# Patient Record
Sex: MALE | Race: WHITE | HISPANIC OR LATINO | URBAN - METROPOLITAN AREA
[De-identification: names, ages, dates, MRNs, and addresses within clinical notes are randomized per-mention and may not be internally consistent; named-entity substitution may affect disease eponyms.]

---

## 2017-08-05 ENCOUNTER — INPATIENT (INPATIENT)
Facility: HOSPITAL | Age: 82
LOS: 2 days | Discharge: HOME | DRG: 247 | End: 2017-08-08
Attending: STUDENT IN AN ORGANIZED HEALTH CARE EDUCATION/TRAINING PROGRAM | Admitting: STUDENT IN AN ORGANIZED HEALTH CARE EDUCATION/TRAINING PROGRAM
Payer: MEDICARE

## 2017-08-05 VITALS
SYSTOLIC BLOOD PRESSURE: 137 MMHG | DIASTOLIC BLOOD PRESSURE: 68 MMHG | OXYGEN SATURATION: 100 % | RESPIRATION RATE: 24 BRPM | HEART RATE: 56 BPM

## 2017-08-05 DIAGNOSIS — I21.3 ST ELEVATION (STEMI) MYOCARDIAL INFARCTION OF UNSPECIFIED SITE: ICD-10-CM

## 2017-08-05 DIAGNOSIS — R07.9 CHEST PAIN, UNSPECIFIED: ICD-10-CM

## 2017-08-05 DIAGNOSIS — E11.9 TYPE 2 DIABETES MELLITUS WITHOUT COMPLICATIONS: ICD-10-CM

## 2017-08-05 DIAGNOSIS — I10 ESSENTIAL (PRIMARY) HYPERTENSION: ICD-10-CM

## 2017-08-05 DIAGNOSIS — Z95.1 PRESENCE OF AORTOCORONARY BYPASS GRAFT: Chronic | ICD-10-CM

## 2017-08-05 DIAGNOSIS — E78.5 HYPERLIPIDEMIA, UNSPECIFIED: ICD-10-CM

## 2017-08-05 DIAGNOSIS — E87.8 OTHER DISORDERS OF ELECTROLYTE AND FLUID BALANCE, NOT ELSEWHERE CLASSIFIED: ICD-10-CM

## 2017-08-05 LAB
ALBUMIN SERPL ELPH-MCNC: 4.1 G/DL — SIGNIFICANT CHANGE UP (ref 3.3–5)
ALBUMIN SERPL ELPH-MCNC: 4.3 G/DL — SIGNIFICANT CHANGE UP (ref 3.3–5)
ALP SERPL-CCNC: 41 U/L — SIGNIFICANT CHANGE UP (ref 40–120)
ALP SERPL-CCNC: 42 U/L — SIGNIFICANT CHANGE UP (ref 40–120)
ALT FLD-CCNC: 17 U/L RC — SIGNIFICANT CHANGE UP (ref 10–45)
ALT FLD-CCNC: 18 U/L RC — SIGNIFICANT CHANGE UP (ref 10–45)
ANION GAP SERPL CALC-SCNC: 15 MMOL/L — SIGNIFICANT CHANGE UP (ref 5–17)
ANION GAP SERPL CALC-SCNC: 18 MMOL/L — HIGH (ref 5–17)
APTT BLD: 26.4 SEC — LOW (ref 27.5–37.4)
AST SERPL-CCNC: 22 U/L — SIGNIFICANT CHANGE UP (ref 10–40)
AST SERPL-CCNC: 24 U/L — SIGNIFICANT CHANGE UP (ref 10–40)
BASOPHILS # BLD AUTO: 0 K/UL — SIGNIFICANT CHANGE UP (ref 0–0.2)
BASOPHILS # BLD AUTO: 0 K/UL — SIGNIFICANT CHANGE UP (ref 0–0.2)
BASOPHILS NFR BLD AUTO: 0.1 % — SIGNIFICANT CHANGE UP (ref 0–2)
BASOPHILS NFR BLD AUTO: 0.2 % — SIGNIFICANT CHANGE UP (ref 0–2)
BILIRUB SERPL-MCNC: 0.5 MG/DL — SIGNIFICANT CHANGE UP (ref 0.2–1.2)
BILIRUB SERPL-MCNC: 0.6 MG/DL — SIGNIFICANT CHANGE UP (ref 0.2–1.2)
BLD GP AB SCN SERPL QL: NEGATIVE — SIGNIFICANT CHANGE UP
BUN SERPL-MCNC: 15 MG/DL — SIGNIFICANT CHANGE UP (ref 7–23)
BUN SERPL-MCNC: 17 MG/DL — SIGNIFICANT CHANGE UP (ref 7–23)
CALCIUM SERPL-MCNC: 9.6 MG/DL — SIGNIFICANT CHANGE UP (ref 8.4–10.5)
CALCIUM SERPL-MCNC: 9.7 MG/DL — SIGNIFICANT CHANGE UP (ref 8.4–10.5)
CHLORIDE SERPL-SCNC: 102 MMOL/L — SIGNIFICANT CHANGE UP (ref 96–108)
CHLORIDE SERPL-SCNC: 104 MMOL/L — SIGNIFICANT CHANGE UP (ref 96–108)
CHOLEST SERPL-MCNC: 118 MG/DL — SIGNIFICANT CHANGE UP (ref 10–199)
CK MB BLD-MCNC: 5.6 % — HIGH (ref 0–3.5)
CK MB CFR SERPL CALC: 4.6 NG/ML — SIGNIFICANT CHANGE UP (ref 0–6.7)
CK SERPL-CCNC: 126 U/L — SIGNIFICANT CHANGE UP (ref 30–200)
CK SERPL-CCNC: 82 U/L — SIGNIFICANT CHANGE UP (ref 30–200)
CO2 SERPL-SCNC: 18 MMOL/L — LOW (ref 22–31)
CO2 SERPL-SCNC: 20 MMOL/L — LOW (ref 22–31)
CREAT SERPL-MCNC: 1.06 MG/DL — SIGNIFICANT CHANGE UP (ref 0.5–1.3)
CREAT SERPL-MCNC: 1.2 MG/DL — SIGNIFICANT CHANGE UP (ref 0.5–1.3)
EOSINOPHIL # BLD AUTO: 0.1 K/UL — SIGNIFICANT CHANGE UP (ref 0–0.5)
EOSINOPHIL # BLD AUTO: 0.1 K/UL — SIGNIFICANT CHANGE UP (ref 0–0.5)
EOSINOPHIL NFR BLD AUTO: 0.6 % — SIGNIFICANT CHANGE UP (ref 0–6)
EOSINOPHIL NFR BLD AUTO: 1 % — SIGNIFICANT CHANGE UP (ref 0–6)
GAS PNL BLDV: SIGNIFICANT CHANGE UP
GLUCOSE SERPL-MCNC: 136 MG/DL — HIGH (ref 70–99)
GLUCOSE SERPL-MCNC: 307 MG/DL — HIGH (ref 70–99)
HBA1C BLD-MCNC: 7.2 % — HIGH (ref 4–5.6)
HCT VFR BLD CALC: 38.9 % — LOW (ref 39–50)
HCT VFR BLD CALC: 38.9 % — LOW (ref 39–50)
HDLC SERPL-MCNC: 39 MG/DL — LOW (ref 40–125)
HGB BLD-MCNC: 13.2 G/DL — SIGNIFICANT CHANGE UP (ref 13–17)
HGB BLD-MCNC: 13.6 G/DL — SIGNIFICANT CHANGE UP (ref 13–17)
INR BLD: 0.96 RATIO — SIGNIFICANT CHANGE UP (ref 0.88–1.16)
LIDOCAIN IGE QN: 46 U/L — SIGNIFICANT CHANGE UP (ref 7–60)
LIPID PNL WITH DIRECT LDL SERPL: 47 MG/DL — SIGNIFICANT CHANGE UP
LYMPHOCYTES # BLD AUTO: 2.4 K/UL — SIGNIFICANT CHANGE UP (ref 1–3.3)
LYMPHOCYTES # BLD AUTO: 20 % — SIGNIFICANT CHANGE UP (ref 13–44)
LYMPHOCYTES # BLD AUTO: 29.3 % — SIGNIFICANT CHANGE UP (ref 13–44)
LYMPHOCYTES # BLD AUTO: 3.2 K/UL — SIGNIFICANT CHANGE UP (ref 1–3.3)
MAGNESIUM SERPL-MCNC: 1.4 MG/DL — LOW (ref 1.6–2.6)
MCHC RBC-ENTMCNC: 32 PG — SIGNIFICANT CHANGE UP (ref 27–34)
MCHC RBC-ENTMCNC: 33.2 PG — SIGNIFICANT CHANGE UP (ref 27–34)
MCHC RBC-ENTMCNC: 33.8 GM/DL — SIGNIFICANT CHANGE UP (ref 32–36)
MCHC RBC-ENTMCNC: 35 GM/DL — SIGNIFICANT CHANGE UP (ref 32–36)
MCV RBC AUTO: 94.4 FL — SIGNIFICANT CHANGE UP (ref 80–100)
MCV RBC AUTO: 95 FL — SIGNIFICANT CHANGE UP (ref 80–100)
MONOCYTES # BLD AUTO: 1 K/UL — HIGH (ref 0–0.9)
MONOCYTES # BLD AUTO: 1 K/UL — HIGH (ref 0–0.9)
MONOCYTES NFR BLD AUTO: 8.2 % — SIGNIFICANT CHANGE UP (ref 2–14)
MONOCYTES NFR BLD AUTO: 9.6 % — SIGNIFICANT CHANGE UP (ref 2–14)
NEUTROPHILS # BLD AUTO: 6.5 K/UL — SIGNIFICANT CHANGE UP (ref 1.8–7.4)
NEUTROPHILS # BLD AUTO: 8.7 K/UL — HIGH (ref 1.8–7.4)
NEUTROPHILS NFR BLD AUTO: 60 % — SIGNIFICANT CHANGE UP (ref 43–77)
NEUTROPHILS NFR BLD AUTO: 70.9 % — SIGNIFICANT CHANGE UP (ref 43–77)
NT-PROBNP SERPL-SCNC: 222 PG/ML — SIGNIFICANT CHANGE UP (ref 0–300)
NT-PROBNP SERPL-SCNC: 540 PG/ML — HIGH (ref 0–300)
PHOSPHATE SERPL-MCNC: 1.9 MG/DL — LOW (ref 2.5–4.5)
PLATELET # BLD AUTO: 247 K/UL — SIGNIFICANT CHANGE UP (ref 150–400)
PLATELET # BLD AUTO: 257 K/UL — SIGNIFICANT CHANGE UP (ref 150–400)
POTASSIUM SERPL-MCNC: 4.1 MMOL/L — SIGNIFICANT CHANGE UP (ref 3.5–5.3)
POTASSIUM SERPL-MCNC: 4.7 MMOL/L — SIGNIFICANT CHANGE UP (ref 3.5–5.3)
POTASSIUM SERPL-SCNC: 4.1 MMOL/L — SIGNIFICANT CHANGE UP (ref 3.5–5.3)
POTASSIUM SERPL-SCNC: 4.7 MMOL/L — SIGNIFICANT CHANGE UP (ref 3.5–5.3)
PROT SERPL-MCNC: 6.6 G/DL — SIGNIFICANT CHANGE UP (ref 6–8.3)
PROT SERPL-MCNC: 6.8 G/DL — SIGNIFICANT CHANGE UP (ref 6–8.3)
PROTHROM AB SERPL-ACNC: 10.4 SEC — SIGNIFICANT CHANGE UP (ref 9.8–12.7)
RBC # BLD: 4.09 M/UL — LOW (ref 4.2–5.8)
RBC # BLD: 4.12 M/UL — LOW (ref 4.2–5.8)
RBC # FLD: 12 % — SIGNIFICANT CHANGE UP (ref 10.3–14.5)
RBC # FLD: 12 % — SIGNIFICANT CHANGE UP (ref 10.3–14.5)
RH IG SCN BLD-IMP: POSITIVE — SIGNIFICANT CHANGE UP
SODIUM SERPL-SCNC: 137 MMOL/L — SIGNIFICANT CHANGE UP (ref 135–145)
SODIUM SERPL-SCNC: 140 MMOL/L — SIGNIFICANT CHANGE UP (ref 135–145)
TOTAL CHOLESTEROL/HDL RATIO MEASUREMENT: 3 RATIO — LOW (ref 3.4–9.6)
TRIGL SERPL-MCNC: 159 MG/DL — HIGH (ref 10–149)
TROPONIN T SERPL-MCNC: 0.1 NG/ML — HIGH (ref 0–0.06)
TROPONIN T SERPL-MCNC: <0.01 NG/ML — SIGNIFICANT CHANGE UP (ref 0–0.06)
WBC # BLD: 10.8 K/UL — HIGH (ref 3.8–10.5)
WBC # BLD: 12.2 K/UL — HIGH (ref 3.8–10.5)
WBC # FLD AUTO: 10.8 K/UL — HIGH (ref 3.8–10.5)
WBC # FLD AUTO: 12.2 K/UL — HIGH (ref 3.8–10.5)

## 2017-08-05 PROCEDURE — 93010 ELECTROCARDIOGRAM REPORT: CPT | Mod: GC

## 2017-08-05 PROCEDURE — 71010: CPT | Mod: 26

## 2017-08-05 PROCEDURE — 99223 1ST HOSP IP/OBS HIGH 75: CPT | Mod: GC

## 2017-08-05 PROCEDURE — 93010 ELECTROCARDIOGRAM REPORT: CPT

## 2017-08-05 PROCEDURE — 99291 CRITICAL CARE FIRST HOUR: CPT | Mod: 25,GC

## 2017-08-05 RX ORDER — HEPARIN SODIUM 5000 [USP'U]/ML
INJECTION INTRAVENOUS; SUBCUTANEOUS
Qty: 25000 | Refills: 0 | Status: DISCONTINUED | OUTPATIENT
Start: 2017-08-05 | End: 2017-08-07

## 2017-08-05 RX ORDER — TICAGRELOR 90 MG/1
90 TABLET ORAL
Qty: 0 | Refills: 0 | Status: DISCONTINUED | OUTPATIENT
Start: 2017-08-05 | End: 2017-08-08

## 2017-08-05 RX ORDER — HEPARIN SODIUM 5000 [USP'U]/ML
5000 INJECTION INTRAVENOUS; SUBCUTANEOUS ONCE
Qty: 0 | Refills: 0 | Status: COMPLETED | OUTPATIENT
Start: 2017-08-05 | End: 2017-08-05

## 2017-08-05 RX ORDER — FENTANYL CITRATE 50 UG/ML
50 INJECTION INTRAVENOUS ONCE
Qty: 0 | Refills: 0 | Status: DISCONTINUED | OUTPATIENT
Start: 2017-08-05 | End: 2017-08-05

## 2017-08-05 RX ORDER — ASPIRIN/CALCIUM CARB/MAGNESIUM 324 MG
324 TABLET ORAL ONCE
Qty: 0 | Refills: 0 | Status: COMPLETED | OUTPATIENT
Start: 2017-08-05 | End: 2017-08-05

## 2017-08-05 RX ORDER — PANTOPRAZOLE SODIUM 20 MG/1
40 TABLET, DELAYED RELEASE ORAL
Qty: 0 | Refills: 0 | Status: DISCONTINUED | OUTPATIENT
Start: 2017-08-05 | End: 2017-08-08

## 2017-08-05 RX ORDER — SIMETHICONE 80 MG/1
80 TABLET, CHEWABLE ORAL ONCE
Qty: 0 | Refills: 0 | Status: COMPLETED | OUTPATIENT
Start: 2017-08-05 | End: 2017-08-05

## 2017-08-05 RX ORDER — MAGNESIUM SULFATE 500 MG/ML
2 VIAL (ML) INJECTION ONCE
Qty: 0 | Refills: 0 | Status: COMPLETED | OUTPATIENT
Start: 2017-08-05 | End: 2017-08-05

## 2017-08-05 RX ORDER — ATORVASTATIN CALCIUM 80 MG/1
80 TABLET, FILM COATED ORAL AT BEDTIME
Qty: 0 | Refills: 0 | Status: DISCONTINUED | OUTPATIENT
Start: 2017-08-05 | End: 2017-08-08

## 2017-08-05 RX ORDER — ASPIRIN/CALCIUM CARB/MAGNESIUM 324 MG
81 TABLET ORAL DAILY
Qty: 0 | Refills: 0 | Status: DISCONTINUED | OUTPATIENT
Start: 2017-08-05 | End: 2017-08-08

## 2017-08-05 RX ORDER — HEPARIN SODIUM 5000 [USP'U]/ML
4000 INJECTION INTRAVENOUS; SUBCUTANEOUS ONCE
Qty: 0 | Refills: 0 | Status: DISCONTINUED | OUTPATIENT
Start: 2017-08-05 | End: 2017-08-05

## 2017-08-05 RX ORDER — TICAGRELOR 90 MG/1
180 TABLET ORAL ONCE
Qty: 0 | Refills: 0 | Status: COMPLETED | OUTPATIENT
Start: 2017-08-05 | End: 2017-08-05

## 2017-08-05 RX ORDER — HEPARIN SODIUM 5000 [USP'U]/ML
4000 INJECTION INTRAVENOUS; SUBCUTANEOUS EVERY 6 HOURS
Qty: 0 | Refills: 0 | Status: DISCONTINUED | OUTPATIENT
Start: 2017-08-05 | End: 2017-08-07

## 2017-08-05 RX ORDER — SODIUM CHLORIDE 9 MG/ML
3 INJECTION INTRAMUSCULAR; INTRAVENOUS; SUBCUTANEOUS ONCE
Qty: 0 | Refills: 0 | Status: COMPLETED | OUTPATIENT
Start: 2017-08-05 | End: 2017-08-05

## 2017-08-05 RX ADMIN — HEPARIN SODIUM 800 UNIT(S)/HR: 5000 INJECTION INTRAVENOUS; SUBCUTANEOUS at 17:31

## 2017-08-05 RX ADMIN — TICAGRELOR 90 MILLIGRAM(S): 90 TABLET ORAL at 20:39

## 2017-08-05 RX ADMIN — SIMETHICONE 80 MILLIGRAM(S): 80 TABLET, CHEWABLE ORAL at 18:21

## 2017-08-05 RX ADMIN — Medication 324 MILLIGRAM(S): at 12:22

## 2017-08-05 RX ADMIN — TICAGRELOR 180 MILLIGRAM(S): 90 TABLET ORAL at 13:02

## 2017-08-05 RX ADMIN — FENTANYL CITRATE 50 MICROGRAM(S): 50 INJECTION INTRAVENOUS at 12:18

## 2017-08-05 RX ADMIN — SODIUM CHLORIDE 3 MILLILITER(S): 9 INJECTION INTRAMUSCULAR; INTRAVENOUS; SUBCUTANEOUS at 12:10

## 2017-08-05 RX ADMIN — Medication 50 GRAM(S): at 20:39

## 2017-08-05 RX ADMIN — HEPARIN SODIUM 5000 UNIT(S): 5000 INJECTION INTRAVENOUS; SUBCUTANEOUS at 13:01

## 2017-08-05 RX ADMIN — Medication 63.75 MILLIMOLE(S): at 22:05

## 2017-08-05 RX ADMIN — ATORVASTATIN CALCIUM 80 MILLIGRAM(S): 80 TABLET, FILM COATED ORAL at 13:01

## 2017-08-05 NOTE — H&P ADULT - NSHPREVIEWOFSYSTEMS_GEN_ALL_CORE
REVIEW OF SYSTEMS:    CONSTITUTIONAL: No weakness, fevers or chills  EYES/ENT: No visual changes;  No vertigo or throat pain   NECK: No pain or stiffness  RESPIRATORY: No cough, wheezing, hemoptysis; No shortness of breath  CARDIOVASCULAR: No chest pain or palpitations  GASTROINTESTINAL: No abdominal or epigastric pain. No nausea, vomiting, or hematemesis; No diarrhea or constipation. No melena or hematochezia.  GENITOURINARY: No dysuria, frequency or hematuria  NEUROLOGICAL: No numbness or weakness  SKIN: No itching, burning, rashes, or lesions   All other review of systems is negative unless indicated above. REVIEW OF SYSTEMS:    CONSTITUTIONAL: No weakness, fevers or chills  EYES/ENT: No visual changes;  No vertigo or throat pain   NECK: No pain or stiffness  RESPIRATORY: No cough, wheezing, hemoptysis; No shortness of breath  CARDIOVASCULAR: No chest pain or palpitations  GASTROINTESTINAL: No abdominal or epigastric pain. No nausea, vomiting, or hematemesis; No diarrhea or constipation. No melena or hematochezia. + burping and bloating.   GENITOURINARY: No dysuria, frequency or hematuria  NEUROLOGICAL: No numbness or weakness  SKIN: No itching, burning, rashes, or lesions   All other review of systems is negative unless indicated above.

## 2017-08-05 NOTE — ED PROVIDER NOTE - PHYSICAL EXAMINATION
AAOX3, Moderate distress 2/2 pain. NCAT, PERRL, +pallor, MMM, Neck Supple, Hr bradycardic, regular, no murmur appreciated. CTABL, Midline sternal scar. ABD S/NT/ND No CVAT, EXT warm, well perfused, No Edema, Distal Pulses Intact, No Rash,  MAEx4, Sensation to soft touch grossly intact, normal strength/tone.

## 2017-08-05 NOTE — ED PROVIDER NOTE - CRITICAL CARE PROVIDED
consultation with other physicians/consult w/ pt's family directly relating to pts condition/documentation/direct patient care (not related to procedure)/additional history taking/interpretation of diagnostic studies

## 2017-08-05 NOTE — H&P ADULT - ASSESSMENT
83 y/o M w/ pmhx of CABG x2 (1988), PCI w/ 2-3stents, HTN, T2DM. Presented to the ED w/ chest pain admitted for STEMI

## 2017-08-05 NOTE — ED ADULT NURSE NOTE - OBJECTIVE STATEMENT
85 y/o M pt w/ pmhx of DM2, CAD s/p CABG, stentx3 on Plavix, present to ED for 10/10 substernal chest pain that radiating to both arms a/w sob that began 2 hours. Pt was using a  on the floor when pain began. Pt became diaphoretic, SOB along with chest pain. No nausea, vomiting or dizziness. Pt took 2 0.4mg of Nitro at home, with mild relief. Pt states he has been otherwise well but felt tired yesterday. Had some SOB with exertion yesterday. No fever/chills, no change in vision, no throat pain, no jvd, +sob, no leg swelling or calf pain, no recent decrease in exercise tolerance, no recent travel,  no abdominal pain, no nausea/vomiting,  no dysuria, no joint pain, no rashes, no focal numbness or weakness, no melena, here ED pt arrival clutch chest, SOB, EKG done and given to MD, placed on CM NSR, labs drawn and sent, pt given fentanyl for pain, 324mg of aspirin, ED MD and cardiologist at bedside

## 2017-08-05 NOTE — ED PROVIDER NOTE - MEDICAL DECISION MAKING DETAILS
Chest pain consistent with ACS and CAD risk factors 1) serial EKGs/CXR/ASA/O2/cardiac monitor/LABS/nitro vs analgesia prn CP 2) reassess 3) Admit to telemetry, pending cards consult. Chest pain consistent with ACS and CAD risk factors 1) serial EKGs/CXR/ASA/O2/cardiac monitor/LABS/nitro vs analgesia prn CP 2) reassess 3) Admit to telemetry, pending cards consult.    ALEM Morton MD: 85 y/o male with PMH DM2, CAD s/p CABG and 3 stents, on plavix, who p/w 10/10 SSCP radiating to both arms a/w sob that began 2 hours pta. Per pt and wife, pt was exerting himself by using a  to clean the home when CP began. No nausea, vomiting, diaphoresis. Pt states he has been otherwise well but felt tired yesterday. Did not take ASA prior to arrival. No known h/o GIB or life-threatening bleeding, kidney injury or CVA. Pt arrived in triage clutching his chest, appearing very uncomfortable. STAT EKG concerning for 1mm STEs in V1 and V2. Cardiology consulted for a STEMI and arrived immediately. Per their recommendations, pt given 325mg ASA, 180mg brillinta, 5000U heparin and 80mg atorvastatin. Cardiology fellow discussed pt. with Cardiology attending, and they plan to admit pt to CCU, obtain troponin and if + and/or pt. continues to have ongoing CP, they will take pt to cardiac cath lab for PCI.

## 2017-08-05 NOTE — CONSULT NOTE ADULT - SUBJECTIVE AND OBJECTIVE BOX
Patient seen and evaluated @ [facility] ED 26  Chief Complaint: chest pain    HPI:  84 year old man w/ hx CABG 2V 1988, subsequent PCI last 2014    PMH:     PSH:     Medications:   heparin  Injectable 5000 Unit(s) IV Push once  ticagrelor 180 milliGRAM(s) Oral once  atorvastatin 80 milliGRAM(s) Oral at bedtime    Allergies:  No Known Allergies    FAMILY HISTORY:    Social History:  Smoking:  Alcohol:  Drugs:    Review of Systems:  Constitutional: [ ] Fever [ ] Chills [ ] Fatigue [ ] Weight change   HEENT: [ ] Blurred vision [ ] Eye Pain [ ] Headache [ ] Runny nose [ ] Sore Throat   Respiratory: [ ] Cough [ ] Wheezing [ ] Shortness of breath  Cardiovascular: [ ] Chest Pain [ ] Palpitations [ ] POPE [ ] PND [ ] Orthopnea  Gastrointestinal: [ ] Abdominal Pain [ ] Diarrhea [ ] Constipation [ ] Hemorrhoids [ ] Nausea [ ] Vomiting  Genitourinary: [ ] Nocturia [ ] Dysuria [ ] Incontinence  Extremities: [ ] Swelling [ ] Joint Pain  Neurologic: [ ] Focal deficit [ ] Paresthesias [ ] Syncope  Lymphatic: [ ] Swelling [ ] Lymphadenopathy   Skin: [ ] Rash [ ] Ecchymoses [ ] Wounds [ ] Lesions  Psychiatry: [ ] Depression [ ] Suicidal/Homicidal Ideation [ ] Anxiety [ ] Sleep Disturbances  [ ] 10 point review of systems is otherwise negative except as mentioned above            [ ]Unable to obtain    Physical Exam:  T(C): 36.4 (08-05-17 @ 12:30), Max: 36.4 (08-05-17 @ 12:30)  HR: 56 (08-05-17 @ 12:13) (56 - 56)  BP: 152/72 (08-05-17 @ 12:30) (137/68 - 152/72)  RR: 18 (08-05-17 @ 12:30) (18 - 24)  SpO2: 99% (08-05-17 @ 12:30) (99% - 100%)  Wt(kg): --    Daily     Daily     Appearance: [ ] Normal [ ] NAD  Eyes: [ ] PERRL [ ] EOMI  HENT: [ ] Normal oral muscosa [ ]NC/AT  Cardiovascular: [ ] S1 [ ] S2 [ ] RRR [ ] No m/r/g [ ]No edema [ ] no JVP  Procedural Access Site: [ ] No hematoma [ ] Non-tender to palpation [ ] 2+ pulse [ ] No bruit [ ] No Ecchymosis  Respiratory: [ ] Clear to auscultation bilaterally  Gastrointestinal: [ ] Soft [ ] Non-tender [ ] Non-distended [ ] BS+  Musculoskeletal: [ ] No clubbing [ ] No joint deformity   Neurologic: [ ] Non-focal  Lymphatic: [ ] No lymphadenopathy  Psychiatry: [ ] AAOx3 [ ] Mood & affect appropriate  Skin: [ ] No rashes [ ] No ecchymoses [ ] No cyanosis    Cardiovascular Diagnostic Testing:  ECG:    Echo:    Stress Testing:    Cath:    Interpretation of Telemetry:    Imaging:    Labs:                        13.2   10.8  )-----------( 257      ( 05 Aug 2017 12:23 )             38.9           PT/INR - ( 05 Aug 2017 12:23 )   PT: 10.4 sec;   INR: 0.96 ratio         PTT - ( 05 Aug 2017 12:23 )  PTT:26.4 sec Patient seen and evaluated @ [facility] ED 26  Chief Complaint: chest pain    HPI:  84 year old man w/ hx CABG 2V 1988, subsequent PCI last 2014, DM presents w/ chest pain.    He explains that two hours ago started cleaning with rented steam machine. Laments "I should not have pushed myself so hard." He developed substernal chest pain, pressure-like "9.99" out of 10 rad down both arms associated with SOB and diaphoresis per wife. Pain lasted until patient received fentanyl in ER. Denies orthopnea, fatigue, n/v, LH/dizziness, nor LOC. Up to this point had been feeling well, which surprised him because he is often fatigued.    Cards [employer] [facility]      Medications:   heparin  Injectable 5000 Unit(s) IV Push once  ticagrelor 180 milliGRAM(s) Oral once  atorvastatin 80 milliGRAM(s) Oral at bedtime    home:  plavix 75  losartan 50  atenolol 25  atorvastatin 40  trajenta 5  metformin 500  glimepiride 4  vit C      Allergies:  No Known Allergies    FAMILY HISTORY:    Social History:  Smoking: former smoker   Alcohol: denies  Drugs: denies    Review of Systems: see hpi  Constitutional: [ ] Fever [ ]  Chills [ ] Fatigue [ ] Weight change   HEENT: [ ] Blurred vision [ ] Eye Pain [ ] Headache [ ] Runny nose [ ] Sore Throat   Respiratory: [ ] Cough [ ] Wheezing [ ] Shortness of breath  Cardiovascular: [ ] Chest Pain [ ] Palpitations [ ] POPE [ ] PND [ ] Orthopnea  Gastrointestinal: [ ] Abdominal Pain [ ] Diarrhea [ ] Constipation [ ] Hemorrhoids [ ] Nausea [ ] Vomiting  Genitourinary: [ ] Nocturia [ ] Dysuria [ ] Incontinence  Extremities: [ ] Swelling [ ] Joint Pain  Neurologic: [ ] Focal deficit [ ] Paresthesias [ ] Syncope  Lymphatic: [ ] Swelling [ ] Lymphadenopathy   Skin: [ ] Rash [ ] Ecchymoses [ ] Wounds [ ] Lesions  Psychiatry: [ ] Depression [ ] Suicidal/Homicidal Ideation [ ] Anxiety [ ] Sleep Disturbances  [ ] 10 point review of systems is otherwise negative except as mentioned above            [ ]Unable to obtain    Physical Exam:  T(C): 36.4 (08-05-17 @ 12:30), Max: 36.4 (08-05-17 @ 12:30)  HR: 56 (08-05-17 @ 12:13) (56 - 56)  BP: 152/72 (08-05-17 @ 12:30) (137/68 - 152/72)  RR: 18 (08-05-17 @ 12:30) (18 - 24)  SpO2: 99% (08-05-17 @ 12:30) (99% - 100%)  Wt(kg): --    Daily     Daily     Appearance: [x ] Normal [x ] NAD  Eyes: [ x] PERRL [x ] EOMI  HENT: [x ] Normal oral muscosa [x ]NC/AT  Cardiovascular: [x ] S1 [x ] S2 [x ] RRR [ x] No m/r/g [x ]No edema [x ] no JVP  Respiratory: [x ] Clear to auscultation bilaterally  Gastrointestinal: [x ] Soft [x ] Non-tender [x ] Non-distended [x ] BS+  Musculoskeletal: [x ] No clubbing [x ] No joint deformity   Neurologic: [x ] Non-focal  Lymphatic: [x ] No lymphadenopathy  Psychiatry: [x ] AAOx3 [x ] Mood & affect appropriate  Skin: [ x] No rashes [x ] No ecchymoses [x ] No cyanosis    Cardiovascular Diagnostic Testing:  ECG: sinus 50     Echo:    Stress Testing:    Cath:    Interpretation of Telemetry:    Imaging:    Labs:                        13.2   10.8  )-----------( 257      ( 05 Aug 2017 12:23 )             38.9           PT/INR - ( 05 Aug 2017 12:23 )   PT: 10.4 sec;   INR: 0.96 ratio         PTT - ( 05 Aug 2017 12:23 )  PTT:26.4 sec Patient seen and evaluated @ [facility] ED 26  Chief Complaint: chest pain    HPI:  84 year old man w/ hx CABG 2V 1988, subsequent PCI last 2014, DM presents w/ chest pain.    He explains that two hours ago started cleaning with rented steam machine. Laments "I should not have pushed myself so hard." He developed substernal chest pain, pressure-like "9.99" out of 10 rad down both arms associated with SOB and diaphoresis per wife. Pain lasted until patient received fentanyl in ER. Denies orthopnea, fatigue, n/v, LH/dizziness, nor LOC. Up to this point had been feeling well, which surprised him because he is often fatigued.    Cards [employer] [facility]      Medications:   heparin  Injectable 5000 Unit(s) IV Push once  ticagrelor 180 milliGRAM(s) Oral once  atorvastatin 80 milliGRAM(s) Oral at bedtime    home:  plavix 75  losartan 50  atenolol 25  atorvastatin 40  trajenta 5  metformin 500  glimepiride 4  vit C      Allergies:  No Known Allergies    FAMILY HISTORY:    Social History:  Smoking: former smoker   Alcohol: denies  Drugs: denies    Review of Systems: see hpi  Constitutional: [ ] Fever [ ]  Chills [ ] Fatigue [ ] Weight change   HEENT: [ ] Blurred vision [ ] Eye Pain [ ] Headache [ ] Runny nose [ ] Sore Throat   Respiratory: [ ] Cough [ ] Wheezing [ ] Shortness of breath  Cardiovascular: [ ] Chest Pain [ ] Palpitations [ ] POPE [ ] PND [ ] Orthopnea  Gastrointestinal: [ ] Abdominal Pain [ ] Diarrhea [ ] Constipation [ ] Hemorrhoids [ ] Nausea [ ] Vomiting  Genitourinary: [ ] Nocturia [ ] Dysuria [ ] Incontinence  Extremities: [ ] Swelling [ ] Joint Pain  Neurologic: [ ] Focal deficit [ ] Paresthesias [ ] Syncope  Lymphatic: [ ] Swelling [ ] Lymphadenopathy   Skin: [ ] Rash [ ] Ecchymoses [ ] Wounds [ ] Lesions  Psychiatry: [ ] Depression [ ] Suicidal/Homicidal Ideation [ ] Anxiety [ ] Sleep Disturbances  [ ] 10 point review of systems is otherwise negative except as mentioned above            [ ]Unable to obtain    Physical Exam:  T(C): 36.4 (08-05-17 @ 12:30), Max: 36.4 (08-05-17 @ 12:30)  HR: 56 (08-05-17 @ 12:13) (56 - 56)  BP: 152/72 (08-05-17 @ 12:30) (137/68 - 152/72)  RR: 18 (08-05-17 @ 12:30) (18 - 24)  SpO2: 99% (08-05-17 @ 12:30) (99% - 100%)  Wt(kg): --    Daily     Daily     Appearance: [x ] Normal [x ] NAD  Eyes: [ x] PERRL [x ] EOMI  HENT: [x ] Normal oral muscosa [x ]NC/AT  Cardiovascular: [x ] S1 [x ] S2 [x ] RRR [ x] No m/r/g [x ]No edema [x ] no JVP  Respiratory: [x ] Clear to auscultation bilaterally  Gastrointestinal: [x ] Soft [x ] Non-tender [x ] Non-distended [x ] BS+  Musculoskeletal: [x ] No clubbing [x ] No joint deformity   Neurologic: [x ] Non-focal  Lymphatic: [x ] No lymphadenopathy  Psychiatry: [x ] AAOx3 [x ] Mood & affect appropriate  Skin: [ x] No rashes [x ] No ecchymoses [x ] No cyanosis    Cardiovascular Diagnostic Testing:  ECG: sinus 50, incomplete RBBB, septal 1mm NATHANIEL    Labs:                        13.2   10.8  )-----------( 257      ( 05 Aug 2017 12:23 )             38.9           PT/INR - ( 05 Aug 2017 12:23 )   PT: 10.4 sec;   INR: 0.96 ratio         PTT - ( 05 Aug 2017 12:23 )  PTT:26.4 sec

## 2017-08-05 NOTE — H&P ADULT - PROBLEM SELECTOR PLAN 1
s/p ticagrelor load give 90mg tonight then 90mg BID  s/p ASA load c/w 81mg OD   c/w heparin ggt   atorvastatin 80mg OD  -trend CE's q8 until down trending.   continuous telemetry monitoring   -vital checks per CCU routine.    -eventual cardiac cath

## 2017-08-05 NOTE — CONSULT NOTE ADULT - PROBLEM SELECTOR RECOMMENDATION 9
-pain resolved after fentanyl  -hemodynamically stable  -trend CE/EKG  -ticagrelor 180, asa 325, hep 5000 followed by hep gtt  -TTE  -discussed w interventionist will consider ICA   -records from [facility]  -admit and monitor CCU    [id number]

## 2017-08-05 NOTE — H&P ADULT - NSHPPHYSICALEXAM_GEN_ALL_CORE
Gen: awake, alert  HENT: neck soft / supple  Lymph: no LAD noted in neck  Eye: PERRL, sclerae anicteric  CV: bradycardic, RRR, +systolic murmur   Pulm: CTAB, no w/r/r auscultated  Abd: +BS, soft, NT, ND  Skin: warm, dry  Ext: no LE edema  Neuro: answering questions appropriately, following commands appropriately, recent and remote memory intact  Psych: normal mood / affect

## 2017-08-05 NOTE — H&P ADULT - HISTORY OF PRESENT ILLNESS
83 y/o M w/ pmhx of CABG 85 y/o M w/ pmhx of CABG x2 (1988), PCI w/ 2-3? stents, HTN, T2DM (noninsulin dependent) 85 y/o M w/ pmhx of CABG x2 (1988), PCI w/ 2-3? stents, HTN, T2DM (noninsulin dependent). Presented to ED w/ chest pain. Pain started when the patient was vaccuming the sofa. 8-10/10 in intensity sharp radiating to both arms accompanied w/ diaphoresis. EMS was called and he was taken to the ED.     In the ED EKG was performed and showed 83 y/o M w/ pmhx of CABG x2 (1988), PCI w/ 2-3? stents last PCI in 2014, HTN, T2DM (noninsulin dependent). Presented to ED w/ chest pain. Pain started when the patient was vaccuming the sofa. 8-10/10 in intensity sharp radiating to both arms accompanied w/ diaphoresis. EMS was called and he was taken to the ED.     In the ED EKG was performed and showed 1mm elevation in v2; <1mm elevation in v1 in the setting of right bundle branch block. GIven 325mg ASA, 180mg brillinta, 5000U heparin and 80mg Atorvastatin. CE's in ED had elevated CPK mass assay % increase but other wise negative.    Patient txfered to CCU for monitoring.

## 2017-08-05 NOTE — ED PROVIDER NOTE - OBJECTIVE STATEMENT
83yo M hx of DMII, CAD s/p CABG on plavix pw 10/10 SSCP radiating to both arms a/w sob that began 2 hours pta. Pt was 85yo M hx of DMII, CAD s/p CABG on plavix pw 10/10 SSCP radiating to both arms a/w sob that began 2 hours pta. Pt was using a  on the floor when pain began. Progressive onset. No nausea, vomiting, diaphoresis. Pt states he has been otherwise well but felt tired yesterday. Did not take ASA prior to arrival.  No fever/chills, no change in vision, no throat pain, no jvd, +sob, no leg swelling or calf pain, no orthopnea, no pnd, no recent decrease in exercise tolerance, no recent travel, no cancer hx, unknown prior hx of dvt/blood clot,  no abdominal pain, no nausea/vomiting,  no dysuria, no joint pain, no rashes, no focal numbness or weakness, no known mental health issues, no melena, no hematochezia, no a/c

## 2017-08-05 NOTE — CONSULT NOTE ADULT - ASSESSMENT
84 year old man w/ hx CABG 2V 1988, subsequent PCI last 2014, DM presents w/ chest pain concerning for STEMI

## 2017-08-05 NOTE — CHART NOTE - NSCHARTNOTEFT_GEN_A_CORE
====================  NEW EVENTS:  ====================  Loaded with ASA, Brilinta, Heparin. Admitted to CCU for monitoring.    ====================  SUMMARY:  ====================  HPI:  85 y/o M w/ pmhx of CABG x2 (1988), PCI w/ 2-3? stents last PCI in 2014, HTN, T2DM (noninsulin dependent). Presented to ED w/ chest pain. Pain started when the patient was vaccuming the sofa. 8-10/10 in intensity sharp radiating to both arms accompanied w/ diaphoresis. EMS was called and he was taken to the ED.     In the ED EKG was performed and showed 1mm elevation in v2; <1mm elevation in v1 in the setting of right bundle branch block. GIven 325mg ASA, 180mg brillinta, 5000U heparin and 80mg Atorvastatin. CE's in ED had elevated CPK mass assay % increase but other wise negative.    Patient txfered to CCU for monitoring. (05 Aug 2017 16:11)      ====================  VITALS:  ====================    ICU Vital Signs Last 24 Hrs  T(C): 36.8 (05 Aug 2017 19:35), Max: 37 (05 Aug 2017 15:35)  T(F): 98.3 (05 Aug 2017 19:35), Max: 98.6 (05 Aug 2017 15:35)  HR: 50 (05 Aug 2017 21:30) (50 - 68)  BP: 114/65 (05 Aug 2017 21:30) (114/65 - 157/66)  BP(mean): 78 (05 Aug 2017 21:30) (78 - 105)  ABP: --  ABP(mean): --  RR: 17 (05 Aug 2017 21:30) (17 - 24)  SpO2: 96% (05 Aug 2017 21:30) (96% - 100%)      I&O's Summary    05 Aug 2017 07:01  -  05 Aug 2017 22:21  --------------------------------------------------------  IN: 632.5 mL / OUT: 750 mL / NET: -117.5 mL        Adult Advanced Hemodynamics Last 24 Hrs  CVP(mm Hg): --  CVP(cm H2O): --  CO: --  CI: --  PA: --  PA(mean): --  PCWP: --  SVR: --  SVRI: --  PVR: --  PVRI: --        ====================  LABS:  ====================                          13.6   12.2  )-----------( 247      ( 05 Aug 2017 18:51 )             38.9     08-05    140  |  104  |  15  ----------------------------<  136<H>  4.1   |  18<L>  |  1.06    Ca    9.6      05 Aug 2017 18:51  Phos  1.9     08-05  Mg     1.4     08-05    TPro  6.6  /  Alb  4.1  /  TBili  0.5  /  DBili  x   /  AST  24  /  ALT  18  /  AlkPhos  42  08-05    PT/INR - ( 05 Aug 2017 12:23 )   PT: 10.4 sec;   INR: 0.96 ratio         PTT - ( 05 Aug 2017 12:23 )  PTT:26.4 sec  Creatine Kinase, Serum: 126 U/L (08-05-17 @ 18:51)  Troponin T, Serum: 0.10 ng/mL <H> (08-05-17 @ 18:51)  Creatine Kinase, Serum: 82 U/L (08-05-17 @ 12:23)  Troponin T, Serum: <0.01 ng/mL (08-05-17 @ 12:23)        ====================  PLAN:  ====================  -C/w ASA, Brilinta, Heparin, Lipitor  -Trend CE

## 2017-08-06 LAB
ALBUMIN SERPL ELPH-MCNC: 3.8 G/DL — SIGNIFICANT CHANGE UP (ref 3.3–5)
ALBUMIN SERPL ELPH-MCNC: 3.9 G/DL — SIGNIFICANT CHANGE UP (ref 3.3–5)
ALP SERPL-CCNC: 39 U/L — LOW (ref 40–120)
ALP SERPL-CCNC: 39 U/L — LOW (ref 40–120)
ALT FLD-CCNC: 17 U/L RC — SIGNIFICANT CHANGE UP (ref 10–45)
ALT FLD-CCNC: 18 U/L RC — SIGNIFICANT CHANGE UP (ref 10–45)
ANION GAP SERPL CALC-SCNC: 15 MMOL/L — SIGNIFICANT CHANGE UP (ref 5–17)
ANION GAP SERPL CALC-SCNC: 15 MMOL/L — SIGNIFICANT CHANGE UP (ref 5–17)
APPEARANCE UR: CLEAR — SIGNIFICANT CHANGE UP
APTT BLD: 63.5 SEC — HIGH (ref 27.5–37.4)
APTT BLD: 64.7 SEC — HIGH (ref 27.5–37.4)
AST SERPL-CCNC: 32 U/L — SIGNIFICANT CHANGE UP (ref 10–40)
AST SERPL-CCNC: 39 U/L — SIGNIFICANT CHANGE UP (ref 10–40)
BASOPHILS # BLD AUTO: 0 K/UL — SIGNIFICANT CHANGE UP (ref 0–0.2)
BASOPHILS # BLD AUTO: 0 K/UL — SIGNIFICANT CHANGE UP (ref 0–0.2)
BASOPHILS NFR BLD AUTO: 0.1 % — SIGNIFICANT CHANGE UP (ref 0–2)
BASOPHILS NFR BLD AUTO: 0.3 % — SIGNIFICANT CHANGE UP (ref 0–2)
BILIRUB SERPL-MCNC: 0.4 MG/DL — SIGNIFICANT CHANGE UP (ref 0.2–1.2)
BILIRUB SERPL-MCNC: 0.7 MG/DL — SIGNIFICANT CHANGE UP (ref 0.2–1.2)
BILIRUB UR-MCNC: NEGATIVE — SIGNIFICANT CHANGE UP
BUN SERPL-MCNC: 13 MG/DL — SIGNIFICANT CHANGE UP (ref 7–23)
BUN SERPL-MCNC: 13 MG/DL — SIGNIFICANT CHANGE UP (ref 7–23)
CALCIUM SERPL-MCNC: 9 MG/DL — SIGNIFICANT CHANGE UP (ref 8.4–10.5)
CALCIUM SERPL-MCNC: 9.4 MG/DL — SIGNIFICANT CHANGE UP (ref 8.4–10.5)
CHLORIDE SERPL-SCNC: 104 MMOL/L — SIGNIFICANT CHANGE UP (ref 96–108)
CHLORIDE SERPL-SCNC: 105 MMOL/L — SIGNIFICANT CHANGE UP (ref 96–108)
CK MB BLD-MCNC: 10.8 % — HIGH (ref 0–3.5)
CK MB BLD-MCNC: 11.7 % — HIGH (ref 0–3.5)
CK MB BLD-MCNC: 12.8 % — HIGH (ref 0–3.5)
CK MB CFR SERPL CALC: 18.1 NG/ML — HIGH (ref 0–6.7)
CK MB CFR SERPL CALC: 23.6 NG/ML — HIGH (ref 0–6.7)
CK MB CFR SERPL CALC: 25.2 NG/ML — HIGH (ref 0–6.7)
CK SERPL-CCNC: 167 U/L — SIGNIFICANT CHANGE UP (ref 30–200)
CK SERPL-CCNC: 184 U/L — SIGNIFICANT CHANGE UP (ref 30–200)
CK SERPL-CCNC: 215 U/L — HIGH (ref 30–200)
CO2 SERPL-SCNC: 22 MMOL/L — SIGNIFICANT CHANGE UP (ref 22–31)
CO2 SERPL-SCNC: 22 MMOL/L — SIGNIFICANT CHANGE UP (ref 22–31)
COLOR SPEC: COLORLESS — SIGNIFICANT CHANGE UP
CREAT SERPL-MCNC: 1 MG/DL — SIGNIFICANT CHANGE UP (ref 0.5–1.3)
CREAT SERPL-MCNC: 1.01 MG/DL — SIGNIFICANT CHANGE UP (ref 0.5–1.3)
DIFF PNL FLD: NEGATIVE — SIGNIFICANT CHANGE UP
EOSINOPHIL # BLD AUTO: 0.2 K/UL — SIGNIFICANT CHANGE UP (ref 0–0.5)
EOSINOPHIL # BLD AUTO: 0.2 K/UL — SIGNIFICANT CHANGE UP (ref 0–0.5)
EOSINOPHIL NFR BLD AUTO: 1.9 % — SIGNIFICANT CHANGE UP (ref 0–6)
EOSINOPHIL NFR BLD AUTO: 2 % — SIGNIFICANT CHANGE UP (ref 0–6)
GLUCOSE SERPL-MCNC: 115 MG/DL — HIGH (ref 70–99)
GLUCOSE SERPL-MCNC: 138 MG/DL — HIGH (ref 70–99)
GLUCOSE UR QL: NEGATIVE — SIGNIFICANT CHANGE UP
HCT VFR BLD CALC: 37.1 % — LOW (ref 39–50)
HCT VFR BLD CALC: 38.6 % — LOW (ref 39–50)
HGB BLD-MCNC: 13.1 G/DL — SIGNIFICANT CHANGE UP (ref 13–17)
HGB BLD-MCNC: 13.4 G/DL — SIGNIFICANT CHANGE UP (ref 13–17)
INR BLD: 0.93 RATIO — SIGNIFICANT CHANGE UP (ref 0.88–1.16)
INR BLD: 0.99 RATIO — SIGNIFICANT CHANGE UP (ref 0.88–1.16)
KETONES UR-MCNC: NEGATIVE — SIGNIFICANT CHANGE UP
LEUKOCYTE ESTERASE UR-ACNC: NEGATIVE — SIGNIFICANT CHANGE UP
LYMPHOCYTES # BLD AUTO: 19.6 % — SIGNIFICANT CHANGE UP (ref 13–44)
LYMPHOCYTES # BLD AUTO: 2.3 K/UL — SIGNIFICANT CHANGE UP (ref 1–3.3)
LYMPHOCYTES # BLD AUTO: 2.5 K/UL — SIGNIFICANT CHANGE UP (ref 1–3.3)
LYMPHOCYTES # BLD AUTO: 21.3 % — SIGNIFICANT CHANGE UP (ref 13–44)
MAGNESIUM SERPL-MCNC: 1.8 MG/DL — SIGNIFICANT CHANGE UP (ref 1.6–2.6)
MAGNESIUM SERPL-MCNC: 2.2 MG/DL — SIGNIFICANT CHANGE UP (ref 1.6–2.6)
MCHC RBC-ENTMCNC: 32.6 PG — SIGNIFICANT CHANGE UP (ref 27–34)
MCHC RBC-ENTMCNC: 33.1 PG — SIGNIFICANT CHANGE UP (ref 27–34)
MCHC RBC-ENTMCNC: 34.7 GM/DL — SIGNIFICANT CHANGE UP (ref 32–36)
MCHC RBC-ENTMCNC: 35.2 GM/DL — SIGNIFICANT CHANGE UP (ref 32–36)
MCV RBC AUTO: 94 FL — SIGNIFICANT CHANGE UP (ref 80–100)
MCV RBC AUTO: 94.1 FL — SIGNIFICANT CHANGE UP (ref 80–100)
MONOCYTES # BLD AUTO: 1.2 K/UL — HIGH (ref 0–0.9)
MONOCYTES # BLD AUTO: 1.3 K/UL — HIGH (ref 0–0.9)
MONOCYTES NFR BLD AUTO: 11.5 % — SIGNIFICANT CHANGE UP (ref 2–14)
MONOCYTES NFR BLD AUTO: 9.8 % — SIGNIFICANT CHANGE UP (ref 2–14)
NEUTROPHILS # BLD AUTO: 7.5 K/UL — HIGH (ref 1.8–7.4)
NEUTROPHILS # BLD AUTO: 8.1 K/UL — HIGH (ref 1.8–7.4)
NEUTROPHILS NFR BLD AUTO: 64.9 % — SIGNIFICANT CHANGE UP (ref 43–77)
NEUTROPHILS NFR BLD AUTO: 68.4 % — SIGNIFICANT CHANGE UP (ref 43–77)
NITRITE UR-MCNC: NEGATIVE — SIGNIFICANT CHANGE UP
PH UR: 6 — SIGNIFICANT CHANGE UP (ref 5–8)
PHOSPHATE SERPL-MCNC: 3 MG/DL — SIGNIFICANT CHANGE UP (ref 2.5–4.5)
PLATELET # BLD AUTO: 240 K/UL — SIGNIFICANT CHANGE UP (ref 150–400)
PLATELET # BLD AUTO: 242 K/UL — SIGNIFICANT CHANGE UP (ref 150–400)
POTASSIUM SERPL-MCNC: 3.8 MMOL/L — SIGNIFICANT CHANGE UP (ref 3.5–5.3)
POTASSIUM SERPL-MCNC: 3.9 MMOL/L — SIGNIFICANT CHANGE UP (ref 3.5–5.3)
POTASSIUM SERPL-SCNC: 3.8 MMOL/L — SIGNIFICANT CHANGE UP (ref 3.5–5.3)
POTASSIUM SERPL-SCNC: 3.9 MMOL/L — SIGNIFICANT CHANGE UP (ref 3.5–5.3)
PROT SERPL-MCNC: 6.2 G/DL — SIGNIFICANT CHANGE UP (ref 6–8.3)
PROT SERPL-MCNC: 6.2 G/DL — SIGNIFICANT CHANGE UP (ref 6–8.3)
PROT UR-MCNC: NEGATIVE — SIGNIFICANT CHANGE UP
PROTHROM AB SERPL-ACNC: 10 SEC — SIGNIFICANT CHANGE UP (ref 9.8–12.7)
PROTHROM AB SERPL-ACNC: 10.7 SEC — SIGNIFICANT CHANGE UP (ref 9.8–12.7)
RBC # BLD: 3.94 M/UL — LOW (ref 4.2–5.8)
RBC # BLD: 4.11 M/UL — LOW (ref 4.2–5.8)
RBC # FLD: 12 % — SIGNIFICANT CHANGE UP (ref 10.3–14.5)
RBC # FLD: 12.1 % — SIGNIFICANT CHANGE UP (ref 10.3–14.5)
SODIUM SERPL-SCNC: 141 MMOL/L — SIGNIFICANT CHANGE UP (ref 135–145)
SODIUM SERPL-SCNC: 142 MMOL/L — SIGNIFICANT CHANGE UP (ref 135–145)
SP GR SPEC: 1 — LOW (ref 1.01–1.02)
TROPONIN T SERPL-MCNC: 0.27 NG/ML — HIGH (ref 0–0.06)
TROPONIN T SERPL-MCNC: 0.49 NG/ML — HIGH (ref 0–0.06)
TROPONIN T SERPL-MCNC: 0.56 NG/ML — HIGH (ref 0–0.06)
UROBILINOGEN FLD QL: NEGATIVE — SIGNIFICANT CHANGE UP
WBC # BLD: 11.6 K/UL — HIGH (ref 3.8–10.5)
WBC # BLD: 11.8 K/UL — HIGH (ref 3.8–10.5)
WBC # FLD AUTO: 11.6 K/UL — HIGH (ref 3.8–10.5)
WBC # FLD AUTO: 11.8 K/UL — HIGH (ref 3.8–10.5)

## 2017-08-06 PROCEDURE — 99233 SBSQ HOSP IP/OBS HIGH 50: CPT | Mod: GC

## 2017-08-06 PROCEDURE — 93306 TTE W/DOPPLER COMPLETE: CPT | Mod: 26

## 2017-08-06 PROCEDURE — 93010 ELECTROCARDIOGRAM REPORT: CPT

## 2017-08-06 RX ORDER — SIMETHICONE 80 MG/1
80 TABLET, CHEWABLE ORAL ONCE
Qty: 0 | Refills: 0 | Status: COMPLETED | OUTPATIENT
Start: 2017-08-06 | End: 2017-08-06

## 2017-08-06 RX ORDER — LISINOPRIL 2.5 MG/1
5 TABLET ORAL DAILY
Qty: 0 | Refills: 0 | Status: DISCONTINUED | OUTPATIENT
Start: 2017-08-06 | End: 2017-08-08

## 2017-08-06 RX ORDER — ACETAMINOPHEN 500 MG
650 TABLET ORAL AT BEDTIME
Qty: 0 | Refills: 0 | Status: DISCONTINUED | OUTPATIENT
Start: 2017-08-06 | End: 2017-08-06

## 2017-08-06 RX ORDER — SIMETHICONE 80 MG/1
80 TABLET, CHEWABLE ORAL THREE TIMES A DAY
Qty: 0 | Refills: 0 | Status: DISCONTINUED | OUTPATIENT
Start: 2017-08-06 | End: 2017-08-08

## 2017-08-06 RX ORDER — CEFAZOLIN SODIUM 1 G
2000 VIAL (EA) INJECTION ONCE
Qty: 0 | Refills: 0 | Status: DISCONTINUED | OUTPATIENT
Start: 2017-08-06 | End: 2017-08-06

## 2017-08-06 RX ORDER — POTASSIUM CHLORIDE 20 MEQ
20 PACKET (EA) ORAL ONCE
Qty: 0 | Refills: 0 | Status: COMPLETED | OUTPATIENT
Start: 2017-08-06 | End: 2017-08-06

## 2017-08-06 RX ORDER — MAGNESIUM SULFATE 500 MG/ML
2 VIAL (ML) INJECTION ONCE
Qty: 0 | Refills: 0 | Status: COMPLETED | OUTPATIENT
Start: 2017-08-06 | End: 2017-08-06

## 2017-08-06 RX ORDER — INSULIN LISPRO 100/ML
VIAL (ML) SUBCUTANEOUS
Qty: 0 | Refills: 0 | Status: DISCONTINUED | OUTPATIENT
Start: 2017-08-06 | End: 2017-08-08

## 2017-08-06 RX ADMIN — HEPARIN SODIUM 800 UNIT(S)/HR: 5000 INJECTION INTRAVENOUS; SUBCUTANEOUS at 06:28

## 2017-08-06 RX ADMIN — Medication 650 MILLIGRAM(S): at 00:49

## 2017-08-06 RX ADMIN — PANTOPRAZOLE SODIUM 40 MILLIGRAM(S): 20 TABLET, DELAYED RELEASE ORAL at 05:25

## 2017-08-06 RX ADMIN — Medication 20 MILLIEQUIVALENT(S): at 05:25

## 2017-08-06 RX ADMIN — HEPARIN SODIUM 800 UNIT(S)/HR: 5000 INJECTION INTRAVENOUS; SUBCUTANEOUS at 00:50

## 2017-08-06 RX ADMIN — Medication 81 MILLIGRAM(S): at 17:00

## 2017-08-06 RX ADMIN — LISINOPRIL 5 MILLIGRAM(S): 2.5 TABLET ORAL at 21:02

## 2017-08-06 RX ADMIN — ATORVASTATIN CALCIUM 80 MILLIGRAM(S): 80 TABLET, FILM COATED ORAL at 21:02

## 2017-08-06 RX ADMIN — TICAGRELOR 90 MILLIGRAM(S): 90 TABLET ORAL at 17:00

## 2017-08-06 RX ADMIN — SIMETHICONE 80 MILLIGRAM(S): 80 TABLET, CHEWABLE ORAL at 05:25

## 2017-08-06 RX ADMIN — Medication 50 GRAM(S): at 06:45

## 2017-08-06 RX ADMIN — SIMETHICONE 80 MILLIGRAM(S): 80 TABLET, CHEWABLE ORAL at 21:03

## 2017-08-06 RX ADMIN — Medication 650 MILLIGRAM(S): at 00:52

## 2017-08-06 RX ADMIN — TICAGRELOR 90 MILLIGRAM(S): 90 TABLET ORAL at 05:25

## 2017-08-06 NOTE — PROGRESS NOTE ADULT - SUBJECTIVE AND OBJECTIVE BOX
PATIENT:  CRISTOFER SMITH  [id number]    CHIEF COMPLAINT:  Patient is a 84y old  Male who presents with a chief complaint of chest pain (05 Aug 2017 16:11)      INTERVAL HISTORY:    SUBJECTIVE:    REVIEW OF SYSTEMS:         General: No fevers, No chills, No malaise        HEENT: No headaches, No changes in vision, No dysphagia        CVS: No chest pain, No palpitations        Pulm: No SOB, No wheezing        GI: No abdominal pain, No nausea, No vomiting, No changes in bowel         : No dysuria         Ext: No edema, No claudications,    MEDICATIONS  (STANDING):  atorvastatin 80 milliGRAM(s) Oral at bedtime  pantoprazole    Tablet 40 milliGRAM(s) Oral before breakfast  heparin  Infusion.  Unit(s)/Hr (8 mL/Hr) IV Continuous <Continuous>  ticagrelor 90 milliGRAM(s) Oral two times a day  aspirin  chewable 81 milliGRAM(s) Oral daily    MEDICATIONS  (PRN):  heparin  Injectable 4000 Unit(s) IV Push every 6 hours PRN For aPTT less than 40  artificial  tears Solution 1 Drop(s) Both EYES four times a day PRN Dry Eyes      OBJECTIVE:  ICU Vital Signs Last 24 Hrs  T(C): 36.7 (06 Aug 2017 07:30), Max: 37 (05 Aug 2017 15:35)  T(F): 98 (06 Aug 2017 07:30), Max: 98.6 (05 Aug 2017 15:35)  HR: 46 (06 Aug 2017 07:30) (46 - 68)  BP: 119/66 (06 Aug 2017 07:30) (108/50 - 157/66)  BP(mean): 83 (06 Aug 2017 07:30) (73 - 105)  ABP: --  ABP(mean): --  RR: 14 (06 Aug 2017 07:30) (12 - 24)  SpO2: 98% (06 Aug 2017 07:30) (95% - 100%)      Adult Advanced Hemodynamics Last 24 Hrs  CVP(mm Hg): --  CVP(cm H2O): --  CO: --  CI: --  PA: --  PA(mean): --  PCWP: --  SVR: --  SVRI: --  PVR: --  PVRI: --  CAPILLARY BLOOD GLUCOSE  161 (05 Aug 2017 17:15)        CAPILLARY BLOOD GLUCOSE  161 (05 Aug 2017 17:15)          I&O's Summary    05 Aug 2017 07:01  -  06 Aug 2017 07:00  --------------------------------------------------------  IN: 1124 mL / OUT: 1550 mL / NET: -426 mL      Daily Height in cm: 162.56 (05 Aug 2017 15:35)    Daily Weight in k.5 (06 Aug 2017 05:30)    PHYSICAL EXAM:       General: NAD, lyng in bed       HEENT: EOMI, Nl oropharynx, neck supple, no JVD        CVS: RRR, nl S1, S2, no murmurs, gallops, or regurg       Pulm: Lungs CTA b/l, no crackles, no wheezing, no rhonchi        GI: Nl BS, soft, nontender, nondistended, no masses       : No CVA tenderness       Ext: + Peripheral pulses, no edema, no cyanosis, no clubbing       Neuro: AOx3, no focal deficits       TELEMETRY:     EKG:     IMAGING:    LABS:                          13.4   11.8  )-----------( 242      ( 06 Aug 2017 05:57 )             38.6     08-    141  |  104  |  13  ----------------------------<  138<H>  3.8   |  22  |  1.01    Ca    9.0      06 Aug 2017 05:57  Phos  3.0     -  Mg     1.8     -    TPro  6.2  /  Alb  3.8  /  TBili  0.7  /  DBili  x   /  AST  39  /  ALT  17  /  AlkPhos  39<L>  08-    LIVER FUNCTIONS - ( 06 Aug 2017 05:57 )  Alb: 3.8 g/dL / Pro: 6.2 g/dL / ALK PHOS: 39 U/L / ALT: 17 U/L RC / AST: 39 U/L / GGT: x           PT/INR - ( 06 Aug 2017 05:57 )   PT: 10.7 sec;   INR: 0.99 ratio         PTT - ( 06 Aug 2017 05:57 )  PTT:63.5 sec  CKMB Units: 25.2 ng/mL ( @ 05:57)  Creatine Kinase, Serum: 215 U/L ( @ 05:57)  Troponin T, Serum: 0.56 ng/mL ( 05:57)  CKMB Units: 23.6 ng/mL ( @ 23:55)  Creatine Kinase, Serum: 184 U/L ( @ 23:55)  Troponin T, Serum: 0.27 ng/mL ( @ 23:55)  Creatine Kinase, Serum: 126 U/L ( @ 18:51)  Troponin T, Serum: 0.10 ng/mL ( @ 18:51)  Creatine Kinase, Serum: 82 U/L ( @ 12:23)  CKMB Units: 4.6 ng/mL ( @ 12:23)  Troponin T, Serum: <0.01 ng/mL ( @ 12:23)    Urinalysis Basic - ( 06 Aug 2017 00:56 )    Color: x / Appearance: Clear / S.005 / pH: x  Gluc: x / Ketone: Negative  / Bili: Negative / Urobili: Negative   Blood: x / Protein: Negative / Nitrite: Negative   Leuk Esterase: Negative / RBC: x / WBC x   Sq Epi: x / Non Sq Epi: x / Bacteria: x

## 2017-08-06 NOTE — PROGRESS NOTE ADULT - ATTENDING COMMENTS
Patient is seen and examined with fellow, NP and the CCU house-staff. I agree with the history, physical and the assessment and plan.  NSTEMI - presently asymptomatic  -continue dual antiplatelet therapy  -continue heparin gtt  -continue statin  -trend cardiac enzymes  - plan for cardiac catheterization  - TTE

## 2017-08-06 NOTE — CHART NOTE - NSCHARTNOTEFT_GEN_A_CORE
====================  CCU MIDNIGHT ROUNDS  ====================    CRISTOFER SMITH  [id number]    ====================  SUMMARY:  ====================    85 y/o M w/ pmhx of CABG x2 (1988), PCI w/ 2-3stents, HTN, T2DM. Presented to the ED w/ chest pain admitted for NSTEMI    ====================  NEW EVENTS:  ====================    CE downtrending,     ====================  VITALS (Last 12 hrs):  ====================    T(C): 36.7 (08-06-17 @ 20:00), Max: 36.7 (08-06-17 @ 20:00)  HR: 56 (08-06-17 @ 22:00) (52 - 70)  BP: 140/68 (08-06-17 @ 22:00) (108/57 - 186/78)  BP(mean): 91 (08-06-17 @ 22:00) (73 - 124)  RR: 23 (08-06-17 @ 22:00) (12 - 35)  SpO2: 98% (08-06-17 @ 22:00) (94% - 100%)    TELEMETRY: sinus bud    I&O's Summary    05 Aug 2017 07:01  -  06 Aug 2017 07:00  --------------------------------------------------------  IN: 1124 mL / OUT: 1550 mL / NET: -426 mL    06 Aug 2017 07:01  -  06 Aug 2017 22:52  --------------------------------------------------------  IN: 660 mL / OUT: 1350 mL / NET: -690 mL    ====================  PLAN:  ====================  1. NSTEMI - c/w hep gtt x 48 hours, DAPT, lipitor, ACE, holding BB s/t bradycardia. plan for [facility]     Lynda Christina CCU NP   #[id number]

## 2017-08-07 DIAGNOSIS — I21.4 NON-ST ELEVATION (NSTEMI) MYOCARDIAL INFARCTION: ICD-10-CM

## 2017-08-07 LAB
ALBUMIN SERPL ELPH-MCNC: 3.9 G/DL — SIGNIFICANT CHANGE UP (ref 3.3–5)
ALP SERPL-CCNC: 46 U/L — SIGNIFICANT CHANGE UP (ref 40–120)
ALT FLD-CCNC: 19 U/L RC — SIGNIFICANT CHANGE UP (ref 10–45)
ANION GAP SERPL CALC-SCNC: 11 MMOL/L — SIGNIFICANT CHANGE UP (ref 5–17)
APTT BLD: 76.6 SEC — HIGH (ref 27.5–37.4)
AST SERPL-CCNC: 31 U/L — SIGNIFICANT CHANGE UP (ref 10–40)
BASOPHILS # BLD AUTO: 0 K/UL — SIGNIFICANT CHANGE UP (ref 0–0.2)
BASOPHILS NFR BLD AUTO: 0.2 % — SIGNIFICANT CHANGE UP (ref 0–2)
BILIRUB SERPL-MCNC: 0.8 MG/DL — SIGNIFICANT CHANGE UP (ref 0.2–1.2)
BUN SERPL-MCNC: 12 MG/DL — SIGNIFICANT CHANGE UP (ref 7–23)
CALCIUM SERPL-MCNC: 8.8 MG/DL — SIGNIFICANT CHANGE UP (ref 8.4–10.5)
CHLORIDE SERPL-SCNC: 106 MMOL/L — SIGNIFICANT CHANGE UP (ref 96–108)
CK MB BLD-MCNC: 8.3 % — HIGH (ref 0–3.5)
CK MB CFR SERPL CALC: 7.3 NG/ML — HIGH (ref 0–6.7)
CK SERPL-CCNC: 88 U/L — SIGNIFICANT CHANGE UP (ref 30–200)
CO2 SERPL-SCNC: 25 MMOL/L — SIGNIFICANT CHANGE UP (ref 22–31)
CREAT SERPL-MCNC: 1.06 MG/DL — SIGNIFICANT CHANGE UP (ref 0.5–1.3)
EOSINOPHIL # BLD AUTO: 0.3 K/UL — SIGNIFICANT CHANGE UP (ref 0–0.5)
EOSINOPHIL NFR BLD AUTO: 2.4 % — SIGNIFICANT CHANGE UP (ref 0–6)
GLUCOSE SERPL-MCNC: 157 MG/DL — HIGH (ref 70–99)
HCT VFR BLD CALC: 40.9 % — SIGNIFICANT CHANGE UP (ref 39–50)
HGB BLD-MCNC: 14.3 G/DL — SIGNIFICANT CHANGE UP (ref 13–17)
INR BLD: 1.08 RATIO — SIGNIFICANT CHANGE UP (ref 0.88–1.16)
LYMPHOCYTES # BLD AUTO: 25.9 % — SIGNIFICANT CHANGE UP (ref 13–44)
LYMPHOCYTES # BLD AUTO: 3 K/UL — SIGNIFICANT CHANGE UP (ref 1–3.3)
MAGNESIUM SERPL-MCNC: 1.7 MG/DL — SIGNIFICANT CHANGE UP (ref 1.6–2.6)
MCHC RBC-ENTMCNC: 33.1 PG — SIGNIFICANT CHANGE UP (ref 27–34)
MCHC RBC-ENTMCNC: 35 GM/DL — SIGNIFICANT CHANGE UP (ref 32–36)
MCV RBC AUTO: 94.5 FL — SIGNIFICANT CHANGE UP (ref 80–100)
MONOCYTES # BLD AUTO: 1.3 K/UL — HIGH (ref 0–0.9)
MONOCYTES NFR BLD AUTO: 11.4 % — SIGNIFICANT CHANGE UP (ref 2–14)
NEUTROPHILS # BLD AUTO: 6.8 K/UL — SIGNIFICANT CHANGE UP (ref 1.8–7.4)
NEUTROPHILS NFR BLD AUTO: 60 % — SIGNIFICANT CHANGE UP (ref 43–77)
PHOSPHATE SERPL-MCNC: 2.8 MG/DL — SIGNIFICANT CHANGE UP (ref 2.5–4.5)
PLATELET # BLD AUTO: 248 K/UL — SIGNIFICANT CHANGE UP (ref 150–400)
POTASSIUM SERPL-MCNC: 4.3 MMOL/L — SIGNIFICANT CHANGE UP (ref 3.5–5.3)
POTASSIUM SERPL-SCNC: 4.3 MMOL/L — SIGNIFICANT CHANGE UP (ref 3.5–5.3)
PROT SERPL-MCNC: 6.5 G/DL — SIGNIFICANT CHANGE UP (ref 6–8.3)
PROTHROM AB SERPL-ACNC: 11.8 SEC — SIGNIFICANT CHANGE UP (ref 9.8–12.7)
RBC # BLD: 4.32 M/UL — SIGNIFICANT CHANGE UP (ref 4.2–5.8)
RBC # FLD: 12.2 % — SIGNIFICANT CHANGE UP (ref 10.3–14.5)
SODIUM SERPL-SCNC: 142 MMOL/L — SIGNIFICANT CHANGE UP (ref 135–145)
TROPONIN T SERPL-MCNC: 0.41 NG/ML — HIGH (ref 0–0.06)
WBC # BLD: 11.4 K/UL — HIGH (ref 3.8–10.5)
WBC # FLD AUTO: 11.4 K/UL — HIGH (ref 3.8–10.5)

## 2017-08-07 PROCEDURE — 93010 ELECTROCARDIOGRAM REPORT: CPT

## 2017-08-07 PROCEDURE — 99233 SBSQ HOSP IP/OBS HIGH 50: CPT | Mod: GC

## 2017-08-07 PROCEDURE — 99152 MOD SED SAME PHYS/QHP 5/>YRS: CPT

## 2017-08-07 PROCEDURE — 93459 L HRT ART/GRFT ANGIO: CPT | Mod: 26,59,GC

## 2017-08-07 PROCEDURE — 93571 IV DOP VEL&/PRESS C FLO 1ST: CPT | Mod: 26,RC,GC

## 2017-08-07 PROCEDURE — 92941 PRQ TRLML REVSC TOT OCCL AMI: CPT | Mod: RC,GC

## 2017-08-07 RX ORDER — FENTANYL CITRATE 50 UG/ML
12.5 INJECTION INTRAVENOUS ONCE
Qty: 0 | Refills: 0 | Status: DISCONTINUED | OUTPATIENT
Start: 2017-08-07 | End: 2017-08-07

## 2017-08-07 RX ORDER — NITROGLYCERIN 6.5 MG
0.3 CAPSULE, EXTENDED RELEASE ORAL ONCE
Qty: 0 | Refills: 0 | Status: DISCONTINUED | OUTPATIENT
Start: 2017-08-07 | End: 2017-08-07

## 2017-08-07 RX ORDER — METOPROLOL TARTRATE 50 MG
25 TABLET ORAL
Qty: 0 | Refills: 0 | Status: DISCONTINUED | OUTPATIENT
Start: 2017-08-07 | End: 2017-08-08

## 2017-08-07 RX ORDER — CEFTRIAXONE 500 MG/1
INJECTION, POWDER, FOR SOLUTION INTRAMUSCULAR; INTRAVENOUS
Qty: 0 | Refills: 0 | Status: DISCONTINUED | OUTPATIENT
Start: 2017-08-07 | End: 2017-08-07

## 2017-08-07 RX ORDER — CEFTRIAXONE 500 MG/1
2 INJECTION, POWDER, FOR SOLUTION INTRAMUSCULAR; INTRAVENOUS ONCE
Qty: 0 | Refills: 0 | Status: DISCONTINUED | OUTPATIENT
Start: 2017-08-07 | End: 2017-08-07

## 2017-08-07 RX ORDER — NITROGLYCERIN 6.5 MG
0.4 CAPSULE, EXTENDED RELEASE ORAL ONCE
Qty: 0 | Refills: 0 | Status: COMPLETED | OUTPATIENT
Start: 2017-08-07 | End: 2017-08-07

## 2017-08-07 RX ORDER — INSULIN LISPRO 100/ML
VIAL (ML) SUBCUTANEOUS AT BEDTIME
Qty: 0 | Refills: 0 | Status: DISCONTINUED | OUTPATIENT
Start: 2017-08-07 | End: 2017-08-08

## 2017-08-07 RX ADMIN — FENTANYL CITRATE 12.5 MICROGRAM(S): 50 INJECTION INTRAVENOUS at 22:00

## 2017-08-07 RX ADMIN — PANTOPRAZOLE SODIUM 40 MILLIGRAM(S): 20 TABLET, DELAYED RELEASE ORAL at 05:49

## 2017-08-07 RX ADMIN — Medication 81 MILLIGRAM(S): at 11:23

## 2017-08-07 RX ADMIN — Medication: at 09:25

## 2017-08-07 RX ADMIN — TICAGRELOR 90 MILLIGRAM(S): 90 TABLET ORAL at 05:49

## 2017-08-07 RX ADMIN — LISINOPRIL 5 MILLIGRAM(S): 2.5 TABLET ORAL at 11:23

## 2017-08-07 RX ADMIN — TICAGRELOR 90 MILLIGRAM(S): 90 TABLET ORAL at 20:17

## 2017-08-07 RX ADMIN — ATORVASTATIN CALCIUM 80 MILLIGRAM(S): 80 TABLET, FILM COATED ORAL at 20:16

## 2017-08-07 RX ADMIN — HEPARIN SODIUM 750 UNIT(S)/HR: 5000 INJECTION INTRAVENOUS; SUBCUTANEOUS at 06:39

## 2017-08-07 RX ADMIN — Medication 0.4 MILLIGRAM(S): at 11:21

## 2017-08-07 RX ADMIN — Medication 6: at 12:33

## 2017-08-07 NOTE — CHART NOTE - NSCHARTNOTEFT_GEN_A_CORE
Removal of Femoral Sheath    Pulses in the (right/left) lower extremity are (palpable/audible by doppler/absent). The patient was placed in the supine position. The insertion site was identified and the sutures were removed per protocol.  The __5__ [place] femoral sheath was then removed. Direct pressure was applied for  ____15__ minutes.     Monitoring of the (right/left) groin and both lower extremities including neuro-vascular checks and vital signs every 15 minutes x 4, then every 30 minutes x 2, then every 1 hour was ordered.    Complications: None    Comments: Premedicate with Fentanyl 12.5 mg IVP x 1    Ghazala Duran  CCU NP

## 2017-08-07 NOTE — PROGRESS NOTE ADULT - PROBLEM SELECTOR PLAN 1
c/w ticagrelor 90mg BID  c/w ASA load c/w 81mg OD   c/w heparin ggt   atorvastatin 80mg OD  -trend CE's q8 until down trending.   continuous telemetry monitoring   -vital checks per CCU routine.  -Pending cardiac cath

## 2017-08-07 NOTE — PROGRESS NOTE ADULT - ASSESSMENT
85 y/o M w/ pmhx of CABG x2 (1988), PCI w/ 2-3stents, HTN, T2DM. Presented to the ED w/ chest pain admitted for NSTEMI.

## 2017-08-07 NOTE — PROGRESS NOTE ADULT - SUBJECTIVE AND OBJECTIVE BOX
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 stents last PCI in 2014, HTN, T2DM (noninsulin dependent). Presented to ED w/ chest pain, found to have NSTEMI    ====================  NEW EVENTS:  ====================  Now s/p ASA to the SVG to dRCA.  RFA sheath discontinued, premedicated with Fentanyl, no complications post removal.        MEDICATIONS  (STANDING):  atorvastatin 80 milliGRAM(s) Oral at bedtime  pantoprazole    Tablet 40 milliGRAM(s) Oral before breakfast  ticagrelor 90 milliGRAM(s) Oral two times a day  aspirin  chewable 81 milliGRAM(s) Oral daily  insulin lispro (HumaLOG) corrective regimen sliding scale   SubCutaneous three times a day before meals  lisinopril 5 milliGRAM(s) Oral daily  metoprolol 25 milliGRAM(s) Oral two times a day  insulin lispro (HumaLOG) corrective regimen sliding scale   SubCutaneous at bedtime  fentaNYL    Injectable 12.5 MICROGram(s) IV Push once    MEDICATIONS  (PRN):  artificial  tears Solution 1 Drop(s) Both EYES four times a day PRN Dry Eyes  simethicone 80 milliGRAM(s) Chew three times a day PRN Upset Stomach    ====================  VITALS (Last 12 hrs):  ====================    T(C): 36.8 (08-07-17 @ 19:00), Max: 36.8 (08-07-17 @ 19:00)  T(F): 98.2 (08-07-17 @ 19:00), Max: 98.2 (08-07-17 @ 19:00)  HR: 58 (08-07-17 @ 21:49) (50 - 64)  BP: 119/72 (08-07-17 @ 21:49) (92/58 - 131/60)  BP(mean): 93 (08-07-17 @ 21:49) (64 - 98)  ABP: --  ABP(mean): --  RR: 22 (08-07-17 @ 21:49) (13 - 26)  SpO2: 96% (08-07-17 @ 21:49) (96% - 100%)  Wt(kg): --  CVP(mm Hg): --  CVP(cm H2O): --  CO: --  CI: --  PA: --  PA(mean): --  PCWP: --  SVR: --  PVR: --    I&O's Summary    06 Aug 2017 07:01  -  07 Aug 2017 07:00  --------------------------------------------------------  IN: 911.5 mL / OUT: 1350 mL / NET: -438.5 mL    07 Aug 2017 07:01  -  07 Aug 2017 22:37  --------------------------------------------------------  IN: 832.5 mL / OUT: 350 mL / NET: 482.5 mL    ====================  NEW LABS:  ====================    08-07    142  |  106  |  12  ----------------------------<  157<H>  4.3   |  25  |  1.06    Ca    8.8      07 Aug 2017 05:49  Phos  2.8     08-07  Mg     1.7     08-07    TPro  6.5  /  Alb  3.9  /  TBili  0.8  /  DBili  x   /  AST  31  /  ALT  19  /  AlkPhos  46  08-07    PT/INR - ( 07 Aug 2017 05:49 )   PT: 11.8 sec;   INR: 1.08 ratio      PTT - ( 07 Aug 2017 05:49 )  PTT:76.6 sec  Creatine Kinase, Serum: 88 U/L (08-07-17 @ 05:49)  Troponin T, Serum: 0.41 ng/mL <H> (08-07-17 @ 05:49)    CKMB Units: 7.3 ng/mL (08-07 @ 05:49)    ====================  PLAN:  ====================  - Continue DAPT (ASA & Brilinta)  - Continue BB, ACEI, and statin  - Monitor HR, may need to decrease BB  - Discharge planning     Ghazala Duran CCU NP  Beeper #[id number]  Spectra # [phone]

## 2017-08-07 NOTE — PROGRESS NOTE ADULT - ATTENDING COMMENTS
,sign  NSTEMI awaiting PCI   -continue dual antiplatelet therapy  -continue heparin gtt  -continue statin  -trend cardiac enzymes  - plan for cardiac catheterization  - started on Beta-blocker and an ace-inhibitor

## 2017-08-07 NOTE — CHART NOTE - NSCHARTNOTEFT_GEN_A_CORE
2- Hours Post Removal of Femoral Sheath Assessment of Access Site    Vital signs are stable, neuro-vascular status of the lower extremities is intact, stable and there is no evidence of hematoma on the (right/left) lower extremities.     Complications:   [ x] None  [ ] Other    Comments: Premedicated with Fentanyl 12.5 mg IVP x 1    [place]  CCU NP

## 2017-08-07 NOTE — PROGRESS NOTE ADULT - SUBJECTIVE AND OBJECTIVE BOX
HPI:  83 y/o M w/ pmhx of CABG x2 (), PCI w/ 2-3? stents last PCI in , HTN, T2DM (noninsulin dependent). Presented to ED w/ chest pain. Pain started when the patient was vaccuming the sofa. 8-10/10 in intensity sharp radiating to both arms accompanied w/ diaphoresis. EMS was called and he was taken to the ED.     In the ED EKG was performed and showed 1mm elevation in v2; <1mm elevation in v1 in the setting of right bundle branch block. GIven 325mg ASA, 180mg brillinta, 5000U heparin and 80mg Atorvastatin. CE's in ED had elevated CPK mass assay % increase but other wise negative.    Patient txfered to CCU for monitoring. (05 Aug 2017 16:11)      SUBJECTIVE:  Patient is a 84y old  Male who presents with a chief complaint of chest pain (05 Aug 2017 16:11)          OBJECTIVE:  Review Of Systems:  Constitutional: [ ] Fever [ ] Chills [ ] Fatigue [ ] Weight change   HEENT: [ ] Blurred vision [ ] Eye Pain [ ] Headache [ ] Runny nose [ ] Sore Throat   Respiratory: [ ] Cough [ ] Wheezing [ ] Shortness of breath  Cardiovascular: [ ] Chest Pain [ ] Palpitations [ ] POPE [ ] PND [ ] Orthopnea  Gastrointestinal: [ ] Abdominal Pain [ ] Diarrhea [ ] Constipation [ ] Hemorrhoids [ ] Nausea [ ] Vomiting  Genitourinary: [ ] Nocturia [ ] Dysuria [ ] Incontinence  Extremities: [ ] Swelling [ ] Joint Pain  Neurologic: [ ] Focal deficit [ ] Paresthesias [ ] Syncope  Lymphatic: [ ] Swelling [ ] Lymphadenopathy   Skin: [ ] Rash [ ] Ecchymoses [ ] Wounds [ ] Lesions  Psychiatry: [ ] Depression [ ] Suicidal/Homicidal Ideation [ ] Anxiety [ ] Sleep Disturbances  [ ] 10 point review of systems is otherwise negative except as mentioned above            [ ]Unable to obtain    Allergy:  Allergies    No Known Allergies    Intolerances        Medications:  MEDICATIONS  (STANDING):  atorvastatin 80 milliGRAM(s) Oral at bedtime  pantoprazole    Tablet 40 milliGRAM(s) Oral before breakfast  heparin  Infusion.  Unit(s)/Hr (8 mL/Hr) IV Continuous <Continuous>  ticagrelor 90 milliGRAM(s) Oral two times a day  aspirin  chewable 81 milliGRAM(s) Oral daily  insulin lispro (HumaLOG) corrective regimen sliding scale   SubCutaneous three times a day before meals  lisinopril 5 milliGRAM(s) Oral daily    MEDICATIONS  (PRN):  heparin  Injectable 4000 Unit(s) IV Push every 6 hours PRN For aPTT less than 40  artificial  tears Solution 1 Drop(s) Both EYES four times a day PRN Dry Eyes  simethicone 80 milliGRAM(s) Chew three times a day PRN Upset Stomach      PMH/PSH/FH/SH: [ ] Unchanged    Vitals:  T(C): 36.8 (17 @ 08:00), Max: 36.8 (17 @ 05:00)  HR: 74 (17 @ 09:00) (48 - 74)  BP: 151/82 (17 @ 09:00) (97/59 - 186/78)  BP(mean): 105 (17 @ 09:00) (71 - 124)  RR: 29 (17 @ 09:00) (11 - 35)  SpO2: 97% (17 @ 09:00) (94% - 100%)  Wt(kg): --  Daily     Daily Weight in k.4 (07 Aug 2017 05:00)  I&O's Summary    06 Aug 2017 07:  -  07 Aug 2017 07:00  --------------------------------------------------------  IN: 911.5 mL / OUT: 1350 mL / NET: -438.5 mL    07 Aug 2017 07:  -  07 Aug 2017 09:38  --------------------------------------------------------  IN: 7.5 mL / OUT: 0 mL / NET: 7.5 mL        Labs:                        14.3   11.4  )-----------( 248      ( 07 Aug 2017 05:49 )             40.9         142  |  106  |  12  ----------------------------<  157<H>  4.3   |  25  |  1.06    Ca    8.8      07 Aug 2017 05:49  Phos  2.8       Mg     1.7         TPro  6.5  /  Alb  3.9  /  TBili  0.8  /  DBili  x   /  AST  31  /  ALT  19  /  AlkPhos  46      PT/INR - ( 07 Aug 2017 05:49 )   PT: 11.8 sec;   INR: 1.08 ratio         PTT - ( 07 Aug 2017 05:49 )  PTT:76.6 sec  CARDIAC MARKERS ( 07 Aug 2017 05:49 )  x     / 0.41 ng/mL / 88 U/L / x     / 7.3 ng/mL  CARDIAC MARKERS ( 06 Aug 2017 13:56 )  x     / 0.49 ng/mL / 167 U/L / x     / 18.1 ng/mL  CARDIAC MARKERS ( 06 Aug 2017 05:57 )  x     / 0.56 ng/mL / 215 U/L / x     / 25.2 ng/mL  CARDIAC MARKERS ( 05 Aug 2017 23:55 )  x     / 0.27 ng/mL / 184 U/L / x     / 23.6 ng/mL  CARDIAC MARKERS ( 05 Aug 2017 18:51 )  x     / 0.10 ng/mL / 126 U/L / x     / x      CARDIAC MARKERS ( 05 Aug 2017 12:23 )  x     / <0.01 ng/mL / 82 U/L / x     / 4.6 ng/mL      Magnesium, Serum: 1.7 mg/dL ( @ 05:49)  Phosphorus Level, Serum: 2.8 mg/dL ( @ 05:49)              ECG:    Echo:    Stress Testing:     Cath:    Imaging:    Interpretation of Telemetry:      Physical Exam:  Appearance: [ ] Normal  [ ] abnormal [ ] NAD   Eyes: [ ] PERRL [ ] EOMI  HENT: [ ] Normal [ ] Abnormal oral muscosa [ ]NC/AT  Cardiovascular: [ ] S1 [ ] S2 [ ] RRR [ ] m/r/g [ ]edema [ ] JVP  Procedural Access Site: [ ]  hematoma [ ] tender to palpation [ ] 2+ pulse [ ] bruit [ ] Ecchymosis  Respiratory: [ ] Clear to auscultation bilaterally  Gastrointestinal: [ ] Soft [ ] tenderness[ ] distension [ ] BS  Musculoskeletal: [ ] clubbing [ ] joint deformity   Neurologic: [ ] Non-focal  Lymphatic: [ ] lymphadenopathy  Psychiatry: [ ] AAOx3  [ ] confused [ ] disoriented [ ] Mood & affect appropriate  Skin: [ ]  rashes [ ] ecchymoses [ ] cyanosis Patient txfered to CCU for monitoring. (05 Aug 2017 16:11)      SUBJECTIVE:  Patient is a 84y old  Male who presents with a chief complaint of chest pain (05 Aug 2017 16:11)          OBJECTIVE:  Review Of Systems:  Constitutional: [ ] Fever [ ] Chills [ ] Fatigue [ ] Weight change   HEENT: [ ] Blurred vision [ ] Eye Pain [ ] Headache [ ] Runny nose [ ] Sore Throat   Respiratory: [ ] Cough [ ] Wheezing [ ] Shortness of breath  Cardiovascular: [ ] Chest Pain [ ] Palpitations [ ] POPE [ ] PND [ ] Orthopnea  Gastrointestinal: [ ] Abdominal Pain [ ] Diarrhea [ ] Constipation [ ] Hemorrhoids [ ] Nausea [ ] Vomiting  Genitourinary: [ ] Nocturia [ ] Dysuria [ ] Incontinence  Extremities: [ ] Swelling [ ] Joint Pain  Neurologic: [ ] Focal deficit [ ] Paresthesias [ ] Syncope  Lymphatic: [ ] Swelling [ ] Lymphadenopathy   Skin: [ ] Rash [ ] Ecchymoses [ ] Wounds [ ] Lesions  Psychiatry: [ ] Depression [ ] Suicidal/Homicidal Ideation [ ] Anxiety [ ] Sleep Disturbances  [ ] 10 point review of systems is otherwise negative except as mentioned above            [ ]Unable to obtain    Allergy:  Allergies    No Known Allergies    Intolerances        Medications:  MEDICATIONS  (STANDING):  atorvastatin 80 milliGRAM(s) Oral at bedtime  pantoprazole    Tablet 40 milliGRAM(s) Oral before breakfast  heparin  Infusion.  Unit(s)/Hr (8 mL/Hr) IV Continuous <Continuous>  ticagrelor 90 milliGRAM(s) Oral two times a day  aspirin  chewable 81 milliGRAM(s) Oral daily  insulin lispro (HumaLOG) corrective regimen sliding scale   SubCutaneous three times a day before meals  lisinopril 5 milliGRAM(s) Oral daily    MEDICATIONS  (PRN):  heparin  Injectable 4000 Unit(s) IV Push every 6 hours PRN For aPTT less than 40  artificial  tears Solution 1 Drop(s) Both EYES four times a day PRN Dry Eyes  simethicone 80 milliGRAM(s) Chew three times a day PRN Upset Stomach      PMH/PSH/FH/SH: [ ] Unchanged    Vitals:  T(C): 36.8 (17 @ 08:00), Max: 36.8 (17 @ 05:00)  HR: 74 (17 @ 09:00) (48 - 74)  BP: 151/82 (17 @ 09:00) (97/59 - 186/78)  BP(mean): 105 (17 @ 09:00) (71 - 124)  RR: 29 (17 @ 09:00) (11 - 35)  SpO2: 97% (17 @ 09:00) (94% - 100%)  Wt(kg): --  Daily     Daily Weight in k.4 (07 Aug 2017 05:00)  I&O's Summary    06 Aug 2017 07:01  -  07 Aug 2017 07:00  --------------------------------------------------------  IN: 911.5 mL / OUT: 1350 mL / NET: -438.5 mL    07 Aug 2017 07:01  -  07 Aug 2017 09:38  --------------------------------------------------------  IN: 7.5 mL / OUT: 0 mL / NET: 7.5 mL        Labs:                        14.3   11.4  )-----------( 248      ( 07 Aug 2017 05:49 )             40.9     08-07    142  |  106  |  12  ----------------------------<  157<H>  4.3   |  25  |  1.06    Ca    8.8      07 Aug 2017 05:49  Phos  2.8     08-07  Mg     1.7     08-07    TPro  6.5  /  Alb  3.9  /  TBili  0.8  /  DBili  x   /  AST  31  /  ALT  19  /  AlkPhos  46  08-07    PT/INR - ( 07 Aug 2017 05:49 )   PT: 11.8 sec;   INR: 1.08 ratio         PTT - ( 07 Aug 2017 05:49 )  PTT:76.6 sec  CARDIAC MARKERS ( 07 Aug 2017 05:49 )  x     / 0.41 ng/mL / 88 U/L / x     / 7.3 ng/mL  CARDIAC MARKERS ( 06 Aug 2017 13:56 )  x     / 0.49 ng/mL / 167 U/L / x     / 18.1 ng/mL  CARDIAC MARKERS ( 06 Aug 2017 05:57 )  x     / 0.56 ng/mL / 215 U/L / x     / 25.2 ng/mL  CARDIAC MARKERS ( 05 Aug 2017 23:55 )  x     / 0.27 ng/mL / 184 U/L / x     / 23.6 ng/mL  CARDIAC MARKERS ( 05 Aug 2017 18:51 )  x     / 0.10 ng/mL / 126 U/L / x     / x      CARDIAC MARKERS ( 05 Aug 2017 12:23 )  x     / <0.01 ng/mL / 82 U/L / x     / 4.6 ng/mL      Magnesium, Serum: 1.7 mg/dL ( @ 05:49)  Phosphorus Level, Serum: 2.8 mg/dL ( @ 05:49)              ECG:    Echo:    Stress Testing:     Cath:    Imaging:    Interpretation of Telemetry:      Physical Exam:  Appearance: [ ] Normal  [ ] abnormal [ ] NAD   Eyes: [ ] PERRL [ ] EOMI  HENT: [ ] Normal [ ] Abnormal oral muscosa [ ]NC/AT  Cardiovascular: [ ] S1 [ ] S2 [ ] RRR [ ] m/r/g [ ]edema [ ] JVP  Procedural Access Site: [ ]  hematoma [ ] tender to palpation [ ] 2+ pulse [ ] bruit [ ] Ecchymosis  Respiratory: [ ] Clear to auscultation bilaterally  Gastrointestinal: [ ] Soft [ ] tenderness[ ] distension [ ] BS  Musculoskeletal: [ ] clubbing [ ] joint deformity   Neurologic: [ ] Non-focal  Lymphatic: [ ] lymphadenopathy  Psychiatry: [ ] AAOx3  [ ] confused [ ] disoriented [ ] Mood & affect appropriate  Skin: [ ]  rashes [ ] ecchymoses [ ] cyanosis

## 2017-08-08 ENCOUNTER — TRANSCRIPTION ENCOUNTER (OUTPATIENT)
Age: 82
End: 2017-08-08

## 2017-08-08 VITALS
RESPIRATION RATE: 23 BRPM | DIASTOLIC BLOOD PRESSURE: 63 MMHG | SYSTOLIC BLOOD PRESSURE: 121 MMHG | HEART RATE: 58 BPM | OXYGEN SATURATION: 99 %

## 2017-08-08 LAB
ALBUMIN SERPL ELPH-MCNC: 3.7 G/DL — SIGNIFICANT CHANGE UP (ref 3.3–5)
ALP SERPL-CCNC: 48 U/L — SIGNIFICANT CHANGE UP (ref 40–120)
ALT FLD-CCNC: 25 U/L RC — SIGNIFICANT CHANGE UP (ref 10–45)
ANION GAP SERPL CALC-SCNC: 13 MMOL/L — SIGNIFICANT CHANGE UP (ref 5–17)
AST SERPL-CCNC: 34 U/L — SIGNIFICANT CHANGE UP (ref 10–40)
BASOPHILS # BLD AUTO: 0 K/UL — SIGNIFICANT CHANGE UP (ref 0–0.2)
BASOPHILS NFR BLD AUTO: 0.3 % — SIGNIFICANT CHANGE UP (ref 0–2)
BILIRUB SERPL-MCNC: 0.7 MG/DL — SIGNIFICANT CHANGE UP (ref 0.2–1.2)
BUN SERPL-MCNC: 15 MG/DL — SIGNIFICANT CHANGE UP (ref 7–23)
CALCIUM SERPL-MCNC: 8.5 MG/DL — SIGNIFICANT CHANGE UP (ref 8.4–10.5)
CHLORIDE SERPL-SCNC: 107 MMOL/L — SIGNIFICANT CHANGE UP (ref 96–108)
CO2 SERPL-SCNC: 21 MMOL/L — LOW (ref 22–31)
CREAT SERPL-MCNC: 1.21 MG/DL — SIGNIFICANT CHANGE UP (ref 0.5–1.3)
EOSINOPHIL # BLD AUTO: 0.3 K/UL — SIGNIFICANT CHANGE UP (ref 0–0.5)
EOSINOPHIL NFR BLD AUTO: 2.7 % — SIGNIFICANT CHANGE UP (ref 0–6)
GLUCOSE SERPL-MCNC: 212 MG/DL — HIGH (ref 70–99)
HCT VFR BLD CALC: 39.1 % — SIGNIFICANT CHANGE UP (ref 39–50)
HGB BLD-MCNC: 13.9 G/DL — SIGNIFICANT CHANGE UP (ref 13–17)
LYMPHOCYTES # BLD AUTO: 1.7 K/UL — SIGNIFICANT CHANGE UP (ref 1–3.3)
LYMPHOCYTES # BLD AUTO: 16.8 % — SIGNIFICANT CHANGE UP (ref 13–44)
MAGNESIUM SERPL-MCNC: 1.7 MG/DL — SIGNIFICANT CHANGE UP (ref 1.6–2.6)
MCHC RBC-ENTMCNC: 33.9 PG — SIGNIFICANT CHANGE UP (ref 27–34)
MCHC RBC-ENTMCNC: 35.5 GM/DL — SIGNIFICANT CHANGE UP (ref 32–36)
MCV RBC AUTO: 95.5 FL — SIGNIFICANT CHANGE UP (ref 80–100)
MONOCYTES # BLD AUTO: 1.2 K/UL — HIGH (ref 0–0.9)
MONOCYTES NFR BLD AUTO: 11.6 % — SIGNIFICANT CHANGE UP (ref 2–14)
NEUTROPHILS # BLD AUTO: 7 K/UL — SIGNIFICANT CHANGE UP (ref 1.8–7.4)
NEUTROPHILS NFR BLD AUTO: 68.7 % — SIGNIFICANT CHANGE UP (ref 43–77)
PHOSPHATE SERPL-MCNC: 2.6 MG/DL — SIGNIFICANT CHANGE UP (ref 2.5–4.5)
PLATELET # BLD AUTO: 257 K/UL — SIGNIFICANT CHANGE UP (ref 150–400)
POTASSIUM SERPL-MCNC: 3.9 MMOL/L — SIGNIFICANT CHANGE UP (ref 3.5–5.3)
POTASSIUM SERPL-SCNC: 3.9 MMOL/L — SIGNIFICANT CHANGE UP (ref 3.5–5.3)
PROT SERPL-MCNC: 6.3 G/DL — SIGNIFICANT CHANGE UP (ref 6–8.3)
RBC # BLD: 4.1 M/UL — LOW (ref 4.2–5.8)
RBC # FLD: 12.3 % — SIGNIFICANT CHANGE UP (ref 10.3–14.5)
SODIUM SERPL-SCNC: 141 MMOL/L — SIGNIFICANT CHANGE UP (ref 135–145)
WBC # BLD: 10.2 K/UL — SIGNIFICANT CHANGE UP (ref 3.8–10.5)
WBC # FLD AUTO: 10.2 K/UL — SIGNIFICANT CHANGE UP (ref 3.8–10.5)

## 2017-08-08 PROCEDURE — 99233 SBSQ HOSP IP/OBS HIGH 50: CPT | Mod: GC

## 2017-08-08 PROCEDURE — 93010 ELECTROCARDIOGRAM REPORT: CPT

## 2017-08-08 RX ORDER — METOPROLOL TARTRATE 50 MG
1 TABLET ORAL
Qty: 30 | Refills: 0
Start: 2017-08-08 | End: 2017-09-07

## 2017-08-08 RX ORDER — LISINOPRIL 2.5 MG/1
1 TABLET ORAL
Qty: 30 | Refills: 0 | OUTPATIENT
Start: 2017-08-08 | End: 2017-09-07

## 2017-08-08 RX ORDER — MAGNESIUM SULFATE 500 MG/ML
2 VIAL (ML) INJECTION ONCE
Qty: 0 | Refills: 0 | Status: COMPLETED | OUTPATIENT
Start: 2017-08-08 | End: 2017-08-08

## 2017-08-08 RX ORDER — ASPIRIN/CALCIUM CARB/MAGNESIUM 324 MG
1 TABLET ORAL
Qty: 30 | Refills: 0
Start: 2017-08-08

## 2017-08-08 RX ORDER — HEPARIN SODIUM 5000 [USP'U]/ML
5000 INJECTION INTRAVENOUS; SUBCUTANEOUS EVERY 12 HOURS
Qty: 0 | Refills: 0 | Status: DISCONTINUED | OUTPATIENT
Start: 2017-08-08 | End: 2017-08-08

## 2017-08-08 RX ORDER — TICAGRELOR 90 MG/1
1 TABLET ORAL
Qty: 60 | Refills: 0 | OUTPATIENT
Start: 2017-08-08 | End: 2017-09-07

## 2017-08-08 RX ORDER — ATORVASTATIN CALCIUM 80 MG/1
1 TABLET, FILM COATED ORAL
Qty: 30 | Refills: 0
Start: 2017-08-08 | End: 2017-09-07

## 2017-08-08 RX ADMIN — LISINOPRIL 5 MILLIGRAM(S): 2.5 TABLET ORAL at 12:22

## 2017-08-08 RX ADMIN — Medication 50 GRAM(S): at 08:39

## 2017-08-08 RX ADMIN — Medication 4: at 08:37

## 2017-08-08 RX ADMIN — TICAGRELOR 90 MILLIGRAM(S): 90 TABLET ORAL at 06:37

## 2017-08-08 RX ADMIN — Medication 81 MILLIGRAM(S): at 12:22

## 2017-08-08 RX ADMIN — Medication 2: at 12:22

## 2017-08-08 RX ADMIN — PANTOPRAZOLE SODIUM 40 MILLIGRAM(S): 20 TABLET, DELAYED RELEASE ORAL at 06:37

## 2017-08-08 RX ADMIN — SIMETHICONE 80 MILLIGRAM(S): 80 TABLET, CHEWABLE ORAL at 06:37

## 2017-08-08 RX ADMIN — Medication 25 MILLIGRAM(S): at 06:37

## 2017-08-08 NOTE — DISCHARGE NOTE ADULT - MEDICATION SUMMARY - MEDICATIONS TO TAKE
I will START or STAY ON the medications listed below when I get home from the hospital:    aspirin 81 mg oral tablet, chewable  -- 1 tab(s) by mouth once a day  -- Indication: For NSTEMI (non-ST elevated myocardial infarction)    lisinopril 5 mg oral tablet  -- 1 tab(s) by mouth once a day  -- Indication: For HTN (hypertension)    glimepiride 4 mg oral tablet  -- 1 tab(s) by mouth once a day  -- Indication: For DM (diabetes mellitus)    metFORMIN 500 mg oral tablet  -- 1 tab(s) by mouth once a day  -- Indication: For DM (diabetes mellitus)    Tradjenta 5 mg oral tablet  -- 1 tab(s) by mouth once a day  -- Indication: For DM (diabetes mellitus)    atorvastatin 80 mg oral tablet  -- 1 tab(s) by mouth once a day (at bedtime)  -- Avoid grapefruit and grapefruit juice while taking this medication.  Do not take this drug if you are pregnant.  It is very important that you take or use this exactly as directed.  Do not skip doses or discontinue unless directed by your doctor.  Obtain medical advice before taking any non-prescription drugs as some may affect the action of this medication.  Take with food or milk.    -- Indication: For HLD (hyperlipidemia)    ticagrelor 90 mg oral tablet  -- 1 tab(s) by mouth 2 times a day  -- Indication: For NSTEMI (non-ST elevated myocardial infarction)    metoprolol succinate 25 mg oral tablet, extended release  -- 1 tab(s) by mouth once a day  -- It is very important that you take or use this exactly as directed.  Do not skip doses or discontinue unless directed by your doctor.  May cause drowsiness.  Alcohol may intensify this effect.  Use care when operating dangerous machinery.  Some non-prescription drugs may aggravate your condition.  Read all labels carefully.  If a warning appears, check with your doctor before taking.  Swallow whole.  Do not crush.  Take with food or milk.  This drug may impair the ability to drive or operate machinery.  Use care until you become familiar with its effects.    -- Indication: For NSTEMI (non-ST elevated myocardial infarction)    Vitamin C 1000 mg oral tablet  -- 1 tab(s) by mouth once a day  -- Indication: For prophylatic

## 2017-08-08 NOTE — DISCHARGE NOTE ADULT - PATIENT PORTAL LINK FT
“You can access the [employer] Patient Portal, offered by [facility], by registering with the following website: [email or web address]”

## 2017-08-08 NOTE — PROGRESS NOTE ADULT - SUBJECTIVE AND OBJECTIVE BOX
HPI:  85 y/o M w/ pmhx of CABG x2 (), PCI w/ 2-3? stents last PCI in , HTN, T2DM (noninsulin dependent). Presented to ED w/ chest pain. Pain started when the patient was vaccuming the sofa. 8-10/10 in intensity sharp radiating to both arms accompanied w/ diaphoresis. EMS was called and he was taken to the ED.     In the ED EKG was performed and showed 1mm elevation in v2; <1mm elevation in v1 in the setting of right bundle branch block. GIven 325mg ASA, 180mg brillinta, 5000U heparin and 80mg Atorvastatin. CE's in ED had elevated CPK mass assay % increase but other wise negative.    Patient txfered to CCU for monitoring. (05 Aug 2017 16:11)      SUBJECTIVE:  Patient is a 84y old  Male who presents with a chief complaint of chest pain (05 Aug 2017 16:11)          OBJECTIVE:  Review Of Systems:  Constitutional: [ ] Fever [ ] Chills [ ] Fatigue [ ] Weight change   HEENT: [ ] Blurred vision [ ] Eye Pain [ ] Headache [ ] Runny nose [ ] Sore Throat   Respiratory: [ ] Cough [ ] Wheezing [ ] Shortness of breath  Cardiovascular: [ ] Chest Pain [ ] Palpitations [ ] POPE [ ] PND [ ] Orthopnea  Gastrointestinal: [ ] Abdominal Pain [ ] Diarrhea [ ] Constipation [ ] Hemorrhoids [ ] Nausea [ ] Vomiting  Genitourinary: [ ] Nocturia [ ] Dysuria [ ] Incontinence  Extremities: [ ] Swelling [ ] Joint Pain  Neurologic: [ ] Focal deficit [ ] Paresthesias [ ] Syncope  Lymphatic: [ ] Swelling [ ] Lymphadenopathy   Skin: [ ] Rash [ ] Ecchymoses [ ] Wounds [ ] Lesions  Psychiatry: [ ] Depression [ ] Suicidal/Homicidal Ideation [ ] Anxiety [ ] Sleep Disturbances  [ ] 10 point review of systems is otherwise negative except as mentioned above            [ ]Unable to obtain    Allergy:  Allergies    No Known Allergies    Intolerances        Medications:  MEDICATIONS  (STANDING):  atorvastatin 80 milliGRAM(s) Oral at bedtime  pantoprazole    Tablet 40 milliGRAM(s) Oral before breakfast  ticagrelor 90 milliGRAM(s) Oral two times a day  aspirin  chewable 81 milliGRAM(s) Oral daily  insulin lispro (HumaLOG) corrective regimen sliding scale   SubCutaneous three times a day before meals  lisinopril 5 milliGRAM(s) Oral daily  metoprolol 25 milliGRAM(s) Oral two times a day  insulin lispro (HumaLOG) corrective regimen sliding scale   SubCutaneous at bedtime    MEDICATIONS  (PRN):  artificial  tears Solution 1 Drop(s) Both EYES four times a day PRN Dry Eyes  simethicone 80 milliGRAM(s) Chew three times a day PRN Upset Stomach      PMH/PSH/FH/SH: [ ] Unchanged    Vitals:  T(C): 36.9 (17 @ 08:00), Max: 36.9 (17 @ 08:00)  HR: 46 (17 @ 08:00) (46 - 74)  BP: 106/64 (17 @ 08:00) (84/51 - 165/78)  BP(mean): 80 (17 @ 08:00) (60 - 122)  RR: 12 (17 @ 08:00) (8 - 31)  SpO2: 96% (17 @ 08:00) (92% - 100%)  Wt(kg): --  Daily     Daily Weight in k.2 (08 Aug 2017 06:00)  I&O's Summary    07 Aug 2017 07:01  -  08 Aug 2017 07:00  --------------------------------------------------------  IN: 832.5 mL / OUT: 550 mL / NET: 282.5 mL        Labs:                        13.9   10.2  )-----------( 257      ( 08 Aug 2017 06:43 )             39.1     08-    141  |  107  |  15  ----------------------------<  212<H>  3.9   |  21<L>  |  1.21    Ca    8.5      08 Aug 2017 06:43  Phos  2.6     08-  Mg     1.7         TPro  6.3  /  Alb  3.7  /  TBili  0.7  /  DBili  x   /  AST  34  /  ALT  25  /  AlkPhos  48  08-08    PT/INR - ( 07 Aug 2017 05:49 )   PT: 11.8 sec;   INR: 1.08 ratio         PTT - ( 07 Aug 2017 05:49 )  PTT:76.6 sec  CARDIAC MARKERS ( 07 Aug 2017 05:49 )  x     / 0.41 ng/mL / 88 U/L / x     / 7.3 ng/mL  CARDIAC MARKERS ( 06 Aug 2017 13:56 )  x     / 0.49 ng/mL / 167 U/L / x     / 18.1 ng/mL      Magnesium, Serum: 1.7 mg/dL ( @ 06:43)  Phosphorus Level, Serum: 2.6 mg/dL ( @ 06:43)              ECG:    Echo:    Stress Testing:     Cath:    Imaging:    Interpretation of Telemetry:      Physical Exam:  Appearance: [ ] Normal  [ ] abnormal [ ] NAD   Eyes: [ ] PERRL [ ] EOMI  HENT: [ ] Normal [ ] Abnormal oral muscosa [ ]NC/AT  Cardiovascular: [ ] S1 [ ] S2 [ ] RRR [ ] m/r/g [ ]edema [ ] JVP  Procedural Access Site: [ ]  hematoma [ ] tender to palpation [ ] 2+ pulse [ ] bruit [ ] Ecchymosis  Respiratory: [ ] Clear to auscultation bilaterally  Gastrointestinal: [ ] Soft [ ] tenderness[ ] distension [ ] BS  Musculoskeletal: [ ] clubbing [ ] joint deformity   Neurologic: [ ] Non-focal  Lymphatic: [ ] lymphadenopathy  Psychiatry: [ ] AAOx3  [ ] confused [ ] disoriented [ ] Mood & affect appropriate  Skin: [ ]  rashes [ ] ecchymoses [ ] cyanosis SUBJECTIVE:  Patient is a 84y old  Male who presents with a chief complaint of chest pain (05 Aug 2017 16:11)          OBJECTIVE:  Review Of Systems:  Constitutional: [ ] Fever [ ] Chills [ ] Fatigue [ ] Weight change   HEENT: [ ] Blurred vision [ ] Eye Pain [ ] Headache [ ] Runny nose [ ] Sore Throat   Respiratory: [ ] Cough [ ] Wheezing [ ] Shortness of breath  Cardiovascular: [ ] Chest Pain [ ] Palpitations [ ] POPE [ ] PND [ ] Orthopnea  Gastrointestinal: denies nausea, vomiting abdomen pain, but does endorse a decrease in belching.   Genitourinary: [ ] Nocturia [ ] Dysuria [ ] Incontinence  Extremities: [ ] Swelling [ ] Joint Pain  Neurologic: [ ] Focal deficit [ ] Paresthesias [ ] Syncope  Lymphatic: [ ] Swelling [ ] Lymphadenopathy   Skin: [ ] Rash [ ] Ecchymoses [ ] Wounds [ ] Lesions  Psychiatry: [ ] Depression [ ] Suicidal/Homicidal Ideation [ ] Anxiety [ ] Sleep Disturbances  [ ] 10 point review of systems is otherwise negative except as mentioned above            [ ]Unable to obtain    Allergy:  Allergies    No Known Allergies    Intolerances        Medications:  MEDICATIONS  (STANDING):  atorvastatin 80 milliGRAM(s) Oral at bedtime  pantoprazole    Tablet 40 milliGRAM(s) Oral before breakfast  ticagrelor 90 milliGRAM(s) Oral two times a day  aspirin  chewable 81 milliGRAM(s) Oral daily  insulin lispro (HumaLOG) corrective regimen sliding scale   SubCutaneous three times a day before meals  lisinopril 5 milliGRAM(s) Oral daily  metoprolol 25 milliGRAM(s) Oral two times a day  insulin lispro (HumaLOG) corrective regimen sliding scale   SubCutaneous at bedtime    MEDICATIONS  (PRN):  artificial  tears Solution 1 Drop(s) Both EYES four times a day PRN Dry Eyes  simethicone 80 milliGRAM(s) Chew three times a day PRN Upset Stomach      PMH/PSH/FH/SH: [ ] Unchanged    Vitals:  T(C): 36.9 (17 @ 08:00), Max: 36.9 (17 @ 08:00)  HR: 46 (17 @ 08:00) (46 - 74)  BP: 106/64 (17 @ 08:00) (84/51 - 165/78)  BP(mean): 80 (17 @ 08:00) (60 - 122)  RR: 12 (17 @ 08:00) (8 - 31)  SpO2: 96% (17 @ 08:00) (92% - 100%)  Wt(kg): --  Daily     Daily Weight in k.2 (08 Aug 2017 06:00)  I&O's Summary    07 Aug 2017 07:01  -  08 Aug 2017 07:00  --------------------------------------------------------  IN: 832.5 mL / OUT: 550 mL / NET: 282.5 mL        Labs:                        13.9   10.2  )-----------( 257      ( 08 Aug 2017 06:43 )             39.1         141  |  107  |  15  ----------------------------<  212<H>  3.9   |  21<L>  |  1.21    Ca    8.5      08 Aug 2017 06:43  Phos  2.6       Mg     1.7         TPro  6.3  /  Alb  3.7  /  TBili  0.7  /  DBili  x   /  AST  34  /  ALT  25  /  AlkPhos  48  08-08    PT/INR - ( 07 Aug 2017 05:49 )   PT: 11.8 sec;   INR: 1.08 ratio         PTT - ( 07 Aug 2017 05:49 )  PTT:76.6 sec  CARDIAC MARKERS ( 07 Aug 2017 05:49 )  x     / 0.41 ng/mL / 88 U/L / x     / 7.3 ng/mL  CARDIAC MARKERS ( 06 Aug 2017 13:56 )  x     / 0.49 ng/mL / 167 U/L / x     / 18.1 ng/mL      Magnesium, Serum: 1.7 mg/dL ( @ 06:43)  Phosphorus Level, Serum: 2.6 mg/dL ( @ 06:43)              ECG:    Echo:    Stress Testing:     Cath:  CORONARY VESSELS: The coronary circulation is right dominant.  LM:   --  LM: There was a tubular 40 % stenosis.  LAD:   --  Ostial LAD: There was a 100 % stenosis.  CX:   --  Circumflex: The vessel was very small sized. Angiography showed  mild atherosclerosis with no flow limiting lesions.  --  Ostial circumflex: There was a 90 % stenosis.  RCA:   --  Distal RCA: There was a 100 % stenosis.  GRAFTS:   --  Graft to the mid LAD: The graft was a LIMA. It was normal.  Distal vessel angiography showed severe diffuse disease.  --  Graft to the RPDA: The graft was a saphenous vein graft from the aorta.  There was a discrete 50 % stenosis in the proximal third of the graft, at  the site of two layers of stent, within the stented segment. There was a  discrete 90 % stenosis in the distal third of the graft, at the site of a  prior stent.      Imaging:    Interpretation of Telemetry:      Physical Exam:  Appearance: [ ] Normal  [ ] abnormal [ ] NAD   Eyes: [ ] PERRL [ ] EOMI  HENT: [ ] Normal [ ] Abnormal oral muscosa [ ]NC/AT  Cardiovascular: [ ] S1 [ ] S2 [ ] RRR [ ] m/r/g [ ]edema [ ] JVP  Procedural Access Site: [ ]  hematoma [ ] tender to palpation [ ] 2+ pulse [ ] bruit [ ] Ecchymosis  Respiratory: [ ] Clear to auscultation bilaterally  Gastrointestinal: [ ] Soft [ ] tenderness[ ] distension [ ] BS  Musculoskeletal: [ ] clubbing [ ] joint deformity   Neurologic: [ ] Non-focal  Lymphatic: [ ] lymphadenopathy  Psychiatry: [ ] AAOx3  [ ] confused [ ] disoriented [ ] Mood & affect appropriate  Skin: [ ]  rashes [ ] ecchymoses [ ] cyanosis

## 2017-08-08 NOTE — DISCHARGE NOTE ADULT - CARE PROVIDERS DIRECT ADDRESSES
[email or web address]

## 2017-08-08 NOTE — DISCHARGE NOTE ADULT - CARE PLAN
Principal Discharge DX:	NSTEMI (non-ST elevated myocardial infarction)  Goal:	Management of acute event  Instructions for follow-up, activity and diet:	You were diagnosed with a myocardial infarction. Percutaneous coronary intervention was performed and a drug eluding stent was placed in an existing graft to the right proximal diastal artery  Secondary Diagnosis:	HTN (hypertension)  Secondary Diagnosis:	DM (diabetes mellitus) Principal Discharge DX:	NSTEMI (non-ST elevated myocardial infarction)  Goal:	Management of acute event  Instructions for follow-up, activity and diet:	You were diagnosed with a myocardial infarction. Percutaneous coronary intervention was performed and a drug eluding stent was placed in an existing graft to the right proximal distal artery. Please adhere to your medical regimen as listed. Thursday at 2:15 pm.  Secondary Diagnosis:	HTN (hypertension)  Instructions for follow-up, activity and diet:	Please continue with the medical regimen as listed. Follow up with your primary care physician within one week of discharge.  Secondary Diagnosis:	DM (diabetes mellitus)  Instructions for follow-up, activity and diet:	Please continue with your home regimen. Your hemoglobin A1c which is a marker of diabetes is high(7.2%) and your medications may need to be adjusted. Follow up with your primary care physician within one week of discharge. Principal Discharge DX:	NSTEMI (non-ST elevated myocardial infarction)  Goal:	Management of acute event  Instructions for follow-up, activity and diet:	You were diagnosed with a myocardial infarction. Percutaneous coronary intervention was performed and a drug eluding stent was placed in an existing graft to the right proximal distal artery. Please adhere to your medical regimen as listed. An appointment was set up with your cardiologist Dr. Luis Eduardo Schaffer for this Thursday(8/10/2017) at 2:15 pm.  Secondary Diagnosis:	HTN (hypertension)  Instructions for follow-up, activity and diet:	Please continue with the medical regimen as listed. Follow up with your primary care physician within one week of discharge.  Secondary Diagnosis:	DM (diabetes mellitus)  Instructions for follow-up, activity and diet:	Please continue with your home regimen. Your hemoglobin A1c which is a marker of diabetes is high(7.2%) and your medications may need to be adjusted. Follow up with your primary care physician within one week of discharge.

## 2017-08-08 NOTE — DISCHARGE NOTE ADULT - MEDICATION SUMMARY - MEDICATIONS TO STOP TAKING
I will STOP taking the medications listed below when I get home from the hospital:    Plavix 75 mg oral tablet  -- 1 tab(s) by mouth once a day    atenolol 25 mg oral tablet  -- 1 tab(s) by mouth once a day    losartan 50 mg oral tablet  -- 1 tab(s) by mouth once a day

## 2017-08-08 NOTE — DISCHARGE NOTE ADULT - MEDICATION SUMMARY - MEDICATIONS TO CHANGE
I will SWITCH the dose or number of times a day I take the medications listed below when I get home from the hospital:    atorvastatin 40 mg oral tablet  -- 1 tab(s) by mouth once a day

## 2017-08-08 NOTE — DISCHARGE NOTE ADULT - HOSPITAL COURSE
83 y/o male hx of CAD s/p CABG x2, PCI w/ ASA x 3, HTN, T2DM. Presented to the ED w/ chest pain. EKG did not show ST elevations but chest pain was relieved w/ aspirin and Brilinta load. Suspision for NSTEMI initiated patient to be placed on heparin drip and cardiac cath was obtained. Drug eluding stent was placed in the SVG (distal 1/3) from the aorta to RPDA. 83 y/o male hx of CAD s/p CABG x2, PCI w/ ASA x 3, HTN, T2DM. Presented to the ED w/ chest pain. EKG did not show ST elevations but chest pain was relieved w/ aspirin and Brilinta load. Suspision for NSTEMI initiated patient to be placed on heparin drip and cardiac cath was performed. Drug eluding stent was placed in the SVG (distal 1/3) from the aorta to RPDA. Patient tolerated the procedure well with no complications. 83 y/o male hx of CAD s/p CABG x2, PCI w/ ASA x 3, HTN, T2DM. Presented to the ED w/ chest pain. EKG did not show ST elevations but chest pain was relieved w/ aspirin and Brilinta load. Suspision for NSTEMI initiated patient to be placed on heparin drip and cardiac cath was performed. Drug eluding stent was placed in the SVG (distal 1/3) from the aorta to RPDA. Patient tolerated the procedure well with no complications. Transthoracic echo was also performed during his admission and showed an EF of 55%,Mildly dilated left atrium.  LA volume index = 36 cc/m2.,Normal left ventricular internal dimensions and wall thicknesses, Mild segmental left ventricular systolic dysfunction and Hypokinesis of the basal inferoseptum.

## 2017-08-08 NOTE — PROGRESS NOTE ADULT - PROBLEM SELECTOR PLAN 1
s/p cath w/ ASA x1 to SVG to RPDL  c/w ticagrelor 90mg BID  c/w 81mg OD   atorvastatin 80mg OD  continuous telemetry monitoring   -vital checks per CCU routine. s/p cath w/ ASA x1 to SVG to RPDA  c/w ticagrelor 90mg BID  c/w 81mg OD   atorvastatin 80mg OD  continuous telemetry monitoring   -vital checks per CCU routine.

## 2017-08-08 NOTE — PROGRESS NOTE ADULT - PROBLEM SELECTOR PLAN 4
Holding home antihypertensives in setting of low BP   monitor vitals per CCU routine

## 2017-08-08 NOTE — DISCHARGE NOTE ADULT - CARE PROVIDER_API CALL
Luis Eduardo Schaffer), Cardiovascular Disease; Internal Medicine  [address] [place] [address]  Phone: [phone]  Fax: [phone]

## 2017-08-08 NOTE — PROGRESS NOTE ADULT - PROBLEM SELECTOR PLAN 3
holding home medications   -start HISS   fingersticks per routine

## 2017-08-08 NOTE — PROGRESS NOTE ADULT - PROBLEM SELECTOR PLAN 5
-c/w monitoring of lytes and replete PRN
-c/w monitoring of lytes and replete PRN
low mg and phos will replete PRN

## 2017-08-08 NOTE — PROGRESS NOTE ADULT - ASSESSMENT
83 y/o M w/ pmhx of CABG x2 (1988), PCI w/ 2-3stents, HTN, T2DM. Presented to the ED w/ chest pain admitted for NSTEMI. s/p ASA to SVG to RPDL. 83 y/o M w/ pmhx of CABG x2 (1988), PCI w/ 2-3stents, HTN, T2DM. Presented to the ED w/ chest pain admitted for NSTEMI. s/p ASA to SVG to RPDA.

## 2017-08-08 NOTE — DISCHARGE NOTE ADULT - PLAN OF CARE
Management of acute event You were diagnosed with a myocardial infarction. Percutaneous coronary intervention was performed and a drug eluding stent was placed in an existing graft to the right proximal diastal artery Please continue with the medical regimen as listed. Follow up with your primary care physician within one week of discharge. Please continue with your home regimen. Your hemoglobin A1c which is a marker of diabetes is high(7.2%) and your medications may need to be adjusted. Follow up with your primary care physician within one week of discharge. You were diagnosed with a myocardial infarction. Percutaneous coronary intervention was performed and a drug eluding stent was placed in an existing graft to the right proximal distal artery. Please adhere to your medical regimen as listed. Thursday at 2:15 pm. You were diagnosed with a myocardial infarction. Percutaneous coronary intervention was performed and a drug eluding stent was placed in an existing graft to the right proximal distal artery. Please adhere to your medical regimen as listed. An appointment was set up with your cardiologist Dr. Luis Eduardo Schaffer for this Thursday(8/10/2017) at 2:15 pm.

## 2017-08-17 RX ORDER — ATENOLOL 25 MG/1
0 TABLET ORAL
Qty: 0 | Refills: 0 | COMMUNITY

## 2017-08-17 RX ORDER — LOSARTAN POTASSIUM 100 MG/1
1 TABLET, FILM COATED ORAL
Qty: 0 | Refills: 0 | COMMUNITY

## 2017-08-17 RX ORDER — METFORMIN HYDROCHLORIDE 850 MG/1
0 TABLET ORAL
Qty: 0 | Refills: 0 | COMMUNITY

## 2017-08-17 RX ORDER — METOPROLOL TARTRATE 50 MG
0 TABLET ORAL
Qty: 0 | Refills: 0 | COMMUNITY

## 2017-08-17 RX ORDER — ATORVASTATIN CALCIUM 80 MG/1
1 TABLET, FILM COATED ORAL
Qty: 0 | Refills: 0 | COMMUNITY

## 2017-08-17 RX ORDER — CLOPIDOGREL BISULFATE 75 MG/1
1 TABLET, FILM COATED ORAL
Qty: 0 | Refills: 0 | COMMUNITY

## 2017-08-17 RX ORDER — ATENOLOL 25 MG/1
1 TABLET ORAL
Qty: 0 | Refills: 0 | COMMUNITY

## 2017-08-17 RX ORDER — SIMVASTATIN 20 MG/1
0 TABLET, FILM COATED ORAL
Qty: 0 | Refills: 0 | COMMUNITY

## 2017-08-17 RX ORDER — GLIMEPIRIDE 1 MG
0 TABLET ORAL
Qty: 0 | Refills: 0 | COMMUNITY

## 2017-09-18 NOTE — PATIENT PROFILE ADULT. - PRESSURE ULCER(S)
General Adult HPI





- General


Chief complaint: Chest Pain


Stated complaint: Chest Pain/Migrane/Weakness


Time Seen by Provider: 17 23:46


Source: patient, RN notes reviewed, old records reviewed


Mode of arrival: ambulatory


Limitations: no limitations





- History of Present Illness


Initial comments: 





This is a 30-year-old female year for evaluation.  Patient is a for evaluation 

of abdominal pain chest pain generalized body pain weakness not feeling well.  

Patient has history of similar symptoms when more started on a headache to this 

use of Hospital at that time felt better was discharged home.  Patient's 

symptoms became worse today at work.  No fevers or cough or congestion note 

significant medical history.





- Related Data


 Home Medications











 Medication  Instructions  Recorded  Confirmed


 


No Known Home Medications [No  17





Known Home Medications]   











 Allergies











Allergy/AdvReac Type Severity Reaction Status Date / Time


 


sulfamethoxazole Allergy  Rash/Hives Verified 17 23:44





[From Bactrim]     


 


trimethoprim [From Bactrim] Allergy  Rash/Hives Verified 17 23:44














Review of Systems


ROS Statement: 


Those systems with pertinent positive or pertinent negative responses have been 

documented in the HPI.





ROS Other: All systems not noted in ROS Statement are negative.





Past Medical History


Past Medical History: No Reported History


Additional Past Medical History / Comment(s): Anemia


History of Any Multi-Drug Resistant Organisms: None Reported


Past Surgical History:  Section, Tubal Ligation


Additional Past Surgical History / Comment(s): ganglion cyst removal-left wrist


Past Anesthesia/Blood Transfusion Reactions: No Reported Reaction


Past Psychological History: No Psychological Hx Reported


Smoking Status: Current every day smoker


Past Alcohol Use History: Occasional


Past Drug Use History: None Reported





- Past Family History


  ** Mother


Family Medical History: Hypertension





  ** Father


Family Medical History: Cancer





General Exam


Limitations: no limitations


General appearance: alert, in no apparent distress


Head exam: Present: atraumatic, normocephalic, normal inspection


Eye exam: Present: normal appearance, PERRL, EOMI.  Absent: scleral icterus, 

conjunctival injection, periorbital swelling


ENT exam: Present: normal exam, mucous membranes moist


Neck exam: Present: normal inspection.  Absent: tenderness, meningismus, 

lymphadenopathy


Respiratory exam: Present: normal lung sounds bilaterally.  Absent: respiratory 

distress, wheezes, rales, rhonchi, stridor


Cardiovascular Exam: Present: regular rate, normal rhythm, normal heart sounds.

  Absent: systolic murmur, diastolic murmur, rubs, gallop, clicks


GI/Abdominal exam: Present: soft, normal bowel sounds.  Absent: distended, 

tenderness, guarding, rebound, rigid


Extremities exam: Present: normal inspection, full ROM, normal capillary 

refill.  Absent: tenderness, pedal edema, joint swelling, calf tenderness


Back exam: Present: normal inspection


Neurological exam: Present: alert, oriented X3, CN II-XII intact


Psychiatric exam: Present: normal affect, normal mood


Skin exam: Present: warm, dry, intact, normal color.  Absent: rash





Course


 Vital Signs











  17





  23:40 01:23 02:02


 


Temperature 97.9 F 97.1 F L 98.2 F


 


Pulse Rate 85 68 62


 


Respiratory 18 18 16





Rate   


 


Blood Pressure 119/65 107/58 114/57


 


O2 Sat by Pulse 100 99 100





Oximetry   














EKG Findings





- EKG Comments:


EKG Findings:: EKG shows normal sinus or mastoid I, , QRS 86, QTc 458





Medical Decision Making





- Medical Decision Making





38 female here for evaluation of chest pain.  Patient does have history of 

chest pain.  Similar symptoms before which resolved with pain control.  Patient 

at this time is without pain no acute distress and no cardiac risk factors.  

Patient will be discharged home





- Lab Data


Result diagrams: 


 17 00:33





 17 00:33


 Lab Results











  17 Range/Units





  00:00 00:00 00:33 


 


WBC    6.9  (3.8-10.6)  k/uL


 


RBC    3.81  (3.80-5.40)  m/uL


 


Hgb    7.2 L  (11.4-16.0)  gm/dL


 


Hct    27.3 L  (34.0-46.0)  %


 


MCV    71.6 L  (80.0-100.0)  fL


 


MCH    18.9 L  (25.0-35.0)  pg


 


MCHC    26.4 L  (31.0-37.0)  g/dL


 


RDW    18.0 H  (11.5-15.5)  %


 


Plt Count    493 H  (150-450)  k/uL


 


Neutrophils %    68  %


 


Lymphocytes %    24  %


 


Monocytes %    4  %


 


Eosinophils %    2  %


 


Basophils %    1  %


 


Neutrophils #    4.7  (1.3-7.7)  k/uL


 


Lymphocytes #    1.7  (1.0-4.8)  k/uL


 


Monocytes #    0.3  (0-1.0)  k/uL


 


Eosinophils #    0.1  (0-0.7)  k/uL


 


Basophils #    0.0  (0-0.2)  k/uL


 


Hypochromasia    Marked  


 


Anisocytosis    Slight  


 


Microcytosis    Marked  


 


PT     (9.0-12.0)  sec


 


INR     (<1.2)  


 


APTT     (22.0-30.0)  sec


 


D-Dimer     (<0.60)  mg/L FEU


 


Sodium     (137-145)  mmol/L


 


Potassium     (3.5-5.1)  mmol/L


 


Chloride     ()  mmol/L


 


Carbon Dioxide     (22-30)  mmol/L


 


Anion Gap     mmol/L


 


BUN     (7-17)  mg/dL


 


Creatinine     (0.52-1.04)  mg/dL


 


Est GFR (MDRD) Af Amer     (>60 ml/min/1.73 sqM)  


 


Est GFR (MDRD) Non-Af     (>60 ml/min/1.73 sqM)  


 


Glucose     (74-99)  mg/dL


 


Calcium     (8.4-10.2)  mg/dL


 


Magnesium     (1.6-2.3)  mg/dL


 


Total Bilirubin     (0.2-1.3)  mg/dL


 


AST     (14-36)  U/L


 


ALT     (9-52)  U/L


 


Alkaline Phosphatase     ()  U/L


 


Total Creatine Kinase     ()  U/L


 


CK-MB (CK-2)     (0.0-2.4)  ng/mL


 


CK-MB (CK-2) Rel Index     


 


Troponin I     (0.000-0.034)  ng/mL


 


Total Protein     (6.3-8.2)  g/dL


 


Albumin     (3.5-5.0)  g/dL


 


Lipase     ()  U/L


 


Urine Color  Light Yellow    


 


Urine Appearance  Clear    (Clear)  


 


Urine pH  7.0    (5.0-8.0)  


 


Ur Specific Gravity  1.011    (1.001-1.035)  


 


Urine Protein  Negative    (Negative)  


 


Urine Glucose (UA)  Negative    (Negative)  


 


Urine Ketones  Negative    (Negative)  


 


Urine Blood  Negative    (Negative)  


 


Urine Nitrite  Negative    (Negative)  


 


Urine Bilirubin  Negative    (Negative)  


 


Urine Urobilinogen  <2.0    (<2.0)  mg/dL


 


Ur Leukocyte Esterase  Negative    (Negative)  


 


Urine HCG, Qual   Not Detected   (Not Detectd)  














  17 Range/Units





  00:33 00:33 00:33 


 


WBC     (3.8-10.6)  k/uL


 


RBC     (3.80-5.40)  m/uL


 


Hgb     (11.4-16.0)  gm/dL


 


Hct     (34.0-46.0)  %


 


MCV     (80.0-100.0)  fL


 


MCH     (25.0-35.0)  pg


 


MCHC     (31.0-37.0)  g/dL


 


RDW     (11.5-15.5)  %


 


Plt Count     (150-450)  k/uL


 


Neutrophils %     %


 


Lymphocytes %     %


 


Monocytes %     %


 


Eosinophils %     %


 


Basophils %     %


 


Neutrophils #     (1.3-7.7)  k/uL


 


Lymphocytes #     (1.0-4.8)  k/uL


 


Monocytes #     (0-1.0)  k/uL


 


Eosinophils #     (0-0.7)  k/uL


 


Basophils #     (0-0.2)  k/uL


 


Hypochromasia     


 


Anisocytosis     


 


Microcytosis     


 


PT  12.6 H    (9.0-12.0)  sec


 


INR  1.3 H    (<1.2)  


 


APTT  24.7    (22.0-30.0)  sec


 


D-Dimer  <0.17    (<0.60)  mg/L FEU


 


Sodium   138   (137-145)  mmol/L


 


Potassium   3.9   (3.5-5.1)  mmol/L


 


Chloride   108 H   ()  mmol/L


 


Carbon Dioxide   20 L   (22-30)  mmol/L


 


Anion Gap   10   mmol/L


 


BUN   11   (7-17)  mg/dL


 


Creatinine   0.60   (0.52-1.04)  mg/dL


 


Est GFR (MDRD) Af Amer   >60   (>60 ml/min/1.73 sqM)  


 


Est GFR (MDRD) Non-Af   >60   (>60 ml/min/1.73 sqM)  


 


Glucose   73 L   (74-99)  mg/dL


 


Calcium   9.1   (8.4-10.2)  mg/dL


 


Magnesium   1.6   (1.6-2.3)  mg/dL


 


Total Bilirubin   0.3   (0.2-1.3)  mg/dL


 


AST   27   (14-36)  U/L


 


ALT   21   (9-52)  U/L


 


Alkaline Phosphatase   54   ()  U/L


 


Total Creatine Kinase    75  ()  U/L


 


CK-MB (CK-2)    0.3  (0.0-2.4)  ng/mL


 


CK-MB (CK-2) Rel Index    0.4  


 


Troponin I    <0.012  (0.000-0.034)  ng/mL


 


Total Protein   6.6   (6.3-8.2)  g/dL


 


Albumin   4.0   (3.5-5.0)  g/dL


 


Lipase   62   ()  U/L


 


Urine Color     


 


Urine Appearance     (Clear)  


 


Urine pH     (5.0-8.0)  


 


Ur Specific Gravity     (1.001-1.035)  


 


Urine Protein     (Negative)  


 


Urine Glucose (UA)     (Negative)  


 


Urine Ketones     (Negative)  


 


Urine Blood     (Negative)  


 


Urine Nitrite     (Negative)  


 


Urine Bilirubin     (Negative)  


 


Urine Urobilinogen     (<2.0)  mg/dL


 


Ur Leukocyte Esterase     (Negative)  


 


Urine HCG, Qual     (Not Detectd)  














- Radiology Data


Radiology results: report reviewed (Chest x-ray is negative for acute disease), 

image reviewed





Disposition


Clinical Impression: 


 Chest pain





Disposition: HOME SELF-CARE


Condition: Good


Instructions:  Chest Pain (ED)


Referrals: 


Pankaj Resendiz MD [Primary Care Provider] - 1-2 days no

## 2017-10-19 PROCEDURE — C1887: CPT

## 2017-10-19 PROCEDURE — C1769: CPT

## 2017-10-19 PROCEDURE — 86850 RBC ANTIBODY SCREEN: CPT

## 2017-10-19 PROCEDURE — 82330 ASSAY OF CALCIUM: CPT

## 2017-10-19 PROCEDURE — 80061 LIPID PANEL: CPT

## 2017-10-19 PROCEDURE — 96374 THER/PROPH/DIAG INJ IV PUSH: CPT

## 2017-10-19 PROCEDURE — 80053 COMPREHEN METABOLIC PANEL: CPT

## 2017-10-19 PROCEDURE — 85730 THROMBOPLASTIN TIME PARTIAL: CPT

## 2017-10-19 PROCEDURE — 86900 BLOOD TYPING SEROLOGIC ABO: CPT

## 2017-10-19 PROCEDURE — 82550 ASSAY OF CK (CPK): CPT

## 2017-10-19 PROCEDURE — C1874: CPT

## 2017-10-19 PROCEDURE — 82803 BLOOD GASES ANY COMBINATION: CPT

## 2017-10-19 PROCEDURE — 83605 ASSAY OF LACTIC ACID: CPT

## 2017-10-19 PROCEDURE — 84100 ASSAY OF PHOSPHORUS: CPT

## 2017-10-19 PROCEDURE — C9606: CPT | Mod: RC

## 2017-10-19 PROCEDURE — 85610 PROTHROMBIN TIME: CPT

## 2017-10-19 PROCEDURE — 83690 ASSAY OF LIPASE: CPT

## 2017-10-19 PROCEDURE — 71045 X-RAY EXAM CHEST 1 VIEW: CPT

## 2017-10-19 PROCEDURE — 83036 HEMOGLOBIN GLYCOSYLATED A1C: CPT

## 2017-10-19 PROCEDURE — 85014 HEMATOCRIT: CPT

## 2017-10-19 PROCEDURE — 85027 COMPLETE CBC AUTOMATED: CPT

## 2017-10-19 PROCEDURE — 99285 EMERGENCY DEPT VISIT HI MDM: CPT | Mod: 25

## 2017-10-19 PROCEDURE — 99152 MOD SED SAME PHYS/QHP 5/>YRS: CPT

## 2017-10-19 PROCEDURE — 93459 L HRT ART/GRFT ANGIO: CPT | Mod: 59

## 2017-10-19 PROCEDURE — 84132 ASSAY OF SERUM POTASSIUM: CPT

## 2017-10-19 PROCEDURE — 82553 CREATINE MB FRACTION: CPT

## 2017-10-19 PROCEDURE — 86901 BLOOD TYPING SEROLOGIC RH(D): CPT

## 2017-10-19 PROCEDURE — 99153 MOD SED SAME PHYS/QHP EA: CPT

## 2017-10-19 PROCEDURE — 82947 ASSAY GLUCOSE BLOOD QUANT: CPT

## 2017-10-19 PROCEDURE — C1894: CPT

## 2017-10-19 PROCEDURE — 84295 ASSAY OF SERUM SODIUM: CPT

## 2017-10-19 PROCEDURE — 83735 ASSAY OF MAGNESIUM: CPT

## 2017-10-19 PROCEDURE — 93005 ELECTROCARDIOGRAM TRACING: CPT

## 2017-10-19 PROCEDURE — 84484 ASSAY OF TROPONIN QUANT: CPT

## 2017-10-19 PROCEDURE — 93571 IV DOP VEL&/PRESS C FLO 1ST: CPT | Mod: RC

## 2017-10-19 PROCEDURE — 81003 URINALYSIS AUTO W/O SCOPE: CPT

## 2017-10-19 PROCEDURE — 93306 TTE W/DOPPLER COMPLETE: CPT

## 2017-10-19 PROCEDURE — 82435 ASSAY OF BLOOD CHLORIDE: CPT

## 2017-10-19 PROCEDURE — 83880 ASSAY OF NATRIURETIC PEPTIDE: CPT

## 2018-03-13 ENCOUNTER — EMERGENCY (EMERGENCY)
Facility: HOSPITAL | Age: 83
LOS: 1 days | Discharge: HOME | End: 2018-03-13
Attending: EMERGENCY MEDICINE | Admitting: EMERGENCY MEDICINE
Payer: MEDICARE

## 2018-03-13 VITALS
HEART RATE: 74 BPM | SYSTOLIC BLOOD PRESSURE: 206 MMHG | OXYGEN SATURATION: 98 % | DIASTOLIC BLOOD PRESSURE: 84 MMHG | RESPIRATION RATE: 20 BRPM | TEMPERATURE: 98 F

## 2018-03-13 DIAGNOSIS — Z95.1 PRESENCE OF AORTOCORONARY BYPASS GRAFT: Chronic | ICD-10-CM

## 2018-03-13 PROCEDURE — 93010 ELECTROCARDIOGRAM REPORT: CPT

## 2018-03-13 PROCEDURE — 99284 EMERGENCY DEPT VISIT MOD MDM: CPT | Mod: 25,GC

## 2018-03-13 NOTE — ED ADULT NURSE NOTE - OBJECTIVE STATEMENT
Pt 85yrs old arrived from triage [id number] Reporting abdominal pain PMH DM, CAD, HLD, Onset of pain in the morning ..... Pain rated 3 scale of 10 right now. Lcoated in RUQ Pain is non radiating. Denies NVD. No  distress. No CP dizziness weakness or SOB. BS sounds + All 4Q ABdomen soft, nontender, nondistended. Decrease BM.

## 2018-03-13 NOTE — ED ADULT NURSE NOTE - CHPI ED SYMPTOMS NEG
no vomiting/no chills/no burning urination/no hematuria/no dysuria/no blood in stool/no diarrhea/no nausea/no abdominal distension/no fever

## 2018-03-14 VITALS
RESPIRATION RATE: 16 BRPM | HEART RATE: 61 BPM | OXYGEN SATURATION: 96 % | DIASTOLIC BLOOD PRESSURE: 72 MMHG | TEMPERATURE: 98 F | SYSTOLIC BLOOD PRESSURE: 159 MMHG

## 2018-03-14 LAB
ALBUMIN SERPL ELPH-MCNC: 4.3 G/DL — SIGNIFICANT CHANGE UP (ref 3.3–5)
ALP SERPL-CCNC: 49 U/L — SIGNIFICANT CHANGE UP (ref 40–120)
ALT FLD-CCNC: 17 U/L RC — SIGNIFICANT CHANGE UP (ref 10–45)
ANION GAP SERPL CALC-SCNC: 14 MMOL/L — SIGNIFICANT CHANGE UP (ref 5–17)
APPEARANCE UR: CLEAR — SIGNIFICANT CHANGE UP
APTT BLD: 27.7 SEC — SIGNIFICANT CHANGE UP (ref 27.5–37.4)
AST SERPL-CCNC: 20 U/L — SIGNIFICANT CHANGE UP (ref 10–40)
BASOPHILS # BLD AUTO: 0 K/UL — SIGNIFICANT CHANGE UP (ref 0–0.2)
BASOPHILS NFR BLD AUTO: 0.1 % — SIGNIFICANT CHANGE UP (ref 0–2)
BILIRUB SERPL-MCNC: 0.7 MG/DL — SIGNIFICANT CHANGE UP (ref 0.2–1.2)
BILIRUB UR-MCNC: NEGATIVE — SIGNIFICANT CHANGE UP
BUN SERPL-MCNC: 15 MG/DL — SIGNIFICANT CHANGE UP (ref 7–23)
CALCIUM SERPL-MCNC: 10.2 MG/DL — SIGNIFICANT CHANGE UP (ref 8.4–10.5)
CHLORIDE SERPL-SCNC: 99 MMOL/L — SIGNIFICANT CHANGE UP (ref 96–108)
CO2 SERPL-SCNC: 25 MMOL/L — SIGNIFICANT CHANGE UP (ref 22–31)
COLOR SPEC: SIGNIFICANT CHANGE UP
CREAT SERPL-MCNC: 1.08 MG/DL — SIGNIFICANT CHANGE UP (ref 0.5–1.3)
DIFF PNL FLD: NEGATIVE — SIGNIFICANT CHANGE UP
EOSINOPHIL # BLD AUTO: 0.1 K/UL — SIGNIFICANT CHANGE UP (ref 0–0.5)
EOSINOPHIL NFR BLD AUTO: 0.9 % — SIGNIFICANT CHANGE UP (ref 0–6)
GAS PNL BLDV: SIGNIFICANT CHANGE UP
GLUCOSE SERPL-MCNC: 170 MG/DL — HIGH (ref 70–99)
GLUCOSE UR QL: NEGATIVE — SIGNIFICANT CHANGE UP
HCT VFR BLD CALC: 39.5 % — SIGNIFICANT CHANGE UP (ref 39–50)
HGB BLD-MCNC: 13.7 G/DL — SIGNIFICANT CHANGE UP (ref 13–17)
INR BLD: 1.03 RATIO — SIGNIFICANT CHANGE UP (ref 0.88–1.16)
KETONES UR-MCNC: NEGATIVE — SIGNIFICANT CHANGE UP
LEUKOCYTE ESTERASE UR-ACNC: NEGATIVE — SIGNIFICANT CHANGE UP
LIDOCAIN IGE QN: 107 U/L — HIGH (ref 7–60)
LYMPHOCYTES # BLD AUTO: 14.6 % — SIGNIFICANT CHANGE UP (ref 13–44)
LYMPHOCYTES # BLD AUTO: 2.1 K/UL — SIGNIFICANT CHANGE UP (ref 1–3.3)
MCHC RBC-ENTMCNC: 32.4 PG — SIGNIFICANT CHANGE UP (ref 27–34)
MCHC RBC-ENTMCNC: 34.6 GM/DL — SIGNIFICANT CHANGE UP (ref 32–36)
MCV RBC AUTO: 93.6 FL — SIGNIFICANT CHANGE UP (ref 80–100)
MONOCYTES # BLD AUTO: 1.6 K/UL — HIGH (ref 0–0.9)
MONOCYTES NFR BLD AUTO: 11.4 % — SIGNIFICANT CHANGE UP (ref 2–14)
NEUTROPHILS # BLD AUTO: 10.4 K/UL — HIGH (ref 1.8–7.4)
NEUTROPHILS NFR BLD AUTO: 73 % — SIGNIFICANT CHANGE UP (ref 43–77)
NITRITE UR-MCNC: NEGATIVE — SIGNIFICANT CHANGE UP
PH UR: 8 — SIGNIFICANT CHANGE UP (ref 5–8)
PLATELET # BLD AUTO: 268 K/UL — SIGNIFICANT CHANGE UP (ref 150–400)
POTASSIUM SERPL-MCNC: 4.1 MMOL/L — SIGNIFICANT CHANGE UP (ref 3.5–5.3)
POTASSIUM SERPL-SCNC: 4.1 MMOL/L — SIGNIFICANT CHANGE UP (ref 3.5–5.3)
PROT SERPL-MCNC: 7.4 G/DL — SIGNIFICANT CHANGE UP (ref 6–8.3)
PROT UR-MCNC: NEGATIVE — SIGNIFICANT CHANGE UP
PROTHROM AB SERPL-ACNC: 11.1 SEC — SIGNIFICANT CHANGE UP (ref 9.8–12.7)
RBC # BLD: 4.22 M/UL — SIGNIFICANT CHANGE UP (ref 4.2–5.8)
RBC # FLD: 12 % — SIGNIFICANT CHANGE UP (ref 10.3–14.5)
SODIUM SERPL-SCNC: 138 MMOL/L — SIGNIFICANT CHANGE UP (ref 135–145)
SP GR SPEC: 1.01 — LOW (ref 1.01–1.02)
UROBILINOGEN FLD QL: NEGATIVE — SIGNIFICANT CHANGE UP
WBC # BLD: 14.2 K/UL — HIGH (ref 3.8–10.5)
WBC # FLD AUTO: 14.2 K/UL — HIGH (ref 3.8–10.5)
WBC UR QL: SIGNIFICANT CHANGE UP /HPF (ref 0–5)

## 2018-03-14 PROCEDURE — 96374 THER/PROPH/DIAG INJ IV PUSH: CPT | Mod: XU

## 2018-03-14 PROCEDURE — 82330 ASSAY OF CALCIUM: CPT

## 2018-03-14 PROCEDURE — 93005 ELECTROCARDIOGRAM TRACING: CPT

## 2018-03-14 PROCEDURE — 82947 ASSAY GLUCOSE BLOOD QUANT: CPT

## 2018-03-14 PROCEDURE — 85610 PROTHROMBIN TIME: CPT

## 2018-03-14 PROCEDURE — 85730 THROMBOPLASTIN TIME PARTIAL: CPT

## 2018-03-14 PROCEDURE — 96375 TX/PRO/DX INJ NEW DRUG ADDON: CPT

## 2018-03-14 PROCEDURE — 85014 HEMATOCRIT: CPT

## 2018-03-14 PROCEDURE — 83690 ASSAY OF LIPASE: CPT

## 2018-03-14 PROCEDURE — 84132 ASSAY OF SERUM POTASSIUM: CPT

## 2018-03-14 PROCEDURE — 74177 CT ABD & PELVIS W/CONTRAST: CPT

## 2018-03-14 PROCEDURE — 82435 ASSAY OF BLOOD CHLORIDE: CPT

## 2018-03-14 PROCEDURE — 82803 BLOOD GASES ANY COMBINATION: CPT

## 2018-03-14 PROCEDURE — 84295 ASSAY OF SERUM SODIUM: CPT

## 2018-03-14 PROCEDURE — 80053 COMPREHEN METABOLIC PANEL: CPT

## 2018-03-14 PROCEDURE — 74177 CT ABD & PELVIS W/CONTRAST: CPT | Mod: 26

## 2018-03-14 PROCEDURE — 99284 EMERGENCY DEPT VISIT MOD MDM: CPT | Mod: 25

## 2018-03-14 PROCEDURE — 81001 URINALYSIS AUTO W/SCOPE: CPT

## 2018-03-14 PROCEDURE — 83605 ASSAY OF LACTIC ACID: CPT

## 2018-03-14 PROCEDURE — 85027 COMPLETE CBC AUTOMATED: CPT

## 2018-03-14 RX ORDER — SODIUM CHLORIDE 9 MG/ML
500 INJECTION INTRAMUSCULAR; INTRAVENOUS; SUBCUTANEOUS ONCE
Qty: 0 | Refills: 0 | Status: COMPLETED | OUTPATIENT
Start: 2018-03-14 | End: 2018-03-14

## 2018-03-14 RX ORDER — FAMOTIDINE 10 MG/ML
20 INJECTION INTRAVENOUS ONCE
Qty: 0 | Refills: 0 | Status: COMPLETED | OUTPATIENT
Start: 2018-03-14 | End: 2018-03-14

## 2018-03-14 RX ORDER — METRONIDAZOLE 500 MG
500 TABLET ORAL ONCE
Qty: 0 | Refills: 0 | Status: DISCONTINUED | OUTPATIENT
Start: 2018-03-14 | End: 2018-03-14

## 2018-03-14 RX ORDER — METRONIDAZOLE 500 MG
1 TABLET ORAL
Qty: 30 | Refills: 0 | OUTPATIENT
Start: 2018-03-14 | End: 2018-03-23

## 2018-03-14 RX ORDER — ACETAMINOPHEN 500 MG
1000 TABLET ORAL ONCE
Qty: 0 | Refills: 0 | Status: COMPLETED | OUTPATIENT
Start: 2018-03-14 | End: 2018-03-14

## 2018-03-14 RX ORDER — CIPROFLOXACIN LACTATE 400MG/40ML
400 VIAL (ML) INTRAVENOUS ONCE
Qty: 0 | Refills: 0 | Status: COMPLETED | OUTPATIENT
Start: 2018-03-14 | End: 2018-03-14

## 2018-03-14 RX ORDER — MOXIFLOXACIN HYDROCHLORIDE TABLETS, 400 MG 400 MG/1
1 TABLET, FILM COATED ORAL
Qty: 20 | Refills: 0 | OUTPATIENT
Start: 2018-03-14 | End: 2018-03-23

## 2018-03-14 RX ORDER — METRONIDAZOLE 500 MG
500 TABLET ORAL ONCE
Qty: 0 | Refills: 0 | Status: COMPLETED | OUTPATIENT
Start: 2018-03-14 | End: 2018-03-14

## 2018-03-14 RX ADMIN — Medication 200 MILLIGRAM(S): at 03:53

## 2018-03-14 RX ADMIN — Medication 500 MILLIGRAM(S): at 04:46

## 2018-03-14 RX ADMIN — Medication 1000 MILLIGRAM(S): at 04:47

## 2018-03-14 RX ADMIN — SODIUM CHLORIDE 1000 MILLILITER(S): 9 INJECTION INTRAMUSCULAR; INTRAVENOUS; SUBCUTANEOUS at 01:25

## 2018-03-14 RX ADMIN — Medication 400 MILLIGRAM(S): at 01:24

## 2018-03-14 RX ADMIN — FAMOTIDINE 20 MILLIGRAM(S): 10 INJECTION INTRAVENOUS at 01:24

## 2018-03-14 NOTE — ED PROVIDER NOTE - GASTROINTESTINAL, MLM
Abdomen soft, mild bloating. Tenderness in epigastrum w/ neg murphys. Marked tenderness in left lower quadrant w/ mild in right lower quadrant. No guarding. Frequent belching while palpating abdomen.

## 2018-03-14 NOTE — ED PROVIDER NOTE - MEDICAL DECISION MAKING DETAILS
Alexis Andrade (Resident): 86 y/o hx of hernia repair, HTN HLD DM p/w abd pain, small stools, belching, bloating w/ epigastric tenderness - concern for diverticulitis vs pancreatitis vs SBO - non toxic appearing w/ minimal pain at rest, declined pain medication - will check labs, CT A/P and dispo accordingly.

## 2018-03-14 NOTE — ED PROVIDER NOTE - PROGRESS NOTE DETAILS
mildly elevated lipase and uncomplicated diverticulitis - d/w patient and daughters results, and return precuations including worsening pain and uncontrollable vomiting - d/w patient liquid diet for 24 hours followed by bland food, increasing water intake and fiber diet - safe to d/c home after IV ABX and PO chal

## 2018-03-14 NOTE — ED PROVIDER NOTE - ATTENDING CONTRIBUTION TO CARE
attending Aliza: 85yM HTN, NIDDM, CAD s/p on aspirin and plavix p/w abdominal pain. Epigastric and lower abdomen with associated abdominal distention. Began after drinking coffee. Denies fever, vomiting, diarrhea, or nausea. Reports only small amount of stool today. Passing flatus. On exam, abdomen distended, soft, tender in epigastric area and LLQ without r/g/r. Concern for diverticulitis vs pancreatitis vs SBO. Will obtain labs, CT A/P, pain control and reassess.

## 2018-03-14 NOTE — ED PROVIDER NOTE - CARE PLAN
Principal Discharge DX:	Diverticulitis  Assessment and plan of treatment:	1) Please return to the ED should you have any new or worsening symptoms, worsening pain, develop uncontrollable vomiting, or any concerning symptoms  2) Please follow up with your primary care doctor in 2-3 days.   3) Please  Cipro from pharmacy - please take 1 tab twice a day for 10 days. Please  flagyl. 1 tab every 8 hours for 10 days. Do not drink alcohol while on this medication or for 3 days after completing medication.

## 2018-03-14 NOTE — ED PROVIDER NOTE - PLAN OF CARE
1) Please return to the ED should you have any new or worsening symptoms, worsening pain, develop uncontrollable vomiting, or any concerning symptoms  2) Please follow up with your primary care doctor in 2-3 days.   3) Please  Cipro from pharmacy - please take 1 tab twice a day for 10 days. Please  flagyl. 1 tab every 8 hours for 10 days. Do not drink alcohol while on this medication or for 3 days after completing medication.

## 2018-03-14 NOTE — ED PROVIDER NOTE - CONSTITUTIONAL, MLM
normal... Well appearing, well nourished, awake, alert, oriented to person, place, time/situation and mildly uncomfortable.

## 2018-03-14 NOTE — ED PROVIDER NOTE - OBJECTIVE STATEMENT
86 y/o male with PMH of HTN, NIDDM, CAD w/ CABG on aspirin and plavix p/w epigastric pain. Per patient, this morning he developed epigastric pain after drinking coffee, and pain has not gone away. Has had this pain before, that always resolves after burping or Mylanta. Pain is not getting worse. Located epigastric, non radiating. Did not take anything for the pain. No vomiting, or diarrhea, or nausea. Has sensation that he needs to have a BM - only small amount of stool today each of 5 times he tried, normal color and no blood. No dysuria. Never takes NSAIDs. Doesn't drink alcohol regularly. No hx of any ulcers. Hx of hernia repair 25 years ago. Still passing gas. No hx of pancreatitis. Has had renal colic before - this pain is not the same or as severe.

## 2018-03-23 ENCOUNTER — INPATIENT (INPATIENT)
Facility: HOSPITAL | Age: 83
LOS: 3 days | Discharge: HOME | DRG: 392 | End: 2018-03-27
Attending: INTERNAL MEDICINE | Admitting: INTERNAL MEDICINE
Payer: MEDICARE

## 2018-03-23 VITALS
RESPIRATION RATE: 18 BRPM | HEIGHT: 65 IN | SYSTOLIC BLOOD PRESSURE: 169 MMHG | HEART RATE: 64 BPM | WEIGHT: 151.9 LBS | OXYGEN SATURATION: 99 % | TEMPERATURE: 98 F | DIASTOLIC BLOOD PRESSURE: 76 MMHG

## 2018-03-23 DIAGNOSIS — R63.8 OTHER SYMPTOMS AND SIGNS CONCERNING FOOD AND FLUID INTAKE: ICD-10-CM

## 2018-03-23 DIAGNOSIS — E78.5 HYPERLIPIDEMIA, UNSPECIFIED: ICD-10-CM

## 2018-03-23 DIAGNOSIS — E87.1 HYPO-OSMOLALITY AND HYPONATREMIA: ICD-10-CM

## 2018-03-23 DIAGNOSIS — Z95.1 PRESENCE OF AORTOCORONARY BYPASS GRAFT: Chronic | ICD-10-CM

## 2018-03-23 DIAGNOSIS — I10 ESSENTIAL (PRIMARY) HYPERTENSION: ICD-10-CM

## 2018-03-23 DIAGNOSIS — E11.9 TYPE 2 DIABETES MELLITUS WITHOUT COMPLICATIONS: ICD-10-CM

## 2018-03-23 DIAGNOSIS — R10.13 EPIGASTRIC PAIN: ICD-10-CM

## 2018-03-23 DIAGNOSIS — Z29.9 ENCOUNTER FOR PROPHYLACTIC MEASURES, UNSPECIFIED: ICD-10-CM

## 2018-03-23 DIAGNOSIS — I25.10 ATHEROSCLEROTIC HEART DISEASE OF NATIVE CORONARY ARTERY WITHOUT ANGINA PECTORIS: ICD-10-CM

## 2018-03-23 LAB
ALBUMIN SERPL ELPH-MCNC: 4.2 G/DL — SIGNIFICANT CHANGE UP (ref 3.3–5)
ALP SERPL-CCNC: 43 U/L — SIGNIFICANT CHANGE UP (ref 40–120)
ALT FLD-CCNC: 21 U/L RC — SIGNIFICANT CHANGE UP (ref 10–45)
ANION GAP SERPL CALC-SCNC: 15 MMOL/L — SIGNIFICANT CHANGE UP (ref 5–17)
ANION GAP SERPL CALC-SCNC: 15 MMOL/L — SIGNIFICANT CHANGE UP (ref 5–17)
APPEARANCE UR: CLEAR — SIGNIFICANT CHANGE UP
APTT BLD: 29.6 SEC — SIGNIFICANT CHANGE UP (ref 27.5–37.4)
AST SERPL-CCNC: 29 U/L — SIGNIFICANT CHANGE UP (ref 10–40)
BASE EXCESS BLDV CALC-SCNC: -0.1 MMOL/L — SIGNIFICANT CHANGE UP (ref -2–2)
BASOPHILS # BLD AUTO: 0 K/UL — SIGNIFICANT CHANGE UP (ref 0–0.2)
BASOPHILS NFR BLD AUTO: 0.1 % — SIGNIFICANT CHANGE UP (ref 0–2)
BILIRUB SERPL-MCNC: 0.6 MG/DL — SIGNIFICANT CHANGE UP (ref 0.2–1.2)
BILIRUB UR-MCNC: NEGATIVE — SIGNIFICANT CHANGE UP
BUN SERPL-MCNC: 13 MG/DL — SIGNIFICANT CHANGE UP (ref 7–23)
BUN SERPL-MCNC: 15 MG/DL — SIGNIFICANT CHANGE UP (ref 7–23)
CA-I SERPL-SCNC: 1.2 MMOL/L — SIGNIFICANT CHANGE UP (ref 1.12–1.3)
CALCIUM SERPL-MCNC: 10.1 MG/DL — SIGNIFICANT CHANGE UP (ref 8.4–10.5)
CALCIUM SERPL-MCNC: 8.9 MG/DL — SIGNIFICANT CHANGE UP (ref 8.4–10.5)
CHLORIDE BLDV-SCNC: 93 MMOL/L — LOW (ref 96–108)
CHLORIDE SERPL-SCNC: 88 MMOL/L — LOW (ref 96–108)
CHLORIDE SERPL-SCNC: 90 MMOL/L — LOW (ref 96–108)
CHLORIDE UR-SCNC: 35 MMOL/L — SIGNIFICANT CHANGE UP
CO2 BLDV-SCNC: 26 MMOL/L — SIGNIFICANT CHANGE UP (ref 22–30)
CO2 SERPL-SCNC: 20 MMOL/L — LOW (ref 22–31)
CO2 SERPL-SCNC: 23 MMOL/L — SIGNIFICANT CHANGE UP (ref 22–31)
COLOR SPEC: SIGNIFICANT CHANGE UP
CREAT ?TM UR-MCNC: 24 MG/DL — SIGNIFICANT CHANGE UP
CREAT SERPL-MCNC: 1.16 MG/DL — SIGNIFICANT CHANGE UP (ref 0.5–1.3)
CREAT SERPL-MCNC: 1.17 MG/DL — SIGNIFICANT CHANGE UP (ref 0.5–1.3)
DIFF PNL FLD: NEGATIVE — SIGNIFICANT CHANGE UP
EOSINOPHIL # BLD AUTO: 0.2 K/UL — SIGNIFICANT CHANGE UP (ref 0–0.5)
EOSINOPHIL NFR BLD AUTO: 2.2 % — SIGNIFICANT CHANGE UP (ref 0–6)
GAS PNL BLDV: 125 MMOL/L — LOW (ref 136–145)
GAS PNL BLDV: SIGNIFICANT CHANGE UP
GLUCOSE BLDV-MCNC: 116 MG/DL — HIGH (ref 70–99)
GLUCOSE SERPL-MCNC: 101 MG/DL — HIGH (ref 70–99)
GLUCOSE SERPL-MCNC: 127 MG/DL — HIGH (ref 70–99)
GLUCOSE UR QL: NEGATIVE — SIGNIFICANT CHANGE UP
HCO3 BLDV-SCNC: 25 MMOL/L — SIGNIFICANT CHANGE UP (ref 21–29)
HCT VFR BLD CALC: 39.4 % — SIGNIFICANT CHANGE UP (ref 39–50)
HCT VFR BLDA CALC: 41 % — SIGNIFICANT CHANGE UP (ref 39–50)
HGB BLD CALC-MCNC: 13.5 G/DL — SIGNIFICANT CHANGE UP (ref 13–17)
HGB BLD-MCNC: 13.6 G/DL — SIGNIFICANT CHANGE UP (ref 13–17)
INR BLD: 1.03 RATIO — SIGNIFICANT CHANGE UP (ref 0.88–1.16)
KETONES UR-MCNC: NEGATIVE — SIGNIFICANT CHANGE UP
LACTATE BLDV-MCNC: 1.9 MMOL/L — SIGNIFICANT CHANGE UP (ref 0.7–2)
LEUKOCYTE ESTERASE UR-ACNC: NEGATIVE — SIGNIFICANT CHANGE UP
LIDOCAIN IGE QN: 52 U/L — SIGNIFICANT CHANGE UP (ref 7–60)
LYMPHOCYTES # BLD AUTO: 19.9 % — SIGNIFICANT CHANGE UP (ref 13–44)
LYMPHOCYTES # BLD AUTO: 2.1 K/UL — SIGNIFICANT CHANGE UP (ref 1–3.3)
MCHC RBC-ENTMCNC: 32 PG — SIGNIFICANT CHANGE UP (ref 27–34)
MCHC RBC-ENTMCNC: 34.6 GM/DL — SIGNIFICANT CHANGE UP (ref 32–36)
MCV RBC AUTO: 92.3 FL — SIGNIFICANT CHANGE UP (ref 80–100)
MONOCYTES # BLD AUTO: 1 K/UL — HIGH (ref 0–0.9)
MONOCYTES NFR BLD AUTO: 9.5 % — SIGNIFICANT CHANGE UP (ref 2–14)
NEUTROPHILS # BLD AUTO: 7.3 K/UL — SIGNIFICANT CHANGE UP (ref 1.8–7.4)
NEUTROPHILS NFR BLD AUTO: 68.2 % — SIGNIFICANT CHANGE UP (ref 43–77)
NITRITE UR-MCNC: NEGATIVE — SIGNIFICANT CHANGE UP
OSMOLALITY SERPL: 271 MOS/KG — LOW (ref 275–300)
OSMOLALITY UR: 196 MOS/KG — LOW (ref 300–900)
PCO2 BLDV: 43 MMHG — SIGNIFICANT CHANGE UP (ref 35–50)
PH BLDV: 7.38 — SIGNIFICANT CHANGE UP (ref 7.35–7.45)
PH UR: 6 — SIGNIFICANT CHANGE UP (ref 5–8)
PLATELET # BLD AUTO: 399 K/UL — SIGNIFICANT CHANGE UP (ref 150–400)
PO2 BLDV: 24 MMHG — LOW (ref 25–45)
POTASSIUM BLDV-SCNC: 4 MMOL/L — SIGNIFICANT CHANGE UP (ref 3.5–5)
POTASSIUM SERPL-MCNC: 4.1 MMOL/L — SIGNIFICANT CHANGE UP (ref 3.5–5.3)
POTASSIUM SERPL-MCNC: 4.3 MMOL/L — SIGNIFICANT CHANGE UP (ref 3.5–5.3)
POTASSIUM SERPL-SCNC: 4.1 MMOL/L — SIGNIFICANT CHANGE UP (ref 3.5–5.3)
POTASSIUM SERPL-SCNC: 4.3 MMOL/L — SIGNIFICANT CHANGE UP (ref 3.5–5.3)
POTASSIUM UR-SCNC: 8 MMOL/L — SIGNIFICANT CHANGE UP
PROT SERPL-MCNC: 7.7 G/DL — SIGNIFICANT CHANGE UP (ref 6–8.3)
PROT UR-MCNC: NEGATIVE — SIGNIFICANT CHANGE UP
PROTHROM AB SERPL-ACNC: 11.1 SEC — SIGNIFICANT CHANGE UP (ref 9.8–12.7)
RBC # BLD: 4.27 M/UL — SIGNIFICANT CHANGE UP (ref 4.2–5.8)
RBC # FLD: 11.9 % — SIGNIFICANT CHANGE UP (ref 10.3–14.5)
SAO2 % BLDV: 38 % — LOW (ref 67–88)
SODIUM SERPL-SCNC: 123 MMOL/L — LOW (ref 135–145)
SODIUM SERPL-SCNC: 128 MMOL/L — LOW (ref 135–145)
SODIUM UR-SCNC: 36 MMOL/L — SIGNIFICANT CHANGE UP
SP GR SPEC: 1.02 — SIGNIFICANT CHANGE UP (ref 1.01–1.02)
UROBILINOGEN FLD QL: NEGATIVE — SIGNIFICANT CHANGE UP
WBC # BLD: 10.7 K/UL — HIGH (ref 3.8–10.5)
WBC # FLD AUTO: 10.7 K/UL — HIGH (ref 3.8–10.5)

## 2018-03-23 PROCEDURE — 99285 EMERGENCY DEPT VISIT HI MDM: CPT | Mod: 25,GC

## 2018-03-23 PROCEDURE — 99223 1ST HOSP IP/OBS HIGH 75: CPT

## 2018-03-23 PROCEDURE — 71045 X-RAY EXAM CHEST 1 VIEW: CPT | Mod: 26

## 2018-03-23 PROCEDURE — 74177 CT ABD & PELVIS W/CONTRAST: CPT | Mod: 26

## 2018-03-23 PROCEDURE — 93010 ELECTROCARDIOGRAM REPORT: CPT

## 2018-03-23 RX ORDER — METOPROLOL TARTRATE 50 MG
25 TABLET ORAL DAILY
Qty: 0 | Refills: 0 | Status: DISCONTINUED | OUTPATIENT
Start: 2018-03-23 | End: 2018-03-27

## 2018-03-23 RX ORDER — METFORMIN HYDROCHLORIDE 850 MG/1
1 TABLET ORAL
Qty: 0 | Refills: 0 | COMMUNITY

## 2018-03-23 RX ORDER — ATORVASTATIN CALCIUM 80 MG/1
80 TABLET, FILM COATED ORAL AT BEDTIME
Qty: 0 | Refills: 0 | Status: DISCONTINUED | OUTPATIENT
Start: 2018-03-23 | End: 2018-03-27

## 2018-03-23 RX ORDER — ASPIRIN/CALCIUM CARB/MAGNESIUM 324 MG
81 TABLET ORAL DAILY
Qty: 0 | Refills: 0 | Status: DISCONTINUED | OUTPATIENT
Start: 2018-03-23 | End: 2018-03-27

## 2018-03-23 RX ORDER — SODIUM CHLORIDE 9 MG/ML
1000 INJECTION, SOLUTION INTRAVENOUS
Qty: 0 | Refills: 0 | Status: DISCONTINUED | OUTPATIENT
Start: 2018-03-23 | End: 2018-03-27

## 2018-03-23 RX ORDER — FAMOTIDINE 10 MG/ML
20 INJECTION INTRAVENOUS ONCE
Qty: 0 | Refills: 0 | Status: COMPLETED | OUTPATIENT
Start: 2018-03-23 | End: 2018-03-23

## 2018-03-23 RX ORDER — ONDANSETRON 8 MG/1
4 TABLET, FILM COATED ORAL ONCE
Qty: 0 | Refills: 0 | Status: COMPLETED | OUTPATIENT
Start: 2018-03-23 | End: 2018-03-23

## 2018-03-23 RX ORDER — CLOPIDOGREL BISULFATE 75 MG/1
75 TABLET, FILM COATED ORAL DAILY
Qty: 0 | Refills: 0 | Status: DISCONTINUED | OUTPATIENT
Start: 2018-03-23 | End: 2018-03-27

## 2018-03-23 RX ORDER — LISINOPRIL 2.5 MG/1
10 TABLET ORAL DAILY
Qty: 0 | Refills: 0 | Status: DISCONTINUED | OUTPATIENT
Start: 2018-03-23 | End: 2018-03-27

## 2018-03-23 RX ORDER — SODIUM CHLORIDE 9 MG/ML
1000 INJECTION INTRAMUSCULAR; INTRAVENOUS; SUBCUTANEOUS ONCE
Qty: 0 | Refills: 0 | Status: DISCONTINUED | OUTPATIENT
Start: 2018-03-23 | End: 2018-03-23

## 2018-03-23 RX ORDER — PANTOPRAZOLE SODIUM 20 MG/1
40 TABLET, DELAYED RELEASE ORAL DAILY
Qty: 0 | Refills: 0 | Status: DISCONTINUED | OUTPATIENT
Start: 2018-03-23 | End: 2018-03-26

## 2018-03-23 RX ORDER — DEXTROSE 50 % IN WATER 50 %
1 SYRINGE (ML) INTRAVENOUS ONCE
Qty: 0 | Refills: 0 | Status: DISCONTINUED | OUTPATIENT
Start: 2018-03-23 | End: 2018-03-27

## 2018-03-23 RX ORDER — DEXTROSE 50 % IN WATER 50 %
25 SYRINGE (ML) INTRAVENOUS ONCE
Qty: 0 | Refills: 0 | Status: DISCONTINUED | OUTPATIENT
Start: 2018-03-23 | End: 2018-03-27

## 2018-03-23 RX ORDER — GLUCAGON INJECTION, SOLUTION 0.5 MG/.1ML
1 INJECTION, SOLUTION SUBCUTANEOUS ONCE
Qty: 0 | Refills: 0 | Status: DISCONTINUED | OUTPATIENT
Start: 2018-03-23 | End: 2018-03-27

## 2018-03-23 RX ORDER — ACETAMINOPHEN 500 MG
1000 TABLET ORAL ONCE
Qty: 0 | Refills: 0 | Status: COMPLETED | OUTPATIENT
Start: 2018-03-23 | End: 2018-03-23

## 2018-03-23 RX ORDER — INSULIN LISPRO 100/ML
VIAL (ML) SUBCUTANEOUS
Qty: 0 | Refills: 0 | Status: DISCONTINUED | OUTPATIENT
Start: 2018-03-23 | End: 2018-03-27

## 2018-03-23 RX ORDER — INSULIN LISPRO 100/ML
VIAL (ML) SUBCUTANEOUS AT BEDTIME
Qty: 0 | Refills: 0 | Status: DISCONTINUED | OUTPATIENT
Start: 2018-03-23 | End: 2018-03-27

## 2018-03-23 RX ORDER — HEPARIN SODIUM 5000 [USP'U]/ML
5000 INJECTION INTRAVENOUS; SUBCUTANEOUS EVERY 12 HOURS
Qty: 0 | Refills: 0 | Status: DISCONTINUED | OUTPATIENT
Start: 2018-03-23 | End: 2018-03-27

## 2018-03-23 RX ORDER — SODIUM CHLORIDE 9 MG/ML
1000 INJECTION INTRAMUSCULAR; INTRAVENOUS; SUBCUTANEOUS ONCE
Qty: 0 | Refills: 0 | Status: COMPLETED | OUTPATIENT
Start: 2018-03-23 | End: 2018-03-23

## 2018-03-23 RX ORDER — DEXTROSE 50 % IN WATER 50 %
12.5 SYRINGE (ML) INTRAVENOUS ONCE
Qty: 0 | Refills: 0 | Status: DISCONTINUED | OUTPATIENT
Start: 2018-03-23 | End: 2018-03-27

## 2018-03-23 RX ADMIN — Medication 30 MILLILITER(S): at 18:21

## 2018-03-23 RX ADMIN — ONDANSETRON 4 MILLIGRAM(S): 8 TABLET, FILM COATED ORAL at 12:32

## 2018-03-23 RX ADMIN — Medication 1000 MILLIGRAM(S): at 15:51

## 2018-03-23 RX ADMIN — SODIUM CHLORIDE 1000 MILLILITER(S): 9 INJECTION INTRAMUSCULAR; INTRAVENOUS; SUBCUTANEOUS at 12:15

## 2018-03-23 RX ADMIN — FAMOTIDINE 20 MILLIGRAM(S): 10 INJECTION INTRAVENOUS at 18:21

## 2018-03-23 RX ADMIN — Medication 400 MILLIGRAM(S): at 14:12

## 2018-03-23 NOTE — ED PROVIDER NOTE - PROGRESS NOTE DETAILS
Patient reports having belching, no chest pain, but had belching with MI many years ago (had chest pain at that time). Will add troponin to labs, unlikely cardiac based on story.  - Jn Casillas MD Patient with worsening hyponatremia despite normal saline, will perform hyponatremia workup and admit to the hospital. Patient with worsening hyponatremia despite normal saline, will perform hyponatremia workup and admit to the hospital, spoke with CDU. Belching continues, but epigastric pain decreased now.  - Jn Casillas MD

## 2018-03-23 NOTE — H&P ADULT - NSHPLABSRESULTS_GEN_ALL_CORE
Labs personally reviewed.                        13.6   10.7  )-----------( 399      ( 23 Mar 2018 12:20 )             39.4     03-    123<L>  |  88<L>  |  13  ----------------------------<  101<H>  4.1   |  20<L>  |  1.17    Ca    8.9      23 Mar 2018 18:30    TPro  7.7  /  Alb  4.2  /  TBili  0.6  /  DBili  x   /  AST  29  /  ALT  21  /  AlkPhos  43  -    CARDIAC MARKERS ( 23 Mar 2018 18:30 )  x     / <0.01 ng/mL / x     / x     / x      CARDIAC MARKERS ( 23 Mar 2018 12:20 )  x     / <0.01 ng/mL / x     / x     / x          LIVER FUNCTIONS - ( 23 Mar 2018 12:20 )  Alb: 4.2 g/dL / Pro: 7.7 g/dL / ALK PHOS: 43 U/L / ALT: 21 U/L RC / AST: 29 U/L / GGT: x           PT/INR - ( 23 Mar 2018 12:20 )   PT: 11.1 sec;   INR: 1.03 ratio         PTT - ( 23 Mar 2018 12:20 )  PTT:29.6 sec  Urinalysis Basic - ( 23 Mar 2018 20:52 )    Color: PL Yellow / Appearance: Clear / S.018 / pH: x  Gluc: x / Ketone: Negative  / Bili: Negative / Urobili: Negative   Blood: x / Protein: Negative / Nitrite: Negative   Leuk Esterase: Negative / RBC: x / WBC x   Sq Epi: x / Non Sq Epi: x / Bacteria: x      Imaging personally reviewed.      Tracing personally reviewed.

## 2018-03-23 NOTE — ED ADULT NURSE NOTE - OBJECTIVE STATEMENT
85 year old male complaining of RLQ abdominal pain x 1 week. As per family the Pt was seen at [facility] this week and was diagnosed with uncomplicated diverticulitis and sent home on oral cipro and flagyl with no improvement in pain. Pt is A&O x 4, VSS, afebrile, ambulating independently. Pt denies fever, chills, NVD, SOB, or chest pain. Pt complains of belching. IV placed, labs drawn, bed in low position, safety measures in place. positive bowel sounds in all 4 quadrants. last BM today was, 'normal'.

## 2018-03-23 NOTE — ED PROVIDER NOTE - OBJECTIVE STATEMENT
85y Male PMH CAD s/p 3 stents (most recent last year in August), DM2, HLD, HTN complaining of epigastric abdominal pain. Patient recently discharged with diverticulitis last week on 3/14, given ciprofloxacin and flagyl. Patient has been having a lot of epigastric pain and belching. Last Bm this morning, twice, reports having dark stools, last week PCP checked stool, was negative. 85y Male PMH CAD s/p 3 stents (most recent last year in August), DM2, HLD, HTN PSH right inguinal hernia repair ~30 years ago complaining of epigastric abdominal pain with bloating. Patient recently discharged with diverticulitis last week on 3/14, given ciprofloxacin and flagyl. Patient has been having a lot of epigastric pain and belching due to bloating, lower abdominal pain improved. Last Bm this morning, twice and appeared normal, reports having dark stools, last week PCP checked stool, was negative. 85y Male PMH CAD s/p 3 stents (most recent last year in August), DM2, HLD, HTN PSH right inguinal hernia repair ~30 years ago complaining of epigastric abdominal pain with bloating. Patient recently discharged with diverticulitis last week on 3/14, given ciprofloxacin and flagyl. Patient has been having a lot of epigastric pain and belching due to bloating, lower abdominal pain improved. Still passing flatus, Last Bm this morning, twice and appeared normal, reports having dark stools, last week PCP checked stool, was negative. 85y Male PMH CAD s/p 3 stents (most recent last year in August), DM2, HLD, HTN PSH right inguinal hernia repair ~30 years ago complaining of epigastric abdominal pain with bloating. Patient recently discharged with diverticulitis last week on 3/14, given ciprofloxacin and flagyl. Patient has been having a lot of epigastric pain and belching due to bloating, lower abdominal pain improved. Still passing flatus, Last BM this morning, twice and appeared normal, reports having dark stools, last week PCP checked stool, was negative. 85y Male PMH CAD s/p 3 stents (most recent last year in August), DM2, HLD, HTN PSH right inguinal hernia repair ~30 years ago complaining of epigastric abdominal pain with bloating. Patient recently discharged with diverticulitis last week on 3/14, given ciprofloxacin and flagyl. Patient has been having a lot of epigastric pain and belching due to bloating, lower abdominal pain improved. Still passing flatus, Last BM this morning, twice and appeared normal, reports having dark stools, last week PCP checked stool, was negative.    PCP: Dr. Jairo Blanc 85y Male PMH CAD s/p 3 stents (most recent last year in August), DM2, HLD, HTN PSH right inguinal hernia repair ~30 years ago complaining of epigastric abdominal pain with bloating. Patient recently discharged with diverticulitis last week on 3/14, given ciprofloxacin and flagyl. Patient has been having a lot of epigastric pain and belching due to bloating, lower abdominal pain improved. Still passing flatus, Last BM this morning, twice and appeared normal, reports having dark stools, last week PCP checked stool, was negative.    PCP: Dr. Jairo Blanc ([facility])

## 2018-03-23 NOTE — H&P ADULT - PROBLEM SELECTOR PLAN 1
--suspect gastritis vs. PUD given similar to prior gastritis pain, associated belching, worse when supine  --patient high risk for ACS, but pain is non-exertional, no associated symptoms, troponin negative x 2, no EKG changes  --pantoprazole 40mg IV daily  --patient with black stools, but guaiac negative as out-patient, Hgb stable, doubt UGIB  --maalox PRN --suspect gastritis vs. PUD given similar to prior gastritis pain, associated belching, worse when supine  --patient high risk for ACS, but pain is non-exertional, no associated symptoms, troponin negative x 2, no EKG changes  --pantoprazole 40mg IV daily  --patient with black stools, but guaiac negative as out-patient, Hgb stable, doubt UGIB.  Will continue ASA, plavix, DVT PPx  --maalox PRN

## 2018-03-23 NOTE — ED PROVIDER NOTE - ATTENDING CONTRIBUTION TO CARE
attending Pollannabelle: 85yM h/o CAD s/p stents, DM2, HLD, prior R inguinal hernia repair p/w epigastric abdominal pain with bloating. Seen in ED earlier this month for uncomplicated diverticulitis currently on cipro and flagy. Initially with improved symptoms but now with increased pain. Passing flatus, Last BM this morning. On exam, well-appearing, abdomen soft, mild tenderness epigastric area and bilateral lower quadrants without r/g/r. Will obtain labs, CT A/P eval for complicated diverticulitis, pain control and reassess.

## 2018-03-23 NOTE — ED ADULT NURSE NOTE - ATTEMPT TO OOB
Last office visit:12/11/17.  Next office visit:none,to rtn in 1 wk/PRN.     refilled  Date rx was last filled:.  lisinopril (PRINIVIL,ZESTRIL) 40 MG tablet 30 tablet 1 10/26/2017     Sig - Route: Take 1 tablet by mouth daily. - Oral    Class: Eprescribe          No changes to dose/sig     no

## 2018-03-23 NOTE — ED ADULT NURSE NOTE - CHPI ED SYMPTOMS NEG
no abdominal distension/no burning urination/no chills/no fever/no blood in stool/no diarrhea/no vomiting/no dysuria/no nausea

## 2018-03-23 NOTE — ED ADULT NURSE REASSESSMENT NOTE - NS ED NURSE REASSESS COMMENT FT1
Pt admitted to medicine, awaiting bed assignment. No complaints of pain or discomfort at this time. Comfort and safety maintained.

## 2018-03-23 NOTE — ED PROVIDER NOTE - PHYSICAL EXAMINATION
PE: CONSTITUTIONAL: Nontoxic, in no apparent distress. ENMT: Airway patent, nasal mucosa clear, mouth with normal mucosa. HEAD: NCAT EYES: PERRL, EOMI bilaterally CARDIAC: RRR, no m/r/g, no pedal edema RESPIRATORY: CTA bilaterally, no adventitious sounds GI: Abdomen non-distended, mild-moderate epigastric TTP, mild BL lower abdominal TTP MSK: Spine appears normal, range of motion is not limited, no muscle/joint tenderness NEURO: CNII-XII grossly intact, 5/5 strength, full sensation all extremities, gait intact SKIN: Skin tone normal in color, warm and dry. No evidence of rash.

## 2018-03-23 NOTE — H&P ADULT - HISTORY OF PRESENT ILLNESS
This is an 85 year old gentleman with PMHx CAD s/p 3 stents (most recent last year in August), DM2, HLD, HTN PSH right inguinal hernia repair ~30 years ago complaining of epigastric abdominal pain with bloating. Patient recently discharged with diverticulitis last week on 3/14, given ciprofloxacin and flagyl. Patient has been having a lot of epigastric pain and belching due to bloating, lower abdominal pain improved. Still passing flatus, Last BM this morning, twice and appeared normal, reports having dark stools, last week PCP checked stool, was negative.    In ED, This is an 85 year old gentleman with PMHx CAD s/p 3 stents (most recent last year in August), DM2, HLD, HTN PSH right inguinal hernia repair ~30 years ago complaining of epigastric abdominal pain with bloating. Patient recently discharged with diverticulitis last week on 3/14, given ciprofloxacin and flagyl. Patient has been having a lot of epigastric pain and belching due to bloating, lower abdominal pain improved. Still passing flatus, Last BM this morning, twice and appeared normal, reports having dark stools, last week PCP checked stool, was negative.    In ED, Tmax 98.3, HR 62-64, /75, satting well on RA. Labs notable for low grade leukocytosis to 10.7 with normal diff, Hgb 13.6, Plt 399, Na initially 128, then worsened to 123 with 2L NS, Cr 1.16 (stable), trop negative x 2. This is an 85 year old gentleman with PMHx CAD s/p 3 stents (most recent last year in August), DM2, HLD, HTN PSH right inguinal hernia repair ~30 years ago complaining of epigastric abdominal pain with bloating. Patient recently discharged with diverticulitis last week on 3/14, given ciprofloxacin and flagyl. Patient has been having a lot of epigastric pain and belching due to bloating, lower abdominal pain improved. Still passing flatus, Last BM this morning, twice and appeared normal, reports having dark stools, last week PCP checked stool, was negative. yes    In ED, Tmax 98.3, HR 62-64, /75, satting well on RA. Labs notable for low grade leukocytosis to 10.7 with normal diff, Hgb 13.6, Plt 399, Na initially 128, then worsened to 123 with 2L NS, Cr 1.16 (stable), trop negative x 2. This is an 85 year old gentleman with PMHx CAD s/p 3 stents (most recent last year in August), DM2, HLD, HTN PSH right inguinal hernia repair ~30 years ago complaining of epigastric abdominal pain with bloating. Patient presented to ED 3/14 with epigastric pain. At the time found on exam to have LLQ pain, CT showed moderate sigmoid diverticulitis, he was discharged on cipro/flagyl x 1 week.  He notes that his epigastric pain never improved, and worsened overnight prior to admission. He notes that pain overnight was 9/10 sharp pain which is non-radiating, lasted hours overnight, non-exertional, non-pleuritic, non-radiating, no cough, SOB, diaphoresis.  Notes that pain is similar to prior gastritis pain, though more severe. Denies nausea, vomiting, diarrhea, constipation. He does note dark stools since starting antibiotics, had stool guaiac as out-patient which was negative. Notes complete resolution of pain with ED GI cocktail.      In ED, Tmax 98.3, HR 62-64, /75, satting well on RA. Labs notable for low grade leukocytosis to 10.7 with normal diff, Hgb 13.6, Plt 399, Na initially 128, then worsened to 123 with 2L NS, Cr 1.16 (stable), trop negative x 2. This is an 85 year old gentleman with PMHx CAD s/p 3 stents (most recent last year in August), DM2, HLD, HTN PSH right inguinal hernia repair ~30 years ago complaining of epigastric abdominal pain with bloating. Patient presented to ED 3/14 with epigastric pain. At the time found on exam to have LLQ pain, CT showed moderate sigmoid diverticulitis, he was discharged on cipro/flagyl x 1 week.  He notes that his epigastric pain never improved, and worsened overnight prior to admission. He notes that pain overnight was 9/10 sharp pain which is non-radiating, lasted hours overnight, non-exertional, non-pleuritic, non-radiating, no cough, SOB, diaphoresis.  Notes that pain is similar to prior gastritis pain, though more severe. Denies nausea, vomiting, diarrhea, constipation. He does note dark stools since starting antibiotics, had stool guaiac as out-patient which was negative, denies lightheadedness, dizziness, fatigue. Notes complete resolution of pain with ED GI cocktail. Notably, patient     In ED, Tmax 98.3, HR 62-64, /75, satting well on RA. Labs notable for low grade leukocytosis to 10.7 with normal diff, Hgb 13.6, Plt 399, Na initially 128, then worsened to 123 with 2L NS, Cr 1.16 (stable), trop negative x 2. This is an 85 year old gentleman with PMHx CAD s/p 3 stents (most recent last year in August), DM2, HLD, HTN PSH right inguinal hernia repair ~30 years ago complaining of epigastric abdominal pain with bloating. Patient presented to ED 3/14 with epigastric pain. At the time found on exam to have LLQ pain, CT showed moderate sigmoid diverticulitis, he was discharged on cipro/flagyl x 1 week.  He notes that his epigastric pain never improved, and worsened overnight prior to admission. He notes that pain overnight was 9/10 sharp pain which is non-radiating, lasted hours overnight, non-exertional, non-pleuritic, non-radiating, no cough, SOB, diaphoresis.  Notes that pain is similar to prior gastritis pain, though more severe. Denies nausea, vomiting, diarrhea, constipation. He does note dark stools since starting antibiotics, had stool guaiac as out-patient which was negative, denies lightheadedness, dizziness, fatigue. Notes complete resolution of pain with ED GI cocktail. Notably, patient has increased his PO intake of water since diverticulitis dx, notes at least [id number] of water daily, also notes decreased urine output.      In ED, Tmax 98.3, HR 62-64, /75, satting well on RA. Labs notable for low grade leukocytosis to 10.7 with normal diff, Hgb 13.6, Plt 399, Na initially 128, then worsened to 123 with 2L NS, Cr 1.16 (stable), trop negative x 2.

## 2018-03-23 NOTE — H&P ADULT - NSHPSOCIALHISTORY_GEN_ALL_CORE
tobacco smoker at 18 quit at age 19. occasional Etoh use, 1-2 drinks a week. no illicit drug use. , retired banker, independent in ADLs. Tobacco smoker at 18 quit at age 19. denies illicits, EtOH.

## 2018-03-23 NOTE — H&P ADULT - ASSESSMENT
This is an 85 year old gentleman with PMHx CAD s/p 3 stents (most recent last year in August), DM2, HLD, HTN PSH right inguinal hernia repair ~30 years ago complaining of epigastric abdominal pain with bloating, found to be hyponatremic to 128, worsened to 123 with 2L NS.  Suspect SIADH, urine lytes pending.

## 2018-03-23 NOTE — ED ADULT NURSE REASSESSMENT NOTE - NS ED NURSE REASSESS COMMENT FT1
Report received from Emily MORALES in [facility]. Pt resting comfortably at this time. Awaiting admission and bed assignment. Comfort and safety maintained.

## 2018-03-24 LAB
ANION GAP SERPL CALC-SCNC: 13 MMOL/L — SIGNIFICANT CHANGE UP (ref 5–17)
BASOPHILS # BLD AUTO: 0.06 K/UL — SIGNIFICANT CHANGE UP (ref 0–0.2)
BASOPHILS NFR BLD AUTO: 0.8 % — SIGNIFICANT CHANGE UP (ref 0–2)
BUN SERPL-MCNC: 15 MG/DL — SIGNIFICANT CHANGE UP (ref 7–23)
CALCIUM SERPL-MCNC: 9.3 MG/DL — SIGNIFICANT CHANGE UP (ref 8.4–10.5)
CHLORIDE SERPL-SCNC: 99 MMOL/L — SIGNIFICANT CHANGE UP (ref 96–108)
CO2 SERPL-SCNC: 23 MMOL/L — SIGNIFICANT CHANGE UP (ref 22–31)
CREAT SERPL-MCNC: 1.27 MG/DL — SIGNIFICANT CHANGE UP (ref 0.5–1.3)
EOSINOPHIL # BLD AUTO: 0.59 K/UL — HIGH (ref 0–0.5)
EOSINOPHIL NFR BLD AUTO: 7.7 % — HIGH (ref 0–6)
GLUCOSE SERPL-MCNC: 117 MG/DL — HIGH (ref 70–99)
HBA1C BLD-MCNC: 7 % — HIGH (ref 4–5.6)
HCT VFR BLD CALC: 36.7 % — LOW (ref 39–50)
HGB BLD-MCNC: 12.7 G/DL — LOW (ref 13–17)
IMM GRANULOCYTES NFR BLD AUTO: 0.9 % — SIGNIFICANT CHANGE UP (ref 0–1.5)
LYMPHOCYTES # BLD AUTO: 1.79 K/UL — SIGNIFICANT CHANGE UP (ref 1–3.3)
LYMPHOCYTES # BLD AUTO: 23.4 % — SIGNIFICANT CHANGE UP (ref 13–44)
MCHC RBC-ENTMCNC: 31.1 PG — SIGNIFICANT CHANGE UP (ref 27–34)
MCHC RBC-ENTMCNC: 34.6 GM/DL — SIGNIFICANT CHANGE UP (ref 32–36)
MCV RBC AUTO: 90 FL — SIGNIFICANT CHANGE UP (ref 80–100)
MONOCYTES # BLD AUTO: 1.27 K/UL — HIGH (ref 0–0.9)
MONOCYTES NFR BLD AUTO: 16.6 % — HIGH (ref 2–14)
NEUTROPHILS # BLD AUTO: 3.88 K/UL — SIGNIFICANT CHANGE UP (ref 1.8–7.4)
NEUTROPHILS NFR BLD AUTO: 50.6 % — SIGNIFICANT CHANGE UP (ref 43–77)
PLATELET # BLD AUTO: 364 K/UL — SIGNIFICANT CHANGE UP (ref 150–400)
POTASSIUM SERPL-MCNC: 4.4 MMOL/L — SIGNIFICANT CHANGE UP (ref 3.5–5.3)
POTASSIUM SERPL-SCNC: 4.4 MMOL/L — SIGNIFICANT CHANGE UP (ref 3.5–5.3)
RBC # BLD: 4.08 M/UL — LOW (ref 4.2–5.8)
RBC # FLD: 13.2 % — SIGNIFICANT CHANGE UP (ref 10.3–14.5)
SODIUM SERPL-SCNC: 135 MMOL/L — SIGNIFICANT CHANGE UP (ref 135–145)
WBC # BLD: 7.66 K/UL — SIGNIFICANT CHANGE UP (ref 3.8–10.5)
WBC # FLD AUTO: 7.66 K/UL — SIGNIFICANT CHANGE UP (ref 3.8–10.5)

## 2018-03-24 PROCEDURE — 99233 SBSQ HOSP IP/OBS HIGH 50: CPT

## 2018-03-24 RX ORDER — DIPHENHYDRAMINE HCL 50 MG
12.5 CAPSULE ORAL ONCE
Qty: 0 | Refills: 0 | Status: COMPLETED | OUTPATIENT
Start: 2018-03-24 | End: 2018-03-24

## 2018-03-24 RX ORDER — DIPHENHYDRAMINE HCL 50 MG
12.5 CAPSULE ORAL ONCE
Qty: 0 | Refills: 0 | Status: DISCONTINUED | OUTPATIENT
Start: 2018-03-24 | End: 2018-03-24

## 2018-03-24 RX ADMIN — Medication 81 MILLIGRAM(S): at 12:28

## 2018-03-24 RX ADMIN — HEPARIN SODIUM 5000 UNIT(S): 5000 INJECTION INTRAVENOUS; SUBCUTANEOUS at 05:02

## 2018-03-24 RX ADMIN — Medication 1: at 17:15

## 2018-03-24 RX ADMIN — HEPARIN SODIUM 5000 UNIT(S): 5000 INJECTION INTRAVENOUS; SUBCUTANEOUS at 17:16

## 2018-03-24 RX ADMIN — PANTOPRAZOLE SODIUM 40 MILLIGRAM(S): 20 TABLET, DELAYED RELEASE ORAL at 12:28

## 2018-03-24 RX ADMIN — Medication 2: at 12:28

## 2018-03-24 RX ADMIN — Medication 12.5 MILLIGRAM(S): at 23:39

## 2018-03-24 RX ADMIN — CLOPIDOGREL BISULFATE 75 MILLIGRAM(S): 75 TABLET, FILM COATED ORAL at 12:28

## 2018-03-24 RX ADMIN — LISINOPRIL 10 MILLIGRAM(S): 2.5 TABLET ORAL at 05:02

## 2018-03-24 RX ADMIN — ATORVASTATIN CALCIUM 80 MILLIGRAM(S): 80 TABLET, FILM COATED ORAL at 22:39

## 2018-03-24 RX ADMIN — Medication 25 MILLIGRAM(S): at 05:02

## 2018-03-25 LAB
ANION GAP SERPL CALC-SCNC: 12 MMOL/L — SIGNIFICANT CHANGE UP (ref 5–17)
BUN SERPL-MCNC: 16 MG/DL — SIGNIFICANT CHANGE UP (ref 7–23)
CALCIUM SERPL-MCNC: 9.4 MG/DL — SIGNIFICANT CHANGE UP (ref 8.4–10.5)
CHLORIDE SERPL-SCNC: 99 MMOL/L — SIGNIFICANT CHANGE UP (ref 96–108)
CO2 SERPL-SCNC: 24 MMOL/L — SIGNIFICANT CHANGE UP (ref 22–31)
CREAT SERPL-MCNC: 1.24 MG/DL — SIGNIFICANT CHANGE UP (ref 0.5–1.3)
GLUCOSE SERPL-MCNC: 172 MG/DL — HIGH (ref 70–99)
HCT VFR BLD CALC: 34.8 % — LOW (ref 39–50)
HGB BLD-MCNC: 12.4 G/DL — LOW (ref 13–17)
MCHC RBC-ENTMCNC: 31.1 PG — SIGNIFICANT CHANGE UP (ref 27–34)
MCHC RBC-ENTMCNC: 35.6 GM/DL — SIGNIFICANT CHANGE UP (ref 32–36)
MCV RBC AUTO: 87.2 FL — SIGNIFICANT CHANGE UP (ref 80–100)
NRBC # BLD: 0 /100 WBCS — SIGNIFICANT CHANGE UP (ref 0–0)
PLATELET # BLD AUTO: 364 K/UL — SIGNIFICANT CHANGE UP (ref 150–400)
POTASSIUM SERPL-MCNC: 4.6 MMOL/L — SIGNIFICANT CHANGE UP (ref 3.5–5.3)
POTASSIUM SERPL-SCNC: 4.6 MMOL/L — SIGNIFICANT CHANGE UP (ref 3.5–5.3)
RBC # BLD: 3.99 M/UL — LOW (ref 4.2–5.8)
RBC # FLD: 13.7 % — SIGNIFICANT CHANGE UP (ref 10.3–14.5)
SODIUM SERPL-SCNC: 135 MMOL/L — SIGNIFICANT CHANGE UP (ref 135–145)
WBC # BLD: 7.9 K/UL — SIGNIFICANT CHANGE UP (ref 3.8–10.5)
WBC # FLD AUTO: 7.9 K/UL — SIGNIFICANT CHANGE UP (ref 3.8–10.5)

## 2018-03-25 PROCEDURE — 99232 SBSQ HOSP IP/OBS MODERATE 35: CPT

## 2018-03-25 RX ORDER — SIMETHICONE 80 MG/1
80 TABLET, CHEWABLE ORAL EVERY 8 HOURS
Qty: 0 | Refills: 0 | Status: DISCONTINUED | OUTPATIENT
Start: 2018-03-25 | End: 2018-03-27

## 2018-03-25 RX ORDER — POLYETHYLENE GLYCOL 3350 17 G/17G
17 POWDER, FOR SOLUTION ORAL DAILY
Qty: 0 | Refills: 0 | Status: DISCONTINUED | OUTPATIENT
Start: 2018-03-25 | End: 2018-03-27

## 2018-03-25 RX ADMIN — Medication 1: at 08:47

## 2018-03-25 RX ADMIN — PANTOPRAZOLE SODIUM 40 MILLIGRAM(S): 20 TABLET, DELAYED RELEASE ORAL at 12:02

## 2018-03-25 RX ADMIN — Medication 81 MILLIGRAM(S): at 12:02

## 2018-03-25 RX ADMIN — CLOPIDOGREL BISULFATE 75 MILLIGRAM(S): 75 TABLET, FILM COATED ORAL at 12:02

## 2018-03-25 RX ADMIN — HEPARIN SODIUM 5000 UNIT(S): 5000 INJECTION INTRAVENOUS; SUBCUTANEOUS at 05:58

## 2018-03-25 RX ADMIN — Medication 3: at 12:44

## 2018-03-25 RX ADMIN — Medication 1: at 17:11

## 2018-03-25 RX ADMIN — LISINOPRIL 10 MILLIGRAM(S): 2.5 TABLET ORAL at 05:58

## 2018-03-25 RX ADMIN — Medication 25 MILLIGRAM(S): at 05:58

## 2018-03-25 RX ADMIN — ATORVASTATIN CALCIUM 80 MILLIGRAM(S): 80 TABLET, FILM COATED ORAL at 21:43

## 2018-03-25 RX ADMIN — POLYETHYLENE GLYCOL 3350 17 GRAM(S): 17 POWDER, FOR SOLUTION ORAL at 12:02

## 2018-03-25 NOTE — CONSULT NOTE ADULT - ASSESSMENT
Impression:    1. Dyspepsia with weight loss, downtrending hgb, repeated ER visits. DDX includes erosive esohpagitis, peptic ulcer disease, less likely gastric cancer. Lipase negative, normal LFT. Pain not biliary in quality. Low likelihood of cardiac etiology however pt has known CAD.    2. Mild anemia: normocytic, baseline hgb was higher by 1G earlier this month    Recommendation:  -agree with PPI  -would perform an EGD to evaluate, however would appreciate cardiology input if any further risk stratification is necessary  -check an abdominal US to r/o gallstones  -could also consider outpatient workup, will D/W attending. Impression:    1. Dyspepsia with weight loss, downtrending hgb, repeated ER visits. DDX includes erosive esohpagitis, peptic ulcer disease, less likely gastric cancer. Lipase negative, normal LFT. Pain not biliary in quality. Low likelihood of cardiac etiology however pt has known CAD.    2. Mild anemia: normocytic, baseline hgb was higher by 1G earlier this month    Recommendation:  -agree with PPI  -would perform an EGD to evaluate, however would appreciate cardiology input if any further work up is necessary in order to risk stratify cardiac risk  -check an abdominal US to r/o gallstones  -could also consider outpatient workup, will D/W attending. Impression:    1. Dyspepsia with weight loss, downtrending hgb, repeated ER visits. DDX includes erosive esohpagitis, peptic ulcer disease, less likely gastric cancer. Lipase negative, normal LFT. Pain not biliary in quality. Pt w good exercise tolerance, doubt CAD-related.    2. Mild anemia: normocytic, baseline hgb was higher by 1G earlier this month    Recommendation:  -agree with PPI  -would perform an EGD   -check an abdominal US to r/o gallstones  -could also consider outpatient workup, will D/W attending.

## 2018-03-25 NOTE — CONSULT NOTE ADULT - SUBJECTIVE AND OBJECTIVE BOX
Chief Complaint:  Patient is a 85y old  Male who presents with a chief complaint of hyponatremia (23 Mar 2018 21:54)      HPI:  This is an 85 year old man with a PMHx CAD s/p 3 stents (most recent last year in August) on DAPT, DM2, HLD, HTN who presents with epigastric pain. The pain has a burning quality and is ongoing for "a long time" however it has become more severe and is associated with intractable burping. There is no n/v or dyaphagia or change in bowel habits or color however the patient notes a 5lb weight loss. The pain is not necesarrily related to meals and improves w Zantac. The patient was drinking excessive water to make it feel better. Earlier in the month (3/14) he presented with a similar pain but was diagnosed with mild acute diverticulitis due to LLQ TTP. He completed a course of cipro/flagyl for that. The LLQ pain is improved but still present and is improved w Bowel movements. The pain is nonexertional. Patient noticed dark brown stool while on abx.      Allergies:  No Known Allergies      Home Medications:    Hospital Medications:  aluminum hydroxide/magnesium hydroxide/simethicone Suspension 30 milliLiter(s) Oral every 4 hours PRN  aspirin  chewable 81 milliGRAM(s) Oral daily  atorvastatin 80 milliGRAM(s) Oral at bedtime  clopidogrel Tablet 75 milliGRAM(s) Oral daily  dextrose 5%. 1000 milliLiter(s) IV Continuous <Continuous>  dextrose 50% Injectable 12.5 Gram(s) IV Push once  dextrose 50% Injectable 25 Gram(s) IV Push once  dextrose 50% Injectable 25 Gram(s) IV Push once  dextrose Gel 1 Dose(s) Oral once PRN  glucagon  Injectable 1 milliGRAM(s) IntraMuscular once PRN  heparin  Injectable 5000 Unit(s) SubCutaneous every 12 hours  insulin lispro (HumaLOG) corrective regimen sliding scale   SubCutaneous three times a day before meals  insulin lispro (HumaLOG) corrective regimen sliding scale   SubCutaneous at bedtime  lisinopril 10 milliGRAM(s) Oral daily  metoprolol succinate ER 25 milliGRAM(s) Oral daily  pantoprazole  Injectable 40 milliGRAM(s) IV Push daily  polyethylene glycol 3350 17 Gram(s) Oral daily PRN  simethicone 80 milliGRAM(s) Chew every 8 hours PRN      PMHX/PSHX:  HLD (hyperlipidemia)  CAD (coronary artery disease)  DM (diabetes mellitus)  HTN (hypertension)  S/P CABG (coronary artery bypass graft)      Family history:    no fhx of GI disease  Social History:   remotely a smoker, nondrinker  ROS:       Eyes:  Good vision, no reported pain  ENT:  No sore throat, pain, runny nose, dysphagia  CV:  No pain, palpitations, hypo/hypertension  Resp:  No dyspnea, cough, tachypnea, wheezing  GI:  See HPI  :  No pain, bleeding, incontinence, nocturia  Muscle:  No pain, weakness  Neuro:  No weakness, tingling, memory problems  Psych:  No fatigue, insomnia, mood problems, depression  Endocrine:  No polyuria, polydipsia, cold/heat intolerance  Heme:  No petechiae, ecchymosis, easy bruisability  Skin:  No rash, edema      PHYSICAL EXAM:     GENERAL:  Appears stated age, well-groomed, well-nourished, no distress  HEENT:  NC/AT,  conjunctivae clear and pink,  no JVD  CHEST:  Full & symmetric excursion, no increased effort, breath sounds clear  HEART:  Regular rhythm, S1, S2, no murmur/rub/S3/S4, no abdominal bruit, no edema  ABDOMEN:  Soft, non-tender, non-distended, normoactive bowel sounds,  no masses , no hepatosplenomegaly  EXTREMITIES:  no cyanosis,clubbing or edema  SKIN:  No rash/erythema/ecchymoses/petechiae/wounds/abscess/warm/dry  NEURO:  Alert, oriented  RECTUM: brown stool.    Vital Signs:  Vital Signs Last 24 Hrs  T(C): 37 (25 Mar 2018 13:35), Max: 37 (25 Mar 2018 13:35)  T(F): 98.6 (25 Mar 2018 13:35), Max: 98.6 (25 Mar 2018 13:35)  HR: 61 (25 Mar 2018 13:35) (51 - 61)  BP: 118/69 (25 Mar 2018 13:35) (118/69 - 136/81)  BP(mean): --  RR: 18 (25 Mar 2018 13:35) (18 - 18)  SpO2: 98% (25 Mar 2018 13:35) (97% - 99%)  Daily     Daily     LABS:                        12.4   7.90  )-----------( 364      ( 25 Mar 2018 09:04 )             34.8     03-    135  |  99  |  16  ----------------------------<  172<H>  4.6   |  24  |  1.24    Ca    9.4      25 Mar 2018 07:16          Urinalysis Basic - ( 23 Mar 2018 20:52 )    Color: PL Yellow / Appearance: Clear / S.018 / pH: x  Gluc: x / Ketone: Negative  / Bili: Negative / Urobili: Negative   Blood: x / Protein: Negative / Nitrite: Negative   Leuk Esterase: Negative / RBC: x / WBC x   Sq Epi: x / Non Sq Epi: x / Bacteria: x          Imaging:

## 2018-03-25 NOTE — CONSULT NOTE ADULT - ATTENDING COMMENTS
Epigastric pain   NAD  ABD NT  Impression: recurrent epigastric pain, unclear etiology  Plan EGD today

## 2018-03-26 LAB
ANION GAP SERPL CALC-SCNC: 14 MMOL/L — SIGNIFICANT CHANGE UP (ref 5–17)
ANION GAP SERPL CALC-SCNC: 16 MMOL/L — SIGNIFICANT CHANGE UP (ref 5–17)
BASOPHILS # BLD AUTO: 0.05 K/UL — SIGNIFICANT CHANGE UP (ref 0–0.2)
BASOPHILS NFR BLD AUTO: 0.4 % — SIGNIFICANT CHANGE UP (ref 0–2)
BUN SERPL-MCNC: 20 MG/DL — SIGNIFICANT CHANGE UP (ref 7–23)
BUN SERPL-MCNC: 22 MG/DL — SIGNIFICANT CHANGE UP (ref 7–23)
CALCIUM SERPL-MCNC: 9.3 MG/DL — SIGNIFICANT CHANGE UP (ref 8.4–10.5)
CALCIUM SERPL-MCNC: 9.9 MG/DL — SIGNIFICANT CHANGE UP (ref 8.4–10.5)
CHLORIDE SERPL-SCNC: 95 MMOL/L — LOW (ref 96–108)
CHLORIDE SERPL-SCNC: 96 MMOL/L — SIGNIFICANT CHANGE UP (ref 96–108)
CO2 SERPL-SCNC: 24 MMOL/L — SIGNIFICANT CHANGE UP (ref 22–31)
CO2 SERPL-SCNC: 25 MMOL/L — SIGNIFICANT CHANGE UP (ref 22–31)
CREAT SERPL-MCNC: 1.31 MG/DL — HIGH (ref 0.5–1.3)
CREAT SERPL-MCNC: 1.57 MG/DL — HIGH (ref 0.5–1.3)
EOSINOPHIL # BLD AUTO: 0.75 K/UL — HIGH (ref 0–0.5)
EOSINOPHIL NFR BLD AUTO: 6 % — SIGNIFICANT CHANGE UP (ref 0–6)
GLUCOSE BLDC GLUCOMTR-MCNC: 214 MG/DL — HIGH (ref 70–99)
GLUCOSE BLDC GLUCOMTR-MCNC: 239 MG/DL — HIGH (ref 70–99)
GLUCOSE BLDC GLUCOMTR-MCNC: 310 MG/DL — HIGH (ref 70–99)
GLUCOSE SERPL-MCNC: 206 MG/DL — HIGH (ref 70–99)
GLUCOSE SERPL-MCNC: 278 MG/DL — HIGH (ref 70–99)
HCT VFR BLD CALC: 40.3 % — SIGNIFICANT CHANGE UP (ref 39–50)
HGB BLD-MCNC: 14.2 G/DL — SIGNIFICANT CHANGE UP (ref 13–17)
IMM GRANULOCYTES NFR BLD AUTO: 0.5 % — SIGNIFICANT CHANGE UP (ref 0–1.5)
LYMPHOCYTES # BLD AUTO: 3.93 K/UL — HIGH (ref 1–3.3)
LYMPHOCYTES # BLD AUTO: 31.6 % — SIGNIFICANT CHANGE UP (ref 13–44)
MCHC RBC-ENTMCNC: 30.8 PG — SIGNIFICANT CHANGE UP (ref 27–34)
MCHC RBC-ENTMCNC: 35.2 GM/DL — SIGNIFICANT CHANGE UP (ref 32–36)
MCV RBC AUTO: 87.4 FL — SIGNIFICANT CHANGE UP (ref 80–100)
MONOCYTES # BLD AUTO: 1.2 K/UL — HIGH (ref 0–0.9)
MONOCYTES NFR BLD AUTO: 9.7 % — SIGNIFICANT CHANGE UP (ref 2–14)
NEUTROPHILS # BLD AUTO: 6.43 K/UL — SIGNIFICANT CHANGE UP (ref 1.8–7.4)
NEUTROPHILS NFR BLD AUTO: 51.8 % — SIGNIFICANT CHANGE UP (ref 43–77)
PLATELET # BLD AUTO: 415 K/UL — HIGH (ref 150–400)
POTASSIUM SERPL-MCNC: 4.2 MMOL/L — SIGNIFICANT CHANGE UP (ref 3.5–5.3)
POTASSIUM SERPL-MCNC: 4.5 MMOL/L — SIGNIFICANT CHANGE UP (ref 3.5–5.3)
POTASSIUM SERPL-SCNC: 4.2 MMOL/L — SIGNIFICANT CHANGE UP (ref 3.5–5.3)
POTASSIUM SERPL-SCNC: 4.5 MMOL/L — SIGNIFICANT CHANGE UP (ref 3.5–5.3)
RBC # BLD: 4.61 M/UL — SIGNIFICANT CHANGE UP (ref 4.2–5.8)
RBC # FLD: 13.7 % — SIGNIFICANT CHANGE UP (ref 10.3–14.5)
SODIUM SERPL-SCNC: 133 MMOL/L — LOW (ref 135–145)
SODIUM SERPL-SCNC: 137 MMOL/L — SIGNIFICANT CHANGE UP (ref 135–145)
WBC # BLD: 12.42 K/UL — HIGH (ref 3.8–10.5)
WBC # FLD AUTO: 12.42 K/UL — HIGH (ref 3.8–10.5)

## 2018-03-26 PROCEDURE — 99233 SBSQ HOSP IP/OBS HIGH 50: CPT

## 2018-03-26 PROCEDURE — 99223 1ST HOSP IP/OBS HIGH 75: CPT | Mod: GC

## 2018-03-26 PROCEDURE — 43235 EGD DIAGNOSTIC BRUSH WASH: CPT | Mod: GC

## 2018-03-26 RX ORDER — PANTOPRAZOLE SODIUM 20 MG/1
40 TABLET, DELAYED RELEASE ORAL
Qty: 0 | Refills: 0 | Status: DISCONTINUED | OUTPATIENT
Start: 2018-03-26 | End: 2018-03-27

## 2018-03-26 RX ADMIN — Medication 81 MILLIGRAM(S): at 12:45

## 2018-03-26 RX ADMIN — Medication 25 MILLIGRAM(S): at 06:13

## 2018-03-26 RX ADMIN — ATORVASTATIN CALCIUM 80 MILLIGRAM(S): 80 TABLET, FILM COATED ORAL at 21:49

## 2018-03-26 RX ADMIN — Medication 4: at 17:10

## 2018-03-26 RX ADMIN — CLOPIDOGREL BISULFATE 75 MILLIGRAM(S): 75 TABLET, FILM COATED ORAL at 12:45

## 2018-03-26 RX ADMIN — LISINOPRIL 10 MILLIGRAM(S): 2.5 TABLET ORAL at 06:13

## 2018-03-26 RX ADMIN — PANTOPRAZOLE SODIUM 40 MILLIGRAM(S): 20 TABLET, DELAYED RELEASE ORAL at 12:51

## 2018-03-26 RX ADMIN — HEPARIN SODIUM 5000 UNIT(S): 5000 INJECTION INTRAVENOUS; SUBCUTANEOUS at 17:10

## 2018-03-26 RX ADMIN — HEPARIN SODIUM 5000 UNIT(S): 5000 INJECTION INTRAVENOUS; SUBCUTANEOUS at 06:13

## 2018-03-26 RX ADMIN — Medication 2: at 12:45

## 2018-03-26 NOTE — CONSULT NOTE ADULT - ASSESSMENT
A/P:   85 year old gentleman with PMHx CAD w CABG last cath in Aug 2017 , DM2, HLD, HTN PSH right inguinal hernia repair ~30 years ago complaining of epigastric abdominal pain with bloating. Patient is planned for endoscopy. He has good functional capacity, walks 25 blocks.   Patient RCRI score is 1 and going for a low risk procedure and does not need further cardiac risk stratification.   Plan  - may proceed with endoscopy.     Charles Hazel MD   Cardiology Fellow  x [phone] A/P:   85 year old man, with PMHx CAD s/p CABG.  Last cardiac cath in Aug 2017; continued on medical therapy at that time.  Hx. includes DM2, HLD, HTN  Admitted with complaint of epigastric/abdominal pain with bloating.   Patient is planned for endoscopy. He has good functional capacity, walks 25 blocks.  Patient RCRI score is 1.  Hs cardiac risk is low, for planned low risk procedure.  He does not need further cardiac testing prior to the procedure.     REC:   - may proceed with endoscopy.   - no additional cardiac testing at this time.  - continue usual meds including ASA and Plavix    Charles Hazel MD   Cardiology Fellow  x [phone]    Cezar Phelan M.D.  Cardiology Consult Service  [phone] or [phone]

## 2018-03-26 NOTE — CONSULT NOTE ADULT - SUBJECTIVE AND OBJECTIVE BOX
Patient seen and evaluated at bedside    Chief Complaint:    HPI:  This is an 85 year old gentleman with PMHx CAD w CABG last cath in Aug 2017 , DM2, HLD, HTN PSH right inguinal hernia repair ~30 years ago complaining of epigastric abdominal pain with bloating. Patient presented to ED 3/14 with epigastric pain. At the time found on exam to have LLQ pain, CT showed moderate sigmoid diverticulitis, he was discharged on cipro/flagyl x 1 week.  He notes that his epigastric pain never improved, and worsened overnight prior to admission. He notes that pain overnight was 9/10 sharp pain which is non-radiating, lasted hours overnight, non-exertional, non-pleuritic, non-radiating, no cough, SOB, diaphoresis.  Notes that pain is similar to prior gastritis pain, though more severe. Denies nausea, vomiting, diarrhea, constipation. He does note dark stools since starting antibiotics, had stool guaiac as out-patient which was negative, denies lightheadedness, dizziness, fatigue. Notes complete resolution of pain with ED GI cocktail. Notably, patient has increased his PO intake of water since diverticulitis dx, notes at least [id number] of water daily, also notes decreased urine output.      In ED, Tmax 98.3, HR 62-64, /75, satting well on RA. Labs notable for low grade leukocytosis to 10.7 with normal diff, Hgb 13.6, Plt 399, Na initially 128, then worsened to 123 with 2L NS, Cr 1.16 (stable), trop negative x 2. (23 Mar 2018 21:54)      PMH:   HLD (hyperlipidemia)  CAD (coronary artery disease)  DM (diabetes mellitus)  HTN (hypertension)      PSH:   S/P CABG (coronary artery bypass graft)      Medications:   aluminum hydroxide/magnesium hydroxide/simethicone Suspension 30 milliLiter(s) Oral every 4 hours PRN  aspirin  chewable 81 milliGRAM(s) Oral daily  atorvastatin 80 milliGRAM(s) Oral at bedtime  clopidogrel Tablet 75 milliGRAM(s) Oral daily  dextrose 5%. 1000 milliLiter(s) IV Continuous <Continuous>  dextrose 50% Injectable 12.5 Gram(s) IV Push once  dextrose 50% Injectable 25 Gram(s) IV Push once  dextrose 50% Injectable 25 Gram(s) IV Push once  dextrose Gel 1 Dose(s) Oral once PRN  glucagon  Injectable 1 milliGRAM(s) IntraMuscular once PRN  heparin  Injectable 5000 Unit(s) SubCutaneous every 12 hours  insulin lispro (HumaLOG) corrective regimen sliding scale   SubCutaneous three times a day before meals  insulin lispro (HumaLOG) corrective regimen sliding scale   SubCutaneous at bedtime  lisinopril 10 milliGRAM(s) Oral daily  metoprolol succinate ER 25 milliGRAM(s) Oral daily  pantoprazole  Injectable 40 milliGRAM(s) IV Push daily  polyethylene glycol 3350 17 Gram(s) Oral daily PRN  simethicone 80 milliGRAM(s) Chew every 8 hours PRN      Allergies:  No Known Allergies      FAMILY HISTORY:      Social History:  Smoking History:  Alcohol Use:  Drug Use:    Review of Systems:  REVIEW OF SYSTEMS:    CONSTITUTIONAL: No weakness, fevers or chills  EYES/ENT: No visual changes;  No dysphagia  NECK: No pain or stiffness  RESPIRATORY: No cough, wheezing, hemoptysis; No shortness of breath  CARDIOVASCULAR: No chest pain or palpitations; No lower extremity edema  GASTROINTESTINAL: No abdominal or epigastric pain. No nausea, vomiting, or hematemesis; No diarrhea or constipation. No melena or hematochezia.  BACK: No back pain  GENITOURINARY: No dysuria, frequency or hematuria  NEUROLOGICAL: No numbness or weakness  SKIN: No itching, burning, rashes, or lesions   All other review of systems is negative unless indicated above.    Physical Exam:  T(F): 98.4 (03-26), Max: 98.6 (03-25)  HR: 60 (03-26) (58 - 61)  BP: 155/76 (03-26) (118/69 - 173/86)  RR: 18 (03-26)  SpO2: 96% (03-26)  GENERAL: No acute distress, well-developed  HEAD:  Atraumatic, Normocephalic  ENT: EOMI, PERRLA, conjunctiva and sclera clear, Neck supple, No JVD, moist mucosa  CHEST/LUNG: Clear to auscultation bilaterally; No wheeze, equal breath sounds bilaterally   BACK: No spinal tenderness  HEART: Regular rate and rhythm; No murmurs, rubs, or gallops  ABDOMEN: Soft, Nontender, Nondistended; Bowel sounds present  EXTREMITIES:  No clubbing, cyanosis, or edema  PSYCH: Nl behavior, nl affect  NEUROLOGY: AAOx3, non-focal, cranial nerves intact  SKIN: Normal color, No rashes or lesions  LINES:    Cardiovascular Diagnostic Testing:    ECG: Personally reviewed  RBBB       Echo:  < from: Transthoracic Echocardiogram (08.06.17 @ 12:00) >  1. Mildly dilated left atrium.  LA volume index = 36 cc/m2.  2. Normal left ventricular internal dimensions and wall  thicknesses.  3. Mild segmental left ventricular systolic dysfunction.  Hypokinesis of the basal inferoseptum    < end of copied text >    Cath:  < from: Cardiac Cath Lab - Adult (08.07.17 @ 16:57) >  VENTRICLES: No left ventriculogram was performed.  CORONARY VESSELS: The coronary circulation is right dominant.  LM:   --  LM: There was a tubular 40 % stenosis.  LAD:   --  Ostial LAD: There was a 100 % stenosis.  CX:   --  Circumflex: The vessel was very small sized. Angiography showed  mild atherosclerosis with no flow limiting lesions.  --  Ostial circumflex: There was a 90 % stenosis.  RCA:   --  Distal RCA: There was a 100 % stenosis.  GRAFTS:   --  Graft to the mid LAD: The graft was a LIMA. It was normal.  Distal vessel angiography showed severe diffuse disease.  --  Graft to the RPDA: The graft was a saphenous vein graft from the aorta.  There was a discrete 50 % stenosis in the proximal third of the graft, at  the site of two layers of stent, within the stented segment. There was a  discrete 90 % stenosis in the distal third of the graft, at the site of a  prior stent.  COMPLICATIONS: There were no complications.  DIAGNOSTIC IMPRESSIONS: Severe small vessel, distal disease. 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 iFR performed of the prox SCG-RPDA stenosis  after distal PCI which was normal 0.99  INTERVENTIONAL RECOMMENDATIONS: Aspirin and Brilinta.    < end of copied text >    Labs: Personally reviewed                                       14.2   12.42 )-----------( 415      ( 26 Mar 2018 07:26 )             40.3   03-26    137  |  96  |  20  ----------------------------<  206<H>  4.2   |  25  |  1.31<H>    Ca    9.9      26 Mar 2018 06:45 84 yo M with PMHx CAD w CABG, last cath in Aug 2017, DM2, HLD, HTN.  Admitted with complaint of epigastric abdominal pain with bloating.  Initially presented to ED 3/14 with epigastric pain. At that time, found on exam to have LLQ pain - CT showed moderate sigmoid diverticulitis.  He was discharged on cipro/flagyl x 1 week.  He reports that the epigastric pain never improved, and worsened overnight prior to current admission. He notes that pain overnight was 9/10 sharp pain, which was non-radiating, non-exertional, non-pleuritic, non-radiating.  Reports no cough, SOB, diaphoresis.  Notes that pain is similar to prior gastritis pain, though more severe. Denies nausea, vomiting, diarrhea, constipation. He does note dark stools since starting antibiotics, but had stool guaiac as out-patient which was negative.  He denies lightheadedness, dizziness, fatigue. Notes complete resolution of pain with ED GI cocktail.     In ED, Tmax 98.3, HR 62-64, /75, dean O2 sat on RA. Labs notable for low grade leukocytosis to 10.7 with normal diff, Hgb 13.6, Plt 399, Na initially 128, then worsened to 123 with 2L NS, Cr 1.16 (stable), trop negative x 2. (23 Mar 2018 21:54)    PMH:   HLD (hyperlipidemia)  CAD (coronary artery disease)  DM (diabetes mellitus)  HTN (hypertension)    PSH:   S/P CABG (coronary artery bypass graft)    Medications:   aluminum hydroxide/magnesium hydroxide/simethicone Suspension 30 milliLiter(s) Oral every 4 hours PRN  aspirin  chewable 81 milliGRAM(s) Oral daily  atorvastatin 80 milliGRAM(s) Oral at bedtime  clopidogrel Tablet 75 milliGRAM(s) Oral daily  dextrose 5%. 1000 milliLiter(s) IV Continuous <Continuous>  dextrose 50% Injectable 12.5 Gram(s) IV Push once  dextrose 50% Injectable 25 Gram(s) IV Push once  dextrose 50% Injectable 25 Gram(s) IV Push once  dextrose Gel 1 Dose(s) Oral once PRN  glucagon  Injectable 1 milliGRAM(s) IntraMuscular once PRN  heparin  Injectable 5000 Unit(s) SubCutaneous every 12 hours  insulin lispro (HumaLOG) corrective regimen sliding scale   SubCutaneous three times a day before meals  insulin lispro (HumaLOG) corrective regimen sliding scale   SubCutaneous at bedtime  lisinopril 10 milliGRAM(s) Oral daily  metoprolol succinate ER 25 milliGRAM(s) Oral daily  pantoprazole  Injectable 40 milliGRAM(s) IV Push daily  polyethylene glycol 3350 17 Gram(s) Oral daily PRN  simethicone 80 milliGRAM(s) Chew every 8 hours PRN    Allergies:  No Known Allergies    FAMILY HISTORY:  No family hx. of heart disease    Social History:  , retired.  D/C cigarettes age 19.  No alcohol or drug use    REVIEW OF SYSTEMS:  CONSTITUTIONAL: No weakness, fevers or chills  EYES/ENT: No visual changes;  No dysphagia  NECK: No pain or stiffness  RESPIRATORY: No cough, wheezing, hemoptysis; No shortness of breath  CARDIOVASCULAR: No chest pain or palpitations; No lower extremity edema  BACK: No back pain  GENITOURINARY: No dysuria, frequency or hematuria  NEUROLOGICAL: No numbness or weakness  SKIN: No itching, burning, rashes, or lesions   All other review of systems is negative unless indicated above.    Physical Exam:  T(F): 98.4 (-), Max: 98.6 ()  HR: 60 () (58 - 61)  BP: 155/76 () (118/69 - 173/86)  RR: 18 (-)  SpO2: 96% ()    GENERAL: No acute distress, well-developed  HEAD:  Atraumatic, Normocephalic  ENT: EOMI, PERRLA, conjunctiva and sclera clear, Neck supple, No JVD, moist mucosa  CHEST/LUNG: Clear to auscultation bilaterally; No wheeze, equal breath sounds bilaterally   BACK: No spinal tenderness  HEART: Regular rate and rhythm; No murmurs, rubs, or gallops  ABDOMEN: Soft, Nontender, Nondistended; Bowel sounds present  EXTREMITIES:  No clubbing, cyanosis, or edema  PSYCH: Nl behavior, nl affect  NEUROLOGY: AAOx3, non-focal, cranial nerves intact  SKIN: Normal color, No rashes or lesions  LINES:      EC18 @ 12:04)  SB at 54 BPM.  P-R Interval 182 ms, QRS Duration 124 ms,  ms.  Axis 11 degrees  RIGHT BUNDLE BRANCH BLOCK    Transthoracic Echocardiogram (17 @ 12:00) >  1. Mildly dilated left atrium.  LA volume index = 36 cc/m2.  2. Normal left ventricular internal dimensions and wall thicknesses.  3. Mild segmental left ventricular systolic dysfunction. Hypokinesis of the basal infero-septum    Cardiac Cath Lab - Adult (17 @ 16:57) >  VENTRICLES: No left ventriculogram was performed.  CORONARY VESSELS: The coronary circulation is right dominant.  LM:   --  LM: There was a tubular 40 % stenosis.  LAD:   --  Ostial LAD: There was a 100 % stenosis.  CX:   --  Circumflex: The vessel was very small sized. Angiography showed mild atherosclerosis with no flow limiting lesions.  --  Ostial circumflex: There was a 90 % stenosis.  RCA:   --  Distal RCA: There was a 100 % stenosis.  GRAFTS:   --  Graft to the mid LAD: The graft was a LIMA. It was normal.    Distal vessel angiography showed severe diffuse disease.  --  Graft to the RPDA: The graft was a saphenous vein graft from the aorta. There was a discrete 50 % stenosis in the proximal third of the graft, at the site of two layers of stent, within the stented segment. There was a discrete 90 % stenosis in the distal third of the graft, at the site of a prior stent.  COMPLICATIONS: There were no complications.  DIAGNOSTIC IMPRESSIONS: Severe small vessel, distal disease. Critical restenosis in the SVG-RPDA. iFR performed of the prox SCG-RPDA stenosis after distal PCI which was normal 0.99  INTERVENTIONAL RECOMMENDATIONS: Aspirin and Brilinta.    Labs: Personally reviewed                               14.2   12.42 )-----------( 415      ( 26 Mar 2018 07:26 )             40.3         137  |  96  |  20  ----------------------------<  206<H>  4.2   |  25  |  1.31<H>    Ca    9.9      26 Mar 2018 06:45      Xray Chest 1 View- PORTABLE-Urgent (18 @ 12:01) >  PRIOR: 17.  CLINICAL INDICATION: Abdominal pain - assess for subdiaphragmatic air.  TECHNIQUE: portable chest - done upright.  FINDINGS:   S/P sternotomy, CABG.  The heart is not enlarged.   No bilateral focal infiltrates.  No pleural effusion or pneumothorax.  No free subdiaphragmatic air.  There are degenerative changes of the thoracic spine.    IMPRESSION:   Clear lungs.  No free subdiaphragmatic air noted.    LIVE RHODES M.D., ATTENDING RADIOLOGIST  This document has been electronically signed. Mar 23 2018 12:27PM

## 2018-03-26 NOTE — CONSULT NOTE ADULT - NSHPATTENDINGPLANDISCUSS_GEN_ALL_CORE
cardiology fellow, Dr. LINDA Murillo; patient seen and examined.  Hx., exam and labs as above.  I agree with the assessment and recommendations.

## 2018-03-27 ENCOUNTER — TRANSCRIPTION ENCOUNTER (OUTPATIENT)
Age: 83
End: 2018-03-27

## 2018-03-27 VITALS — DIASTOLIC BLOOD PRESSURE: 73 MMHG | HEART RATE: 60 BPM | SYSTOLIC BLOOD PRESSURE: 151 MMHG

## 2018-03-27 DIAGNOSIS — N17.9 ACUTE KIDNEY FAILURE, UNSPECIFIED: ICD-10-CM

## 2018-03-27 LAB
ANION GAP SERPL CALC-SCNC: 12 MMOL/L — SIGNIFICANT CHANGE UP (ref 5–17)
ANION GAP SERPL CALC-SCNC: 12 MMOL/L — SIGNIFICANT CHANGE UP (ref 5–17)
BASOPHILS # BLD AUTO: 0.04 K/UL — SIGNIFICANT CHANGE UP (ref 0–0.2)
BASOPHILS NFR BLD AUTO: 0.5 % — SIGNIFICANT CHANGE UP (ref 0–2)
BUN SERPL-MCNC: 20 MG/DL — SIGNIFICANT CHANGE UP (ref 7–23)
BUN SERPL-MCNC: 21 MG/DL — SIGNIFICANT CHANGE UP (ref 7–23)
CALCIUM SERPL-MCNC: 9.5 MG/DL — SIGNIFICANT CHANGE UP (ref 8.4–10.5)
CALCIUM SERPL-MCNC: 9.7 MG/DL — SIGNIFICANT CHANGE UP (ref 8.4–10.5)
CHLORIDE SERPL-SCNC: 102 MMOL/L — SIGNIFICANT CHANGE UP (ref 96–108)
CHLORIDE SERPL-SCNC: 97 MMOL/L — SIGNIFICANT CHANGE UP (ref 96–108)
CO2 SERPL-SCNC: 23 MMOL/L — SIGNIFICANT CHANGE UP (ref 22–31)
CO2 SERPL-SCNC: 24 MMOL/L — SIGNIFICANT CHANGE UP (ref 22–31)
CREAT SERPL-MCNC: 1.24 MG/DL — SIGNIFICANT CHANGE UP (ref 0.5–1.3)
CREAT SERPL-MCNC: 1.26 MG/DL — SIGNIFICANT CHANGE UP (ref 0.5–1.3)
EOSINOPHIL # BLD AUTO: 0.5 K/UL — SIGNIFICANT CHANGE UP (ref 0–0.5)
EOSINOPHIL NFR BLD AUTO: 6.1 % — HIGH (ref 0–6)
GLUCOSE BLDC GLUCOMTR-MCNC: 176 MG/DL — HIGH (ref 70–99)
GLUCOSE BLDC GLUCOMTR-MCNC: 296 MG/DL — HIGH (ref 70–99)
GLUCOSE SERPL-MCNC: 186 MG/DL — HIGH (ref 70–99)
GLUCOSE SERPL-MCNC: 451 MG/DL — CRITICAL HIGH (ref 70–99)
HCT VFR BLD CALC: 35.6 % — LOW (ref 39–50)
HGB BLD-MCNC: 12.1 G/DL — LOW (ref 13–17)
IMM GRANULOCYTES NFR BLD AUTO: 0.5 % — SIGNIFICANT CHANGE UP (ref 0–1.5)
LYMPHOCYTES # BLD AUTO: 2.41 K/UL — SIGNIFICANT CHANGE UP (ref 1–3.3)
LYMPHOCYTES # BLD AUTO: 29.4 % — SIGNIFICANT CHANGE UP (ref 13–44)
MCHC RBC-ENTMCNC: 30.3 PG — SIGNIFICANT CHANGE UP (ref 27–34)
MCHC RBC-ENTMCNC: 34 GM/DL — SIGNIFICANT CHANGE UP (ref 32–36)
MCV RBC AUTO: 89.2 FL — SIGNIFICANT CHANGE UP (ref 80–100)
MONOCYTES # BLD AUTO: 0.91 K/UL — HIGH (ref 0–0.9)
MONOCYTES NFR BLD AUTO: 11.1 % — SIGNIFICANT CHANGE UP (ref 2–14)
NEUTROPHILS # BLD AUTO: 4.29 K/UL — SIGNIFICANT CHANGE UP (ref 1.8–7.4)
NEUTROPHILS NFR BLD AUTO: 52.4 % — SIGNIFICANT CHANGE UP (ref 43–77)
PLATELET # BLD AUTO: 362 K/UL — SIGNIFICANT CHANGE UP (ref 150–400)
POTASSIUM SERPL-MCNC: 4.1 MMOL/L — SIGNIFICANT CHANGE UP (ref 3.5–5.3)
POTASSIUM SERPL-MCNC: 4.2 MMOL/L — SIGNIFICANT CHANGE UP (ref 3.5–5.3)
POTASSIUM SERPL-SCNC: 4.1 MMOL/L — SIGNIFICANT CHANGE UP (ref 3.5–5.3)
POTASSIUM SERPL-SCNC: 4.2 MMOL/L — SIGNIFICANT CHANGE UP (ref 3.5–5.3)
RBC # BLD: 3.99 M/UL — LOW (ref 4.2–5.8)
RBC # FLD: 13.6 % — SIGNIFICANT CHANGE UP (ref 10.3–14.5)
SODIUM SERPL-SCNC: 133 MMOL/L — LOW (ref 135–145)
SODIUM SERPL-SCNC: 137 MMOL/L — SIGNIFICANT CHANGE UP (ref 135–145)
WBC # BLD: 8.19 K/UL — SIGNIFICANT CHANGE UP (ref 3.8–10.5)
WBC # FLD AUTO: 8.19 K/UL — SIGNIFICANT CHANGE UP (ref 3.8–10.5)

## 2018-03-27 PROCEDURE — 85730 THROMBOPLASTIN TIME PARTIAL: CPT

## 2018-03-27 PROCEDURE — 83036 HEMOGLOBIN GLYCOSYLATED A1C: CPT

## 2018-03-27 PROCEDURE — 99285 EMERGENCY DEPT VISIT HI MDM: CPT | Mod: 25

## 2018-03-27 PROCEDURE — 82330 ASSAY OF CALCIUM: CPT

## 2018-03-27 PROCEDURE — 82570 ASSAY OF URINE CREATININE: CPT

## 2018-03-27 PROCEDURE — 85610 PROTHROMBIN TIME: CPT

## 2018-03-27 PROCEDURE — 83605 ASSAY OF LACTIC ACID: CPT

## 2018-03-27 PROCEDURE — 82803 BLOOD GASES ANY COMBINATION: CPT

## 2018-03-27 PROCEDURE — 80053 COMPREHEN METABOLIC PANEL: CPT

## 2018-03-27 PROCEDURE — 82947 ASSAY GLUCOSE BLOOD QUANT: CPT

## 2018-03-27 PROCEDURE — 84300 ASSAY OF URINE SODIUM: CPT

## 2018-03-27 PROCEDURE — 99239 HOSP IP/OBS DSCHRG MGMT >30: CPT

## 2018-03-27 PROCEDURE — 96374 THER/PROPH/DIAG INJ IV PUSH: CPT | Mod: XU

## 2018-03-27 PROCEDURE — 85014 HEMATOCRIT: CPT

## 2018-03-27 PROCEDURE — 85027 COMPLETE CBC AUTOMATED: CPT

## 2018-03-27 PROCEDURE — 84484 ASSAY OF TROPONIN QUANT: CPT

## 2018-03-27 PROCEDURE — 96375 TX/PRO/DX INJ NEW DRUG ADDON: CPT

## 2018-03-27 PROCEDURE — 83930 ASSAY OF BLOOD OSMOLALITY: CPT

## 2018-03-27 PROCEDURE — 82435 ASSAY OF BLOOD CHLORIDE: CPT

## 2018-03-27 PROCEDURE — 71045 X-RAY EXAM CHEST 1 VIEW: CPT

## 2018-03-27 PROCEDURE — 80048 BASIC METABOLIC PNL TOTAL CA: CPT

## 2018-03-27 PROCEDURE — 84295 ASSAY OF SERUM SODIUM: CPT

## 2018-03-27 PROCEDURE — 83935 ASSAY OF URINE OSMOLALITY: CPT

## 2018-03-27 PROCEDURE — 82962 GLUCOSE BLOOD TEST: CPT

## 2018-03-27 PROCEDURE — 81003 URINALYSIS AUTO W/O SCOPE: CPT

## 2018-03-27 PROCEDURE — 74177 CT ABD & PELVIS W/CONTRAST: CPT

## 2018-03-27 PROCEDURE — 83690 ASSAY OF LIPASE: CPT

## 2018-03-27 PROCEDURE — 84133 ASSAY OF URINE POTASSIUM: CPT

## 2018-03-27 PROCEDURE — 82436 ASSAY OF URINE CHLORIDE: CPT

## 2018-03-27 PROCEDURE — 93005 ELECTROCARDIOGRAM TRACING: CPT

## 2018-03-27 PROCEDURE — 84132 ASSAY OF SERUM POTASSIUM: CPT

## 2018-03-27 PROCEDURE — 99232 SBSQ HOSP IP/OBS MODERATE 35: CPT | Mod: GC

## 2018-03-27 RX ORDER — LISINOPRIL 2.5 MG/1
1 TABLET ORAL
Qty: 0 | Refills: 0 | COMMUNITY
Start: 2018-03-27

## 2018-03-27 RX ORDER — PANTOPRAZOLE SODIUM 20 MG/1
1 TABLET, DELAYED RELEASE ORAL
Qty: 30 | Refills: 0
Start: 2018-03-27 | End: 2018-04-25

## 2018-03-27 RX ORDER — POLYETHYLENE GLYCOL 3350 17 G/17G
17 POWDER, FOR SOLUTION ORAL
Qty: 510 | Refills: 0
Start: 2018-03-27 | End: 2018-04-25

## 2018-03-27 RX ORDER — LISINOPRIL 2.5 MG/1
1 TABLET ORAL
Qty: 30 | Refills: 0
Start: 2018-03-27 | End: 2018-04-25

## 2018-03-27 RX ORDER — PANTOPRAZOLE SODIUM 20 MG/1
1 TABLET, DELAYED RELEASE ORAL
Qty: 0 | Refills: 0 | COMMUNITY
Start: 2018-03-27

## 2018-03-27 RX ADMIN — Medication 3: at 12:29

## 2018-03-27 RX ADMIN — PANTOPRAZOLE SODIUM 40 MILLIGRAM(S): 20 TABLET, DELAYED RELEASE ORAL at 06:22

## 2018-03-27 RX ADMIN — Medication 1: at 08:32

## 2018-03-27 RX ADMIN — Medication 81 MILLIGRAM(S): at 11:53

## 2018-03-27 RX ADMIN — HEPARIN SODIUM 5000 UNIT(S): 5000 INJECTION INTRAVENOUS; SUBCUTANEOUS at 06:22

## 2018-03-27 RX ADMIN — Medication 25 MILLIGRAM(S): at 11:53

## 2018-03-27 RX ADMIN — LISINOPRIL 10 MILLIGRAM(S): 2.5 TABLET ORAL at 06:21

## 2018-03-27 RX ADMIN — CLOPIDOGREL BISULFATE 75 MILLIGRAM(S): 75 TABLET, FILM COATED ORAL at 11:53

## 2018-03-27 NOTE — DISCHARGE NOTE ADULT - MEDICATION SUMMARY - MEDICATIONS TO STOP TAKING
I will STOP taking the medications listed below when I get home from the hospital:    Cipro 500 mg oral tablet  -- 1 tab(s) by mouth 2 times a day   -- Avoid prolonged or excessive exposure to direct and/or artificial sunlight while taking this medication.  Check with your doctor before becoming pregnant.  Do not take dairy products, antacids, or iron preparations within one hour of this medication.  Finish all this medication unless otherwise directed by prescriber.  Medication should be taken with plenty of water.    Flagyl 500 mg oral tablet  -- 1 tab(s) by mouth every 8 hours   -- Do not drink alcoholic beverages when taking this medication.  Finish all this medication unless otherwise directed by prescriber.  May discolor urine or feces.

## 2018-03-27 NOTE — DISCHARGE NOTE ADULT - SECONDARY DIAGNOSIS.
Hyponatremia CAD (coronary artery disease) Type 2 diabetes mellitus Essential hypertension Hyperlipidemia, unspecified hyperlipidemia type

## 2018-03-27 NOTE — PROGRESS NOTE ADULT - PROBLEM SELECTOR PROBLEM 8
Nutrition, metabolism, and development symptoms
Prophylactic measure

## 2018-03-27 NOTE — DISCHARGE NOTE ADULT - HOSPITAL COURSE
This is an 85 year old gentleman with PMHx CAD s/p 3 stents (most recent last year in August), DM2, HLD, HTN PSH right inguinal hernia repair ~30 years ago complaining of epigastric abdominal pain with bloating, found to be hyponatremic to 128, now resolved, likely SIADH.  s/p EGD- Non-bleeding erosive gastropathy, suspect reflux syndrome. PPI started. pt to follow up with GI for H. Pylori antigen test, and colonoscopy 6 weeks after initial diverticulitis episode to r/o underlying colon cancer. Follow up with PCP, Card, Endo This is an 85 year old gentleman with PMHx CAD s/p 3 stents (most recent last year in August), DM2, HLD, HTN PSH right inguinal hernia repair ~30 years ago complaining of epigastric abdominal pain with bloating, found to be hyponatremic to 128, now resolved, likely SIADH.  s/p EGD- Non-bleeding erosive gastropathy, suspect reflux syndrome. PPI started. pt to follow up with GI for H. Pylori antigen test, and colonoscopy 6 weeks after initial diverticulitis episode to r/o underlying colon cancer. Follow up with PCP, Card, Endo  Discharge condition stable. Time spent coordinating discharge plan 37 minutes.

## 2018-03-27 NOTE — DISCHARGE NOTE ADULT - PROVIDER TOKENS
FREE:[LAST:[Dr. Blanc],PHONE:[(   )    -],FAX:[(   )    -],ADDRESS:[Primary care physician]] TOKEN:'[id number]',FREE:[LAST:[Dr. Blanc],PHONE:[(   )    -],FAX:[(   )    -],ADDRESS:[Primary care physician]]

## 2018-03-27 NOTE — DISCHARGE NOTE ADULT - MEDICATION SUMMARY - MEDICATIONS TO CHANGE
I will SWITCH the dose or number of times a day I take the medications listed below when I get home from the hospital:    lisinopril 5 mg oral tablet  -- 1 tab(s) by mouth once a day

## 2018-03-27 NOTE — DISCHARGE NOTE ADULT - CARE PROVIDER_API CALL
Dr. Blanc,   Primary care physician  Phone: (   )    -  Fax: (   )    - Luis Eduardo Schaffer), Cardiovascular Disease; Internal Medicine  [address] [place] [address]  Phone: [phone]  Fax: [phone]    Dr. Blanc,   Primary care physician  Phone: (   )    -  Fax: (   )    -

## 2018-03-27 NOTE — DISCHARGE NOTE ADULT - PLAN OF CARE
Continue Protonix as directed  Follow up with your gastroenterologist in one to two weeks  - H. Pylori antigen check as outpatient  - You will need colonsocopy 6 weeks after initial diverticulitis episode to r/o underlying colon cancer Resolved Continue Aspirin, Plavix, Lipitor as directed  Low cholesterol diet HgA1C this admission7.0  Make sure you get your HgA1c checked every three months.  If you take oral diabetes medications, check your blood glucose two times a day.  If you take insulin, check your blood glucose before meals and at bedtime.  It's important not to skip any meals.  Keep a log of your blood glucose results and always take it with you to your doctor appointments.  Keep a list of your current medications including injectables and over the counter medications and bring this medication list with you to all your doctor appointments.  If you have not seen your ophthalmologist this year call for appointment.  Check your feet daily for redness, sores, or openings. Do not self treat. If no improvement in two days call your primary care physician for an appointment.  Low blood sugar (hypoglycemia) is a blood sugar below 70mg/dl. Check your blood sugar if you feel signs/symptoms of hypoglycemia. If your blood sugar is below 70 take 15 grams of carbohydrates (ex 4 oz of apple juice, 3-4 glucose tablets, or 4-6 oz of regular soda) wait 15 minutes and repeat blood sugar to make sure it comes up above 70.  If your blood sugar is above 70 and you are due for a meal, have a meal.  If you are not due for a meal have a snack.  This snack helps keeps your blood sugar at a safe range. Continue current medications a directed  Blood pressure monitoring with your cardiologist Continue Lipitor as directed Epigastric pain free Continue Protonix as directed  Follow up with your gastroenterologist in one to two weeks  - H. Pylori antigen check as outpatient  - You will need colonoscopy 6 weeks after initial diverticulitis episode to r/o underlying colon cancer Continue current medications a directed  Blood pressure monitoring with your cardiologist  Lisinopril increased to 10 mg . Renal function to be monitored

## 2018-03-27 NOTE — PROGRESS NOTE ADULT - ASSESSMENT
Impression:    1. GERD: controlled on PPI. EGD negative for erosive gastritis.     2. Lower abdominal pain relieved w BM: suspicious for IBS    3. Recent diverticulitis    Recommendation:  -continue PPI, f/u H.P antigen and treat if positive  -change miralax to standing daily. Restart metamucil.  -pt needs colonsocopy 6 weeks after initial diverticulitis episode to r/o underlying colon cancer
This is an 85 year old gentleman with PMHx CAD s/p 3 stents (most recent last year in August), DM2, HLD, HTN PSH right inguinal hernia repair ~30 years ago complaining of epigastric abdominal pain with bloating, found to be hyponatremic to 128, now resolved, likely SIADH.
This is an 85 year old gentleman with PMHx CAD s/p 3 stents (most recent last year in August), DM2, HLD, HTN PSH right inguinal hernia repair ~30 years ago complaining of epigastric abdominal pain with bloating, found to be hyponatremic to 128, now resolved, likely SIADH.
This is an 85 year old gentleman with PMHx CAD s/p 3 stents (most recent last year in August), DM2, HLD, HTN PSH right inguinal hernia repair ~30 years ago complaining of epigastric abdominal pain with bloating, found to be hyponatremic to 128, worsened to 123 with 2L NS.  Suspect SIADH, urine lytes pending.
This is an 85 year old gentleman with PMHx CAD s/p 3 stents (most recent last year in August), DM2, HLD, HTN PSH right inguinal hernia repair ~30 years ago complaining of epigastric abdominal pain with bloating, found to be hyponatremic to 128, now resolved, likely SIADH.

## 2018-03-27 NOTE — PROGRESS NOTE ADULT - PROBLEM SELECTOR PLAN 1
--suspect gastritis vs. PUD given similar to prior gastritis pain, associated belching, worse when supine  --patient high risk for ACS, but pain is non-exertional, no associated symptoms, troponin negative x 2, no EKG changes  --pantoprazole 40mg IV daily  --patient with black stools, but guaiac negative as out-patient, Hgb stable, doubt UGIB.  Will continue ASA, plavix, DVT PPx  --maalox PRN  GI consult called, follow up recs.
--suspect reflux syndrome, no PUD/ gastritis seen on EGD.   Continue with PPI.
--suspect reflux syndrome, no PUD/ gastritis seen on EGD.   Continue with PPI.  GI follow up out patient.  D/C planning, time spent more than 35 minutes.
--suspect gastritis vs. PUD given similar to prior gastritis pain, associated belching, worse when supine  --patient high risk for ACS, but pain is non-exertional, no associated symptoms, troponin negative x 2, no EKG changes. Unlikely cardiac in origin.  --pantoprazole 40mg IV daily  --patient with black stools, but guaiac negative as out-patient, Hgb stable, doubt UGIB.  Will continue ASA, plavix, DVT PPx  --maalox PRN  Awaiting GI eval.

## 2018-03-27 NOTE — PROGRESS NOTE ADULT - PROBLEM SELECTOR PLAN 6
--continue home meds  Controlled.
--continue statin

## 2018-03-27 NOTE — PROGRESS NOTE ADULT - PROBLEM SELECTOR PLAN 3
--continue ASA. plavix
--continue ASA. plavix  ---Cards eval appreciated.
--suspect SIADH 2/2 pain, resolved.
--continue ASA. plavix

## 2018-03-27 NOTE — DISCHARGE NOTE ADULT - ADDITIONAL INSTRUCTIONS
Follow up with Gastroenterologist in one to two weeks  Follow up with your primary care physician in one week  Follow up with your cardiologist and Endocrinologist   Make appointments to follow up with your out patient physicians.  Bring all discharge paperwork including discharge medication list to your follow up appointments.

## 2018-03-27 NOTE — PROGRESS NOTE ADULT - SUBJECTIVE AND OBJECTIVE BOX
Patient is a 85y old  Male seen for dyspepsia.      SUBJECTIVE / OVERNIGHT EVENTS:  No further epigastric pain. still w lower abdominal discomfort releived by BM.  Metamucil has helped w this.  MEDICATIONS  (STANDING):  aspirin  chewable 81 milliGRAM(s) Oral daily  atorvastatin 80 milliGRAM(s) Oral at bedtime  clopidogrel Tablet 75 milliGRAM(s) Oral daily  dextrose 5%. 1000 milliLiter(s) (50 mL/Hr) IV Continuous <Continuous>  dextrose 50% Injectable 12.5 Gram(s) IV Push once  dextrose 50% Injectable 25 Gram(s) IV Push once  dextrose 50% Injectable 25 Gram(s) IV Push once  heparin  Injectable 5000 Unit(s) SubCutaneous every 12 hours  insulin lispro (HumaLOG) corrective regimen sliding scale   SubCutaneous three times a day before meals  insulin lispro (HumaLOG) corrective regimen sliding scale   SubCutaneous at bedtime  lisinopril 10 milliGRAM(s) Oral daily  metoprolol succinate ER 25 milliGRAM(s) Oral daily  pantoprazole    Tablet 40 milliGRAM(s) Oral before breakfast    MEDICATIONS  (PRN):  aluminum hydroxide/magnesium hydroxide/simethicone Suspension 30 milliLiter(s) Oral every 4 hours PRN Dyspepsia  dextrose Gel 1 Dose(s) Oral once PRN Blood Glucose LESS THAN 70 milliGRAM(s)/deciliter  glucagon  Injectable 1 milliGRAM(s) IntraMuscular once PRN Glucose LESS THAN 70 milligrams/deciliter  polyethylene glycol 3350 17 Gram(s) Oral daily PRN Constipation  simethicone 80 milliGRAM(s) Chew every 8 hours PRN gas abd bloating              PHYSICAL EXAM:  GENERAL: NAD, well-developed  HEAD:  Atraumatic, Normocephalic  EYES: EOMI, PERRLA, conjunctiva and sclera anicteric  NECK: Supple, No JVD  CHEST/LUNG: Clear to auscultation bilaterally; No wheeze  HEART: Regular rate and rhythm; No murmurs, rubs, or gallops  ABDOMEN: Soft, Nontender, Nondistended; Bowel sounds present, no hepatosplenomegaly, no rebound or guarding  EXTREMITIES:  2+ Peripheral Pulses, No clubbing, cyanosis, or edema  PSYCH: AAOx3  NEUROLOGY: non-focal, no asterixis  SKIN: No rashes or lesion    LABS:                        12.1   8.19  )-----------( 362      ( 27 Mar 2018 07:34 )             35.6     03-27    137  |  102  |  20  ----------------------------<  186<H>  4.1   |  23  |  1.26    Ca    9.5      27 Mar 2018 05:48                  RADIOLOGY & ADDITIONAL TESTS:
Patient is a 85y old  Male who presents with a chief complaint of hyponatremia (23 Mar 2018 21:54)      SUBJECTIVE / OVERNIGHT EVENTS: Patient complaining of abdominal pain, belching over the past few weeks. No nausea/ vomiting/ chest pain.   Reports resolution of symptoms with Maalox.    ROS otherwise negative. No over night events.     T(C): 36.8 (03-24-18 @ 13:26), Max: 36.8 (03-24-18 @ 13:26)  HR: 61 (03-24-18 @ 13:26) (61 - 72)  BP: 119/74 (03-24-18 @ 13:26) (119/74 - 176/86)  RR: 18 (03-24-18 @ 13:26) (18 - 18)  SpO2: 95% (03-24-18 @ 13:26) (95% - 98%)    MEDICATIONS  (STANDING):  aspirin  chewable 81 milliGRAM(s) Oral daily  atorvastatin 80 milliGRAM(s) Oral at bedtime  clopidogrel Tablet 75 milliGRAM(s) Oral daily  dextrose 5%. 1000 milliLiter(s) (50 mL/Hr) IV Continuous <Continuous>  dextrose 50% Injectable 12.5 Gram(s) IV Push once  dextrose 50% Injectable 25 Gram(s) IV Push once  dextrose 50% Injectable 25 Gram(s) IV Push once  heparin  Injectable 5000 Unit(s) SubCutaneous every 12 hours  insulin lispro (HumaLOG) corrective regimen sliding scale   SubCutaneous three times a day before meals  insulin lispro (HumaLOG) corrective regimen sliding scale   SubCutaneous at bedtime  lisinopril 10 milliGRAM(s) Oral daily  metoprolol succinate ER 25 milliGRAM(s) Oral daily  pantoprazole  Injectable 40 milliGRAM(s) IV Push daily    MEDICATIONS  (PRN):  aluminum hydroxide/magnesium hydroxide/simethicone Suspension 30 milliLiter(s) Oral every 4 hours PRN Dyspepsia  dextrose Gel 1 Dose(s) Oral once PRN Blood Glucose LESS THAN 70 milliGRAM(s)/deciliter  glucagon  Injectable 1 milliGRAM(s) IntraMuscular once PRN Glucose LESS THAN 70 milligrams/deciliter    CAPILLARY BLOOD GLUCOSE      CAPILLARY BLOOD GLUCOSE      POCT Blood Glucose.: 230 mg/dL (24 Mar 2018 12:05)  POCT Blood Glucose.: 98 mg/dL (24 Mar 2018 08:15)  POCT Blood Glucose.: 180 mg/dL (24 Mar 2018 03:33)  POCT Blood Glucose.: 86 mg/dL (23 Mar 2018 17:18)    PHYSICAL EXAM:  GENERAL: NAD, well-developed  HEAD:  Atraumatic, Normocephalic  EYES: EOMI, conjunctiva and sclera clear  NECK: Supple, No JVD  CHEST/LUNG: Clear to auscultation bilaterally; No wheeze  HEART: Regular rate and rhythm; No murmurs, rubs, or gallops  ABDOMEN: Soft, Nontender, Nondistended; Bowel sounds present  EXTREMITIES:  2+ Peripheral Pulses, No clubbing, cyanosis, or edema  PSYCH: AAOx3  NEUROLOGY: non-focal  SKIN: No rashes or lesions                          12.7   7.66  )-----------( 364      ( 24 Mar 2018 07:45 )             36.7       CARDIAC MARKERS ( 23 Mar 2018 18:30 )  x     / <0.01 ng/mL / x     / x     / x      CARDIAC MARKERS ( 23 Mar 2018 12:20 )  x     / <0.01 ng/mL / x     / x     / x          LIVER FUNCTIONS - ( 23 Mar 2018 12:20 )  Alb: 4.2 g/dL / Pro: 7.7 g/dL / ALK PHOS: 43 U/L / ALT: 21 U/L RC / AST: 29 U/L / GGT: x           PT/INR - ( 23 Mar 2018 12:20 )   PT: 11.1 sec;   INR: 1.03 ratio         PTT - ( 23 Mar 2018 12:20 )  PTT:29.6 sec  135|99|15<117  4.4|23|1.27  9.3,--,--  03-24 @ 07:10  123|88|13<101  4.1|20|1.17  8.9,--,--  03-23 @ 18:30      RADIOLOGY & ADDITIONAL TESTS:    Imaging Personally Reviewed:    Consultant(s) Notes Reviewed:      Care Discussed with Consultants/Other Providers:
Patient is a 85y old  Male who presents with a chief complaint of hyponatremia, abdominal discomfort.      SUBJECTIVE / OVERNIGHT EVENTS:  Patient feels much improved, no new complaints.   ROS otherwise negative. S/P EGD tolerating feeds.      T(C): 36.7 (03-27-18 @ 04:51), Max: 36.7 (03-27-18 @ 04:51)  HR: 54 (03-27-18 @ 06:20) (52 - 54)  BP: 134/71 (03-27-18 @ 06:20) (134/71 - 137/69)  RR: 18 (03-27-18 @ 04:51) (18 - 18)  SpO2: 98% (03-27-18 @ 04:51) (98% - 98%)    MEDICATIONS  (STANDING):  aspirin  chewable 81 milliGRAM(s) Oral daily  atorvastatin 80 milliGRAM(s) Oral at bedtime  clopidogrel Tablet 75 milliGRAM(s) Oral daily  dextrose 5%. 1000 milliLiter(s) (50 mL/Hr) IV Continuous <Continuous>  dextrose 50% Injectable 12.5 Gram(s) IV Push once  dextrose 50% Injectable 25 Gram(s) IV Push once  dextrose 50% Injectable 25 Gram(s) IV Push once  heparin  Injectable 5000 Unit(s) SubCutaneous every 12 hours  insulin lispro (HumaLOG) corrective regimen sliding scale   SubCutaneous three times a day before meals  insulin lispro (HumaLOG) corrective regimen sliding scale   SubCutaneous at bedtime  lisinopril 10 milliGRAM(s) Oral daily  metoprolol succinate ER 25 milliGRAM(s) Oral daily  pantoprazole    Tablet 40 milliGRAM(s) Oral before breakfast    MEDICATIONS  (PRN):  aluminum hydroxide/magnesium hydroxide/simethicone Suspension 30 milliLiter(s) Oral every 4 hours PRN Dyspepsia  dextrose Gel 1 Dose(s) Oral once PRN Blood Glucose LESS THAN 70 milliGRAM(s)/deciliter  glucagon  Injectable 1 milliGRAM(s) IntraMuscular once PRN Glucose LESS THAN 70 milligrams/deciliter  polyethylene glycol 3350 17 Gram(s) Oral daily PRN Constipation  simethicone 80 milliGRAM(s) Chew every 8 hours PRN gas abd bloating      GENERAL: NAD, well-developed  HEAD:  Atraumatic, Normocephalic  EYES: EOMI, conjunctiva and sclera clear  NECK: Supple, No JVD  CHEST/LUNG: Clear to auscultation bilaterally; No wheeze  HEART: Regular rate and rhythm; No murmurs, rubs, or gallops  ABDOMEN: Soft, Nontender, Nondistended; Bowel sounds present  EXTREMITIES:  2+ Peripheral Pulses, No clubbing, cyanosis, or edema  PSYCH: AAOx3  NEUROLOGY: non-focal  SKIN: No rashes or lesions                                12.1   8.19  )-----------( 362      ( 27 Mar 2018 07:34 )             35.6               133|97|21<451  4.2|24|1.24  9.7,--,--  03-27 @ 10:24  137|102|20<186  4.1|23|1.26  9.5,--,--  03-27 @ 05:48  133|95|22<278  4.5|24|1.57  9.3,--,--  03-26 @ 21:21  CAPILLARY BLOOD GLUCOSE      POCT Blood Glucose.: 176 mg/dL (27 Mar 2018 08:04)  POCT Blood Glucose.: 239 mg/dL (26 Mar 2018 21:46)  POCT Blood Glucose.: 310 mg/dL (26 Mar 2018 16:48)  POCT Blood Glucose.: 214 mg/dL (26 Mar 2018 12:26)      RADIOLOGY & ADDITIONAL TESTS:    Imaging Personally Reviewed:   Consultant(s) Notes Reviewed:  GI     Care Discussed with Consultants/Other Providers: GI
Patient is a 85y old  Male who presents with a chief complaint of hyponatremia, abdominal discomfort.      SUBJECTIVE / OVERNIGHT EVENTS:  Patient feels much improved, no new complaints.   ROS otherwise negative. S/P EGD tolerating feeds.      T(C): 37 (03-26-18 @ 13:57), Max: 37 (03-26-18 @ 13:57)  HR: 59 (03-26-18 @ 13:57) (59 - 60)  BP: 124/66 (03-26-18 @ 13:57) (124/66 - 155/76)  RR: 18 (03-26-18 @ 13:57) (18 - 18)  SpO2: 97% (03-26-18 @ 13:57) (96% - 97%)    MEDICATIONS  (STANDING):  aspirin  chewable 81 milliGRAM(s) Oral daily  atorvastatin 80 milliGRAM(s) Oral at bedtime  clopidogrel Tablet 75 milliGRAM(s) Oral daily  dextrose 5%. 1000 milliLiter(s) (50 mL/Hr) IV Continuous <Continuous>  dextrose 50% Injectable 12.5 Gram(s) IV Push once  dextrose 50% Injectable 25 Gram(s) IV Push once  dextrose 50% Injectable 25 Gram(s) IV Push once  heparin  Injectable 5000 Unit(s) SubCutaneous every 12 hours  insulin lispro (HumaLOG) corrective regimen sliding scale   SubCutaneous three times a day before meals  insulin lispro (HumaLOG) corrective regimen sliding scale   SubCutaneous at bedtime  lisinopril 10 milliGRAM(s) Oral daily  metoprolol succinate ER 25 milliGRAM(s) Oral daily  pantoprazole    Tablet 40 milliGRAM(s) Oral before breakfast    MEDICATIONS  (PRN):  aluminum hydroxide/magnesium hydroxide/simethicone Suspension 30 milliLiter(s) Oral every 4 hours PRN Dyspepsia  dextrose Gel 1 Dose(s) Oral once PRN Blood Glucose LESS THAN 70 milliGRAM(s)/deciliter  glucagon  Injectable 1 milliGRAM(s) IntraMuscular once PRN Glucose LESS THAN 70 milligrams/deciliter  polyethylene glycol 3350 17 Gram(s) Oral daily PRN Constipation  simethicone 80 milliGRAM(s) Chew every 8 hours PRN gas abd bloating        GENERAL: NAD, well-developed  HEAD:  Atraumatic, Normocephalic  EYES: EOMI, conjunctiva and sclera clear  NECK: Supple, No JVD  CHEST/LUNG: Clear to auscultation bilaterally; No wheeze  HEART: Regular rate and rhythm; No murmurs, rubs, or gallops  ABDOMEN: Soft, Nontender, Nondistended; Bowel sounds present  EXTREMITIES:  2+ Peripheral Pulses, No clubbing, cyanosis, or edema  PSYCH: AAOx3  NEUROLOGY: non-focal  SKIN: No rashes or lesions                          14.2   12.42 )-----------( 415      ( 26 Mar 2018 07:26 )             40.3               137|96|20<206  4.2|25|1.31  9.9,--,--  03-26 @ 06:45      RADIOLOGY & ADDITIONAL TESTS:    Imaging Personally Reviewed:   Consultant(s) Notes Reviewed:  GI     Care Discussed with Consultants/Other Providers: GI
Pre-Endoscopy Evaluation      Referring Physician:  Dr. Chilo Camacho                               Procedure: EGD    Indication for Procedure: Dyspepsia    Sedation by Anesthesia [X]    PAST MEDICAL & SURGICAL HISTORY:  HLD (hyperlipidemia)  CAD (coronary artery disease)  DM (diabetes mellitus)  HTN (hypertension)  S/P CABG (coronary artery bypass graft)      PMH of Gastroparesis [ ]  Gastric Surgery [ ]  Gastric Outlet Obstruction [ ]    Allergies:    No Known Allergies    Intolerances:    Latex allergy: [ ] yes [x] no    Medications:MEDICATIONS  (STANDING):  aspirin  chewable 81 milliGRAM(s) Oral daily  atorvastatin 80 milliGRAM(s) Oral at bedtime  clopidogrel Tablet 75 milliGRAM(s) Oral daily  dextrose 5%. 1000 milliLiter(s) (50 mL/Hr) IV Continuous <Continuous>  dextrose 50% Injectable 12.5 Gram(s) IV Push once  dextrose 50% Injectable 25 Gram(s) IV Push once  dextrose 50% Injectable 25 Gram(s) IV Push once  heparin  Injectable 5000 Unit(s) SubCutaneous every 12 hours  insulin lispro (HumaLOG) corrective regimen sliding scale   SubCutaneous three times a day before meals  insulin lispro (HumaLOG) corrective regimen sliding scale   SubCutaneous at bedtime  lisinopril 10 milliGRAM(s) Oral daily  metoprolol succinate ER 25 milliGRAM(s) Oral daily  pantoprazole  Injectable 40 milliGRAM(s) IV Push daily    MEDICATIONS  (PRN):  aluminum hydroxide/magnesium hydroxide/simethicone Suspension 30 milliLiter(s) Oral every 4 hours PRN Dyspepsia  dextrose Gel 1 Dose(s) Oral once PRN Blood Glucose LESS THAN 70 milliGRAM(s)/deciliter  glucagon  Injectable 1 milliGRAM(s) IntraMuscular once PRN Glucose LESS THAN 70 milligrams/deciliter  polyethylene glycol 3350 17 Gram(s) Oral daily PRN Constipation  simethicone 80 milliGRAM(s) Chew every 8 hours PRN gas abd bloating      Smoking: [ ] yes  [x] no    AICD/PPM: [ ] yes   [x] no    Pertinent lab data:                        14.2   12.42 )-----------( 415      ( 26 Mar 2018 07:26 )             40.3     03-26    137  |  96  |  20  ----------------------------<  206<H>  4.2   |  25  |  1.31<H>    Ca    9.9      26 Mar 2018 06:45      CAPILLARY BLOOD GLUCOSE  POCT Blood Glucose.: 183 mg/dL (26 Mar 2018 08:09)      Physical Examination:       Daily   Vital Signs Last 24 Hrs  T(C): 36.9 (26 Mar 2018 06:10), Max: 37 (25 Mar 2018 13:35)  T(F): 98.4 (26 Mar 2018 06:10), Max: 98.6 (25 Mar 2018 13:35)  HR: 60 (26 Mar 2018 06:10) (58 - 61)  BP: 155/76 (26 Mar 2018 06:10) (118/69 - 173/86)  BP(mean): --  RR: 18 (26 Mar 2018 06:10) (18 - 18)  SpO2: 96% (26 Mar 2018 06:10) (96% - 99%)    Drug Dosing Weight  Height (cm): 165.1 (23 Mar 2018 11:14)  Weight (kg): 68.9 (23 Mar 2018 11:14)  BMI (kg/m2): 25.3 (23 Mar 2018 11:14)  BSA (m2): 1.76 (23 Mar 2018 11:14)    Constitutional: NAD  Neck:  No JVD  Respiratory: CTAB/L  Cardiovascular: S1 and S2  Gastrointestinal: BS+, soft, NT/ND  Extremities: No peripheral edema  Neurological: A/O x 3  : No Ramirez  Skin: No rashes    Comments:    ASA Class: I [ ]  II [X]  III [ ]  IV [ ]    The patient is a suitable candidate for the planned procedure unless box checked [ ]  No, explain:
Patient is a 85y old  Male who presents with a chief complaint of hyponatremia, abdominal discomfort.      SUBJECTIVE / OVERNIGHT EVENTS:  Hyponatremia resolved.  Patient is still reporting abdominal discomfort, bloating.     MEDICATIONS  (STANDING):  aspirin  chewable 81 milliGRAM(s) Oral daily  atorvastatin 80 milliGRAM(s) Oral at bedtime  clopidogrel Tablet 75 milliGRAM(s) Oral daily  dextrose 5%. 1000 milliLiter(s) (50 mL/Hr) IV Continuous <Continuous>  dextrose 50% Injectable 12.5 Gram(s) IV Push once  dextrose 50% Injectable 25 Gram(s) IV Push once  dextrose 50% Injectable 25 Gram(s) IV Push once  heparin  Injectable 5000 Unit(s) SubCutaneous every 12 hours  insulin lispro (HumaLOG) corrective regimen sliding scale   SubCutaneous three times a day before meals  insulin lispro (HumaLOG) corrective regimen sliding scale   SubCutaneous at bedtime  lisinopril 10 milliGRAM(s) Oral daily  metoprolol succinate ER 25 milliGRAM(s) Oral daily  pantoprazole  Injectable 40 milliGRAM(s) IV Push daily    MEDICATIONS  (PRN):  aluminum hydroxide/magnesium hydroxide/simethicone Suspension 30 milliLiter(s) Oral every 4 hours PRN Dyspepsia  dextrose Gel 1 Dose(s) Oral once PRN Blood Glucose LESS THAN 70 milliGRAM(s)/deciliter  glucagon  Injectable 1 milliGRAM(s) IntraMuscular once PRN Glucose LESS THAN 70 milligrams/deciliter  simethicone 80 milliGRAM(s) Chew every 8 hours PRN gas abd bloating      CAPILLARY BLOOD GLUCOSE      POCT Blood Glucose.: 171 mg/dL (25 Mar 2018 08:32)  POCT Blood Glucose.: 204 mg/dL (24 Mar 2018 22:06)  POCT Blood Glucose.: 157 mg/dL (24 Mar 2018 16:48)  POCT Blood Glucose.: 230 mg/dL (24 Mar 2018 12:05)    I&O's Summary    24 Mar 2018 07:01  -  25 Mar 2018 07:00  --------------------------------------------------------  IN: 1080 mL / OUT: 0 mL / NET: 1080 mL        PHYSICAL EXAM:  Vital Signs Last 24 Hrs  T(C): 36.6 (25 Mar 2018 04:54), Max: 36.8 (24 Mar 2018 13:26)  T(F): 97.8 (25 Mar 2018 04:54), Max: 98.3 (24 Mar 2018 13:26)  HR: 61 (25 Mar 2018 05:56) (51 - 61)  BP: 126/74 (25 Mar 2018 05:56) (119/74 - 136/81)  BP(mean): --  RR: 18 (25 Mar 2018 04:54) (18 - 18)  SpO2: 99% (25 Mar 2018 04:54) (95% - 99%)  GENERAL: NAD, well-developed  HEAD:  Atraumatic, Normocephalic  EYES: EOMI, PERRLA, conjunctiva and sclera clear  NECK: Supple, No JVD  CHEST/LUNG: Clear to auscultation bilaterally; No wheeze  HEART: Regular rate and rhythm; No murmurs, rubs, or gallops  ABDOMEN: Soft, Nontender, Nondistended; Bowel sounds present  EXTREMITIES:  2+ Peripheral Pulses, No clubbing, cyanosis, or edema  PSYCH: AAOx3  NEUROLOGY: non-focal  SKIN: No rashes or lesions    LABS:                        12.4   7.90  )-----------( 364      ( 25 Mar 2018 09:04 )             34.8     03-25    135  |  99  |  16  ----------------------------<  172<H>  4.6   |  24  |  1.24    Ca    9.4      25 Mar 2018 07:16    TPro  7.7  /  Alb  4.2  /  TBili  0.6  /  DBili  x   /  AST  29  /  ALT  21  /  AlkPhos  43  03-23    PT/INR - ( 23 Mar 2018 12:20 )   PT: 11.1 sec;   INR: 1.03 ratio         PTT - ( 23 Mar 2018 12:20 )  PTT:29.6 sec  CARDIAC MARKERS ( 23 Mar 2018 18:30 )  x     / <0.01 ng/mL / x     / x     / x      CARDIAC MARKERS ( 23 Mar 2018 12:20 )  x     / <0.01 ng/mL / x     / x     / x          Urinalysis Basic - ( 23 Mar 2018 20:52 )    Color: PL Yellow / Appearance: Clear / S.018 / pH: x  Gluc: x / Ketone: Negative  / Bili: Negative / Urobili: Negative   Blood: x / Protein: Negative / Nitrite: Negative   Leuk Esterase: Negative / RBC: x / WBC x   Sq Epi: x / Non Sq Epi: x / Bacteria: x        RADIOLOGY & ADDITIONAL TESTS:    Imaging Personally Reviewed:    Consultant(s) Notes Reviewed:      Care Discussed with Consultants/Other Providers:

## 2018-03-27 NOTE — PROGRESS NOTE ADULT - PROBLEM SELECTOR PROBLEM 3
CAD (coronary artery disease)
CAD (coronary artery disease)
Hyponatremia
CAD (coronary artery disease)

## 2018-03-27 NOTE — DISCHARGE NOTE ADULT - CARE PROVIDERS DIRECT ADDRESSES
,DirectAddress_Unknown ,[email or web address],DirectAddress_Unknown

## 2018-03-27 NOTE — DISCHARGE NOTE ADULT - MEDICATION SUMMARY - MEDICATIONS TO TAKE
I will START or STAY ON the medications listed below when I get home from the hospital:    aspirin 81 mg oral tablet, chewable  -- 1 tab(s) by mouth once a day  -- Indication: For CAD (coronary artery disease)    lisinopril 10 mg oral tablet  -- 1 tab(s) by mouth once a day  -- Indication: For Blood pressure control    glimepiride 4 mg oral tablet  -- 1 tab(s) by mouth once a day  -- Indication: For Type 2 diabetes mellitus    Tradjenta 5 mg oral tablet  -- 1 tab(s) by mouth once a day  -- Indication: For Type 2 diabetes mellitus    metFORMIN 500 mg oral tablet  -- 2 tab(s) by mouth 2 times a day  -- Indication: For Type 2 diabetes mellitus    atorvastatin 80 mg oral tablet  -- 1 tab(s) by mouth once a day (at bedtime)  -- Avoid grapefruit and grapefruit juice while taking this medication.  Do not take this drug if you are pregnant.  It is very important that you take or use this exactly as directed.  Do not skip doses or discontinue unless directed by your doctor.  Obtain medical advice before taking any non-prescription drugs as some may affect the action of this medication.  Take with food or milk.    -- Indication: For Cholesterol control    Plavix 75 mg oral tablet  -- 1 tab(s) by mouth once a day  -- Indication: For CAD (coronary artery disease)    metoprolol succinate 25 mg oral tablet, extended release  -- 1 tab(s) by mouth once a day  -- It is very important that you take or use this exactly as directed.  Do not skip doses or discontinue unless directed by your doctor.  May cause drowsiness.  Alcohol may intensify this effect.  Use care when operating dangerous machinery.  Some non-prescription drugs may aggravate your condition.  Read all labels carefully.  If a warning appears, check with your doctor before taking.  Swallow whole.  Do not crush.  Take with food or milk.  This drug may impair the ability to drive or operate machinery.  Use care until you become familiar with its effects.    -- Indication: For Heart rate and blood pressure control    polyethylene glycol 3350 oral powder for reconstitution  -- 17 gram(s) by mouth once a day -Constipation ( Hold for diarrhea )  -- Indication: For Constipation     Metamucil 3.4 g/3.7 g oral powder for reconstitution  -- 3.4 gram(s) by mouth once a day, As Needed  -- Indication: For Constipation     pantoprazole 40 mg oral delayed release tablet  -- 1 tab(s) by mouth once a day (before a meal)  -- Indication: For GI protection     Vitamin C 1000 mg oral tablet  -- 1 tab(s) by mouth once a day  -- Indication: For Supplement

## 2018-03-27 NOTE — PROGRESS NOTE ADULT - PROBLEM SELECTOR PLAN 2
--suspect SIADH 2/2 pain, resolved.
--suspect SIADH 2/2 pain, resolved.
resolving, likely from mild dehydration given patient NPO for EGD.
--suspect SIADH 2/2 pain, resolved.

## 2018-03-27 NOTE — DISCHARGE NOTE ADULT - PATIENT PORTAL LINK FT
You can access the [facility], offered by [facility], by registering with the following website: [email or web address]

## 2018-03-27 NOTE — PROGRESS NOTE ADULT - PROBLEM SELECTOR PLAN 4
--ISS, holding home meds  Follow up HbA1C.
--continue ASA. plavix  ---Cards eval appreciated.

## 2018-03-27 NOTE — DISCHARGE NOTE ADULT - CARE PLAN
Principal Discharge DX:	Epigastric pain  Secondary Diagnosis:	Hyponatremia  Secondary Diagnosis:	CAD (coronary artery disease)  Secondary Diagnosis:	Type 2 diabetes mellitus  Secondary Diagnosis:	Essential hypertension  Secondary Diagnosis:	Hyperlipidemia, unspecified hyperlipidemia type Principal Discharge DX:	Epigastric pain  Assessment and plan of treatment:	Continue Protonix as directed  Follow up with your gastroenterologist in one to two weeks  - H. Pylori antigen check as outpatient  - You will need colonsocopy 6 weeks after initial diverticulitis episode to r/o underlying colon cancer  Secondary Diagnosis:	Hyponatremia  Assessment and plan of treatment:	Resolved  Secondary Diagnosis:	CAD (coronary artery disease)  Assessment and plan of treatment:	Continue Aspirin, Plavix, Lipitor as directed  Low cholesterol diet  Secondary Diagnosis:	Type 2 diabetes mellitus  Assessment and plan of treatment:	HgA1C this admission7.0  Make sure you get your HgA1c checked every three months.  If you take oral diabetes medications, check your blood glucose two times a day.  If you take insulin, check your blood glucose before meals and at bedtime.  It's important not to skip any meals.  Keep a log of your blood glucose results and always take it with you to your doctor appointments.  Keep a list of your current medications including injectables and over the counter medications and bring this medication list with you to all your doctor appointments.  If you have not seen your ophthalmologist this year call for appointment.  Check your feet daily for redness, sores, or openings. Do not self treat. If no improvement in two days call your primary care physician for an appointment.  Low blood sugar (hypoglycemia) is a blood sugar below 70mg/dl. Check your blood sugar if you feel signs/symptoms of hypoglycemia. If your blood sugar is below 70 take 15 grams of carbohydrates (ex 4 oz of apple juice, 3-4 glucose tablets, or 4-6 oz of regular soda) wait 15 minutes and repeat blood sugar to make sure it comes up above 70.  If your blood sugar is above 70 and you are due for a meal, have a meal.  If you are not due for a meal have a snack.  This snack helps keeps your blood sugar at a safe range.  Secondary Diagnosis:	Essential hypertension  Assessment and plan of treatment:	Continue current medications a directed  Blood pressure monitoring with your cardiologist  Secondary Diagnosis:	Hyperlipidemia, unspecified hyperlipidemia type  Assessment and plan of treatment:	Continue Lipitor as directed Principal Discharge DX:	Epigastric pain  Goal:	Epigastric pain free  Assessment and plan of treatment:	Continue Protonix as directed  Follow up with your gastroenterologist in one to two weeks  - H. Pylori antigen check as outpatient  - You will need colonoscopy 6 weeks after initial diverticulitis episode to r/o underlying colon cancer  Secondary Diagnosis:	Hyponatremia  Assessment and plan of treatment:	Resolved  Secondary Diagnosis:	CAD (coronary artery disease)  Assessment and plan of treatment:	Continue Aspirin, Plavix, Lipitor as directed  Low cholesterol diet  Secondary Diagnosis:	Type 2 diabetes mellitus  Assessment and plan of treatment:	HgA1C this admission7.0  Make sure you get your HgA1c checked every three months.  If you take oral diabetes medications, check your blood glucose two times a day.  If you take insulin, check your blood glucose before meals and at bedtime.  It's important not to skip any meals.  Keep a log of your blood glucose results and always take it with you to your doctor appointments.  Keep a list of your current medications including injectables and over the counter medications and bring this medication list with you to all your doctor appointments.  If you have not seen your ophthalmologist this year call for appointment.  Check your feet daily for redness, sores, or openings. Do not self treat. If no improvement in two days call your primary care physician for an appointment.  Low blood sugar (hypoglycemia) is a blood sugar below 70mg/dl. Check your blood sugar if you feel signs/symptoms of hypoglycemia. If your blood sugar is below 70 take 15 grams of carbohydrates (ex 4 oz of apple juice, 3-4 glucose tablets, or 4-6 oz of regular soda) wait 15 minutes and repeat blood sugar to make sure it comes up above 70.  If your blood sugar is above 70 and you are due for a meal, have a meal.  If you are not due for a meal have a snack.  This snack helps keeps your blood sugar at a safe range.  Secondary Diagnosis:	Essential hypertension  Assessment and plan of treatment:	Continue current medications a directed  Blood pressure monitoring with your cardiologist  Lisinopril increased to 10 mg . Renal function to be monitored  Secondary Diagnosis:	Hyperlipidemia, unspecified hyperlipidemia type  Assessment and plan of treatment:	Continue Lipitor as directed

## 2018-03-28 NOTE — ED ADULT TRIAGE NOTE - AS TEMP SITE
03/28/18 1545   Medicare Message   Important Message from Medicare regarding Discharge Appeal Rights Given to patient/caregiver;Explained to patient/caregiver;Signed/date by patient/caregiver   Date IMM was signed 03/28/18   Time IMM was signed 1549      oral

## 2018-05-10 ENCOUNTER — APPOINTMENT (OUTPATIENT)
Dept: GASTROENTEROLOGY | Facility: CLINIC | Age: 83
End: 2018-05-10
Payer: MEDICARE

## 2018-05-10 VITALS
OXYGEN SATURATION: 99 % | WEIGHT: 151 LBS | HEIGHT: 65 IN | TEMPERATURE: 97.7 F | RESPIRATION RATE: 15 BRPM | SYSTOLIC BLOOD PRESSURE: 128 MMHG | BODY MASS INDEX: 25.16 KG/M2 | HEART RATE: 70 BPM | DIASTOLIC BLOOD PRESSURE: 68 MMHG

## 2018-05-10 DIAGNOSIS — Z78.9 OTHER SPECIFIED HEALTH STATUS: ICD-10-CM

## 2018-05-10 PROCEDURE — 99215 OFFICE O/P EST HI 40 MIN: CPT

## 2018-05-10 RX ORDER — CLOPIDOGREL BISULFATE 75 MG/1
75 TABLET, FILM COATED ORAL
Refills: 0 | Status: ACTIVE | COMMUNITY

## 2018-05-10 RX ORDER — LINAGLIPTIN 5 MG/1
TABLET, FILM COATED ORAL
Refills: 0 | Status: ACTIVE | COMMUNITY

## 2018-05-10 RX ORDER — METFORMIN HYDROCHLORIDE 500 MG/1
500 TABLET, COATED ORAL
Refills: 0 | Status: ACTIVE | COMMUNITY

## 2018-05-10 RX ORDER — ASPIRIN 81 MG
81 TABLET, DELAYED RELEASE (ENTERIC COATED) ORAL
Refills: 0 | Status: ACTIVE | COMMUNITY

## 2018-05-10 RX ORDER — METOPROLOL TARTRATE 25 MG/1
25 TABLET, FILM COATED ORAL
Refills: 0 | Status: ACTIVE | COMMUNITY

## 2018-05-10 RX ORDER — ATORVASTATIN CALCIUM 80 MG/1
80 TABLET, FILM COATED ORAL
Refills: 0 | Status: ACTIVE | COMMUNITY

## 2018-05-10 RX ORDER — PANTOPRAZOLE 40 MG/1
40 TABLET, DELAYED RELEASE ORAL
Refills: 0 | Status: DISCONTINUED | COMMUNITY
End: 2018-05-10

## 2018-05-10 RX ORDER — ASCORBIC ACID 500 MG
TABLET ORAL
Refills: 0 | Status: ACTIVE | COMMUNITY

## 2018-05-10 RX ORDER — GLIPIZIDE 2.5 MG/1
TABLET ORAL
Refills: 0 | Status: ACTIVE | COMMUNITY

## 2018-08-14 ENCOUNTER — APPOINTMENT (OUTPATIENT)
Dept: GASTROENTEROLOGY | Facility: CLINIC | Age: 83
End: 2018-08-14

## 2018-12-18 NOTE — ED ADULT NURSE NOTE - TEMPLATE LIST FOR HEAD TO TOE ASSESSMENT
I called the patient to schedule him an appt per Otoniel Antonio NP.  I scheduled him for January 12th at [address] [place] [address]
Cardiac

## 2018-12-29 ENCOUNTER — EMERGENCY (EMERGENCY)
Facility: HOSPITAL | Age: 83
LOS: 1 days | End: 2018-12-29
Attending: EMERGENCY MEDICINE
Payer: MEDICARE

## 2018-12-29 VITALS
HEIGHT: 65 IN | WEIGHT: 154.1 LBS | OXYGEN SATURATION: 100 % | HEART RATE: 60 BPM | DIASTOLIC BLOOD PRESSURE: 77 MMHG | RESPIRATION RATE: 16 BRPM | SYSTOLIC BLOOD PRESSURE: 135 MMHG

## 2018-12-29 VITALS
OXYGEN SATURATION: 98 % | RESPIRATION RATE: 16 BRPM | SYSTOLIC BLOOD PRESSURE: 120 MMHG | DIASTOLIC BLOOD PRESSURE: 66 MMHG | HEART RATE: 72 BPM

## 2018-12-29 DIAGNOSIS — Z95.1 PRESENCE OF AORTOCORONARY BYPASS GRAFT: Chronic | ICD-10-CM

## 2018-12-29 PROCEDURE — 71045 X-RAY EXAM CHEST 1 VIEW: CPT

## 2018-12-29 PROCEDURE — 73030 X-RAY EXAM OF SHOULDER: CPT | Mod: 26,LT

## 2018-12-29 PROCEDURE — 70450 CT HEAD/BRAIN W/O DYE: CPT | Mod: 26

## 2018-12-29 PROCEDURE — 99284 EMERGENCY DEPT VISIT MOD MDM: CPT | Mod: GC

## 2018-12-29 PROCEDURE — 73060 X-RAY EXAM OF HUMERUS: CPT

## 2018-12-29 PROCEDURE — 71045 X-RAY EXAM CHEST 1 VIEW: CPT | Mod: 26

## 2018-12-29 PROCEDURE — 73030 X-RAY EXAM OF SHOULDER: CPT

## 2018-12-29 PROCEDURE — 70450 CT HEAD/BRAIN W/O DYE: CPT

## 2018-12-29 PROCEDURE — 73562 X-RAY EXAM OF KNEE 3: CPT | Mod: 26,RT

## 2018-12-29 PROCEDURE — 99284 EMERGENCY DEPT VISIT MOD MDM: CPT

## 2018-12-29 PROCEDURE — 73562 X-RAY EXAM OF KNEE 3: CPT

## 2018-12-29 PROCEDURE — 73060 X-RAY EXAM OF HUMERUS: CPT | Mod: 26,LT

## 2018-12-29 NOTE — ED PROVIDER NOTE - CARE PROVIDER_API CALL
Robin Casanova), Orthopaedic Surgery; Surgery of the Hand  [address] [facility] [address]  [place] [address]  Phone: [phone]  Fax: [phone]

## 2018-12-29 NOTE — ED PROVIDER NOTE - PROGRESS NOTE DETAILS
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 Remains neurologically intact. Plain films without acute fractures. Left arm placed in sling for comfort, provided with orthopedics follow up.

## 2018-12-29 NOTE — ED PROVIDER NOTE - NSFOLLOWUPCLINICS_GEN_ALL_ED_FT
[facility] Orthopedic Surgery  Orthopedic Surgery  [address] [facility], 3rd & 4th [place] [address]  Phone: [phone]  Fax:   Follow Up Time:     [facility] [place]  Orthopedics  .  [place]   Phone: [phone]  Fax:   Follow Up Time:

## 2018-12-29 NOTE — ED PROVIDER NOTE - OBJECTIVE STATEMENT
86yom w/ HTN, DM2, CAD was walking the dog and pulled to the ground, striking his face, right knee and left shoulder on the ground. No LOC, was ambulatory afterwards, but has had increasing left shoulder pain since the fall, as well as a mild headache and lightheadedness. On plavix, no other AC.

## 2018-12-29 NOTE — ED PROVIDER NOTE - MEDICAL DECISION MAKING DETAILS
Nemes - 85yo M w/ HTN, DM, CAD was walking the dog and pulled to the ground, striking his face, right knee and left shoulder on the ground. No LOC, was ambulatory afterwards, but has had increasing left shoulder pain since the fall. Brief episode of dizziness and low BP afterwards as per son. VS wnl, abrasion  to upper lip (no septal hematoma, no crepitus, no edema/TTP), significant edema to L shoulder, abrasion to R knee, NV intact. No c-spine tenderness. Will get Xrays/CT, reevaluate, likely DC

## 2018-12-29 NOTE — ED ADULT NURSE NOTE - OBJECTIVE STATEMENT
87 y/o male a+ox3, pmhx DM, cardiac stents s/p MI, coming from home for fall. Pt was walking his dog that "pulled on the leash" and pulled the pt forward to the ground; pt fell onto sidewalk and hit his mouth on the curb. Pt denies LOC, confirms on Plavix. Pt c/o pain to left shoulder, right knee and mouth. Pt denies head, neck or back pain, numbness or tingling x4, chest pain or discomfort, headache, lightheadedness, dizziness, abdominal pain, n/v/d, fever or chills Pt left in position of comfort, ice pack provided. VS documented. Family at bedside, will reassess

## 2018-12-29 NOTE — ED PROVIDER NOTE - CARE PLAN
Principal Discharge DX:	Facial abrasion, initial encounter  Secondary Diagnosis:	Shoulder injury, left, initial encounter

## 2018-12-29 NOTE — ED ADULT TRIAGE NOTE - CHIEF COMPLAINT QUOTE
"My dog pulled me down"; fell and hit nose; no loc; c/o L shoulder and R knee pain "My dog pulled me down"; fell and hit nose; no loc; c/o L shoulder and R knee pain; +plavix

## 2018-12-29 NOTE — ED PROVIDER NOTE - NSFOLLOWUPINSTRUCTIONS_ED_ALL_ED_FT
Follow up with orthopedics at any of the provided numbers or call [phone] for assistance finding a provider  Wear the provided sling until follow up and minimize weight bearing to the left arm  Apply ice for 10-15 minutes to the affected areas as needed  You may take ibuprofen 600mg every 6 hours and/or acetaminophen 650mg every 4-6 hours as needed for pain.

## 2018-12-29 NOTE — ED PROVIDER NOTE - ENMT, MLM
Airway patent, Nasal mucosa clear. Mouth with normal mucosa. Throat has no vesicles, no oropharyngeal exudates and uvula is midline. abrasion and swelling to the left upper lip

## 2018-12-30 PROBLEM — I25.10 ATHEROSCLEROTIC HEART DISEASE OF NATIVE CORONARY ARTERY WITHOUT ANGINA PECTORIS: Chronic | Status: ACTIVE | Noted: 2017-08-05

## 2018-12-30 PROBLEM — I10 ESSENTIAL (PRIMARY) HYPERTENSION: Chronic | Status: ACTIVE | Noted: 2017-08-05

## 2018-12-30 PROBLEM — E78.5 HYPERLIPIDEMIA, UNSPECIFIED: Chronic | Status: ACTIVE | Noted: 2017-08-05

## 2019-03-12 NOTE — ED ADULT NURSE NOTE - CAS TRG GENERAL AIRWAY, MLM
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 ([address] )     4  old extensive anterior, apical, lateral and distal inferior apical MI     5  Impaired left ventricular function     6  Essential hypertension     7  Mixed hyperlipidemia         Patient was referred to us from [facility]  She experienced a myocardial infarction on February 28, 2017 in [place]  She has subsequently moved to [facility] area  She had a cardiac catheterization taken at [facility]  Patient's cardiac catheterization CD revealed the left main coronary artery to be nonobstructed  The left anterior descending artery was 100% in its midportion after a large 1st diagonal branch which was nonobstructed  The circumflex artery obtuse marginal was 90%  The right coronary artery in its midportion with 50-60%  The large distal LAD filled from right to left collaterals  [facility] had recommended CABG but the patient had refused at that time  No ventriculogram was performed but an echocardiogram which I performed had demonstrated extensive LV damage  A nuclear stress test demonstrated minimal with any it ischemia  I did not feel that the patient would improve with CABG  Patient is free of angina pectoris or shortness of breath  She is doing quite well for her degree of LV dysfunction  04/11/2017 echocardiogram severe LV dysfunction, EF 30%, mild LV enlargement, apical aneurysm with akinesis to paradoxical motion  Large organized apical thrombus  Grade 1 diastolic dysfunction, normal pulmonary artery pressures    05/15/2017 stress test: Severe LV dysfunction, EF 20%   Mild LV enlargement  Anterior apical aneurysm with paradoxical motion  Extensive myocardial infarctions involving the distal anterior apical and inferior apical walls  Negative for Persantine induced ischemia  Interval History:  Patient has no complaints  She denies chest discomfort, shortness of breath, dizziness or lightheadedness, leg edema or palpitations  Last visit her atorvastatin was increased from 40 mg to 80 mg daily  A direct LDL was 76 46  Her last defibrillator check was as follows:  MDT SINGLE ICD  CARELINK TRANSMISSION: BATTERY VOLTAGE ADEQUATE (11 YRS)   - 0%  ALL AVAILABLE LEAD PARAMETERS APPEAR WITHIN NORMAL LIMITS & STABLE  PRESENTING EGRM SHOWS SBRADY @ ~ 58 BPM  NO SIGNIFICANT HIGH RATE EPISODES  OPTI-VOL WITHIN NORMAL LIMITS  APPROPRIATELY FUNCTIONING ICD    EB    Patient had an echocardiogram done today but it has not yet been read  Will call patient if anything needs to be addressed      Patient Active Problem List   Diagnosis    Ischemic cardiomyopathy    Triple vessel coronary artery disease    HIV positive ([facility] )    Hypertension    Hyperlipemia    Impaired left ventricular function    Tobacco dependence syndrome    Long term current use of anticoagulant therapy    old extensive anterior, apical, lateral and distal inferior apical MI    LV (left ventricular) mural thrombus following [facility] ([facility] )    Screening for malignant neoplasm of colon    Immunization counseling    Well woman exam with routine gynecological exam    Pap smear for cervical cancer screening    Low grade squamous intraepithelial lesion (LGSIL) on cervical Pap smear    CKD (chronic kidney disease) stage 3, GFR 30-59 ml/min ([facility])    Implantable cardioverter-defibrillator (ICD) in situ     Past Medical History:   Diagnosis Date    Coronary artery disease     defibrillator    HIV positive ([facility] )      Social History     Socioeconomic History    Marital status: Single     Spouse name: Not on file    Number of children: Not on file    Years of education: Not on file    Highest education level: Not on file   Occupational History    Not on file   Social Needs    Financial resource strain: Not on file    Food insecurity:     Worry: Not on file     Inability: Not on file    Transportation needs:     Medical: Not on file     Non-medical: Not on file   Tobacco Use    Smoking status: Current Some Day Smoker     Packs/day: 1 00     Years: 30 00     Pack years: 30 00     Types: Cigarettes    Smokeless tobacco: Current User    Tobacco comment: 15 cigaretes a day   Substance and Sexual Activity    Alcohol use: Yes     Comment: ocassionally    Drug use: No    Sexual activity: Not Currently   Lifestyle    Physical activity:     Days per week: Not on file     Minutes per session: Not on file    Stress: Not on file   Relationships    Social connections:     Talks on phone: Not on file     Gets together: Not on file     Attends Jewish service: Not on file     Active member of club or organization: Not on file     Attends meetings of clubs or organizations: Not on file     Relationship status: Not on file    Intimate partner violence:     Fear of current or ex partner: Not on file     Emotionally abused: Not on file     Physically abused: Not on file     Forced sexual activity: Not on file   Other Topics Concern    Not on file   Social History Narrative    Not on file      Family History   Problem Relation Age of Onset    Other Father         GI bleed    Suicidality Brother     Drug abuse Brother         overdose     Past Surgical History:   Procedure Laterality Date    ANGIOPLASTY      CARDIAC DEFIBRILLATOR PLACEMENT      CORONARY ANGIOPLASTY      pci placement    TOTAL ABDOMINAL HYSTERECTOMY         Current Outpatient Medications:     albuterol (VENTOLIN HFA) 90 mcg/act inhaler, Use only as rescue inhaler, Disp: , Rfl:     aspirin (ECOTRIN LOW STRENGTH) 81 mg EC tablet, Take 81 mg by mouth daily, Disp: , Rfl:     atorvastatin (LIPITOR) 80 mg tablet, Take 1 tablet (80 mg total) by mouth daily, Disp: 30 tablet, Rfl: 5    carvedilol (COREG) 6 25 mg tablet, Take 1 tablet (6 25 mg total) by mouth 2 (two) times a day with meals, Disp: 60 tablet, Rfl: 5    dolutegravir (TIVICAY) 50 MG TABS, Take 1 tablet (50 mg total) by mouth daily, Disp: 30 tablet, Rfl: 3    emtricitabine-tenofovir AF (DESCOVY) 200-25 MG tablet, Take 1 tablet by mouth daily, Disp: 30 tablet, Rfl: 3    nicotine polacrilex (NICORETTE) 4 mg gum, every 2 (two) hours, Disp: , Rfl:     sacubitril-valsartan (ENTRESTO) 24-26 MG TABS, Take 1 tablet by mouth 2 (two) times a day, Disp: 60 tablet, Rfl: 5    warfarin (COUMADIN) 1 mg tablet, Take 1 tab daily along with Coumadin 5mg for the next three days, Disp: 90 tablet, Rfl: 1    warfarin (COUMADIN) 5 mg tablet, TAKE 1 TABLET BY MOUTH ONCE DAILY  , Disp: 30 tablet, Rfl: 0  No current facility-administered medications for this visit  Facility-Administered Medications Ordered in Other Visits:     perflutren lipid microsphere (DEFINITY) injection, 1 mL/min, Intravenous, Once in imaging, Tana Valenzuela MD  Allergies   Allergen Reactions    No Active Allergies     No Known Allergies        No results found for this visit on 03/12/19        No results found for: CHOL  Lab Results   Component Value Date    HDL 40 11/02/2018     Lab Results   Component Value Date    LDLCALC 102 11/02/2018     Lab Results   Component Value Date    TRIG 171 (H) 11/02/2018     No results found for: [place], [place]  Lab Results   Component Value Date    CALCIUM 9 3 01/24/2019    K 4 3 01/24/2019    CO2 24 01/24/2019     (H) 01/24/2019    BUN 14 01/24/2019    CREATININE 1 31 (H) 01/24/2019     Lab Results   Component Value Date    K 4 3 01/24/2019     (H) 01/24/2019    CO2 24 01/24/2019    AGAP 5 01/24/2019    BUN 14 01/24/2019    CREATININE 1 31 (H) 01/24/2019    GLUC 90 01/24/2019    GLUF 139 (H) 11/02/2018    CALCIUM 9 3 01/24/2019    AST 26 01/24/2019    ALT 15 01/24/2019    ALKPHOS 65 01/24/2019    TP 7 4 01/24/2019    TBILI 0 60 01/24/2019    EGFR 47 (L) 01/24/2019     Lab Results   Component Value Date    WBC 5 30 01/24/2019    WBC 5 2 01/24/2019    HGB 14 1 01/24/2019    HGB 14 2 01/24/2019    HCT 43 2 01/24/2019    HCT 41 2 01/24/2019    MCV 96 01/24/2019    MCV 93 01/24/2019     01/24/2019     01/24/2019         Review of Systems:  Review of Systems   Respiratory: Negative for cough, choking, chest tightness, shortness of breath and wheezing  Cardiovascular: Negative for chest pain, palpitations and leg swelling  Musculoskeletal: Negative for gait problem  Skin: Negative for rash  Neurological: Negative for dizziness, tremors, syncope, weakness, light-headedness, numbness and headaches  Psychiatric/Behavioral: Negative for agitation and behavioral problems  The patient is not hyperactive  Physical Exam:  /70 (BP Location: Right arm, Patient Position: Sitting, Cuff Size: Adult)   Pulse 61   Ht 5' 2" (1 575 m)   Wt 79 8 kg (176 lb)   SpO2 99%   BMI 32 19 kg/m²   Physical Exam   Constitutional: She is oriented to person, place, and time  She appears well-developed and well-nourished  No distress  HENT:   Head: Normocephalic and atraumatic  Neck: No JVD present  No thyromegaly present  Cardiovascular: Normal rate, regular rhythm, normal heart sounds and intact distal pulses  Exam reveals no gallop and no friction rub  No murmur heard  Pulmonary/Chest: No respiratory distress  She has no wheezes  She has no rales  Musculoskeletal: She exhibits no edema  Neurological: She is alert and oriented to person, place, and time  Skin: Skin is warm and dry  Psychiatric: She has a normal mood and affect  Her behavior is normal        Discussion/Summary:  1  Continue present management  2  After next visit, patient will need a full lipid profile  3   Return in 6 months [place]

## 2019-04-24 ENCOUNTER — RX RENEWAL (OUTPATIENT)
Age: 84
End: 2019-04-24

## 2019-05-21 NOTE — ED PROVIDER NOTE - RESPIRATORY [-], MLM
"Chief Complaint   Patient presents with     RECHECK     Hep C with SVR       Vital signs:  Temp: 98.5  F (36.9  C) Temp src: Oral BP: 129/87 Pulse: 62   Resp: 18 SpO2: 95 %     Height: 161.3 cm (5' 3.5\") Weight: 80.3 kg (177 lb)  Estimated body mass index is 30.86 kg/m  as calculated from the following:    Height as of this encounter: 1.613 m (5' 3.5\").    Weight as of this encounter: 80.3 kg (177 lb).          Sofia Puentes [facility]  5/21/2019 8:33 AM      " no shortness of breath/no cough

## 2019-10-12 RX ADMIN — HYDROMORPHONE HYDROCHLORIDE 0.25 MILLIGRAM(S): 2 INJECTION INTRAMUSCULAR; INTRAVENOUS; SUBCUTANEOUS at 12:45

## 2019-10-19 ENCOUNTER — INPATIENT (INPATIENT)
Facility: HOSPITAL | Age: 84
LOS: 2 days | Discharge: NURSING, REHAB OR LONG-TERM CARE | DRG: 494 | End: 2019-10-22
Attending: ORTHOPAEDIC SURGERY | Admitting: ORTHOPAEDIC SURGERY
Payer: MEDICARE

## 2019-10-19 ENCOUNTER — TRANSCRIPTION ENCOUNTER (OUTPATIENT)
Age: 84
End: 2019-10-19

## 2019-10-19 VITALS
OXYGEN SATURATION: 97 % | RESPIRATION RATE: 18 BRPM | DIASTOLIC BLOOD PRESSURE: 61 MMHG | SYSTOLIC BLOOD PRESSURE: 143 MMHG | HEART RATE: 67 BPM | TEMPERATURE: 99 F | WEIGHT: 154.98 LBS | HEIGHT: 65 IN

## 2019-10-19 DIAGNOSIS — S82.853A DISPLACED TRIMALLEOLAR FRACTURE OF UNSPECIFIED LOWER LEG, INITIAL ENCOUNTER FOR CLOSED FRACTURE: ICD-10-CM

## 2019-10-19 DIAGNOSIS — Z95.1 PRESENCE OF AORTOCORONARY BYPASS GRAFT: Chronic | ICD-10-CM

## 2019-10-19 LAB
ALBUMIN SERPL ELPH-MCNC: 4.3 G/DL — SIGNIFICANT CHANGE UP (ref 3.3–5)
ALP SERPL-CCNC: 59 U/L — SIGNIFICANT CHANGE UP (ref 40–120)
ALT FLD-CCNC: 16 U/L — SIGNIFICANT CHANGE UP (ref 10–45)
ANION GAP SERPL CALC-SCNC: 15 MMOL/L — SIGNIFICANT CHANGE UP (ref 5–17)
APTT BLD: 30.1 SEC — SIGNIFICANT CHANGE UP (ref 27.5–36.3)
AST SERPL-CCNC: 17 U/L — SIGNIFICANT CHANGE UP (ref 10–40)
BASOPHILS # BLD AUTO: 0.04 K/UL — SIGNIFICANT CHANGE UP (ref 0–0.2)
BASOPHILS NFR BLD AUTO: 0.3 % — SIGNIFICANT CHANGE UP (ref 0–2)
BILIRUB SERPL-MCNC: 0.6 MG/DL — SIGNIFICANT CHANGE UP (ref 0.2–1.2)
BLD GP AB SCN SERPL QL: NEGATIVE — SIGNIFICANT CHANGE UP
BUN SERPL-MCNC: 29 MG/DL — HIGH (ref 7–23)
CALCIUM SERPL-MCNC: 9.2 MG/DL — SIGNIFICANT CHANGE UP (ref 8.4–10.5)
CHLORIDE SERPL-SCNC: 94 MMOL/L — LOW (ref 96–108)
CO2 SERPL-SCNC: 22 MMOL/L — SIGNIFICANT CHANGE UP (ref 22–31)
CREAT SERPL-MCNC: 1.4 MG/DL — HIGH (ref 0.5–1.3)
EOSINOPHIL # BLD AUTO: 0.07 K/UL — SIGNIFICANT CHANGE UP (ref 0–0.5)
EOSINOPHIL NFR BLD AUTO: 0.5 % — SIGNIFICANT CHANGE UP (ref 0–6)
GLUCOSE BLDC GLUCOMTR-MCNC: 158 MG/DL — HIGH (ref 70–99)
GLUCOSE BLDC GLUCOMTR-MCNC: 164 MG/DL — HIGH (ref 70–99)
GLUCOSE SERPL-MCNC: 397 MG/DL — HIGH (ref 70–99)
HCT VFR BLD CALC: 33.9 % — LOW (ref 39–50)
HGB BLD-MCNC: 11.4 G/DL — LOW (ref 13–17)
IMM GRANULOCYTES NFR BLD AUTO: 0.6 % — SIGNIFICANT CHANGE UP (ref 0–1.5)
INR BLD: 0.99 RATIO — SIGNIFICANT CHANGE UP (ref 0.88–1.16)
LYMPHOCYTES # BLD AUTO: 1.69 K/UL — SIGNIFICANT CHANGE UP (ref 1–3.3)
LYMPHOCYTES # BLD AUTO: 12.9 % — LOW (ref 13–44)
MCHC RBC-ENTMCNC: 30 PG — SIGNIFICANT CHANGE UP (ref 27–34)
MCHC RBC-ENTMCNC: 33.6 GM/DL — SIGNIFICANT CHANGE UP (ref 32–36)
MCV RBC AUTO: 89.2 FL — SIGNIFICANT CHANGE UP (ref 80–100)
MONOCYTES # BLD AUTO: 1.09 K/UL — HIGH (ref 0–0.9)
MONOCYTES NFR BLD AUTO: 8.3 % — SIGNIFICANT CHANGE UP (ref 2–14)
NEUTROPHILS # BLD AUTO: 10.15 K/UL — HIGH (ref 1.8–7.4)
NEUTROPHILS NFR BLD AUTO: 77.4 % — HIGH (ref 43–77)
NRBC # BLD: 0 /100 WBCS — SIGNIFICANT CHANGE UP (ref 0–0)
PLATELET # BLD AUTO: 297 K/UL — SIGNIFICANT CHANGE UP (ref 150–400)
POTASSIUM SERPL-MCNC: 4.4 MMOL/L — SIGNIFICANT CHANGE UP (ref 3.5–5.3)
POTASSIUM SERPL-SCNC: 4.4 MMOL/L — SIGNIFICANT CHANGE UP (ref 3.5–5.3)
PROT SERPL-MCNC: 7.3 G/DL — SIGNIFICANT CHANGE UP (ref 6–8.3)
PROTHROM AB SERPL-ACNC: 11.3 SEC — SIGNIFICANT CHANGE UP (ref 10–12.9)
RBC # BLD: 3.8 M/UL — LOW (ref 4.2–5.8)
RBC # FLD: 13.7 % — SIGNIFICANT CHANGE UP (ref 10.3–14.5)
RH IG SCN BLD-IMP: POSITIVE — SIGNIFICANT CHANGE UP
SODIUM SERPL-SCNC: 131 MMOL/L — LOW (ref 135–145)
TROPONIN T, HIGH SENSITIVITY RESULT: 27 NG/L — SIGNIFICANT CHANGE UP (ref 0–51)
TROPONIN T, HIGH SENSITIVITY RESULT: 27 NG/L — SIGNIFICANT CHANGE UP (ref 0–51)
WBC # BLD: 13.12 K/UL — HIGH (ref 3.8–10.5)
WBC # FLD AUTO: 13.12 K/UL — HIGH (ref 3.8–10.5)

## 2019-10-19 PROCEDURE — 73610 X-RAY EXAM OF ANKLE: CPT | Mod: 26,LT

## 2019-10-19 PROCEDURE — 71045 X-RAY EXAM CHEST 1 VIEW: CPT | Mod: 26

## 2019-10-19 PROCEDURE — 73700 CT LOWER EXTREMITY W/O DYE: CPT | Mod: 26,LT

## 2019-10-19 PROCEDURE — 73590 X-RAY EXAM OF LOWER LEG: CPT | Mod: 26,LT

## 2019-10-19 PROCEDURE — 76377 3D RENDER W/INTRP POSTPROCES: CPT | Mod: 26

## 2019-10-19 PROCEDURE — 73620 X-RAY EXAM OF FOOT: CPT | Mod: 26,LT

## 2019-10-19 PROCEDURE — 73610 X-RAY EXAM OF ANKLE: CPT | Mod: 26,LT,77

## 2019-10-19 PROCEDURE — 93010 ELECTROCARDIOGRAM REPORT: CPT

## 2019-10-19 PROCEDURE — 99284 EMERGENCY DEPT VISIT MOD MDM: CPT | Mod: GC

## 2019-10-19 RX ORDER — INSULIN LISPRO 100/ML
VIAL (ML) SUBCUTANEOUS
Refills: 0 | Status: DISCONTINUED | OUTPATIENT
Start: 2019-10-19 | End: 2019-10-20

## 2019-10-19 RX ORDER — PANTOPRAZOLE SODIUM 20 MG/1
40 TABLET, DELAYED RELEASE ORAL
Refills: 0 | Status: DISCONTINUED | OUTPATIENT
Start: 2019-10-19 | End: 2019-10-20

## 2019-10-19 RX ORDER — DEXTROSE 50 % IN WATER 50 %
15 SYRINGE (ML) INTRAVENOUS ONCE
Refills: 0 | Status: DISCONTINUED | OUTPATIENT
Start: 2019-10-19 | End: 2019-10-20

## 2019-10-19 RX ORDER — DEXTROSE 50 % IN WATER 50 %
25 SYRINGE (ML) INTRAVENOUS ONCE
Refills: 0 | Status: DISCONTINUED | OUTPATIENT
Start: 2019-10-19 | End: 2019-10-20

## 2019-10-19 RX ORDER — METOCLOPRAMIDE HCL 10 MG
10 TABLET ORAL EVERY 6 HOURS
Refills: 0 | Status: DISCONTINUED | OUTPATIENT
Start: 2019-10-19 | End: 2019-10-20

## 2019-10-19 RX ORDER — TRAMADOL HYDROCHLORIDE 50 MG/1
25 TABLET ORAL EVERY 4 HOURS
Refills: 0 | Status: DISCONTINUED | OUTPATIENT
Start: 2019-10-19 | End: 2019-10-20

## 2019-10-19 RX ORDER — DEXTROSE 50 % IN WATER 50 %
12.5 SYRINGE (ML) INTRAVENOUS ONCE
Refills: 0 | Status: DISCONTINUED | OUTPATIENT
Start: 2019-10-19 | End: 2019-10-20

## 2019-10-19 RX ORDER — SODIUM CHLORIDE 9 MG/ML
1000 INJECTION, SOLUTION INTRAVENOUS
Refills: 0 | Status: DISCONTINUED | OUTPATIENT
Start: 2019-10-19 | End: 2019-10-20

## 2019-10-19 RX ORDER — GLUCAGON INJECTION, SOLUTION 0.5 MG/.1ML
1 INJECTION, SOLUTION SUBCUTANEOUS ONCE
Refills: 0 | Status: DISCONTINUED | OUTPATIENT
Start: 2019-10-19 | End: 2019-10-20

## 2019-10-19 RX ORDER — ATORVASTATIN CALCIUM 80 MG/1
80 TABLET, FILM COATED ORAL AT BEDTIME
Refills: 0 | Status: DISCONTINUED | OUTPATIENT
Start: 2019-10-19 | End: 2019-10-20

## 2019-10-19 RX ORDER — POLYETHYLENE GLYCOL 3350 17 G/17G
17 POWDER, FOR SOLUTION ORAL DAILY
Refills: 0 | Status: DISCONTINUED | OUTPATIENT
Start: 2019-10-19 | End: 2019-10-20

## 2019-10-19 RX ORDER — TRAMADOL HYDROCHLORIDE 50 MG/1
50 TABLET ORAL EVERY 4 HOURS
Refills: 0 | Status: DISCONTINUED | OUTPATIENT
Start: 2019-10-19 | End: 2019-10-20

## 2019-10-19 RX ORDER — DOCUSATE SODIUM 100 MG
100 CAPSULE ORAL THREE TIMES A DAY
Refills: 0 | Status: DISCONTINUED | OUTPATIENT
Start: 2019-10-19 | End: 2019-10-20

## 2019-10-19 RX ORDER — INSULIN LISPRO 100/ML
VIAL (ML) SUBCUTANEOUS AT BEDTIME
Refills: 0 | Status: DISCONTINUED | OUTPATIENT
Start: 2019-10-19 | End: 2019-10-20

## 2019-10-19 RX ORDER — HEPARIN SODIUM 5000 [USP'U]/ML
5000 INJECTION INTRAVENOUS; SUBCUTANEOUS EVERY 8 HOURS
Refills: 0 | Status: COMPLETED | OUTPATIENT
Start: 2019-10-19 | End: 2019-10-19

## 2019-10-19 RX ORDER — FAMOTIDINE 10 MG/ML
20 INJECTION INTRAVENOUS EVERY 24 HOURS
Refills: 0 | Status: DISCONTINUED | OUTPATIENT
Start: 2019-10-19 | End: 2019-10-20

## 2019-10-19 RX ORDER — LISINOPRIL 2.5 MG/1
10 TABLET ORAL DAILY
Refills: 0 | Status: DISCONTINUED | OUTPATIENT
Start: 2019-10-19 | End: 2019-10-20

## 2019-10-19 RX ORDER — ACETAMINOPHEN 500 MG
650 TABLET ORAL EVERY 6 HOURS
Refills: 0 | Status: DISCONTINUED | OUTPATIENT
Start: 2019-10-19 | End: 2019-10-20

## 2019-10-19 RX ORDER — METOPROLOL TARTRATE 50 MG
25 TABLET ORAL DAILY
Refills: 0 | Status: DISCONTINUED | OUTPATIENT
Start: 2019-10-19 | End: 2019-10-20

## 2019-10-19 RX ADMIN — Medication 100 MILLIGRAM(S): at 22:23

## 2019-10-19 RX ADMIN — ATORVASTATIN CALCIUM 80 MILLIGRAM(S): 80 TABLET, FILM COATED ORAL at 22:23

## 2019-10-19 RX ADMIN — HEPARIN SODIUM 5000 UNIT(S): 5000 INJECTION INTRAVENOUS; SUBCUTANEOUS at 22:23

## 2019-10-19 NOTE — ED ADULT NURSE REASSESSMENT NOTE - NS ED NURSE REASSESS COMMENT FT1
Pt admitted. Ortho in the process of re-casting L ankle, may require surgery family is aware of plan of care. Pt is in nad, VSS and appears comfortable.

## 2019-10-19 NOTE — H&P ADULT - NSHPPHYSICALEXAM_GEN_ALL_CORE
Vital Signs Last 24 Hrs  T(C): 36.8 (10-19-19 @ 18:45), Max: 37.1 (10-19-19 @ 13:11)  T(F): 98.2 (10-19-19 @ 18:45), Max: 98.7 (10-19-19 @ 13:11)  HR: 66 (10-19-19 @ 18:45) (66 - 76)  BP: 123/60 (10-19-19 @ 18:45) (123/60 - 166/72)  BP(mean): --  RR: 18 (10-19-19 @ 18:45) (16 - 18)  SpO2: 95% (10-19-19 @ 18:45) (95% - 97%)    Physical Exam  Gen: Nad  L LE: Skin intact, +TTP medial/lateral malleolus, no bony ttp elsewhere, +ehl/fhl/ta/gs function, L3-S1 SILT, dp/pt pulse intact, negative log roll, able to SLR, compartments soft/compressive, extremity warm/well perfused  Secondary Survey: No TTP over bony prominences, SILT, palpable pulses, full/painless range of motion, compartments soft     Procedure: 10cc 1% lidocaine injected under sterile procedure into L ankle joint. Closed reduction performed. Placed in well padded trilam splint. Post procedure imaging obtained. Post procedure exam unchanged, NV intact, able to move all toes, SILT distally.

## 2019-10-19 NOTE — ED PROVIDER NOTE - PHYSICAL EXAMINATION
CONSTITUTIONAL: NAD, awake, alert  HEAD: Normocephalic; atraumatic  ENMT: External appears normal, MMM  CARD: Normal Sl, S2; no audible murmurs,  RESP: normal wob, lungs ctab  ABD: soft, non-distended; non-tender  MSK: no edema, normal ROM in all four extremities, except for the LLE which is splinted  SKIN: Warm, dry, no rashes, no cyanosis to L toes  NEURO: aaox3, moving all extremities spontaneously, moving toes, sensation intact

## 2019-10-19 NOTE — H&P ADULT - ASSESSMENT
A/P: 86y Male with L ankle fracture  Plan for OR tomorrow with Dr. Larsen for L ankle ORIF  NPO after midnight except meds  IVFs while NPO  Hold chem dvt ppx after midnight  Medical clearance and optimization for OR  FU labs  FU CT L ankle  FU Dopplers bilateral lower extremities  Pain control  NWB L LE in splint, keep c/d/I, cane/crutches/walker as needed  Ice/elevation  Discussed with Dr. Larsen, agrees with above

## 2019-10-19 NOTE — ED PROVIDER NOTE - ATTENDING CONTRIBUTION TO CARE
Attending MD Austin.  Agree with above.  Pt is a 85 yo male with hx of CAD, was on ASA/plavix recently switched to fondaparinux.  [place] in [place] se[place] and broke L ankle.  Was placed in cast and came back to [place].  Pt was trying to get up stairs on knees and felt light-headed. Denies CP/SOB during this event.  Feels well now, no assoc sxs.  L foot in cast.  Able ot move toes.  No pain distal to site.  Pt sees his cardiologist every mo. Carlyn is Sarbjit who sent pt in to ED.  Pt found to have trimaleolar fracture.  Ortho consulted.  Carlyn (pt's cardiologist) called and in agreement that basic cards screening is sufficient from light-headedness perspective.  No actual LOC.  No fall/head injury. PT stable at time of signout to incoming team.

## 2019-10-19 NOTE — H&P ADULT - NSHPLABSRESULTS_GEN_ALL_CORE
Imaging: XR demonstrates L ankle trimalleolar fracture/subluxation                            11.4   13.12 )-----------( 297      ( 19 Oct 2019 14:43 )             33.9       10-19    131<L>  |  94<L>  |  29<H>  ----------------------------<  397<H>  4.4   |  22  |  1.40<H>    Ca    9.2      19 Oct 2019 14:43    TPro  7.3  /  Alb  4.3  /  TBili  0.6  /  DBili  x   /  AST  17  /  ALT  16  /  AlkPhos  59  10-19                  PT/INR - ( 19 Oct 2019 14:43 )   PT: 11.3 sec;   INR: 0.99 ratio         PTT - ( 19 Oct 2019 14:43 )  PTT:30.1 sec    Lactate Trend            CAPILLARY BLOOD GLUCOSE

## 2019-10-19 NOTE — ED PROVIDER NOTE - CLINICAL SUMMARY MEDICAL DECISION MAKING FREE TEXT BOX
86M w/ a presyncopal episode, no other symptoms, L ankle fx, no signs of compartment syndrome on exam, xray, cxr, labs, ekg, reassess

## 2019-10-19 NOTE — PATIENT PROFILE ADULT - NSPROMEDSBROUGHTTOHOSP_GEN_A_NUR
Chief Complaint   Patient presents with   • Rash     to nasal area today       SUBJECTIVE:  HPI:  This 23 year old  male presents to Urgent Care today with a rash near his nose. Patient's chief complaint documented by nursing staff was reviewed. Patient reports he woke up this morning with a rash under the left nostril. States he used to get a similar rash all of the time when he was younger that he was given a prescription ointment to use. Denies history of MRSA. Denies any drainage from the rash. Denies pain or itching. Denies fever. Denies any allergies to medications.     Review of Systems:  A review of systems was performed and findings relevant to these symptoms are included in the HPI.    No current outpatient medications on file.     No current facility-administered medications for this visit.          ALLERGIES:  No Known Allergies    Social History:  Social History     Tobacco Use   Smoking Status Former Smoker   • Packs/day: 0.00   • Types: Cigarettes   • Last attempt to quit: 2017   • Years since quittin.0   Smokeless Tobacco Former User          OBJECTIVE:  Vitals:    19 1454   BP: 138/86   Pulse: 61   Resp: 18   Temp: 98.3 °F (36.8 °C)   TempSrc: Oral   SpO2: 97%   Weight: 104.3 kg       Vitals were reviewed.     Physical Exam:  Constitutional: This pleasant 23 year old male presents to Urgent Care in no acute distress. Patient  is alert and oriented x3.  Skin: Tiny pustular lesions under left nostril. No redness or drainage.    ASSESSMENT:  Impetigo    PLAN:  - Medications: Bactroban ointment 2% ointment - apply to affected area tid x 10 days  - Wash area with mild soap and water daily.  - Good handwashing.  - Written discharge instructions were given for impetigo.  - Follow up with PCP in 5 days if symptoms do not improve, sooner if symptoms worsen.    OZZY Sena    
no

## 2019-10-19 NOTE — ED ADULT NURSE REASSESSMENT NOTE - NS ED NURSE REASSESS COMMENT FT1
Pt is in nad, VSS, XRAY performed, and Ortho consulted with pt, pending reduction. Troponin level 27, second trop drawn and sent to lab per orders.  Bed lowered and locked, family at bedside, aware of plan of care. Will reassess.

## 2019-10-19 NOTE — ED PROVIDER NOTE - CARE PLAN
Principal Discharge DX:	Trimalleolar fracture Principal Discharge DX:	Trimalleolar fracture  Secondary Diagnosis:	Light-headedness

## 2019-10-19 NOTE — ED PROVIDER NOTE - OBJECTIVE STATEMENT
86M w/ DM, HTN. HLD, CAD p/w and episode of lightheadedness this morning while trying to get up his stairs. States he was in [place] three days ago when he had a mechanical fall on wet stones and sustained a broken L ankle. States he was reduced and splinted in [place]. No surgical intervention was conducted. Denies pain in his left toes or color change. States he was going up his stairs today on his knees when he felt lightheaded for about a minute. Resolved w/ sitting down. Denies chest pain, palpitations, n/v, SOB, numbness, weakness, fevers, chills.

## 2019-10-19 NOTE — ED PROVIDER NOTE - NS ED ROS FT
General: denies fever, chills  HENT: denies nasal congestion, rhinorrhea  CV: denies chest pain, palpitations  Resp: denies difficulty breathing, cough  Abdominal: denies nausea, vomiting, abdominal pain  MSK: denies muscle aches, + L leg pain  Neuro: denies headaches, tingling, + lightheadedness   Skin: denies rashes, bruises

## 2019-10-19 NOTE — H&P ADULT - HISTORY OF PRESENT ILLNESS
86y Male community ambulatory presents c/o L ankle pain sp mechanical fall while on cruise in [place] on Wednesday.  Was splinted in [place] and traveled back to [place] yesterday.  Came in today with increased pain/inability to ambulate with crutches at home. Denies HS/LOC. Denies numbness/tingling. No other pain/injuries. Denies fevers/chills.

## 2019-10-19 NOTE — CONSULT NOTE ADULT - SUBJECTIVE AND OBJECTIVE BOX
The patient was seen and examined tonight after an accidental trip and fall on 10/16/19, when on vacation in [place] with an acute displaced left ankle fracture that requires orthopedic ORIF. His comorbidities included inactive ASHD with no post CABG/multi stent placement chest pain, cardiomyopathy or arrythmia; NIDDM with no  neuropathy, nephropathy or retinopathy, BPH with mild outlet obstruction, controlled hypertension, deafness and advanced age. Exam, lab, EKG and CXR are stable. The patient is medically stable, medically optimized and has no medical contraindication to surgery tomorrow as required. Exam time 70 minutes including > than 50 % for bedside discussion and counseling. Comprehensive consultation dictated # [id number].. The patient was seen and examined tonight after an accidental trip and fall on 10/16/19, when on vacation in [place] with an acute displaced left ankle fracture that requires orthopedic ORIF. His comorbidities included inactive ASHD with no post CABG/multi stent placement chest pain, cardiomyopathy or arrythmia; NIDDM with no  neuropathy, nephropathy or retinopathy, BPH with mild outlet obstruction, controlled hypertension, deafness and advanced age. Exam, lab, EKG and CXR are stable. The patient is medically stable, medically optimized and has no medical contraindication to surgery tomorrow as required. Exam time 70 minutes including > than 50 % for bedside discussion and counseling. Comprehensive consultation dictated # [id number].      CONSULTING SERVICE:  Internal Medicine.    HISTORY OF PRESENT ILLNESS:  Bernardo, he is having an Internal Medicine consultation performed on 10/19/2019 preoperatively.  Bernardo is an 86-year-old male who is on vacation in [place] on a cruise and was visiting [place].  He was walking on the street and had an accidental fall with trauma to his left ankle.  He was unable to rise and brought to local hospital.  X-rays confirmed the left ankle fracture.  The patient was immobilized and the left leg was elevated and he was able to get home with nonweightbearing over the next 48 hours.  The fracture occurred on 10/16/2019 and he arrived home on 10/18/2019 only to come to the emergency room for evaluation on the following day.  He complains of inability to bear weight, stand, and walk; otherwise, he would he in terrific pain.  He is relatively asymptomatic with leg elevated and nonweightbearing.  X-rays were obtained confirming displacement of the ankle fracture and necessitating ORIF as scheduled for 10/20/2019.    MEDICATIONS:  Medications at the time of arrival shows MiraLax 17 g daily for constipation, Protonix 40 mg once a day, lisinopril 10 mg once a day, atorvastatin 80 mg once a day at bedtime, Toprol-XL 25 mg daily, 81 mg of baby aspirin daily, 75 mg of Plavix daily, 500 mg of metformin two tablets twice a day, 4 mg of Amaryl daily, 5 mg of Trajenta daily, vitamin C daily, Metamucil 34 g daily.    ALLERGIES:  THE PATIENT HAS NO KNOWN ALLERGIES.    PAST MEDICAL HISTORY:  Includes coronary artery disease, non-insulin dependent diabetes, hypertension, and hypercholesterolemia.    PAST SURGICAL HISTORY: Coronary bypass at [facility] in .  He also has had cardiac stents placed at various hospitals in 2013, 2015, and 2017.  He is not aware of the total number of stents that have been placed during the three sessions.  He continues under the care of Dr. Luis Eduardo Schaffer, [facility], but has no evidence for ischemic arrhythmic lobular or congestive heart disease at this time.    FAMILY HISTORY:  He has a family history that is positive for his mother with atherosclerotic heart disease  at 55, father  of old age.  Siblings are in good health.    DIET:  The patient's diet is diabetic and his present weight is 157 pounds.    SOCIAL HISTORY:  He is a nonsmoker, social drinker with no drug history.  He is a retired computer worker.  He lives with his wife and son in [place].    REVIEW OF SYSTEMS:  He has no headaches or dizziness.  He has significantly abnormal hearing and requires hearing aids.  Vision is corrected with glasses.  He has had no previous cataract surgery.  He has no dental disease or difficulty swallowing.  He has no shortness of breath, cough, congestion, or wheezing at rest.  He has moderate exertional shortness of breath with activity in such as walking up hills, which is appropriate for his age and physical congestion.  He has no cardiac symptoms of chest pain, palpitations, arrhythmias.  He has no ischemia.  He has had no congestive heart failure.  He has had no syncopal events.  He has no abdominal pain or change in bowel habits or GI bleeding.  He complains of gas with reflux, controlled with Protonix.  He has no GI bleeding at this time.  He has no dysuria, frequency, or incontinence.  He has history of prostate disease many years ago, but no nocturia, hesitancy, or difficulty with prostate at the present time.  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  He has no degeneration of his hips, knees, or hands.  He has no rashes or skin problems.  He has diabetes.  No neuropathy, no nephropathy and no retinopathy.  The patient's blood sugar has been under control over the past 90 days and he follows with an endocrinologist outside the hospital.                  PHYSICAL EXAMINATION:  VITAL SIGNS:  At this time shows a blood pressure 110/70, pulse is 73, respirations are 16, he is afebrile.  HEENT:  Head and neck examination are normal with no trauma.  No oral masses.  Good swallowing.  He has no lymphadenopathy.  Good carotid upstrokes with no bruits.  CHEST:  Clear with good breath sounds bilaterally.  CARDIAC EXAMINATION:  Shows 2/6 holosystolic murmur at the left lower sternal border appears to be longstanding.  ABDOMEN:  He has no abdominal pain, change in bowel habits or GI bleeding.  EXTREMITIES:  He has no difficulty with his right leg.  Left leg is immobilized in cast secondary to fracture.  NEUROLOGICAL EXAMINATION:  Normal with no focal deficits.  Sensation is intact to touch and position.    LABORATORY DATA:  CBC, hemoglobin is 11.4, hematocrit 33.9, white count is 13.2, platelet count is 297,000.  INR is 0.99.  PTT is 30.1.  Sodium is 131, potassium is 4.4, chloride is 94, CO2 is 22.  Blood sugar is 397 random.  BUN is 29, creatinine is 1.4.  GFR is 45 mL/min.  Remainder of comprehensive metabolic panel including LFTs are normal.    IMAGING:  Ankle x-ray performed shows that there is minimal lateral displacement of the distal fibular fragment.  In addition, he has tricompartmental fracture of the distal fibula, medial malleolus, and the posterior tibia underneath his cast.  EKG shows a normal sinus rhythm with a first-degree heart block.  He also has a right bundle-branch block.  These appeared to be old and unchanged as compared to baseline EKGs.    IMPRESSION:  At this time is the patient had an accidental fall on 10/16/2019 while out of the country on vacation with trip and fall type accident.  He is returned to [place] over the past four days with leg elevation and immobilized and nonweightbearing.  Peripheral edema has diminished over the last several days.  The patient has advised to elevate his leg and to maintain ice around the clock.  Should he warrant further investigation, which I think is appropriate.  Cardiac angiography should be performed.  The patient is medically stable preoperatively and has no indication for cardiac testing at the present time.  He is medically stable with no contraindication to surgery.  The ankle surgery has been scheduled for 10/20/2019.        Examination time was 70 minutes of which greater than 50% was necessary for bedside discussion and counseling.  The patient will continue to have medical management postoperatively and will require diabetic consultation for insulin therapy off oral hypoglycemic agents postoperatively.        _______________________    DICT:	ZOYA CARRERA MD ([id number]) 10/20/2019 07:48 AM  TRANS:	[id number] 10/20/2019 02:04 PM  JOB:	[id number]     Electronically Signed by:  ZOYA CARRERA 10/20/2019 03:30:35 PM

## 2019-10-19 NOTE — ED ADULT NURSE NOTE - OBJECTIVE STATEMENT
86 yr old male with Hx of CAD, HTN, HLD, DM, and rotator cuff injury presents to the ED c/o dizziness. Pt is A&Ox4, speaking coherently and ambulatory. Pt states while vacationing in [place] on Wednesday he fell and fractured his L ankle. Pt returned home last night, and when attempting to ambulate on casted ankle he started to feel dizzy,. Pt also c/o intermitted sharp 5/10 pain along with, pins and needles in L ankle, and pins. Pts family called cardiologist last night and were advised to bring pt to ED for further work up. Pt states he was taking Plavix, and the MD in [place] took him off and prescribed him Fraxiparine. Pt denies head injury, syncope, CP, sob, blurry vision, N/V, and denies falling when symptoms of dizziness presented. Pt placed on CM, bed locked and lowered, family at bedside, and call bell is within reach. MD eval complete, will reassess. 86 yr old male with Hx of CAD, HTN, HLD, DM, and rotator cuff injury presents to the ED c/o dizziness. Pt is A&Ox4, speaking coherently and ambulatory. Pt states while vacationing in [place] on Wednesday he fell and fractured his L ankle. Pt returned home last night, and when attempting to ambulate on casted ankle he started to feel dizzy,. Pt also c/o intermitted sharp 5/10 pain along with, pins and needles in L ankle. Pts family called cardiologist last night and were advised to bring pt to ED for further work up. Pt states he was taking Plavix, and the MD in [place] took him off and prescribed him Fraxiparine. Pt denies head injury, syncope, CP, sob, blurry vision, N/V, and denies falling when symptoms of dizziness presented. +2 pitting edema noted in R ankle. Pt placed on CM, bed locked and lowered, family at bedside, and call bell is within reach. MD eval complete, will reassess.

## 2019-10-19 NOTE — H&P ADULT - DOES THIS PATIENT HAVE A HISTORY OF OR HAS BEEN DX WITH HEART FAILURE?
Subjective:       Patient ID: Porter Rice is a 53 y.o. male.    Chief Complaint: Annual Exam    HPI  54 yo longterm employee of [employer], spouse of a [employer] employee comes for his periodic health exam. He feels well, has lost 10 pounds since last year's visit but still 30 pounds over weight. Has a strong family hx of diabetes, child with Type I and his father has Type II diabetes. He has had no significant medical encounters since last year except for a screening colonoscope, multiple benign polyps were found and removed, to follow up in three years.   Review of Systems   Constitutional: Negative.    HENT: Negative.    Eyes: Negative.    Respiratory: Negative.         Ex smoker quit 2010   Cardiovascular: Negative.    Gastrointestinal: Negative.         Multiple benign polyps on recent colonoscope.   Endocrine:        Borderline diabetes, weight driven with strong family hx of diabetes   Genitourinary: Negative.    Musculoskeletal: Negative.    Skin: Negative.    Neurological: Negative.    Psychiatric/Behavioral: Negative.    All other systems reviewed and are negative.      Objective:      Physical Exam   Constitutional: He is oriented to person, place, and time. He appears well-developed and well-nourished.   Obese: BMI:32   HENT:   Head: Normocephalic and atraumatic.   Right Ear: External ear normal.   Left Ear: External ear normal.   Eyes: Conjunctivae and EOM are normal. Pupils are equal, round, and reactive to light.   Neck: Normal range of motion. Neck supple.   Cardiovascular: Normal rate, regular rhythm and normal heart sounds.    Pulmonary/Chest: Effort normal and breath sounds normal.   Abdominal: Soft. Bowel sounds are normal.   Musculoskeletal: Normal range of motion.   Neurological: He is alert and oriented to person, place, and time. He has normal reflexes.   Skin: Skin is warm and dry.   Psychiatric: He has a normal mood and affect. His behavior is normal. Judgment and thought content normal.        Assessment:       1. Annual physical exam        Plan:       Labs: Glucose: 122, Triglyerides: 152, Cholesterol: 233,all other parameters are normal. IMP Borderline  Type II diabetes with Type II -b hyperlipidemia. All will improve with weight loss       no

## 2019-10-20 LAB
ALBUMIN SERPL ELPH-MCNC: 4 G/DL — SIGNIFICANT CHANGE UP (ref 3.3–5)
ALP SERPL-CCNC: 57 U/L — SIGNIFICANT CHANGE UP (ref 40–120)
ALT FLD-CCNC: 17 U/L — SIGNIFICANT CHANGE UP (ref 10–45)
ANION GAP SERPL CALC-SCNC: 10 MMOL/L — SIGNIFICANT CHANGE UP (ref 5–17)
ANION GAP SERPL CALC-SCNC: 12 MMOL/L — SIGNIFICANT CHANGE UP (ref 5–17)
APPEARANCE UR: CLEAR — SIGNIFICANT CHANGE UP
APTT BLD: 25.4 SEC — LOW (ref 27.5–36.3)
AST SERPL-CCNC: 18 U/L — SIGNIFICANT CHANGE UP (ref 10–40)
BACTERIA # UR AUTO: NEGATIVE — SIGNIFICANT CHANGE UP
BILIRUB SERPL-MCNC: 0.7 MG/DL — SIGNIFICANT CHANGE UP (ref 0.2–1.2)
BILIRUB UR-MCNC: NEGATIVE — SIGNIFICANT CHANGE UP
BLD GP AB SCN SERPL QL: NEGATIVE — SIGNIFICANT CHANGE UP
BUN SERPL-MCNC: 29 MG/DL — HIGH (ref 7–23)
BUN SERPL-MCNC: 31 MG/DL — HIGH (ref 7–23)
CALCIUM SERPL-MCNC: 9.3 MG/DL — SIGNIFICANT CHANGE UP (ref 8.4–10.5)
CALCIUM SERPL-MCNC: 9.6 MG/DL — SIGNIFICANT CHANGE UP (ref 8.4–10.5)
CHLORIDE SERPL-SCNC: 101 MMOL/L — SIGNIFICANT CHANGE UP (ref 96–108)
CHLORIDE SERPL-SCNC: 99 MMOL/L — SIGNIFICANT CHANGE UP (ref 96–108)
CO2 SERPL-SCNC: 22 MMOL/L — SIGNIFICANT CHANGE UP (ref 22–31)
CO2 SERPL-SCNC: 24 MMOL/L — SIGNIFICANT CHANGE UP (ref 22–31)
COLOR SPEC: COLORLESS — SIGNIFICANT CHANGE UP
CREAT SERPL-MCNC: 1.34 MG/DL — HIGH (ref 0.5–1.3)
CREAT SERPL-MCNC: 1.35 MG/DL — HIGH (ref 0.5–1.3)
DIFF PNL FLD: NEGATIVE — SIGNIFICANT CHANGE UP
EPI CELLS # UR: 0 /HPF — SIGNIFICANT CHANGE UP
GLUCOSE BLDC GLUCOMTR-MCNC: 208 MG/DL — HIGH (ref 70–99)
GLUCOSE BLDC GLUCOMTR-MCNC: 252 MG/DL — HIGH (ref 70–99)
GLUCOSE BLDC GLUCOMTR-MCNC: 263 MG/DL — HIGH (ref 70–99)
GLUCOSE BLDC GLUCOMTR-MCNC: 294 MG/DL — HIGH (ref 70–99)
GLUCOSE SERPL-MCNC: 201 MG/DL — HIGH (ref 70–99)
GLUCOSE SERPL-MCNC: 204 MG/DL — HIGH (ref 70–99)
GLUCOSE UR QL: NEGATIVE — SIGNIFICANT CHANGE UP
HBA1C BLD-MCNC: 7.6 % — HIGH (ref 4–5.6)
HCT VFR BLD CALC: 32.2 % — LOW (ref 39–50)
HCT VFR BLD CALC: 34.5 % — LOW (ref 39–50)
HGB BLD-MCNC: 11.2 G/DL — LOW (ref 13–17)
HGB BLD-MCNC: 11.9 G/DL — LOW (ref 13–17)
HYALINE CASTS # UR AUTO: 0 /LPF — SIGNIFICANT CHANGE UP (ref 0–2)
INR BLD: 1 RATIO — SIGNIFICANT CHANGE UP (ref 0.88–1.16)
KETONES UR-MCNC: NEGATIVE — SIGNIFICANT CHANGE UP
LEUKOCYTE ESTERASE UR-ACNC: NEGATIVE — SIGNIFICANT CHANGE UP
MCHC RBC-ENTMCNC: 30.4 PG — SIGNIFICANT CHANGE UP (ref 27–34)
MCHC RBC-ENTMCNC: 30.6 PG — SIGNIFICANT CHANGE UP (ref 27–34)
MCHC RBC-ENTMCNC: 34.5 GM/DL — SIGNIFICANT CHANGE UP (ref 32–36)
MCHC RBC-ENTMCNC: 34.8 GM/DL — SIGNIFICANT CHANGE UP (ref 32–36)
MCV RBC AUTO: 87.3 FL — SIGNIFICANT CHANGE UP (ref 80–100)
MCV RBC AUTO: 88.7 FL — SIGNIFICANT CHANGE UP (ref 80–100)
NITRITE UR-MCNC: NEGATIVE — SIGNIFICANT CHANGE UP
NRBC # BLD: 0 /100 WBCS — SIGNIFICANT CHANGE UP (ref 0–0)
NRBC # BLD: 0 /100 WBCS — SIGNIFICANT CHANGE UP (ref 0–0)
PH UR: 6.5 — SIGNIFICANT CHANGE UP (ref 5–8)
PLATELET # BLD AUTO: 278 K/UL — SIGNIFICANT CHANGE UP (ref 150–400)
PLATELET # BLD AUTO: 299 K/UL — SIGNIFICANT CHANGE UP (ref 150–400)
POTASSIUM SERPL-MCNC: 4.1 MMOL/L — SIGNIFICANT CHANGE UP (ref 3.5–5.3)
POTASSIUM SERPL-MCNC: 4.3 MMOL/L — SIGNIFICANT CHANGE UP (ref 3.5–5.3)
POTASSIUM SERPL-SCNC: 4.1 MMOL/L — SIGNIFICANT CHANGE UP (ref 3.5–5.3)
POTASSIUM SERPL-SCNC: 4.3 MMOL/L — SIGNIFICANT CHANGE UP (ref 3.5–5.3)
PROT SERPL-MCNC: 6.9 G/DL — SIGNIFICANT CHANGE UP (ref 6–8.3)
PROT UR-MCNC: NEGATIVE — SIGNIFICANT CHANGE UP
PROTHROM AB SERPL-ACNC: 11.4 SEC — SIGNIFICANT CHANGE UP (ref 10–12.9)
RBC # BLD: 3.69 M/UL — LOW (ref 4.2–5.8)
RBC # BLD: 3.89 M/UL — LOW (ref 4.2–5.8)
RBC # FLD: 13.4 % — SIGNIFICANT CHANGE UP (ref 10.3–14.5)
RBC # FLD: 13.7 % — SIGNIFICANT CHANGE UP (ref 10.3–14.5)
RBC CASTS # UR COMP ASSIST: 1 /HPF — SIGNIFICANT CHANGE UP (ref 0–4)
RH IG SCN BLD-IMP: POSITIVE — SIGNIFICANT CHANGE UP
SODIUM SERPL-SCNC: 133 MMOL/L — LOW (ref 135–145)
SODIUM SERPL-SCNC: 135 MMOL/L — SIGNIFICANT CHANGE UP (ref 135–145)
SP GR SPEC: 1.01 — SIGNIFICANT CHANGE UP (ref 1.01–1.02)
UROBILINOGEN FLD QL: NEGATIVE — SIGNIFICANT CHANGE UP
WBC # BLD: 17.35 K/UL — HIGH (ref 3.8–10.5)
WBC # BLD: 9.19 K/UL — SIGNIFICANT CHANGE UP (ref 3.8–10.5)
WBC # FLD AUTO: 17.35 K/UL — HIGH (ref 3.8–10.5)
WBC # FLD AUTO: 9.19 K/UL — SIGNIFICANT CHANGE UP (ref 3.8–10.5)
WBC UR QL: 0 /HPF — SIGNIFICANT CHANGE UP (ref 0–5)

## 2019-10-20 PROCEDURE — 93970 EXTREMITY STUDY: CPT | Mod: 26

## 2019-10-20 RX ORDER — ACETAMINOPHEN 500 MG
975 TABLET ORAL EVERY 8 HOURS
Refills: 0 | Status: DISCONTINUED | OUTPATIENT
Start: 2019-10-20 | End: 2019-10-22

## 2019-10-20 RX ORDER — LISINOPRIL 2.5 MG/1
10 TABLET ORAL DAILY
Refills: 0 | Status: DISCONTINUED | OUTPATIENT
Start: 2019-10-20 | End: 2019-10-22

## 2019-10-20 RX ORDER — INSULIN LISPRO 100/ML
VIAL (ML) SUBCUTANEOUS
Refills: 0 | Status: DISCONTINUED | OUTPATIENT
Start: 2019-10-20 | End: 2019-10-22

## 2019-10-20 RX ORDER — ASPIRIN/CALCIUM CARB/MAGNESIUM 324 MG
81 TABLET ORAL ONCE
Refills: 0 | Status: DISCONTINUED | OUTPATIENT
Start: 2019-10-20 | End: 2019-10-20

## 2019-10-20 RX ORDER — ATORVASTATIN CALCIUM 80 MG/1
80 TABLET, FILM COATED ORAL AT BEDTIME
Refills: 0 | Status: DISCONTINUED | OUTPATIENT
Start: 2019-10-20 | End: 2019-10-22

## 2019-10-20 RX ORDER — AMLODIPINE BESYLATE 2.5 MG/1
5 TABLET ORAL DAILY
Refills: 0 | Status: DISCONTINUED | OUTPATIENT
Start: 2019-10-20 | End: 2019-10-22

## 2019-10-20 RX ORDER — METOPROLOL TARTRATE 50 MG
25 TABLET ORAL DAILY
Refills: 0 | Status: DISCONTINUED | OUTPATIENT
Start: 2019-10-20 | End: 2019-10-20

## 2019-10-20 RX ORDER — DOCUSATE SODIUM 100 MG
100 CAPSULE ORAL THREE TIMES A DAY
Refills: 0 | Status: DISCONTINUED | OUTPATIENT
Start: 2019-10-20 | End: 2019-10-22

## 2019-10-20 RX ORDER — PANTOPRAZOLE SODIUM 20 MG/1
40 TABLET, DELAYED RELEASE ORAL
Refills: 0 | Status: DISCONTINUED | OUTPATIENT
Start: 2019-10-20 | End: 2019-10-22

## 2019-10-20 RX ORDER — CEFAZOLIN SODIUM 1 G
2000 VIAL (EA) INJECTION EVERY 8 HOURS
Refills: 0 | Status: COMPLETED | OUTPATIENT
Start: 2019-10-20 | End: 2019-10-21

## 2019-10-20 RX ORDER — ASPIRIN/CALCIUM CARB/MAGNESIUM 324 MG
81 TABLET ORAL DAILY
Refills: 0 | Status: DISCONTINUED | OUTPATIENT
Start: 2019-10-21 | End: 2019-10-22

## 2019-10-20 RX ORDER — GLUCAGON INJECTION, SOLUTION 0.5 MG/.1ML
1 INJECTION, SOLUTION SUBCUTANEOUS ONCE
Refills: 0 | Status: DISCONTINUED | OUTPATIENT
Start: 2019-10-20 | End: 2019-10-22

## 2019-10-20 RX ORDER — OXYCODONE HYDROCHLORIDE 5 MG/1
5 TABLET ORAL EVERY 4 HOURS
Refills: 0 | Status: DISCONTINUED | OUTPATIENT
Start: 2019-10-20 | End: 2019-10-22

## 2019-10-20 RX ORDER — DEXTROSE 50 % IN WATER 50 %
25 SYRINGE (ML) INTRAVENOUS ONCE
Refills: 0 | Status: DISCONTINUED | OUTPATIENT
Start: 2019-10-20 | End: 2019-10-22

## 2019-10-20 RX ORDER — ACETAMINOPHEN 500 MG
1000 TABLET ORAL ONCE
Refills: 0 | Status: COMPLETED | OUTPATIENT
Start: 2019-10-21 | End: 2019-10-21

## 2019-10-20 RX ORDER — DEXTROSE 50 % IN WATER 50 %
12.5 SYRINGE (ML) INTRAVENOUS ONCE
Refills: 0 | Status: DISCONTINUED | OUTPATIENT
Start: 2019-10-20 | End: 2019-10-22

## 2019-10-20 RX ORDER — ACETAMINOPHEN 500 MG
1000 TABLET ORAL ONCE
Refills: 0 | Status: COMPLETED | OUTPATIENT
Start: 2019-10-20 | End: 2019-10-20

## 2019-10-20 RX ORDER — METOPROLOL TARTRATE 50 MG
25 TABLET ORAL
Refills: 0 | Status: DISCONTINUED | OUTPATIENT
Start: 2019-10-21 | End: 2019-10-22

## 2019-10-20 RX ORDER — OXYCODONE HYDROCHLORIDE 5 MG/1
2.5 TABLET ORAL EVERY 4 HOURS
Refills: 0 | Status: DISCONTINUED | OUTPATIENT
Start: 2019-10-20 | End: 2019-10-22

## 2019-10-20 RX ORDER — INSULIN LISPRO 100/ML
VIAL (ML) SUBCUTANEOUS AT BEDTIME
Refills: 0 | Status: DISCONTINUED | OUTPATIENT
Start: 2019-10-20 | End: 2019-10-22

## 2019-10-20 RX ORDER — SODIUM CHLORIDE 9 MG/ML
1000 INJECTION INTRAMUSCULAR; INTRAVENOUS; SUBCUTANEOUS
Refills: 0 | Status: DISCONTINUED | OUTPATIENT
Start: 2019-10-20 | End: 2019-10-22

## 2019-10-20 RX ORDER — ONDANSETRON 8 MG/1
4 TABLET, FILM COATED ORAL ONCE
Refills: 0 | Status: DISCONTINUED | OUTPATIENT
Start: 2019-10-20 | End: 2019-10-20

## 2019-10-20 RX ORDER — HYDROMORPHONE HYDROCHLORIDE 2 MG/ML
0.25 INJECTION INTRAMUSCULAR; INTRAVENOUS; SUBCUTANEOUS
Refills: 0 | Status: DISCONTINUED | OUTPATIENT
Start: 2019-10-20 | End: 2019-10-20

## 2019-10-20 RX ORDER — SENNA PLUS 8.6 MG/1
2 TABLET ORAL AT BEDTIME
Refills: 0 | Status: DISCONTINUED | OUTPATIENT
Start: 2019-10-20 | End: 2019-10-22

## 2019-10-20 RX ORDER — METOPROLOL TARTRATE 50 MG
25 TABLET ORAL ONCE
Refills: 0 | Status: COMPLETED | OUTPATIENT
Start: 2019-10-20 | End: 2019-10-20

## 2019-10-20 RX ORDER — CLOPIDOGREL BISULFATE 75 MG/1
75 TABLET, FILM COATED ORAL DAILY
Refills: 0 | Status: DISCONTINUED | OUTPATIENT
Start: 2019-10-20 | End: 2019-10-22

## 2019-10-20 RX ORDER — DEXTROSE 50 % IN WATER 50 %
15 SYRINGE (ML) INTRAVENOUS ONCE
Refills: 0 | Status: DISCONTINUED | OUTPATIENT
Start: 2019-10-20 | End: 2019-10-22

## 2019-10-20 RX ORDER — POLYETHYLENE GLYCOL 3350 17 G/17G
17 POWDER, FOR SOLUTION ORAL DAILY
Refills: 0 | Status: DISCONTINUED | OUTPATIENT
Start: 2019-10-20 | End: 2019-10-22

## 2019-10-20 RX ADMIN — PANTOPRAZOLE SODIUM 40 MILLIGRAM(S): 20 TABLET, DELAYED RELEASE ORAL at 05:55

## 2019-10-20 RX ADMIN — Medication 100 MILLIGRAM(S): at 21:17

## 2019-10-20 RX ADMIN — Medication 100 MILLIGRAM(S): at 05:55

## 2019-10-20 RX ADMIN — SODIUM CHLORIDE 100 MILLILITER(S): 9 INJECTION INTRAMUSCULAR; INTRAVENOUS; SUBCUTANEOUS at 12:10

## 2019-10-20 RX ADMIN — Medication 25 MILLIGRAM(S): at 15:32

## 2019-10-20 RX ADMIN — Medication 1000 MILLIGRAM(S): at 21:50

## 2019-10-20 RX ADMIN — Medication 25 MILLIGRAM(S): at 05:55

## 2019-10-20 RX ADMIN — ATORVASTATIN CALCIUM 80 MILLIGRAM(S): 80 TABLET, FILM COATED ORAL at 21:17

## 2019-10-20 RX ADMIN — HYDROMORPHONE HYDROCHLORIDE 0.25 MILLIGRAM(S): 2 INJECTION INTRAMUSCULAR; INTRAVENOUS; SUBCUTANEOUS at 12:15

## 2019-10-20 RX ADMIN — Medication 100 MILLIGRAM(S): at 21:18

## 2019-10-20 RX ADMIN — OXYCODONE HYDROCHLORIDE 5 MILLIGRAM(S): 5 TABLET ORAL at 18:18

## 2019-10-20 RX ADMIN — Medication 3: at 18:16

## 2019-10-20 RX ADMIN — SODIUM CHLORIDE 75 MILLILITER(S): 9 INJECTION, SOLUTION INTRAVENOUS at 00:30

## 2019-10-20 RX ADMIN — LISINOPRIL 10 MILLIGRAM(S): 2.5 TABLET ORAL at 05:55

## 2019-10-20 RX ADMIN — Medication 3: at 15:20

## 2019-10-20 RX ADMIN — OXYCODONE HYDROCHLORIDE 5 MILLIGRAM(S): 5 TABLET ORAL at 17:48

## 2019-10-20 RX ADMIN — CLOPIDOGREL BISULFATE 75 MILLIGRAM(S): 75 TABLET, FILM COATED ORAL at 17:48

## 2019-10-20 RX ADMIN — HYDROMORPHONE HYDROCHLORIDE 0.25 MILLIGRAM(S): 2 INJECTION INTRAMUSCULAR; INTRAVENOUS; SUBCUTANEOUS at 12:01

## 2019-10-20 RX ADMIN — Medication 1: at 21:24

## 2019-10-20 RX ADMIN — Medication 400 MILLIGRAM(S): at 21:20

## 2019-10-20 RX ADMIN — Medication 2: at 06:55

## 2019-10-20 RX ADMIN — FAMOTIDINE 20 MILLIGRAM(S): 10 INJECTION INTRAVENOUS at 00:30

## 2019-10-20 RX ADMIN — HYDROMORPHONE HYDROCHLORIDE 0.25 MILLIGRAM(S): 2 INJECTION INTRAMUSCULAR; INTRAVENOUS; SUBCUTANEOUS at 12:30

## 2019-10-20 RX ADMIN — SENNA PLUS 2 TABLET(S): 8.6 TABLET ORAL at 21:17

## 2019-10-20 NOTE — PRE-ANESTHESIA EVALUATION ADULT - NSANTHOSAYNRD_GEN_A_CORE
No. KHURRAM screening performed.  STOP BANG Legend: 0-2 = LOW Risk; 3-4 = INTERMEDIATE Risk; 5-8 = HIGH Risk

## 2019-10-20 NOTE — PRE-ANESTHESIA EVALUATION ADULT - NSANTHPMHFT_GEN_ALL_CORE
86M PMH CAD s/p CABG (patient says performed in 80s) and multiple stents on ASA/Plavix (last took ASA/Plavix 3 days ago) 86M PMH CAD s/p CABG (patient says performed in 80s) and multiple stents on ASA/Plavix (last took ASA/Plavix 3 days ago), HTN, HLD, T2DM p/w left ankle fracture s/p fall.  Cardiac cath shows severe occlusive disease in graft and native vessels.  TTE: EF 55%, no significant valvular disease.  ASA 81mg x1 ordered stat prior to OR entry.

## 2019-10-20 NOTE — PRE-ANESTHESIA EVALUATION ADULT - NSANTHDISPORD_GEN_ALL_CORE
PACU I personally evaluated the patient. I reviewed the Resident’s or Physician Assistant’s note (as assigned above), and agree with the findings and plan except as documented in my note.    64 male here for limited ambulation. Is undomiciled. Has no other complaints.     PE: ambulating well. no focal deficits.     Impr: medical screening exam    Plan: continue outpatient management

## 2019-10-20 NOTE — PROGRESS NOTE ADULT - SUBJECTIVE AND OBJECTIVE BOX
Patient is a 86y old  Male who presents with a chief complaint of left ankle fracture (20 Oct 2019 06:44)        MEDICATIONS  (STANDING):  acetaminophen  IVPB .. 1000 milliGRAM(s) IV Intermittent once  amLODIPine   Tablet 5 milliGRAM(s) Oral daily  aspirin  chewable 81 milliGRAM(s) Oral daily  atorvastatin 80 milliGRAM(s) Oral at bedtime  ceFAZolin   IVPB 2000 milliGRAM(s) IV Intermittent every 8 hours  clopidogrel Tablet 75 milliGRAM(s) Oral daily  dextrose 50% Injectable 12.5 Gram(s) IV Push once  dextrose 50% Injectable 25 Gram(s) IV Push once  docusate sodium 100 milliGRAM(s) Oral three times a day  insulin lispro (HumaLOG) corrective regimen sliding scale   SubCutaneous three times a day before meals  insulin lispro (HumaLOG) corrective regimen sliding scale   SubCutaneous at bedtime  lisinopril 10 milliGRAM(s) Oral daily  metoprolol succinate ER 25 milliGRAM(s) Oral daily  metoprolol tartrate 25 milliGRAM(s) Oral two times a day  pantoprazole    Tablet 40 milliGRAM(s) Oral before breakfast  polyethylene glycol 3350 17 Gram(s) Oral daily  senna 2 Tablet(s) Oral at bedtime  sodium chloride 0.9%. 1000 milliLiter(s) (100 mL/Hr) IV Continuous <Continuous>    MEDICATIONS  (PRN):  acetaminophen   Tablet .. 975 milliGRAM(s) Oral every 8 hours PRN Mild Pain (1 - 3)  dextrose 40% Gel 15 Gram(s) Oral once PRN Blood Glucose LESS THAN 70 milliGRAM(s)/deciliter  glucagon  Injectable 1 milliGRAM(s) IntraMuscular once PRN Glucose LESS THAN 70 milligrams/deciliter  oxyCODONE    IR 2.5 milliGRAM(s) Oral every 4 hours PRN Moderate Pain (4 - 6)  oxyCODONE    IR 5 milliGRAM(s) Oral every 4 hours PRN Severe Pain (7 - 10)      Allergies    No Known Allergies    VITALS:   T(C): 36.4 (10-20-19 @ 14:27), Max: 36.8 (10-19-19 @ 18:45)  HR: 73 (10-20-19 @ 14:27) (60 - 84)  BP: 166/66 (10-20-19 @ 14:27) (117/68 - 216/85)  RR: 18 (10-20-19 @ 14:27) (14 - 21)  SpO2: 93% (10-20-19 @ 14:27) (93% - 100%)  Wt(kg): --    PHYSICAL EXAM:  GENERAL: NAD, well nourished and conversant  HEAD:  Atraumatic  EYES: EOM, PERRLA, conjunctiva pink and sclera white  ENT: No tonsillar erythema, exudates, or enlargement, moist mucous membranes, good dentition, no lesions  NECK: Supple, No JVD, normal thyroid, carotids with normal upstrokes and no bruits  CHEST/LUNG: Clear to auscultation bilaterally, No rales, rhonchi, wheezing, or rubs  HEART: Regular rate and rhythm, No murmurs, rubs, or gallops  ABDOMEN: Soft, nondistended, no masses, guarding, tenderness or rebound, bowel sounds present  EXTREMITIES:  2+ Peripheral Pulses, No clubbing, cyanosis, or edema. No arthritis of shoulders, elbows, hands, hips, knees, ankles, or feet. No DJD C spine, T spine, or L/S spine  LYMPH: No lymphadenopathy noted  SKIN: No rashes or lesions  NERVOUS SYSTEM:  Alert & Oriented X3, normal cognitive function. Motor Strength 5/5 right upper and right lower.  5/5 left upper and left lower extremities, DTRs 2+ intact and symmetric    LABS:        CBC Full  -  ( 20 Oct 2019 12:26 )  WBC Count : 17.35 K/uL  RBC Count : 3.89 M/uL  Hemoglobin : 11.9 g/dL  Hematocrit : 34.5 %  Platelet Count - Automated : 299 K/uL  Mean Cell Volume : 88.7 fl  Mean Cell Hemoglobin : 30.6 pg  Mean Cell Hemoglobin Concentration : 34.5 gm/dL  Auto Neutrophil # : x  Auto Lymphocyte # : x  Auto Monocyte # : x  Auto Eosinophil # : x  Auto Basophil # : x  Auto Neutrophil % : x  Auto Lymphocyte % : x  Auto Monocyte % : x  Auto Eosinophil % : x  Auto Basophil % : x    10-20    133<L>  |  99  |  29<H>  ----------------------------<  201<H>  4.1   |  22  |  1.34<H>    Ca    9.3      20 Oct 2019 12:26    TPro  6.9  /  Alb  4.0  /  TBili  0.7  /  DBili  x   /  AST  18  /  ALT  17  /  AlkPhos  57  10-20    LIVER FUNCTIONS - ( 20 Oct 2019 12:26 )  Alb: 4.0 g/dL / Pro: 6.9 g/dL / ALK PHOS: 57 U/L / ALT: 17 U/L / AST: 18 U/L / GGT: x           PT/INR - ( 20 Oct 2019 06:07 )   PT: 11.4 sec;   INR: 1.00 ratio         PTT - ( 20 Oct 2019 06:07 )  PTT:25.4 sec  Urinalysis Basic - ( 20 Oct 2019 09:44 )    Color: Colorless / Appearance: Clear / S.011 / pH: x  Gluc: x / Ketone: Negative  / Bili: Negative / Urobili: Negative   Blood: x / Protein: Negative / Nitrite: Negative   Leuk Esterase: Negative / RBC: 1 /hpf / WBC 0 /HPF   Sq Epi: x / Non Sq Epi: 0 /hpf / Bacteria: Negative      CAPILLARY BLOOD GLUCOSE      POCT Blood Glucose.: 208 mg/dL (20 Oct 2019 06:07)  POCT Blood Glucose.: 164 mg/dL (19 Oct 2019 21:38)  POCT Blood Glucose.: 158 mg/dL (19 Oct 2019 19:52)      RADIOLOGY & ADDITIONAL TESTS:      Consultant(s):    Care Discussed with Consultants/Other Providers [ ] YES  [ ] NO Patient is a 86y old  Male who presents with a chief complaint of left ankle fracture.  He was on vacation in [place] on a cruise and was visiting [place].  He was walking on the street and had an accidental fall with trauma to his left ankle.  He was unable to rise and brought to local hospital.  Xrays confirmed the left ankle fracture.  The patient was immobilized and the left leg was elevated and he was able to get home with nonweightbearing over the next 48 hours.  The fracture occurred on 10/16/2019 and he arrived home on 10/18/2019 only to come to the emergency room for evaluation on the following day.  He complains of inability to bear weight, stand, and walk; otherwise, he would be in terrific pain.  He is relatively asymptomatic with leg elevated and nonweightbearing.  Xrays were obtained confirming displacement of the ankle fracture and necessitating. ORIF was performed 10/20/2019 and the patient was seen post-op. He is in moderate pain with left leg elevated and iced.          MEDICATIONS  (STANDING):  acetaminophen  IVPB .. 1000 milliGRAM(s) IV Intermittent once  amLODIPine   Tablet 5 milliGRAM(s) Oral daily  aspirin  chewable 81 milliGRAM(s) Oral daily  atorvastatin 80 milliGRAM(s) Oral at bedtime  ceFAZolin   IVPB 2000 milliGRAM(s) IV Intermittent every 8 hours  clopidogrel Tablet 75 milliGRAM(s) Oral daily  dextrose 50% Injectable 12.5 Gram(s) IV Push once  dextrose 50% Injectable 25 Gram(s) IV Push once  docusate sodium 100 milliGRAM(s) Oral three times a day  insulin lispro (HumaLOG) corrective regimen sliding scale   SubCutaneous three times a day before meals  insulin lispro (HumaLOG) corrective regimen sliding scale   SubCutaneous at bedtime  lisinopril 10 milliGRAM(s) Oral daily  metoprolol succinate ER 25 milliGRAM(s) Oral daily  metoprolol tartrate 25 milliGRAM(s) Oral two times a day  pantoprazole    Tablet 40 milliGRAM(s) Oral before breakfast  polyethylene glycol 3350 17 Gram(s) Oral daily  senna 2 Tablet(s) Oral at bedtime  sodium chloride 0.9%. 1000 milliLiter(s) (100 mL/Hr) IV Continuous <Continuous>    MEDICATIONS  (PRN):  acetaminophen   Tablet .. 975 milliGRAM(s) Oral every 8 hours PRN Mild Pain (1 - 3)  dextrose 40% Gel 15 Gram(s) Oral once PRN Blood Glucose LESS THAN 70 milliGRAM(s)/deciliter  glucagon  Injectable 1 milliGRAM(s) IntraMuscular once PRN Glucose LESS THAN 70 milligrams/deciliter  oxyCODONE    IR 2.5 milliGRAM(s) Oral every 4 hours PRN Moderate Pain (4 - 6)  oxyCODONE    IR 5 milliGRAM(s) Oral every 4 hours PRN Severe Pain (7 - 10)      Allergies    No Known Allergies    VITALS:   T(C): 36.4 (10-20-19 @ 14:27), Max: 36.8 (10-19-19 @ 18:45)  HR: 73 (10-20-19 @ 14:) (60 - 84)  BP: 166/66 (10-20-19 @ 14:27) (117/68 - 216/85)  RR: 18 (10-20-19 @ 14:27) (14 - 21)  SpO2: 93% (10-20-19 @ 14:27) (93% - 100%)  Wt(kg): --    PHYSICAL EXAM:  GENERAL: NAD, well nourished and conversant  HEAD:  Atraumatic  EYES: EOM, PERRLA, conjunctiva pink and sclera white  ENT: No tonsillar erythema, exudates, or enlargement, moist mucous membranes, good dentition, no lesions  NECK: Supple, No JVD, normal thyroid, carotids with normal upstrokes and no bruits  CHEST/LUNG: Clear to auscultation bilaterally, No rales, rhonchi, wheezing, or rubs  HEART: Regular rate and rhythm, No murmurs, rubs, or gallops  ABDOMEN: Soft, nondistended, no masses, guarding, tenderness or rebound, bowel sounds present  EXTREMITIES:  2+ Peripheral Pulses, No clubbing, cyanosis, or edema. No arthritis of shoulders, elbows, hands, hips, knees, ankles, or feet. No DJD C spine, T spine, or L/S spine  LYMPH: No lymphadenopathy noted  SKIN: No rashes or lesions  NERVOUS SYSTEM:  Alert & Oriented X3, normal cognitive function. Motor Strength 5/5 right upper and right lower.  5/5 left upper and left lower extremities, DTRs 2+ intact and symmetric    LABS:        CBC Full  -  ( 20 Oct 2019 12:26 )  WBC Count : 17.35 K/uL  RBC Count : 3.89 M/uL  Hemoglobin : 11.9 g/dL  Hematocrit : 34.5 %  Platelet Count - Automated : 299 K/uL  Mean Cell Volume : 88.7 fl  Mean Cell Hemoglobin : 30.6 pg  Mean Cell Hemoglobin Concentration : 34.5 gm/dL  Auto Neutrophil # : x  Auto Lymphocyte # : x  Auto Monocyte # : x  Auto Eosinophil # : x  Auto Basophil # : x  Auto Neutrophil % : x  Auto Lymphocyte % : x  Auto Monocyte % : x  Auto Eosinophil % : x  Auto Basophil % : x    10-20    133<L>  |  99  |  29<H>  ----------------------------<  201<H>  4.1   |  22  |  1.34<H>    Ca    9.3      20 Oct 2019 12:26    TPro  6.9  /  Alb  4.0  /  TBili  0.7  /  DBili  x   /  AST  18  /  ALT  17  /  AlkPhos  57  10-20    LIVER FUNCTIONS - ( 20 Oct 2019 12:26 )  Alb: 4.0 g/dL / Pro: 6.9 g/dL / ALK PHOS: 57 U/L / ALT: 17 U/L / AST: 18 U/L / GGT: x           PT/INR - ( 20 Oct 2019 06:07 )   PT: 11.4 sec;   INR: 1.00 ratio         PTT - ( 20 Oct 2019 06:07 )  PTT:25.4 sec  Urinalysis Basic - ( 20 Oct 2019 09:44 )    Color: Colorless / Appearance: Clear / S.011 / pH: x  Gluc: x / Ketone: Negative  / Bili: Negative / Urobili: Negative   Blood: x / Protein: Negative / Nitrite: Negative   Leuk Esterase: Negative / RBC: 1 /hpf / WBC 0 /HPF   Sq Epi: x / Non Sq Epi: 0 /hpf / Bacteria: Negative      CAPILLARY BLOOD GLUCOSE      POCT Blood Glucose.: 208 mg/dL (20 Oct 2019 06:07)  POCT Blood Glucose.: 164 mg/dL (19 Oct 2019 21:38)  POCT Blood Glucose.: 158 mg/dL (19 Oct 2019 19:52)      RADIOLOGY & ADDITIONAL TESTS:      Consultant(s):    Care Discussed with Consultants/Other Providers [ ] YES  [ ] NO

## 2019-10-20 NOTE — PROGRESS NOTE ADULT - SUBJECTIVE AND OBJECTIVE BOX
Patient resting without complaints.  No chest pain, SOB, N/V.    T(C): 36.7 (10-20-19 @ 05:39), Max: 37.1 (10-19-19 @ 13:11)  HR: 60 (10-20-19 @ 05:39) (60 - 84)  BP: 129/71 (10-20-19 @ 05:39) (117/68 - 166/72)  RR: 18 (10-20-19 @ 05:39) (16 - 18)  SpO2: 98% (10-20-19 @ 05:39) (95% - 98%)      Exam:  Alert and Oriented, No Acute Distress    Lower Extremities: L Ankle  Dressing: Trilam Splint C/D/I.  Proximal annd Distal Thigh and Knee Compartment soft and non tender.  Patient able to wiggle toes  SILT  Normal Capillary Refill Digits 1-5    Xray: IMPRESSION:  Redemonstrated tricompartmental fracture of the distal fibula, medial   malleolus, and posterior tibia with overlying cast.  There is minimal lateral displacement of the distal fibular fragment.                             11.2   9.19  )-----------( 278      ( 20 Oct 2019 06:07 )             32.2    10-20    135  |  101  |  31<H>  ----------------------------<  204<H>  4.3   |  24  |  1.35<H>    Ca    9.6      20 Oct 2019 06:07    TPro  7.3  /  Alb  4.3  /  TBili  0.6  /  DBili  x   /  AST  17  /  ALT  16  /  AlkPhos  59  10-19

## 2019-10-20 NOTE — CHART NOTE - NSCHARTNOTEFT_GEN_A_CORE
Resting with mild to moderate complaints of L groin pain and back pain which patient feels is from "OR table positioning".  No Chest Pain, SOB, N/V.    T(C): 36.2 (10-20-19 @ 13:30), Max: 36.8 (10-19-19 @ 14:09)  HR: 75 (10-20-19 @ 13:30) (60 - 84)  BP: 165/71 (10-20-19 @ 13:30) (117/68 - 216/85)  RR: 16 (10-20-19 @ 13:30) (14 - 21)  SpO2: 97% (10-20-19 @ 13:30) (94% - 100%)      Exam:  Alert and [place], No Acute Distress  Card: +S1/S2, RRR  Pulm: CTAB  Laterality: L Ankle  Appearance: Mild to moderate swelling on left dorsal foot noted, normal and expected for L Ankle ORIF procedure.  Dressing: Trilam wrapped in ace bandage c/d/i  Proximal thigh soft and non tender.    Patient able to contract quads, Hamstrings and wiggle toes  SILT  Normal Capillary Refil digits 1-5    Xray: Intraop Fluroscopy: S/P L Ankle Trimalleolar Fx ORIF.  Hardware in place and intact with no signs of new Fx.                          11.9   17.35 )-----------( 299      ( 20 Oct 2019 12:26 )             34.5    10-20    133<L>  |  99  |  29<H>  ----------------------------<  201<H>  4.1   |  22  |  1.34<H>    Ca    9.3      20 Oct 2019 12:26    TPro  6.9  /  Alb  4.0  /  TBili  0.7  /  DBili  x   /  AST  18  /  ALT  17  /  AlkPhos  57  10-20      A/P: 86y Male S/p  L/R Total Hip/Knee Arthroplasty. Vitals within stable limits. NAD  -PT/OT-NWB LLE  -F/U AM Labs tomorrow  -IS  -DVT PPx: Aspirin 81 mg PO daily and Plavix 75mg PO Daily  -Pain Control  -Continue Current Tx  -Dispo planning PACU to Floor    Tommy Ramos PA-C  Orthopedic Surgery Team  Team Pager #[phone]

## 2019-10-21 ENCOUNTER — TRANSCRIPTION ENCOUNTER (OUTPATIENT)
Age: 84
End: 2019-10-21

## 2019-10-21 DIAGNOSIS — S82.852A DISPLACED TRIMALLEOLAR FRACTURE OF LEFT LOWER LEG, INITIAL ENCOUNTER FOR CLOSED FRACTURE: ICD-10-CM

## 2019-10-21 LAB
ANION GAP SERPL CALC-SCNC: 14 MMOL/L — SIGNIFICANT CHANGE UP (ref 5–17)
BASOPHILS # BLD AUTO: 0 K/UL — SIGNIFICANT CHANGE UP (ref 0–0.2)
BASOPHILS NFR BLD AUTO: 0 % — SIGNIFICANT CHANGE UP (ref 0–2)
BUN SERPL-MCNC: 32 MG/DL — HIGH (ref 7–23)
CALCIUM SERPL-MCNC: 9 MG/DL — SIGNIFICANT CHANGE UP (ref 8.4–10.5)
CHLORIDE SERPL-SCNC: 98 MMOL/L — SIGNIFICANT CHANGE UP (ref 96–108)
CO2 SERPL-SCNC: 23 MMOL/L — SIGNIFICANT CHANGE UP (ref 22–31)
CREAT SERPL-MCNC: 1.45 MG/DL — HIGH (ref 0.5–1.3)
EOSINOPHIL # BLD AUTO: 0 K/UL — SIGNIFICANT CHANGE UP (ref 0–0.5)
EOSINOPHIL NFR BLD AUTO: 0 % — SIGNIFICANT CHANGE UP (ref 0–6)
GLUCOSE BLDC GLUCOMTR-MCNC: 204 MG/DL — HIGH (ref 70–99)
GLUCOSE BLDC GLUCOMTR-MCNC: 205 MG/DL — HIGH (ref 70–99)
GLUCOSE BLDC GLUCOMTR-MCNC: 224 MG/DL — HIGH (ref 70–99)
GLUCOSE BLDC GLUCOMTR-MCNC: 254 MG/DL — HIGH (ref 70–99)
GLUCOSE SERPL-MCNC: 231 MG/DL — HIGH (ref 70–99)
HCT VFR BLD CALC: 31.6 % — LOW (ref 39–50)
HGB BLD-MCNC: 10.6 G/DL — LOW (ref 13–17)
LYMPHOCYTES # BLD AUTO: 1.27 K/UL — SIGNIFICANT CHANGE UP (ref 1–3.3)
LYMPHOCYTES # BLD AUTO: 8.7 % — LOW (ref 13–44)
MCHC RBC-ENTMCNC: 30.1 PG — SIGNIFICANT CHANGE UP (ref 27–34)
MCHC RBC-ENTMCNC: 33.5 GM/DL — SIGNIFICANT CHANGE UP (ref 32–36)
MCV RBC AUTO: 89.8 FL — SIGNIFICANT CHANGE UP (ref 80–100)
MONOCYTES # BLD AUTO: 1.14 K/UL — HIGH (ref 0–0.9)
MONOCYTES NFR BLD AUTO: 7.8 % — SIGNIFICANT CHANGE UP (ref 2–14)
NEUTROPHILS # BLD AUTO: 12.22 K/UL — HIGH (ref 1.8–7.4)
NEUTROPHILS NFR BLD AUTO: 83.5 % — HIGH (ref 43–77)
PLATELET # BLD AUTO: 276 K/UL — SIGNIFICANT CHANGE UP (ref 150–400)
POTASSIUM SERPL-MCNC: 4.3 MMOL/L — SIGNIFICANT CHANGE UP (ref 3.5–5.3)
POTASSIUM SERPL-SCNC: 4.3 MMOL/L — SIGNIFICANT CHANGE UP (ref 3.5–5.3)
RBC # BLD: 3.52 M/UL — LOW (ref 4.2–5.8)
RBC # FLD: 13.9 % — SIGNIFICANT CHANGE UP (ref 10.3–14.5)
SODIUM SERPL-SCNC: 135 MMOL/L — SIGNIFICANT CHANGE UP (ref 135–145)
WBC # BLD: 14.63 K/UL — HIGH (ref 3.8–10.5)
WBC # FLD AUTO: 14.63 K/UL — HIGH (ref 3.8–10.5)

## 2019-10-21 RX ORDER — ACETAMINOPHEN 500 MG
3 TABLET ORAL
Qty: 0 | Refills: 0 | DISCHARGE
Start: 2019-10-21

## 2019-10-21 RX ORDER — AMLODIPINE BESYLATE 2.5 MG/1
1 TABLET ORAL
Qty: 0 | Refills: 0 | DISCHARGE
Start: 2019-10-21

## 2019-10-21 RX ORDER — METOPROLOL TARTRATE 50 MG
1 TABLET ORAL
Qty: 0 | Refills: 0 | DISCHARGE
Start: 2019-10-21

## 2019-10-21 RX ORDER — DOCUSATE SODIUM 100 MG
1 CAPSULE ORAL
Qty: 0 | Refills: 0 | DISCHARGE
Start: 2019-10-21

## 2019-10-21 RX ORDER — SENNA PLUS 8.6 MG/1
2 TABLET ORAL
Qty: 0 | Refills: 0 | DISCHARGE
Start: 2019-10-21

## 2019-10-21 RX ORDER — OXYCODONE HYDROCHLORIDE 5 MG/1
1 TABLET ORAL
Qty: 0 | Refills: 0 | DISCHARGE
Start: 2019-10-21

## 2019-10-21 RX ADMIN — LISINOPRIL 10 MILLIGRAM(S): 2.5 TABLET ORAL at 05:37

## 2019-10-21 RX ADMIN — Medication 1000 MILLIGRAM(S): at 10:41

## 2019-10-21 RX ADMIN — Medication 25 MILLIGRAM(S): at 05:40

## 2019-10-21 RX ADMIN — ATORVASTATIN CALCIUM 80 MILLIGRAM(S): 80 TABLET, FILM COATED ORAL at 22:31

## 2019-10-21 RX ADMIN — OXYCODONE HYDROCHLORIDE 5 MILLIGRAM(S): 5 TABLET ORAL at 16:00

## 2019-10-21 RX ADMIN — AMLODIPINE BESYLATE 5 MILLIGRAM(S): 2.5 TABLET ORAL at 05:37

## 2019-10-21 RX ADMIN — Medication 3: at 12:31

## 2019-10-21 RX ADMIN — Medication 400 MILLIGRAM(S): at 10:26

## 2019-10-21 RX ADMIN — Medication 81 MILLIGRAM(S): at 11:13

## 2019-10-21 RX ADMIN — Medication 25 MILLIGRAM(S): at 17:43

## 2019-10-21 RX ADMIN — Medication 2: at 09:00

## 2019-10-21 RX ADMIN — PANTOPRAZOLE SODIUM 40 MILLIGRAM(S): 20 TABLET, DELAYED RELEASE ORAL at 05:37

## 2019-10-21 RX ADMIN — Medication 100 MILLIGRAM(S): at 05:37

## 2019-10-21 RX ADMIN — Medication 2: at 18:00

## 2019-10-21 RX ADMIN — OXYCODONE HYDROCHLORIDE 5 MILLIGRAM(S): 5 TABLET ORAL at 16:30

## 2019-10-21 RX ADMIN — Medication 100 MILLIGRAM(S): at 11:13

## 2019-10-21 RX ADMIN — POLYETHYLENE GLYCOL 3350 17 GRAM(S): 17 POWDER, FOR SOLUTION ORAL at 11:13

## 2019-10-21 RX ADMIN — CLOPIDOGREL BISULFATE 75 MILLIGRAM(S): 75 TABLET, FILM COATED ORAL at 11:13

## 2019-10-21 NOTE — OCCUPATIONAL THERAPY INITIAL EVALUATION ADULT - STRENGTHENING, PT EVAL
GOAL: Pt will increase LLE strength to 3+/5 to increase participation in functional tasks in 4 weeks

## 2019-10-21 NOTE — OCCUPATIONAL THERAPY INITIAL EVALUATION ADULT - LIVES WITH, PROFILE
Pt lives with wife and son in a pvt home, [address] and a flight inside. Pt has a walk in shower/tub shower. I in all ADLs and functional mobility PTA, caregiver for wife and son as they need assist with all tasks.

## 2019-10-21 NOTE — OCCUPATIONAL THERAPY INITIAL EVALUATION ADULT - ADDITIONAL COMMENTS
L Foot Xray (10/19): Trimalleolar fracture with oblique fractures of the distal tibia and fibula and additional vertical fracture of the posterior tibia with minimal posterior displacement. ORIF dislocation of ankle joint 20-Oct-2019

## 2019-10-21 NOTE — PHYSICAL THERAPY INITIAL EVALUATION ADULT - PLANNED THERAPY INTERVENTIONS, PT EVAL
balance training/strengthening/transfer training/gait training/Pt will negotiate up/down   steps independently with appropriate assistive device and railing in 4 weeks

## 2019-10-21 NOTE — DISCHARGE NOTE PROVIDER - NSDCACTIVITY_GEN_ALL_CORE
Showering allowed/Walking - Indoors allowed/Do not make important decisions/Walking - Outdoors allowed/Stairs allowed/No heavy lifting/straining/Do not drive or operate machinery

## 2019-10-21 NOTE — PHYSICAL THERAPY INITIAL EVALUATION ADULT - LIVES WITH, PROFILE
spouse/Pt lives with wife who has severe arthritis and unable to assist pt, son who is mentally disabled with cardiomyopathy. Pt states he is their primary caregiver. Pt lives in a house with 4 steps to enter and 13 to 2nd floor. No device used PTA

## 2019-10-21 NOTE — DISCHARGE NOTE PROVIDER - NSDCFUADDINST_GEN_ALL_CORE_FT
Please follow up with Dr. Larsen after your discharge from Subacute Rehab (2 weeks, call for appointment).  PT-Non-weight bearing Left lower extremity .  Keep splint/ dressing clean, dry and intact.  Please continue to take aspirin/plavix for dvt prevention.  Have doctor remove any staples/sutures postop day 14 (if applicable), and apply steristrips as needed.  Please follow up with your PMD 1 month after your hospital discharge. Please follow up with Dr. Larsen after your discharge from Subacute Rehab (2 weeks, call for appointment).  PT-Non-weight bearing Left lower extremity.  Keep splint/ dressing clean, dry and intact.  Please continue to take aspirin/plavix for dvt prevention.  Have Rehab doctor remove any staples/sutures postop day 14 (if applicable), and apply steristrips as needed.  Please follow up with your PMD 1 month after your hospital discharge.

## 2019-10-21 NOTE — PROGRESS NOTE ADULT - SUBJECTIVE AND OBJECTIVE BOX
Patient is a 86y old  Male who presents with a chief complaint of left ankle fracture.  He was on vacation in [place] on a cruise and was visiting [place].  He was walking on the street and had an accidental fall with trauma to his left ankle.  He was unable to rise and brought to local hospital.  X-rays confirmed the left ankle fracture.  The patient was immobilized and the left leg was elevated and he was able to get home with nonweightbearing over the next 48 hours.  The fracture occurred on 10/16/2019 and he arrived home on 10/18/2019 only to come to the emergency room for evaluation on the following day.  He complains of inability to bear weight, stand, and walk; otherwise, he would be in terrific pain.  He is relatively asymptomatic with leg elevated and nonweightbearing.  X-rays were obtained confirming displacement of the ankle fracture and necessitating. ORIF was performed 10/20/2019 and the patient was seen post-op. He is in moderate pain with left leg elevated and iced. Patient seen OOB in chair. Patient found to have a soleal DVT on the right.       MEDICATIONS  (STANDING):  amLODIPine   Tablet 5 milliGRAM(s) Oral daily  aspirin  chewable 81 milliGRAM(s) Oral daily  atorvastatin 80 milliGRAM(s) Oral at bedtime  clopidogrel Tablet 75 milliGRAM(s) Oral daily  dextrose 50% Injectable 12.5 Gram(s) IV Push once  dextrose 50% Injectable 25 Gram(s) IV Push once  docusate sodium 100 milliGRAM(s) Oral three times a day  insulin lispro (HumaLOG) corrective regimen sliding scale   SubCutaneous three times a day before meals  insulin lispro (HumaLOG) corrective regimen sliding scale   SubCutaneous at bedtime  lisinopril 10 milliGRAM(s) Oral daily  metoprolol tartrate 25 milliGRAM(s) Oral two times a day  pantoprazole    Tablet 40 milliGRAM(s) Oral before breakfast  polyethylene glycol 3350 17 Gram(s) Oral daily  senna 2 Tablet(s) Oral at bedtime  sodium chloride 0.9%. 1000 milliLiter(s) (100 mL/Hr) IV Continuous <Continuous>    MEDICATIONS  (PRN):  acetaminophen   Tablet .. 975 milliGRAM(s) Oral every 8 hours PRN Mild Pain (1 - 3)  dextrose 40% Gel 15 Gram(s) Oral once PRN Blood Glucose LESS THAN 70 milliGRAM(s)/deciliter  glucagon  Injectable 1 milliGRAM(s) IntraMuscular once PRN Glucose LESS THAN 70 milligrams/deciliter  oxyCODONE    IR 2.5 milliGRAM(s) Oral every 4 hours PRN Moderate Pain (4 - 6)  oxyCODONE    IR 5 milliGRAM(s) Oral every 4 hours PRN Severe Pain (7 - 10)          VITALS:   T(C): 36.8 (10-22-19 @ 00:02), Max: 36.8 (10-22-19 @ 00:02)  HR: 58 (10-22-19 @ 00:02) (57 - 73)  BP: 124/68 (10-22-19 @ 00:02) (124/68 - 145/66)  RR: 18 (10-22-19 @ 00:02) (16 - 18)  SpO2: 96% (10-22-19 @ 00:02) (94% - 99%)  Wt(kg): --    PHYSICAL EXAM:  GENERAL: NAD, well nourished and conversant  HEAD:  Atraumatic  EYES: EOM, PERRLA, conjunctiva pink and sclera white  ENT: No tonsillar erythema, exudates, or enlargement, moist mucous membranes, good dentition, no lesions  NECK: Supple, No JVD, normal thyroid, carotids with normal upstrokes and no bruits  CHEST/LUNG: Clear to auscultation bilaterally, No rales, rhonchi, wheezing, or rubs  HEART: Regular rate and rhythm, No murmurs, rubs, or gallops  ABDOMEN: Soft, nondistended, no masses, guarding, tenderness or rebound, bowel sounds present  EXTREMITIES:  2+ Peripheral Pulses, No clubbing, cyanosis, or edema. No arthritis of shoulders, elbows, hands, hips, knees, ankles, or feet. No DJD C spine, T spine, or L/S spine  LYMPH: No lymphadenopathy noted  SKIN: No rashes or lesions  NERVOUS SYSTEM:  Alert & Oriented X3, normal cognitive function. Motor Strength 5/5 right upper and right lower.  5/5 left upper and left lower extremities, DTRs 2+ intact and symmetric    LABS:        CBC Full  -  ( 21 Oct 2019 09:10 )  WBC Count : 14.63 K/uL  RBC Count : 3.52 M/uL  Hemoglobin : 10.6 g/dL  Hematocrit : 31.6 %  Platelet Count - Automated : 276 K/uL  Mean Cell Volume : 89.8 fl  Mean Cell Hemoglobin : 30.1 pg  Mean Cell Hemoglobin Concentration : 33.5 gm/dL  Auto Neutrophil # : 12.22 K/uL  Auto Lymphocyte # : 1.27 K/uL  Auto Monocyte # : 1.14 K/uL  Auto Eosinophil # : 0.00 K/uL  Auto Basophil # : 0.00 K/uL  Auto Neutrophil % : 83.5 %  Auto Lymphocyte % : 8.7 %  Auto Monocyte % : 7.8 %  Auto Eosinophil % : 0.0 %  Auto Basophil % : 0.0 %    10-21    135  |  98  |  32<H>  ----------------------------<  231<H>  4.3   |  23  |  1.45<H>    Ca    9.0      21 Oct 2019 07:02    TPro  6.9  /  Alb  4.0  /  TBili  0.7  /  DBili  x   /  AST  18  /  ALT  17  /  AlkPhos  57  10-20    LIVER FUNCTIONS - ( 20 Oct 2019 12:26 )  Alb: 4.0 g/dL / Pro: 6.9 g/dL / ALK PHOS: 57 U/L / ALT: 17 U/L / AST: 18 U/L / GGT: x           PT/INR - ( 20 Oct 2019 06:07 )   PT: 11.4 sec;   INR: 1.00 ratio         PTT - ( 20 Oct 2019 06:07 )  PTT:25.4 sec  Urinalysis Basic - ( 20 Oct 2019 09:44 )    Color: Colorless / Appearance: Clear / S.011 / pH: x  Gluc: x / Ketone: Negative  / Bili: Negative / Urobili: Negative   Blood: x / Protein: Negative / Nitrite: Negative   Leuk Esterase: Negative / RBC: 1 /hpf / WBC 0 /HPF   Sq Epi: x / Non Sq Epi: 0 /hpf / Bacteria: Negative      CAPILLARY BLOOD GLUCOSE      POCT Blood Glucose.: 224 mg/dL (21 Oct 2019 21:38)  POCT Blood Glucose.: 205 mg/dL (21 Oct 2019 17:41)  POCT Blood Glucose.: 254 mg/dL (21 Oct 2019 12:14)  POCT Blood Glucose.: 204 mg/dL (21 Oct 2019 08:42)      RADIOLOGY & ADDITIONAL TESTS:

## 2019-10-21 NOTE — DISCHARGE NOTE PROVIDER - HOSPITAL COURSE
This is a 86 year old Male with PMHx of CAD s/p CABG on plavix, T2DM, HTN admitted to [facility] on 10/19/19.  Patient evaluated and cleared by Medicine for operative procedure.  On 10/20 , patient underwent an uncomplicated ORIF L ankle.  Evaluated and treated by PT, recommended for Subacute Rehab.  Remain of hospital stay unremarkable, and patient discharged to Subacute Rehab when bed available.         < from: [facility] Duplex Lower Ext Vein Scan, Bilat (10.20.19 @ 08:51) >        IMPRESSION:         Acute below the knee DVT of the right soleal vein. Left lower extremity     veins distal to the femoral vein could not be evaluated.        < end of copied text > This is a 86 year old Male with PMHx of CAD s/p CABG on plavix, T2DM, HTN admitted to [facility] on 10/19/19.  Patient evaluated and cleared by Medicine for operative procedure.  On 10/20, patient underwent an uncomplicated ORIF L ankle.  Evaluated and treated by PT, recommended for Subacute Rehab.  Remain of hospital stay unremarkable, and patient discharged to Subacute Rehab when bed available.             < from: [facility] Duplex Lower Ext Vein Scan, Bilat (10.20.19 @ 08:51) >    IMPRESSION:         Acute below the knee DVT of the right soleal vein. Left lower extremity     veins distal to the femoral vein could not be evaluated.        < end of copied text >

## 2019-10-21 NOTE — DISCHARGE NOTE PROVIDER - NSDCCPCAREPLAN_GEN_ALL_CORE_FT
PRINCIPAL DISCHARGE DIAGNOSIS  Diagnosis: Closed trimalleolar fracture of left ankle, initial encounter  Assessment and Plan of Treatment: Closed trimalleolar fracture of left ankle, initial encounter      SECONDARY DISCHARGE DIAGNOSES  Diagnosis: Light-headedness  Assessment and Plan of Treatment:

## 2019-10-21 NOTE — PHYSICAL THERAPY INITIAL EVALUATION ADULT - PERTINENT HX OF CURRENT PROBLEM, REHAB EVAL
86y Male community ambulatory presents c/o L ankle pain sp mechanical fall while on cruise in [place] on Wednesday.  Was splinted in [place] and traveled back to [place] yesterday.  Came in today with increased pain/inability to ambulate with crutches at home.

## 2019-10-21 NOTE — DISCHARGE NOTE PROVIDER - CARE PROVIDER_API CALL
Loy Larsen)  Orthopaedic Surgery  [address] [place] [address], [address]  [place] [address]  Phone: [phone]  Fax: [phone]  Follow Up Time:

## 2019-10-21 NOTE — OCCUPATIONAL THERAPY INITIAL EVALUATION ADULT - IADL RETRAINING, OT EVAL
GOAL: Pt will complete cooking task standing at the counter to assist with IADLs for family member assist in 4 weeks

## 2019-10-21 NOTE — DISCHARGE NOTE PROVIDER - DISCHARGE DATE
INDICATION: Left hip dislocation on CT     



COMPARISON: CT chest/abdomen/pelvis same date



TECHNIQUE: AP and lateral views were obtained.



FINDINGS: No acute fracture is seen. There was posterior dislocation on the CT. The

crosstable lateral images are not adequate to determine the position of the femoral head

within the acetabulum. The AP view does show mild lateral displacement of the femur but

the position has changed suggesting relocation.



IMPRESSION: NO ACUTE FRACTURE IS SEEN. THE POSITION OF THE FEMORAL HEAD WITHIN THE

ACETABULUM CANNOT BE DETERMINED ON THESE IMAGES, HOWEVER. 22-Oct-2019

## 2019-10-21 NOTE — OCCUPATIONAL THERAPY INITIAL EVALUATION ADULT - PERTINENT HX OF CURRENT PROBLEM, REHAB EVAL
86M community ambulatory presents c/o L ankle pain sp mechanical fall while on cruise in [place] on Wednesday.  Was splinted in [place] and traveled back to [place] yesterday.  Came in today with increased pain/inability to ambulate with crutches at home.

## 2019-10-21 NOTE — PROGRESS NOTE ADULT - PROBLEM SELECTOR PLAN 1
PT/OT-NWB LLE  IS  DVT PPx  Pain Control  Continue Current Tx.  Dispo planning    Nathaniel Engel PA-C  Team Pager: [phone]

## 2019-10-22 ENCOUNTER — TRANSCRIPTION ENCOUNTER (OUTPATIENT)
Age: 84
End: 2019-10-22

## 2019-10-22 VITALS
RESPIRATION RATE: 18 BRPM | TEMPERATURE: 98 F | HEART RATE: 57 BPM | SYSTOLIC BLOOD PRESSURE: 149 MMHG | OXYGEN SATURATION: 95 % | DIASTOLIC BLOOD PRESSURE: 79 MMHG

## 2019-10-22 LAB
GLUCOSE BLDC GLUCOMTR-MCNC: 194 MG/DL — HIGH (ref 70–99)
GLUCOSE BLDC GLUCOMTR-MCNC: 225 MG/DL — HIGH (ref 70–99)

## 2019-10-22 RX ADMIN — OXYCODONE HYDROCHLORIDE 5 MILLIGRAM(S): 5 TABLET ORAL at 10:44

## 2019-10-22 RX ADMIN — AMLODIPINE BESYLATE 5 MILLIGRAM(S): 2.5 TABLET ORAL at 06:24

## 2019-10-22 RX ADMIN — Medication 1: at 09:37

## 2019-10-22 RX ADMIN — Medication 25 MILLIGRAM(S): at 06:24

## 2019-10-22 RX ADMIN — CLOPIDOGREL BISULFATE 75 MILLIGRAM(S): 75 TABLET, FILM COATED ORAL at 11:28

## 2019-10-22 RX ADMIN — Medication 2: at 14:10

## 2019-10-22 RX ADMIN — Medication 81 MILLIGRAM(S): at 11:28

## 2019-10-22 RX ADMIN — PANTOPRAZOLE SODIUM 40 MILLIGRAM(S): 20 TABLET, DELAYED RELEASE ORAL at 06:24

## 2019-10-22 RX ADMIN — LISINOPRIL 10 MILLIGRAM(S): 2.5 TABLET ORAL at 06:24

## 2019-10-22 RX ADMIN — OXYCODONE HYDROCHLORIDE 5 MILLIGRAM(S): 5 TABLET ORAL at 11:15

## 2019-10-22 NOTE — PROGRESS NOTE ADULT - ATTENDING COMMENTS
No medical complications post-op to date and to proceed with physical therapy, as tolerated. Continues pulmonary toilet to lessen atelectasis, leg exercises as taught to lessen the risk of DVT and supervised pain medications for post-op pain control. Cleared by orthopedics for discharge to rehabilitation.  Full instructions provided regarding diagnosis, treatment, discharge medications, diet and follow up care on transfer sheet. Medically stable and for discharge to rehabilitation today as planned.

## 2019-10-22 NOTE — PROGRESS NOTE ADULT - SUBJECTIVE AND OBJECTIVE BOX
Patient is a 86y old  Male who presents with a chief complaint of left ankle fracture.  He was on vacation in [place] on a cruise and was visiting [place].  He was walking on the street and had an accidental fall with trauma to his left ankle.  He was unable to rise and brought to local hospital.  X-rays confirmed the left ankle fracture.  The patient was immobilized and the left leg was elevated and he was able to get home with nonweightbearing over the next 48 hours.  The fracture occurred on 10/16/2019 and he arrived home on 10/18/2019 only to come to the emergency room for evaluation on the following day.  He complains of inability to bear weight, stand, and walk; otherwise, he would be in terrific pain.  He is relatively asymptomatic with leg elevated and nonweightbearing.  X-rays were obtained confirming displacement of the ankle fracture and necessitating. ORIF was performed 10/20/2019 and the patient was seen post-op. He is in moderate pain with left leg elevated and iced. Patient seen OOB in chair. Patient found to have a soleal DVT on the right.       MEDICATIONS  (STANDING):  amLODIPine   Tablet 5 milliGRAM(s) Oral daily  aspirin  chewable 81 milliGRAM(s) Oral daily  atorvastatin 80 milliGRAM(s) Oral at bedtime  clopidogrel Tablet 75 milliGRAM(s) Oral daily  dextrose 50% Injectable 12.5 Gram(s) IV Push once  dextrose 50% Injectable 25 Gram(s) IV Push once  docusate sodium 100 milliGRAM(s) Oral three times a day  insulin lispro (HumaLOG) corrective regimen sliding scale   SubCutaneous three times a day before meals  insulin lispro (HumaLOG) corrective regimen sliding scale   SubCutaneous at bedtime  lisinopril 10 milliGRAM(s) Oral daily  metoprolol tartrate 25 milliGRAM(s) Oral two times a day  pantoprazole    Tablet 40 milliGRAM(s) Oral before breakfast  polyethylene glycol 3350 17 Gram(s) Oral daily  senna 2 Tablet(s) Oral at bedtime  sodium chloride 0.9%. 1000 milliLiter(s) (100 mL/Hr) IV Continuous <Continuous>    MEDICATIONS  (PRN):  acetaminophen   Tablet .. 975 milliGRAM(s) Oral every 8 hours PRN Mild Pain (1 - 3)  dextrose 40% Gel 15 Gram(s) Oral once PRN Blood Glucose LESS THAN 70 milliGRAM(s)/deciliter  glucagon  Injectable 1 milliGRAM(s) IntraMuscular once PRN Glucose LESS THAN 70 milligrams/deciliter  oxyCODONE    IR 2.5 milliGRAM(s) Oral every 4 hours PRN Moderate Pain (4 - 6)  oxyCODONE    IR 5 milliGRAM(s) Oral every 4 hours PRN Severe Pain (7 - 10)      Vital Signs Last 24 Hrs  T(C): 36.8 (22 Oct 2019 08:04), Max: 36.8 (22 Oct 2019 00:02)  T(F): 98.2 (22 Oct 2019 08:04), Max: 98.2 (22 Oct 2019 00:02)  HR: 57 (22 Oct 2019 08:04) (57 - 66)  BP: 149/79 (22 Oct 2019 08:04) (124/68 - 149/79)  BP(mean): --  RR: 18 (22 Oct 2019 08:04) (18 - 18)  SpO2: 95% (22 Oct 2019 08:04) (94% - 99%)    PHYSICAL EXAM:  GENERAL: NAD, well nourished and conversant  HEAD:  Atraumatic  EYES: EOM, PERRLA, conjunctiva pink and sclera white  ENT: No tonsillar erythema, exudates, or enlargement, moist mucous membranes, good dentition, no lesions  NECK: Supple, No JVD, normal thyroid, carotids with normal upstrokes and no bruits  CHEST/LUNG: Clear to auscultation bilaterally, No rales, rhonchi, wheezing, or rubs  HEART: Regular rate and rhythm, No murmurs, rubs, or gallops  ABDOMEN: Soft, nondistended, no masses, guarding, tenderness or rebound, bowel sounds present  EXTREMITIES:  2+ Peripheral Pulses, No clubbing, cyanosis, or edema. No arthritis of shoulders, elbows, hands, hips, knees, ankles, or feet. No DJD C spine, T spine, or L/S spine  LYMPH: No lymphadenopathy noted  SKIN: No rashes or lesions  NERVOUS SYSTEM:  Alert & Oriented X3, normal cognitive function. Motor Strength 5/5 right upper and right lower.  5/5 left upper and left lower extremities, DTRs 2+ intact and symmetric    LABS: No labs obtained today        RADIOLOGY & ADDITIONAL TESTS: Patient is a 86y old  Male who presents with a chief complaint of left ankle fracture.  He was on vacation in [place] on a cruise and was visiting [place].  He was walking on the street and had an accidental fall with trauma to his left ankle.  He was unable to rise and brought to local hospital.  X-rays confirmed the left ankle fracture.  The patient was immobilized and the left leg was elevated and he was able to get home with nonweightbearing over the next 48 hours.  The fracture occurred on 10/16/2019 and he arrived home on 10/18/2019 only to come to the emergency room for evaluation on the following day.  He complains of inability to bear weight, stand, and walk; otherwise, he would be in terrific pain.  He is relatively asymptomatic with leg elevated and nonweightbearing.  X-rays were obtained confirming displacement of the ankle fracture and necessitating. ORIF was performed 10/20/2019 and the patient was seen post-op. He is in moderate pain with left leg elevated and iced. Patient seen OOB in chair. Patient found to have a soleal DVT on the right. Started on a daily aspirin and to have a repeat doppler if leg symptoms increase  or routinely in one week  to evaluate for an ascending DVT requiring full anticoagulation. Non weight bearing after left ankle surgery and cleared for rehabilitaion.                                                                                                                                                                                                                                                                                                                                                                                                                                                                                                                                                                                                                                           rbdddddddddddddddddddddddddddddddddddddddddddddddddddddddddddddddddddddddddbbbbbbbbbbbbbbbbbbbbbbbbbbbbbbbbbbbbbbbbbbbbbbbbbbbbbbbbbbbnnnnnnnnnnnnnnnnnnnnnnnnnnnnnnnnnnnnnnnnnnnnnnnnnnnnnnnnnnn    MEDICATIONS  (STANDING):  amLODIPine   Tablet 5 milliGRAM(s) Oral daily  aspirin  chewable 81 milliGRAM(s) Oral daily  atorvastatin 80 milliGRAM(s) Oral at bedtime  clopidogrel Tablet 75 milliGRAM(s) Oral daily  dextrose 50% Injectable 12.5 Gram(s) IV Push once  dextrose 50% Injectable 25 Gram(s) IV Push once  docusate sodium 100 milliGRAM(s) Oral three times a day  insulin lispro (HumaLOG) corrective regimen sliding scale   SubCutaneous three times a day before meals  insulin lispro (HumaLOG) corrective regimen sliding scale   SubCutaneous at bedtime  lisinopril 10 milliGRAM(s) Oral daily  metoprolol tartrate 25 milliGRAM(s) Oral two times a day  pantoprazole    Tablet 40 milliGRAM(s) Oral before breakfast  polyethylene glycol 3350 17 Gram(s) Oral daily  senna 2 Tablet(s) Oral at bedtime  sodium chloride 0.9%. 1000 milliLiter(s) (100 mL/Hr) IV Continuous <Continuous>    MEDICATIONS  (PRN):  acetaminophen   Tablet .. 975 milliGRAM(s) Oral every 8 hours PRN Mild Pain (1 - 3)  dextrose 40% Gel 15 Gram(s) Oral once PRN Blood Glucose LESS THAN 70 milliGRAM(s)/deciliter  glucagon  Injectable 1 milliGRAM(s) IntraMuscular once PRN Glucose LESS THAN 70 milligrams/deciliter  oxyCODONE    IR 2.5 milliGRAM(s) Oral every 4 hours PRN Moderate Pain (4 - 6)  oxyCODONE    IR 5 milliGRAM(s) Oral every 4 hours PRN Severe Pain (7 - 10)      Vital Signs Last 24 Hrs  T(C): 36.8 (22 Oct 2019 08:04), Max: 36.8 (22 Oct 2019 00:02)  T(F): 98.2 (22 Oct 2019 08:04), Max: 98.2 (22 Oct 2019 00:02)  HR: 57 (22 Oct 2019 08:04) (57 - 66)  BP: 149/79 (22 Oct 2019 08:04) (124/68 - 149/79)  BP(mean): --  RR: 18 (22 Oct 2019 08:04) (18 - 18)  SpO2: 95% (22 Oct 2019 08:04) (94% - 99%)    PHYSICAL EXAM:  GENERAL: NAD, well nourished and conversant  HEAD:  Atraumatic  EYES: EOM, PERRLA, conjunctiva pink and sclera white  ENT: No tonsillar erythema, exudates, or enlargement, moist mucous membranes, good dentition, no lesions  NECK: Supple, No JVD, normal thyroid, carotids with normal upstrokes and no bruits  CHEST/LUNG: Clear to auscultation bilaterally, No rales, rhonchi, wheezing, or rubs  HEART: Regular rate and rhythm, No murmurs, rubs, or gallops  ABDOMEN: Soft, nondistended, no masses, guarding, tenderness or rebound, bowel sounds present  EXTREMITIES:  2+ Peripheral Pulses, No clubbing, cyanosis, or edema. No arthritis of shoulders, elbows, hands, hips, knees, ankles, or feet. No DJD C spine, T spine, or L/S spine  LYMPH: No lymphadenopathy noted  SKIN: No rashes or lesions  NERVOUS SYSTEM:  Alert & Oriented X3, normal cognitive function. Motor Strength 5/5 right upper and right lower.  5/5 left upper and left lower extremities, DTRs 2+ intact and symmetric    LABS: No labs obtained today        RADIOLOGY & ADDITIONAL TESTS:

## 2019-10-22 NOTE — PROGRESS NOTE ADULT - ASSESSMENT
A/p: 86y male s/p L Trimalleolar Fx scheduled for ORIF procedure today with Dr. Larsen.    Dr. Larsen to see  F/U clearance from medicine.  F/U BLE Dopplers  F/U AM Pre-Op Labs  PT/OT-NWB until further notice  Remain NPO until after Sx  DVT PPx: On Hold for Pre Op  Pain Control  Continue Current Tx.    Tommy Ramos PA-C  Team Pager: #[phone]
A/p: 86yMale s/p L ankle ORIF.  VSS. NAD.
Impression: Stable       Plan:   Continue present treatment                 Out of bed, ambulate, non-weight bearing                               Physical therapy follow up                 Continue to monitor    Jame Joe PA-C  Orthopaedic Surgery  Team pager [phone]  [phone]
ORIF was performed 10/20/2019 and the patient was seen post-op. He is in moderate pain with left leg elevated and iced. Patient found to have a right soleal DVT. will start on asprin. will need to follow repliza LE doppler. If clot worsens will need to start AC. continue curent pain meds. PO as tolerated. eventual DC planning to rehab
ORIF was performed 10/20/2019 and the patient was seen post-op. He is in moderate pain with left leg elevated and iced. Patient found to have a right soleal DVT. will start on asprin. will need to follow repliza LE doppler. If clot worsens will need to start AC. continue curent pain meds. PO as tolerated. eventual DC planning to rehab
ORIF was performed 10/20/2019 and the patient was seen post-op. He is in moderate pain with left leg elevated and iced.

## 2019-10-22 NOTE — PROGRESS NOTE ADULT - SUBJECTIVE AND OBJECTIVE BOX
ORTHO  Patient is a 86y old  Male who presents with a chief complaint of left ankle fracture (21 Oct 2019 22:34)    Pt. resting without complaint    VS-  T(C): 36.7 (10-22-19 @ 04:53), Max: 36.8 (10-22-19 @ 00:02)  HR: 58 (10-22-19 @ 04:53) (57 - 66)  BP: 147/69 (10-22-19 @ 04:53) (124/68 - 147/69)  RR: 18 (10-22-19 @ 04:53) (16 - 18)  SpO2: 94% (10-22-19 @ 04:53) (94% - 99%)  Wt(kg): --    M.S. A&O  Extremity- Left LE- short leg splint in place, elevated             Digits pink warm and mobile with good sensation                               10.6   14.63 )-----------( 276      ( 21 Oct 2019 09:10 )             31.6     10-21    135  |  98  |  32<H>  ----------------------------<  231<H>  4.3   |  23  |  1.45<H>    Ca    9.0      21 Oct 2019 07:02    TPro  6.9  /  Alb  4.0  /  TBili  0.7  /  DBili  x   /  AST  18  /  ALT  17  /  AlkPhos  57  10-20

## 2019-10-22 NOTE — PROGRESS NOTE ADULT - REASON FOR ADMISSION
left ankle fracture
left ankle fracture-ORIF

## 2019-11-08 NOTE — CHART NOTE - NSCHARTNOTEFT_GEN_A_CORE
Regarding sodium level during hospital stay, slight hyponatremia on admission was discovered 2/2 routine preop labs.  Patient likely has chronic mild hyponatremia.  These levels were non-consequential on his hospital stay.    CANDICE Engel PA-C  #[id number]

## 2019-11-12 PROCEDURE — 86900 BLOOD TYPING SEROLOGIC ABO: CPT

## 2019-11-12 PROCEDURE — 97165 OT EVAL LOW COMPLEX 30 MIN: CPT

## 2019-11-12 PROCEDURE — 84484 ASSAY OF TROPONIN QUANT: CPT

## 2019-11-12 PROCEDURE — 85730 THROMBOPLASTIN TIME PARTIAL: CPT

## 2019-11-12 PROCEDURE — 81001 URINALYSIS AUTO W/SCOPE: CPT

## 2019-11-12 PROCEDURE — 86901 BLOOD TYPING SEROLOGIC RH(D): CPT

## 2019-11-12 PROCEDURE — 85027 COMPLETE CBC AUTOMATED: CPT

## 2019-11-12 PROCEDURE — 97161 PT EVAL LOW COMPLEX 20 MIN: CPT

## 2019-11-12 PROCEDURE — 93970 EXTREMITY STUDY: CPT

## 2019-11-12 PROCEDURE — 86850 RBC ANTIBODY SCREEN: CPT

## 2019-11-12 PROCEDURE — 99285 EMERGENCY DEPT VISIT HI MDM: CPT | Mod: 25

## 2019-11-12 PROCEDURE — 80053 COMPREHEN METABOLIC PANEL: CPT

## 2019-11-12 PROCEDURE — 80048 BASIC METABOLIC PNL TOTAL CA: CPT

## 2019-11-12 PROCEDURE — 73610 X-RAY EXAM OF ANKLE: CPT

## 2019-11-12 PROCEDURE — 82962 GLUCOSE BLOOD TEST: CPT

## 2019-11-12 PROCEDURE — 73590 X-RAY EXAM OF LOWER LEG: CPT

## 2019-11-12 PROCEDURE — 85610 PROTHROMBIN TIME: CPT

## 2019-11-12 PROCEDURE — 93005 ELECTROCARDIOGRAM TRACING: CPT

## 2019-11-12 PROCEDURE — 71045 X-RAY EXAM CHEST 1 VIEW: CPT

## 2019-11-12 PROCEDURE — 73700 CT LOWER EXTREMITY W/O DYE: CPT

## 2019-11-12 PROCEDURE — 83036 HEMOGLOBIN GLYCOSYLATED A1C: CPT

## 2019-11-12 PROCEDURE — 76000 FLUOROSCOPY <1 HR PHYS/QHP: CPT

## 2019-11-12 PROCEDURE — C1713: CPT

## 2019-11-12 PROCEDURE — 73620 X-RAY EXAM OF FOOT: CPT

## 2019-11-12 PROCEDURE — 76377 3D RENDER W/INTRP POSTPROCES: CPT

## 2019-11-22 ENCOUNTER — APPOINTMENT (OUTPATIENT)
Dept: GASTROENTEROLOGY | Facility: CLINIC | Age: 84
End: 2019-11-22
Payer: MEDICARE

## 2019-11-22 VITALS
OXYGEN SATURATION: 98 % | WEIGHT: 150 LBS | RESPIRATION RATE: 15 BRPM | HEART RATE: 64 BPM | SYSTOLIC BLOOD PRESSURE: 118 MMHG | BODY MASS INDEX: 24.99 KG/M2 | DIASTOLIC BLOOD PRESSURE: 60 MMHG | HEIGHT: 65 IN

## 2019-11-22 DIAGNOSIS — Z87.19 PERSONAL HISTORY OF OTHER DISEASES OF THE DIGESTIVE SYSTEM: ICD-10-CM

## 2019-11-22 DIAGNOSIS — K30 FUNCTIONAL DYSPEPSIA: ICD-10-CM

## 2019-11-22 DIAGNOSIS — R10.13 EPIGASTRIC PAIN: ICD-10-CM

## 2019-11-22 PROCEDURE — 99214 OFFICE O/P EST MOD 30 MIN: CPT

## 2019-11-22 NOTE — ASSESSMENT
[FreeTextEntry1] : 86 year old male post rehab follow up. He was admitted with left leg fracture. His epigastric discomfort possible from gastritis. Dr Pereira came in answered all his questions recommended him to take Pantoprazole 40 mg daily 1/2 hr before breakfast. Eat small frequent meal avoid NSAIDs, for pain he can take Tylenol. \par \par Last EGD from 2/22/2017 resulted normal esophagus, Hiatal hernia,mild atrophic gastritis. \par F/U OV in 3 months earlier if needed.

## 2019-11-22 NOTE — PHYSICAL EXAM
[General Appearance - Alert] : alert [General Appearance - In No Acute Distress] : in no acute distress [General Appearance - Well Nourished] : well nourished [General Appearance - Well Developed] : well developed [Sclera] : the sclera and conjunctiva were normal [Outer Ear] : the ears and nose were normal in appearance [Neck Appearance] : the appearance of the neck was normal [Respiration, Rhythm And Depth] : normal respiratory rhythm and effort [Exaggerated Use Of Accessory Muscles For Inspiration] : no accessory muscle use [Auscultation Breath Sounds / Voice Sounds] : lungs were clear to auscultation bilaterally [Apical Impulse] : the apical impulse was normal [Heart Rate And Rhythm] : heart rate was normal and rhythm regular [Heart Sounds] : normal S1 and S2 [Heart Sounds Gallop] : no gallops [Bowel Sounds] : normal bowel sounds [Abdomen Soft] : soft [Abdomen Tenderness] : non-tender [FreeTextEntry1] : Cast on left leg Wheel chair bond  [Skin Color & Pigmentation] : normal skin color and pigmentation [Skin Turgor] : normal skin turgor [] : no rash [Skin Lesions] : no skin lesions [Oriented To Time, Place, And Person] : oriented to person, place, and time [Impaired Insight] : insight and judgment were intact [Affect] : the affect was normal [Mood] : the mood was normal

## 2019-11-22 NOTE — HISTORY OF PRESENT ILLNESS
[de-identified] : 86 year old male presented post rehab discharge. He went for [place] vacation fell fractured left leg he was in rehab for couple of weeks. He did not like their food he got nauseas he did not like Protonix so he refused he was having epigastric pain.His appetite was poor mainly because he did not like the rehab or their food . His GERD symptoms were well controlled on Pantoprazole so he asked his son to bring PAntoprazole in Rehab last week. The epigastric discomfort improved with Pantoprazole 20 mg daily. He was given pain medication and constipation medication in rehab he has no pain and he is moving bowel daily so refused those medications. He is discharged from rehab today and already feeling better. He denies constipation, black or bloody bowel movement.\par \par Social history nonsmoker.

## 2019-11-22 NOTE — REASON FOR VISIT
[Follow-Up: _____] : a [unfilled] follow-up visit [Family Member] : family member [FreeTextEntry1] : epigastric discomfort

## 2019-11-27 ENCOUNTER — INPATIENT (INPATIENT)
Facility: HOSPITAL | Age: 84
LOS: 5 days | Discharge: HOME | DRG: 871 | End: 2019-12-03
Attending: HOSPITALIST | Admitting: STUDENT IN AN ORGANIZED HEALTH CARE EDUCATION/TRAINING PROGRAM
Payer: MEDICARE

## 2019-11-27 VITALS
RESPIRATION RATE: 22 BRPM | OXYGEN SATURATION: 98 % | HEIGHT: 67 IN | WEIGHT: 158.07 LBS | SYSTOLIC BLOOD PRESSURE: 132 MMHG | HEART RATE: 86 BPM | DIASTOLIC BLOOD PRESSURE: 90 MMHG

## 2019-11-27 DIAGNOSIS — R09.02 HYPOXEMIA: ICD-10-CM

## 2019-11-27 DIAGNOSIS — Z95.1 PRESENCE OF AORTOCORONARY BYPASS GRAFT: Chronic | ICD-10-CM

## 2019-11-27 LAB
ALBUMIN SERPL ELPH-MCNC: 4.7 G/DL — SIGNIFICANT CHANGE UP (ref 3.3–5)
ALP SERPL-CCNC: 81 U/L — SIGNIFICANT CHANGE UP (ref 40–120)
ALT FLD-CCNC: 10 U/L — SIGNIFICANT CHANGE UP (ref 10–45)
ANION GAP SERPL CALC-SCNC: 17 MMOL/L — SIGNIFICANT CHANGE UP (ref 5–17)
APPEARANCE UR: CLEAR — SIGNIFICANT CHANGE UP
APTT BLD: 26.4 SEC — LOW (ref 27.5–36.3)
AST SERPL-CCNC: 13 U/L — SIGNIFICANT CHANGE UP (ref 10–40)
B PERT DNA SPEC QL NAA+PROBE: SIGNIFICANT CHANGE UP
BASOPHILS # BLD AUTO: 0.16 K/UL — SIGNIFICANT CHANGE UP (ref 0–0.2)
BASOPHILS NFR BLD AUTO: 1 % — SIGNIFICANT CHANGE UP (ref 0–2)
BILIRUB SERPL-MCNC: 0.6 MG/DL — SIGNIFICANT CHANGE UP (ref 0.2–1.2)
BILIRUB UR-MCNC: NEGATIVE — SIGNIFICANT CHANGE UP
BUN SERPL-MCNC: 13 MG/DL — SIGNIFICANT CHANGE UP (ref 7–23)
C PNEUM DNA SPEC QL NAA+PROBE: SIGNIFICANT CHANGE UP
CALCIUM SERPL-MCNC: 10.4 MG/DL — SIGNIFICANT CHANGE UP (ref 8.4–10.5)
CHLORIDE SERPL-SCNC: 86 MMOL/L — LOW (ref 96–108)
CO2 SERPL-SCNC: 23 MMOL/L — SIGNIFICANT CHANGE UP (ref 22–31)
COLOR SPEC: SIGNIFICANT CHANGE UP
CREAT SERPL-MCNC: 1.19 MG/DL — SIGNIFICANT CHANGE UP (ref 0.5–1.3)
DIFF PNL FLD: NEGATIVE — SIGNIFICANT CHANGE UP
EOSINOPHIL # BLD AUTO: 0.16 K/UL — SIGNIFICANT CHANGE UP (ref 0–0.5)
EOSINOPHIL NFR BLD AUTO: 1 % — SIGNIFICANT CHANGE UP (ref 0–6)
FLUAV H1 2009 PAND RNA SPEC QL NAA+PROBE: SIGNIFICANT CHANGE UP
FLUAV H1 RNA SPEC QL NAA+PROBE: SIGNIFICANT CHANGE UP
FLUAV H3 RNA SPEC QL NAA+PROBE: SIGNIFICANT CHANGE UP
FLUAV SUBTYP SPEC NAA+PROBE: SIGNIFICANT CHANGE UP
FLUBV RNA SPEC QL NAA+PROBE: SIGNIFICANT CHANGE UP
GAS PNL BLDV: SIGNIFICANT CHANGE UP
GAS PNL BLDV: SIGNIFICANT CHANGE UP
GLUCOSE SERPL-MCNC: 202 MG/DL — HIGH (ref 70–99)
GLUCOSE UR QL: NEGATIVE — SIGNIFICANT CHANGE UP
HADV DNA SPEC QL NAA+PROBE: SIGNIFICANT CHANGE UP
HCOV PNL SPEC NAA+PROBE: SIGNIFICANT CHANGE UP
HCT VFR BLD CALC: 36.2 % — LOW (ref 39–50)
HGB BLD-MCNC: 12.7 G/DL — LOW (ref 13–17)
HMPV RNA SPEC QL NAA+PROBE: SIGNIFICANT CHANGE UP
HPIV1 RNA SPEC QL NAA+PROBE: DETECTED
HPIV2 RNA SPEC QL NAA+PROBE: SIGNIFICANT CHANGE UP
HPIV3 RNA SPEC QL NAA+PROBE: SIGNIFICANT CHANGE UP
HPIV4 RNA SPEC QL NAA+PROBE: SIGNIFICANT CHANGE UP
INR BLD: 0.99 RATIO — SIGNIFICANT CHANGE UP (ref 0.88–1.16)
KETONES UR-MCNC: ABNORMAL
LEUKOCYTE ESTERASE UR-ACNC: NEGATIVE — SIGNIFICANT CHANGE UP
LYMPHOCYTES # BLD AUTO: 1.13 K/UL — SIGNIFICANT CHANGE UP (ref 1–3.3)
LYMPHOCYTES # BLD AUTO: 7 % — LOW (ref 13–44)
MCHC RBC-ENTMCNC: 30.5 PG — SIGNIFICANT CHANGE UP (ref 27–34)
MCHC RBC-ENTMCNC: 35.1 GM/DL — SIGNIFICANT CHANGE UP (ref 32–36)
MCV RBC AUTO: 87 FL — SIGNIFICANT CHANGE UP (ref 80–100)
MONOCYTES # BLD AUTO: 1.45 K/UL — HIGH (ref 0–0.9)
MONOCYTES NFR BLD AUTO: 9 % — SIGNIFICANT CHANGE UP (ref 2–14)
NEUTROPHILS # BLD AUTO: 13.19 K/UL — HIGH (ref 1.8–7.4)
NEUTROPHILS NFR BLD AUTO: 82 % — HIGH (ref 43–77)
NITRITE UR-MCNC: NEGATIVE — SIGNIFICANT CHANGE UP
NRBC # BLD: 0 /100 WBCS — SIGNIFICANT CHANGE UP (ref 0–0)
PH UR: 6.5 — SIGNIFICANT CHANGE UP (ref 5–8)
PLATELET # BLD AUTO: 345 K/UL — SIGNIFICANT CHANGE UP (ref 150–400)
POTASSIUM SERPL-MCNC: 4 MMOL/L — SIGNIFICANT CHANGE UP (ref 3.5–5.3)
POTASSIUM SERPL-SCNC: 4 MMOL/L — SIGNIFICANT CHANGE UP (ref 3.5–5.3)
PROT SERPL-MCNC: 7.6 G/DL — SIGNIFICANT CHANGE UP (ref 6–8.3)
PROT UR-MCNC: ABNORMAL
PROTHROM AB SERPL-ACNC: 11.3 SEC — SIGNIFICANT CHANGE UP (ref 10–12.9)
RAPID RVP RESULT: DETECTED
RBC # BLD: 4.16 M/UL — LOW (ref 4.2–5.8)
RBC # FLD: 13.3 % — SIGNIFICANT CHANGE UP (ref 10.3–14.5)
RV+EV RNA SPEC QL NAA+PROBE: SIGNIFICANT CHANGE UP
SODIUM SERPL-SCNC: 126 MMOL/L — LOW (ref 135–145)
SP GR SPEC: 1.02 — SIGNIFICANT CHANGE UP (ref 1.01–1.02)
TROPONIN T, HIGH SENSITIVITY RESULT: 24 NG/L — SIGNIFICANT CHANGE UP (ref 0–51)
TROPONIN T, HIGH SENSITIVITY RESULT: 24 NG/L — SIGNIFICANT CHANGE UP (ref 0–51)
UROBILINOGEN FLD QL: NEGATIVE — SIGNIFICANT CHANGE UP
WBC # BLD: 16.09 K/UL — HIGH (ref 3.8–10.5)
WBC # FLD AUTO: 16.09 K/UL — HIGH (ref 3.8–10.5)

## 2019-11-27 PROCEDURE — 99223 1ST HOSP IP/OBS HIGH 75: CPT | Mod: GC

## 2019-11-27 PROCEDURE — 70491 CT SOFT TISSUE NECK W/DYE: CPT | Mod: 26

## 2019-11-27 PROCEDURE — 71045 X-RAY EXAM CHEST 1 VIEW: CPT | Mod: 26

## 2019-11-27 PROCEDURE — 99285 EMERGENCY DEPT VISIT HI MDM: CPT | Mod: GC

## 2019-11-27 PROCEDURE — 93010 ELECTROCARDIOGRAM REPORT: CPT

## 2019-11-27 RX ORDER — CEFTRIAXONE 500 MG/1
1000 INJECTION, POWDER, FOR SOLUTION INTRAMUSCULAR; INTRAVENOUS ONCE
Refills: 0 | Status: COMPLETED | OUTPATIENT
Start: 2019-11-27 | End: 2019-11-27

## 2019-11-27 RX ORDER — DEXAMETHASONE 0.5 MG/5ML
6 ELIXIR ORAL ONCE
Refills: 0 | Status: DISCONTINUED | OUTPATIENT
Start: 2019-11-27 | End: 2019-11-27

## 2019-11-27 RX ORDER — SODIUM CHLORIDE 9 MG/ML
2200 INJECTION INTRAMUSCULAR; INTRAVENOUS; SUBCUTANEOUS ONCE
Refills: 0 | Status: COMPLETED | OUTPATIENT
Start: 2019-11-27 | End: 2019-11-27

## 2019-11-27 RX ORDER — ACETAMINOPHEN 500 MG
650 TABLET ORAL ONCE
Refills: 0 | Status: COMPLETED | OUTPATIENT
Start: 2019-11-27 | End: 2019-11-27

## 2019-11-27 RX ORDER — AZITHROMYCIN 500 MG/1
500 TABLET, FILM COATED ORAL ONCE
Refills: 0 | Status: COMPLETED | OUTPATIENT
Start: 2019-11-27 | End: 2019-11-27

## 2019-11-27 RX ORDER — DEXAMETHASONE 0.5 MG/5ML
6 ELIXIR ORAL ONCE
Refills: 0 | Status: COMPLETED | OUTPATIENT
Start: 2019-11-27 | End: 2019-11-27

## 2019-11-27 RX ORDER — LIDOCAINE 4 G/100G
10 CREAM TOPICAL ONCE
Refills: 0 | Status: COMPLETED | OUTPATIENT
Start: 2019-11-27 | End: 2019-11-27

## 2019-11-27 RX ADMIN — Medication 6 MILLIGRAM(S): at 19:34

## 2019-11-27 RX ADMIN — AZITHROMYCIN 250 MILLIGRAM(S): 500 TABLET, FILM COATED ORAL at 20:27

## 2019-11-27 RX ADMIN — Medication 650 MILLIGRAM(S): at 19:34

## 2019-11-27 RX ADMIN — LIDOCAINE 10 MILLILITER(S): 4 CREAM TOPICAL at 19:34

## 2019-11-27 RX ADMIN — SODIUM CHLORIDE 2200 MILLILITER(S): 9 INJECTION INTRAMUSCULAR; INTRAVENOUS; SUBCUTANEOUS at 19:33

## 2019-11-27 RX ADMIN — CEFTRIAXONE 100 MILLIGRAM(S): 500 INJECTION, POWDER, FOR SOLUTION INTRAMUSCULAR; INTRAVENOUS at 19:33

## 2019-11-27 NOTE — ED ADULT NURSE NOTE - NSIMPLEMENTINTERV_GEN_ALL_ED
Implemented All Fall Risk Interventions:  Adrian to call system. Call bell, personal items and telephone within reach. Instruct patient to call for assistance. Room bathroom lighting operational. Non-slip footwear when patient is off stretcher. Physically safe environment: no spills, clutter or unnecessary equipment. Stretcher in lowest position, wheels locked, appropriate side rails in place. Provide visual cue, wrist band, yellow gown, etc. Monitor gait and stability. Monitor for mental status changes and reorient to person, place, and time. Review medications for side effects contributing to fall risk. Reinforce activity limits and safety measures with patient and family.

## 2019-11-27 NOTE — ED PROVIDER NOTE - ATTENDING CONTRIBUTION TO CARE
I have seen and evaluated this patient with the resident.   I agree with the findings  unless other wise stated.  I have made appropriate changes in documentations where needed, After my face to face bedside evaluation, I am further  notin87 y/o male with PMHx HTN, DM, CAD with stent placement BIBA from primary care office Dr. Schaffer due to cough and fever x 2 days. notes significant sore throat, productive cough with yellow/white sputum. Admits to chest pain with cough and SOB. Pt was admitted 10/19/19 for ankle fx in which surgery was performed and was discharged to rehab 4 days later. pt denies palpitations, LE swelling, hemoptysis, pleuritic chest pain, LOC, or any other complaints. Pt has some respiratory discomfort no distress bilateral expiratory wheeze general malaise abdomen soft non focal neuro labs and xrays re viewed will admit --Allen I have seen and evaluated this patient with the PA.   I agree with the findings  unless other wise stated.  I have made appropriate changes in documentations where needed, After my face to face bedside evaluation, I am further  notin85 y/o male with PMHx HTN, DM, CAD with stent placement BIBA from primary care office Dr. Schaffer due to cough and fever x 2 days. notes significant sore throat, productive cough with yellow/white sputum. Admits to chest pain with cough and SOB. Pt was admitted 10/19/19 for ankle fx in which surgery was performed and was discharged to rehab 4 days later. pt denies palpitations, LE swelling, hemoptysis, pleuritic chest pain, LOC, or any other complaints. Pt has some respiratory discomfort no distress bilateral expiratory wheeze general malaise abdomen soft non focal neuro labs and xrays re viewed will admit --Allen

## 2019-11-27 NOTE — ED PROVIDER NOTE - CARDIAC, MLM
Normal rate, regular rhythm.  Heart sounds S1, S2. distal pulses of UE and LE symmetrical and intact.

## 2019-11-27 NOTE — ED ADULT NURSE REASSESSMENT NOTE - NS ED NURSE REASSESS COMMENT FT1
Pt received from ANDREW Denney in [facility]. PT AOx3, wet cough noted, pt c/o throat pain at this time. Awaits xray. Daughter at bedside. RADHA Moss at bedside at this time.

## 2019-11-27 NOTE — ED PROVIDER NOTE - CLINICAL SUMMARY MEDICAL DECISION MAKING FREE TEXT BOX
84 yo male with CAD, CABG, HTN, DM, presenting with hypoxia, cough, SOB for 2-3 days. will evaluate for infectious etiology with cbc cmp vbg, cxr, rvp, will give IVF, and will reassess

## 2019-11-27 NOTE — ED PROVIDER NOTE - ENMT, MLM
Airway patent, Nasal mucosa clear. Mouth with normal mucosa. (+) pharyngeal erythema, throat has no vesicles, no oropharyngeal exudates and uvula is midline.

## 2019-11-27 NOTE — ED ADULT NURSE NOTE - OBJECTIVE STATEMENT
87 y/o male a+ox3, pmhx DM, CAD, cardiac bypass, cardiac stents, coming from home for sore throat. Pt has had productive cough x 6 days; sputum is yellow-white in color; seen by PMD who sent pt to ED for r/o pna. Spo2 on room air 93% with increased work of breathing. Pt states he feels short of breath, pain in throat and "all over" chest pain when coughing. Pt denies n/v/d, fever or chills, chest pain or discomfort, headache, lightheadedness, dizziness. IV established, labs obtained. Pt on CM with spo2 on room air. Pt left in position of comfort, guard rails up, bed low and locked. Will reassess

## 2019-11-27 NOTE — ED PROVIDER NOTE - MUSCULOSKELETAL, MLM
Spine appears normal, range of motion is not limited, no muscle or joint tenderness, no LE edema, negative boo sign.

## 2019-11-27 NOTE — ED PROVIDER NOTE - OBJECTIVE STATEMENT
85 y/o male with PMHx HTN, DM, CAD with stent placement BIBA from primary care office Dr. Schaffer due to cough and fever x 2 days. notes significant sore throat, productive cough with yellow/white sputum. Admits to chest pain with cough and SOB. Pt was admitted 10/19/19 for ankle fx in which surgery was performed and was discharged to rehab 4 days later. pt denies palpitations, LE swelling, hemoptysis, pleuritic chest pain, LOC, or any other complaints.

## 2019-11-28 DIAGNOSIS — E87.1 HYPO-OSMOLALITY AND HYPONATREMIA: ICD-10-CM

## 2019-11-28 DIAGNOSIS — B34.8 OTHER VIRAL INFECTIONS OF UNSPECIFIED SITE: ICD-10-CM

## 2019-11-28 DIAGNOSIS — J96.01 ACUTE RESPIRATORY FAILURE WITH HYPOXIA: ICD-10-CM

## 2019-11-28 DIAGNOSIS — I10 ESSENTIAL (PRIMARY) HYPERTENSION: ICD-10-CM

## 2019-11-28 DIAGNOSIS — E11.9 TYPE 2 DIABETES MELLITUS WITHOUT COMPLICATIONS: ICD-10-CM

## 2019-11-28 DIAGNOSIS — A41.9 SEPSIS, UNSPECIFIED ORGANISM: ICD-10-CM

## 2019-11-28 DIAGNOSIS — Z98.890 OTHER SPECIFIED POSTPROCEDURAL STATES: Chronic | ICD-10-CM

## 2019-11-28 DIAGNOSIS — Z02.9 ENCOUNTER FOR ADMINISTRATIVE EXAMINATIONS, UNSPECIFIED: ICD-10-CM

## 2019-11-28 DIAGNOSIS — R33.9 RETENTION OF URINE, UNSPECIFIED: ICD-10-CM

## 2019-11-28 DIAGNOSIS — Z29.9 ENCOUNTER FOR PROPHYLACTIC MEASURES, UNSPECIFIED: ICD-10-CM

## 2019-11-28 DIAGNOSIS — I25.10 ATHEROSCLEROTIC HEART DISEASE OF NATIVE CORONARY ARTERY WITHOUT ANGINA PECTORIS: ICD-10-CM

## 2019-11-28 LAB
ANION GAP SERPL CALC-SCNC: 13 MMOL/L — SIGNIFICANT CHANGE UP (ref 5–17)
BUN SERPL-MCNC: 14 MG/DL — SIGNIFICANT CHANGE UP (ref 7–23)
CALCIUM SERPL-MCNC: 9.7 MG/DL — SIGNIFICANT CHANGE UP (ref 8.4–10.5)
CHLORIDE SERPL-SCNC: 95 MMOL/L — LOW (ref 96–108)
CHLORIDE UR-SCNC: <35 MMOL/L — SIGNIFICANT CHANGE UP
CO2 SERPL-SCNC: 24 MMOL/L — SIGNIFICANT CHANGE UP (ref 22–31)
CREAT SERPL-MCNC: 1.17 MG/DL — SIGNIFICANT CHANGE UP (ref 0.5–1.3)
CULTURE RESULTS: NO GROWTH — SIGNIFICANT CHANGE UP
GLUCOSE BLDC GLUCOMTR-MCNC: 191 MG/DL — HIGH (ref 70–99)
GLUCOSE BLDC GLUCOMTR-MCNC: 235 MG/DL — HIGH (ref 70–99)
GLUCOSE BLDC GLUCOMTR-MCNC: 258 MG/DL — HIGH (ref 70–99)
GLUCOSE BLDC GLUCOMTR-MCNC: 308 MG/DL — HIGH (ref 70–99)
GLUCOSE SERPL-MCNC: 260 MG/DL — HIGH (ref 70–99)
HCT VFR BLD CALC: 32.2 % — LOW (ref 39–50)
HGB BLD-MCNC: 11.3 G/DL — LOW (ref 13–17)
MCHC RBC-ENTMCNC: 30.6 PG — SIGNIFICANT CHANGE UP (ref 27–34)
MCHC RBC-ENTMCNC: 35.1 GM/DL — SIGNIFICANT CHANGE UP (ref 32–36)
MCV RBC AUTO: 87.3 FL — SIGNIFICANT CHANGE UP (ref 80–100)
OSMOLALITY UR: 131 MOS/KG — LOW (ref 300–900)
PLATELET # BLD AUTO: 311 K/UL — SIGNIFICANT CHANGE UP (ref 150–400)
POTASSIUM SERPL-MCNC: 4.2 MMOL/L — SIGNIFICANT CHANGE UP (ref 3.5–5.3)
POTASSIUM SERPL-SCNC: 4.2 MMOL/L — SIGNIFICANT CHANGE UP (ref 3.5–5.3)
RBC # BLD: 3.69 M/UL — LOW (ref 4.2–5.8)
RBC # FLD: 13.3 % — SIGNIFICANT CHANGE UP (ref 10.3–14.5)
SODIUM SERPL-SCNC: 132 MMOL/L — LOW (ref 135–145)
SODIUM UR-SCNC: 35 MMOL/L — SIGNIFICANT CHANGE UP
SPECIMEN SOURCE: SIGNIFICANT CHANGE UP
WBC # BLD: 11.84 K/UL — HIGH (ref 3.8–10.5)
WBC # FLD AUTO: 11.84 K/UL — HIGH (ref 3.8–10.5)

## 2019-11-28 PROCEDURE — 51702 INSERT TEMP BLADDER CATH: CPT

## 2019-11-28 PROCEDURE — 99233 SBSQ HOSP IP/OBS HIGH 50: CPT | Mod: GC

## 2019-11-28 RX ORDER — TAMSULOSIN HYDROCHLORIDE 0.4 MG/1
0.4 CAPSULE ORAL AT BEDTIME
Refills: 0 | Status: DISCONTINUED | OUTPATIENT
Start: 2019-11-28 | End: 2019-12-03

## 2019-11-28 RX ORDER — SODIUM CHLORIDE 9 MG/ML
1000 INJECTION, SOLUTION INTRAVENOUS
Refills: 0 | Status: DISCONTINUED | OUTPATIENT
Start: 2019-11-28 | End: 2019-12-03

## 2019-11-28 RX ORDER — INSULIN LISPRO 100/ML
VIAL (ML) SUBCUTANEOUS AT BEDTIME
Refills: 0 | Status: DISCONTINUED | OUTPATIENT
Start: 2019-11-28 | End: 2019-12-03

## 2019-11-28 RX ORDER — DEXTROSE 50 % IN WATER 50 %
12.5 SYRINGE (ML) INTRAVENOUS ONCE
Refills: 0 | Status: DISCONTINUED | OUTPATIENT
Start: 2019-11-28 | End: 2019-12-03

## 2019-11-28 RX ORDER — DEXTROSE 50 % IN WATER 50 %
25 SYRINGE (ML) INTRAVENOUS ONCE
Refills: 0 | Status: DISCONTINUED | OUTPATIENT
Start: 2019-11-28 | End: 2019-12-03

## 2019-11-28 RX ORDER — ATORVASTATIN CALCIUM 80 MG/1
80 TABLET, FILM COATED ORAL AT BEDTIME
Refills: 0 | Status: DISCONTINUED | OUTPATIENT
Start: 2019-11-28 | End: 2019-11-29

## 2019-11-28 RX ORDER — HEPARIN SODIUM 5000 [USP'U]/ML
5000 INJECTION INTRAVENOUS; SUBCUTANEOUS EVERY 8 HOURS
Refills: 0 | Status: DISCONTINUED | OUTPATIENT
Start: 2019-11-28 | End: 2019-12-03

## 2019-11-28 RX ORDER — ACETAMINOPHEN 500 MG
650 TABLET ORAL ONCE
Refills: 0 | Status: COMPLETED | OUTPATIENT
Start: 2019-11-28 | End: 2019-11-28

## 2019-11-28 RX ORDER — SENNA PLUS 8.6 MG/1
2 TABLET ORAL AT BEDTIME
Refills: 0 | Status: DISCONTINUED | OUTPATIENT
Start: 2019-11-28 | End: 2019-12-03

## 2019-11-28 RX ORDER — PANTOPRAZOLE SODIUM 20 MG/1
40 TABLET, DELAYED RELEASE ORAL
Refills: 0 | Status: DISCONTINUED | OUTPATIENT
Start: 2019-11-28 | End: 2019-12-03

## 2019-11-28 RX ORDER — GLUCAGON INJECTION, SOLUTION 0.5 MG/.1ML
1 INJECTION, SOLUTION SUBCUTANEOUS ONCE
Refills: 0 | Status: DISCONTINUED | OUTPATIENT
Start: 2019-11-28 | End: 2019-12-03

## 2019-11-28 RX ORDER — BENZOCAINE AND MENTHOL 5; 1 G/100ML; G/100ML
1 LIQUID ORAL
Refills: 0 | Status: DISCONTINUED | OUTPATIENT
Start: 2019-11-28 | End: 2019-12-03

## 2019-11-28 RX ORDER — ASPIRIN/CALCIUM CARB/MAGNESIUM 324 MG
81 TABLET ORAL DAILY
Refills: 0 | Status: DISCONTINUED | OUTPATIENT
Start: 2019-11-28 | End: 2019-12-03

## 2019-11-28 RX ORDER — POLYETHYLENE GLYCOL 3350 17 G/17G
17 POWDER, FOR SOLUTION ORAL DAILY
Refills: 0 | Status: DISCONTINUED | OUTPATIENT
Start: 2019-11-28 | End: 2019-12-03

## 2019-11-28 RX ORDER — INSULIN LISPRO 100/ML
VIAL (ML) SUBCUTANEOUS
Refills: 0 | Status: DISCONTINUED | OUTPATIENT
Start: 2019-11-28 | End: 2019-12-03

## 2019-11-28 RX ORDER — MORPHINE SULFATE 50 MG/1
4 CAPSULE, EXTENDED RELEASE ORAL ONCE
Refills: 0 | Status: DISCONTINUED | OUTPATIENT
Start: 2019-11-28 | End: 2019-11-28

## 2019-11-28 RX ORDER — DEXTROSE 50 % IN WATER 50 %
15 SYRINGE (ML) INTRAVENOUS ONCE
Refills: 0 | Status: DISCONTINUED | OUTPATIENT
Start: 2019-11-28 | End: 2019-12-03

## 2019-11-28 RX ORDER — IPRATROPIUM/ALBUTEROL SULFATE 18-103MCG
3 AEROSOL WITH ADAPTER (GRAM) INHALATION EVERY 6 HOURS
Refills: 0 | Status: DISCONTINUED | OUTPATIENT
Start: 2019-11-28 | End: 2019-12-03

## 2019-11-28 RX ORDER — LIDOCAINE 4 G/100G
1 CREAM TOPICAL ONCE
Refills: 0 | Status: COMPLETED | OUTPATIENT
Start: 2019-11-28 | End: 2019-11-28

## 2019-11-28 RX ORDER — CHLORHEXIDINE GLUCONATE 213 G/1000ML
1 SOLUTION TOPICAL
Refills: 0 | Status: DISCONTINUED | OUTPATIENT
Start: 2019-11-28 | End: 2019-12-03

## 2019-11-28 RX ADMIN — HEPARIN SODIUM 5000 UNIT(S): 5000 INJECTION INTRAVENOUS; SUBCUTANEOUS at 22:15

## 2019-11-28 RX ADMIN — Medication 3 MILLILITER(S): at 17:38

## 2019-11-28 RX ADMIN — Medication 8: at 13:42

## 2019-11-28 RX ADMIN — SENNA PLUS 2 TABLET(S): 8.6 TABLET ORAL at 21:52

## 2019-11-28 RX ADMIN — Medication 6: at 09:58

## 2019-11-28 RX ADMIN — Medication 4: at 18:51

## 2019-11-28 RX ADMIN — HEPARIN SODIUM 5000 UNIT(S): 5000 INJECTION INTRAVENOUS; SUBCUTANEOUS at 05:48

## 2019-11-28 RX ADMIN — Medication 100 MILLIGRAM(S): at 12:22

## 2019-11-28 RX ADMIN — Medication 600 MILLIGRAM(S): at 17:52

## 2019-11-28 RX ADMIN — BENZOCAINE AND MENTHOL 1 LOZENGE: 5; 1 LIQUID ORAL at 01:44

## 2019-11-28 RX ADMIN — Medication 3 MILLILITER(S): at 05:57

## 2019-11-28 RX ADMIN — Medication 3 MILLILITER(S): at 11:45

## 2019-11-28 RX ADMIN — Medication 40 MILLIGRAM(S): at 05:48

## 2019-11-28 RX ADMIN — Medication 3 MILLILITER(S): at 01:46

## 2019-11-28 RX ADMIN — Medication 81 MILLIGRAM(S): at 12:31

## 2019-11-28 RX ADMIN — HEPARIN SODIUM 5000 UNIT(S): 5000 INJECTION INTRAVENOUS; SUBCUTANEOUS at 13:42

## 2019-11-28 RX ADMIN — ATORVASTATIN CALCIUM 80 MILLIGRAM(S): 80 TABLET, FILM COATED ORAL at 21:52

## 2019-11-28 RX ADMIN — PANTOPRAZOLE SODIUM 40 MILLIGRAM(S): 20 TABLET, DELAYED RELEASE ORAL at 10:04

## 2019-11-28 RX ADMIN — TAMSULOSIN HYDROCHLORIDE 0.4 MILLIGRAM(S): 0.4 CAPSULE ORAL at 22:16

## 2019-11-28 RX ADMIN — MORPHINE SULFATE 4 MILLIGRAM(S): 50 CAPSULE, EXTENDED RELEASE ORAL at 01:44

## 2019-11-28 RX ADMIN — MORPHINE SULFATE 4 MILLIGRAM(S): 50 CAPSULE, EXTENDED RELEASE ORAL at 02:00

## 2019-11-28 RX ADMIN — Medication 600 MILLIGRAM(S): at 05:47

## 2019-11-28 RX ADMIN — Medication 100 MILLIGRAM(S): at 05:48

## 2019-11-28 RX ADMIN — Medication 100 MILLIGRAM(S): at 01:44

## 2019-11-28 RX ADMIN — LIDOCAINE 1 APPLICATION(S): 4 CREAM TOPICAL at 01:45

## 2019-11-28 RX ADMIN — CHLORHEXIDINE GLUCONATE 1 APPLICATION(S): 213 SOLUTION TOPICAL at 09:58

## 2019-11-28 RX ADMIN — POLYETHYLENE GLYCOL 3350 17 GRAM(S): 17 POWDER, FOR SOLUTION ORAL at 12:31

## 2019-11-28 NOTE — PROGRESS NOTE ADULT - PROBLEM SELECTOR PLAN 2
- RVP + for parainfluenza virus  - will continue supportive care measures including duonebs q6 hours, cepacol cough drops, tessalon pearls, and mucinex q12 hours  - s/p ceftriaxone and azithromycin in the ED but will monitor off abx at this time given lack of consolidation on CXR to suggest superimposed PNA - RVP + for parainfluenza virus  - continue supportive care measures including duonebs q6 hours, cepacol cough drops, tessalon pearls, and mucinex q12 hours  - s/p ceftriaxone and azithromycin in the ED but will monitor off abx at this time given lack of consolidation on CXR to suggest superimposed PNA

## 2019-11-28 NOTE — CONSULT NOTE ADULT - ASSESSMENT
85 y/o male with gross hematuria after traumatic straight catheterization      Rec:  -UCx  -keep 3way magallanes in place, irrigate prn.  -may need CBI if begins to form significant clots  -hydration  will discuss with attending, full recs to follow 85 y/o male with gross hematuria after traumatic straight catheterization      Rec:  -UCx  -keep 3way magallanes in place, irrigate prn.  -may need CBI if begins to form significant clots  -hydration  Discussed w/ Dr. Walker

## 2019-11-28 NOTE — H&P ADULT - PROBLEM SELECTOR PLAN 8
- patient on amlodipine, HCTZ-lisinopril at home  - will hold at this time given relative hypotension and sepsis

## 2019-11-28 NOTE — PROGRESS NOTE ADULT - PROBLEM SELECTOR PLAN 1
- patient with respiratory failure and new hypoxia secondary to acute parainfluenza virus infection  - supplemental oxygen as needed with plan to titrate off as soon as tolerated  - CXR with no signs of consolidation to suggest PNA  - treat parainfluenza infection symptomatically and expect resolution of hypoxia  -s/p decadron in ED, will continue with course of prednisone 40 d - patient with respiratory failure and new hypoxia secondary to acute parainfluenza virus infection  - supplemental oxygen as needed with plan to titrate off as soon as tolerated  - CXR with no signs of consolidation to suggest PNA  - treat parainfluenza infection symptomatically and expect resolution of hypoxia  -s/p decadron in ED, continue with course of prednisone 40 d

## 2019-11-28 NOTE — H&P ADULT - PROBLEM SELECTOR PLAN 1
- patient with respiratory failure and new hypoxia secondary to acute parainfluenza virus infection  - supplemental oxygen as needed with plan to titrate off as soon as tolerated  - CXR with no signs of consolidation to suggest PNA  - treat parainfluenza infection symptomatically and expect resolution of hypoxia - patient with respiratory failure and new hypoxia secondary to acute parainfluenza virus infection  - supplemental oxygen as needed with plan to titrate off as soon as tolerated  - CXR with no signs of consolidation to suggest PNA  - treat parainfluenza infection symptomatically and expect resolution of hypoxia  -s/p decadron in ED, will continue with course of prednisone 40 d

## 2019-11-28 NOTE — H&P ADULT - PROBLEM SELECTOR PLAN 10
Transitions of Care Status:  1.  Name of PCP: Dr. Jairo Blanc  2.  PCP Contacted on Admission: [ ] Y    [ X] N    3.  PCP contacted at Discharge: [ ] Y    [ ] N    [ ] N/A  4.  Post-Discharge Appointment Date and Location:  5.  Summary of Handoff given to PCP:

## 2019-11-28 NOTE — H&P ADULT - PROBLEM SELECTOR PLAN 3
- patient with tachypnea and leukocytosis with RVP + for parainfluenza virus  - s/p 2.2L NS in the ED along with ceftriaxone/azithromycin  - BCx and UCx collected, UA negative, CXR grossly clear  - will monitor off abx at this time given likely viral etiology of symptoms

## 2019-11-28 NOTE — PROGRESS NOTE ADULT - PROBLEM SELECTOR PLAN 8
- patient on amlodipine, HCTZ-lisinopril at home  - will hold at this time given relative hypotension and sepsis - patient on amlodipine, HCTZ-lisinopril at home  - will hold at this time given relative hypotension and sepsis. Can restart once BP stable

## 2019-11-28 NOTE — PROCEDURE NOTE - NSURITECHNIQUE_GU_A_CORE
Proper hand hygiene was performed/Sterile gloves were worn for the duration of the procedure/The catheter was appropriately lubricated/The collection bag is below the level of the patient and urinary bladder/A sterile drape was used to cover all adjacent areas/The catheter was secured with a securement device (e.g. StatLock)/The urinary drainage system is closed at the end of the procedure

## 2019-11-28 NOTE — CONSULT NOTE ADULT - SUBJECTIVE AND OBJECTIVE BOX
"87 y/o M with PMHx CAD s/p 3 stents (most recent ), DM2, HLD, HTN, recent admission for L ankle fracture s/p ORIF who presents to the hospital with 3 days of cough and fever, admitted for sepsis secondary to parainfluenza virus complicated by acute hypoxic respiratory failure."  Urology consulted for gross hematuria that only began after patient had a straight catheterization in the ED which he endorses was significantly painful.  Denies any significant urologic hx    PAST MEDICAL & SURGICAL HISTORY:  HLD (hyperlipidemia)  CAD (coronary artery disease)  DM (diabetes mellitus)  HTN (hypertension)  Status post ORIF of fracture of ankle: Left, Oct 2019  S/P CABG (coronary artery bypass graft)    FAMILY HISTORY:  No pertinent family history in first degree relatives    SOCIAL HISTORY:   Tobacco hx:    MEDICATIONS  (STANDING):  albuterol/ipratropium for Nebulization 3 milliLiter(s) Nebulizer every 6 hours  atorvastatin 80 milliGRAM(s) Oral at bedtime  dextrose 5%. 1000 milliLiter(s) (50 mL/Hr) IV Continuous <Continuous>  dextrose 50% Injectable 12.5 Gram(s) IV Push once  dextrose 50% Injectable 25 Gram(s) IV Push once  dextrose 50% Injectable 25 Gram(s) IV Push once  guaiFENesin  milliGRAM(s) Oral every 12 hours  heparin  Injectable 5000 Unit(s) SubCutaneous every 8 hours  insulin lispro (HumaLOG) corrective regimen sliding scale   SubCutaneous three times a day before meals  insulin lispro (HumaLOG) corrective regimen sliding scale   SubCutaneous at bedtime  pantoprazole    Tablet 40 milliGRAM(s) Oral before breakfast  polyethylene glycol 3350 17 Gram(s) Oral daily  predniSONE   Tablet 40 milliGRAM(s) Oral daily  senna 2 Tablet(s) Oral at bedtime    MEDICATIONS  (PRN):  benzocaine 15 mG/menthol 3.6 mG (Sugar-Free) Lozenge 1 Lozenge Oral every 2 hours PRN Sore Throat  benzonatate 100 milliGRAM(s) Oral three times a day PRN Cough  dextrose 40% Gel 15 Gram(s) Oral once PRN Blood Glucose LESS THAN 70 milliGRAM(s)/deciliter  glucagon  Injectable 1 milliGRAM(s) IntraMuscular once PRN Glucose LESS THAN 70 milligrams/deciliter    Allergies    No Known Allergies    Intolerances        REVIEW OF SYSTEMS: Pertinent positives and negatives as stated in HPI, otherwise negative    Vital signs  T(C): 36.4 (19 @ 03:19), Max: 37.9 (19 @ 18:56)  HR: 67 (19 @ 03:19)  BP: 157/76 (19 @ 03:19)  SpO2: 98% (19 @ 03:19)  Wt(kg): --    Output    UOP    Physical Exam  Gen: NAD  Pulm: No intercostal retractions  Back: No CVAT b/l  Abd: Soft, NT, ND  : 16F magallanes in place, urine hematuric. magallanes changed to 22F 3way. bladder irrigated with sterile water. no clots evacuated. urine light red        LABS:       @ 19:35    WBC 16.09 / Hct 36.2  / SCr 1.19         126<L>  |  86<L>  |  13  ----------------------------<  202<H>  4.0   |  23  |  1.19    Ca    10.4      2019 19:35    TPro  7.6  /  Alb  4.7  /  TBili  0.6  /  DBili  x   /  AST  13  /  ALT  10  /  AlkPhos  81      PT/INR - ( 2019 19:35 )   PT: 11.3 sec;   INR: 0.99 ratio         PTT - ( 2019 19:35 )  PTT:26.4 sec  Urinalysis Basic - ( 2019 21:18 )    Color: Light Yellow / Appearance: Clear / S.018 / pH: x  Gluc: x / Ketone: Small  / Bili: Negative / Urobili: Negative   Blood: x / Protein: Trace / Nitrite: Negative   Leuk Esterase: Negative / RBC: 4 /hpf / WBC 0 /HPF   Sq Epi: x / Non Sq Epi: 0 /hpf / Bacteria: Negative

## 2019-11-28 NOTE — H&P ADULT - ATTENDING COMMENTS
Patient assigned to me by night hospitalist in charge for management and care for patient for this evening only. Care to be resumed by day hospitalist at 08:00 in the morning and thereafter.    86M w/ CAD s/p CABG/stents, DM2, HTN, HLD, and recent L ankle fracture s/p ORIF and recent MARY CARMEN presents for cough/fever and found to have acute respiratory failure c/b sepsis due to parainfluenza virus w/ initial course c/b traumatic magallanes and urinary obstruction.  - exam w/ mild course sounds diffusely, intermittent tachypnea on 3L NC, and non-bloody non-productive cough  - c/w [id number], duonebq6, supplemental O2 as required. For sepsis s/p IV fluid, ceftriaxone/azithromycin, with lactate downtrend and culture collected. agree w/ Dr. Montes that patient can be further observed off antibiotics as bacterial infection is not suspected at this time given parainfluenza identified on RVP and CXR not demonstrating lobar opacification.  - urinary obstruction 2/2 traumatic magallanes- Dr. Montes to call urology consult, will likely need bladder irrigation.

## 2019-11-28 NOTE — H&P ADULT - PROBLEM SELECTOR PLAN 7
- A1c 7.6 from October 2019  - on metformin, glimepiride, and Tradjenta at home  - will check FS before meals and at bedtime and place on moderate SS before meals and at bedtime

## 2019-11-28 NOTE — H&P ADULT - NSICDXPASTSURGICALHX_GEN_ALL_CORE_FT
PAST SURGICAL HISTORY:  S/P CABG (coronary artery bypass graft)     Status post ORIF of fracture of ankle Left, Oct 2019

## 2019-11-28 NOTE — H&P ADULT - HISTORY OF PRESENT ILLNESS
87 y/o M with PMHx CAD s/p 3 stents (most recent 2017), DM2, HLD, HTN, recent admission for L ankle fracture s/p ORIF who presents to the hospital with 3 days of cough and fever. History obtained from patient and daughter at the bedside. The patient was admitted to the hospital 10/19-10/22 s/p a L ankle fracture and was discharged to rehab. He left rehab last Friday. Since then, he was doing well until Monday when he started to develop a wet cough associated with a sore throat and chills at home. He never took his temperature. He denied CP or SOB. He saw his PCP today for his symptoms who sent him to to the ED because he was concerned he may have PNA. The patient denies any abdominal pain, nausea, or vomiting. He endorses poor appetite for the last few days. He denies sick contacts and did not receive the flu shot this year. Of note, patient had been straight cathed in the ED for a clean urine sample and when seen, patient was having trouble urinating and endorsing the urge to urinate.     Upon arrival to the ED, patient's VS were T: 100.3 oral, BP: 132/90, HR: 86, RR: 22, sating 98% on RA but subsequently his O2 sat dropped to 93% so he was placed on 3L of NC. He was given 2.2L of NS, ceftriaxone, azithromycin, tylenol, and decadron 6mg.

## 2019-11-28 NOTE — PROGRESS NOTE ADULT - PROBLEM SELECTOR PLAN 7
- A1c 7.6 from October 2019  - on metformin, glimepiride, and Tradjenta at home  - will check FS before meals and at bedtime and place on moderate SS before meals and at bedtime - A1c 7.6 from October 2019  - on metformin, glimepiride, and Tradjenta at home  - c/w moderate SS before meals and at bedtime for now. If continues to be elevated, in the setting of steroid, will start lantus qhs

## 2019-11-28 NOTE — PROGRESS NOTE ADULT - PROBLEM SELECTOR PLAN 6
- CAD s/p stents most recently 2017  - on ASA and plavix which will hold at this time given acute hematuria  - will hold metoprolol 25mg BID in the setting of sepsis at this time   - TTE from 2017 with EF 55% with mild systolic and diastolic dysfunction  - no signs of acute volume overload or HF at this time - CAD s/p stents most recently 2017  - c/w ASA   - hold plavix given acute hematuria  - c/w hold metoprolol 25mg BID in the setting of sepsis at this time   - TTE from 2017 with EF 55% with mild systolic and diastolic dysfunction  - no signs of acute volume overload or HF at this time

## 2019-11-28 NOTE — H&P ADULT - NSHPPHYSICALEXAM_GEN_ALL_CORE
Vital Signs Last 24 Hrs  T(C): 36.8 (28 Nov 2019 00:05), Max: 37.9 (27 Nov 2019 18:56)  T(F): 98.2 (28 Nov 2019 00:05), Max: 100.3 (27 Nov 2019 18:56)  HR: 80 (28 Nov 2019 00:05) (70 - 98)  BP: 106/88 (28 Nov 2019 00:05) (106/88 - 151/82)  BP(mean): --  RR: 18 (28 Nov 2019 00:05) (18 - 24)  SpO2: 100% (28 Nov 2019 00:05) (98% - 100%)    PHYSICAL EXAM:    GENERAL: ill appearing in mild respiratory distress   HEAD:  Normocephalic, atraumatic  EYES: EOMI, PERRLA  HEENT: Moist mucous membranes, erythema noted in the oropharynx, no abscess visualized   NECK: Supple, No JVD, no lymphadenopathy noted   NERVOUS SYSTEM:  AAOx3, CN II-XII in tact bilaterally, strength and sensation grossly in tact bilaterally   CHEST/LUNG: coarse breath sounds appreciated in all lobes with mild expiratory wheezes, no crackles   HEART: RRR, no murmurs appreciated   ABDOMEN: +BS, soft, ttp in the suprapubic area   EXTREMITIES: 1+ edema noted in the L ankle with no erythema, full ROM of the ankle   MUSCULOSKELETAL: No muscle tenderness, no joint tenderness  SKIN: warm and dry, no rash Vital Signs Last 24 Hrs  T(C): 36.8 (28 Nov 2019 00:05), Max: 37.9 (27 Nov 2019 18:56)  T(F): 98.2 (28 Nov 2019 00:05), Max: 100.3 (27 Nov 2019 18:56)  HR: 80 (28 Nov 2019 00:05) (70 - 98)  BP: 106/88 (28 Nov 2019 00:05) (106/88 - 151/82)  RR: 18 (28 Nov 2019 00:05) (18 - 24)  SpO2: 100% (28 Nov 2019 00:05) (98% - 100%) on 3L NC    GENERAL: ill appearing in mild respiratory distress  HEAD:  Normocephalic, atraumatic  EYES: EOMI, PERRLA  HEENT: Moist mucous membranes, erythema noted in the oropharynx, no abscess visualized   NECK: Supple, No JVD, no lymphadenopathy noted   NERVOUS SYSTEM:  AAOx3, CN II-XII in tact bilaterally, strength and sensation grossly in tact bilaterally   LUNG: coarse breath sounds appreciated in all lobes with mild expiratory wheezes, no crackles   HEART: RRR, no murmurs appreciated   ABDOMEN: +BS, soft, ttp in the suprapubic area   EXTREMITIES: 1+ edema noted in the L ankle with no erythema, full ROM of the ankle   MUSCULOSKELETAL: No muscle tenderness, no joint tenderness  SKIN: warm and dry, no rash in exposed areas

## 2019-11-28 NOTE — H&P ADULT - PROBLEM SELECTOR PLAN 5
- patient with new urinary retention today after straight cath for clean catch urine sample  - will bladder scan and straight cath if patient is retaining   - no clear cause for obstruction at this time but if continues, can consider renal US - patient with new urinary retention today after straight cath for clean catch urine sample  - bladder scan reading > 790, magallanes placed with clots and gross hematuria  - urology consulted for possible CBI  - will hold ASA and plavix at this time and can restart pending AM CBC

## 2019-11-28 NOTE — H&P ADULT - ASSESSMENT
85 y/o M with PMHx CAD s/p 3 stents (most recent 2017), DM2, HLD, HTN, recent admission for L ankle fracture s/p ORIF who presents to the hospital with 3 days of cough and fever, admitted for sepsis secondary to parainfluenza virus complicated by acute hypoxic respiratory failure.

## 2019-11-28 NOTE — H&P ADULT - NSHPSOCIALHISTORY_GEN_ALL_CORE
Patient lives with his wife and his son. He has been independent and was ambulating independently prior to his ankle fracture. Denies tobacco, alcohol, or recreational drug use.

## 2019-11-28 NOTE — PROGRESS NOTE ADULT - PROBLEM SELECTOR PLAN 4
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

## 2019-11-28 NOTE — H&P ADULT - PROBLEM SELECTOR PLAN 2
- RVP + for parainfluenza virus  - will continue supportive care measures including duonebs q6 hours, cepacol cough drops, tessalon pearls, and mucinex q12 hours  - s/p ceftriaxone and azithromycin in the ED but will monitor off abx at this time given lack of consolidation on CXR to suggest superimposed PNA

## 2019-11-28 NOTE — H&P ADULT - PROBLEM SELECTOR PLAN 6
- CAD s/p stents most recently 2017  - continue ASA and plavix   - will hold metoprolol 25mg BID in the setting of sepsis at this time   - TTE from 2017 with EF 55% with mild systolic and diastolic dysfunction  - no signs of acute volume overload or HF at this time - CAD s/p stents most recently 2017  - on ASA and plavix which will hold at this time given acute hematuria  - will hold metoprolol 25mg BID in the setting of sepsis at this time   - TTE from 2017 with EF 55% with mild systolic and diastolic dysfunction  - no signs of acute volume overload or HF at this time

## 2019-11-28 NOTE — PROGRESS NOTE ADULT - PROBLEM SELECTOR PLAN 5
- patient with new urinary retention today after straight cath for clean catch urine sample  - bladder scan reading > 790, magallanes placed with clots and gross hematuria  - urology consulted for possible CBI  - will hold ASA and plavix at this time and can restart pending AM CBC - patient with new urinary retention today after straight cath for clean catch urine sample  - bladder scan reading > 790, magallanes placed with clots and gross hematuria  - urology consulted recs appreciated  - continue to hold plavix   - c/w ASA  - start flomax and plan for trial of voids tomorrow - patient with new urinary retention today after straight cath for clean catch urine sample  - bladder scan reading > 790, magallanes placed with clots and gross hematuria  - urology consulted recs appreciated  - continue to hold plavix   - c/w ASA  - start flomax and plan for trial of voids when improves

## 2019-11-28 NOTE — H&P ADULT - NSHPLABSRESULTS_GEN_ALL_CORE
LABORATORY                            12.7   16.09 )-----------( 345      ( 2019 19:35 )             36.2           126<L>  |  86<L>  |  13  ----------------------------<  202<H>  4.0   |  23  |  1.19    Ca    10.4      2019 19:35    TPro  7.6  /  Alb  4.7  /  TBili  0.6  /  DBili  x   /  AST  13  /  ALT  10  /  AlkPhos  81        Blood Gas Venous - Lactate (19 @ 19:35)    Blood Gas Venous - Lactate: 4.5 mmoL/L    Blood Gas Venous - Lactate (19 @ 22:18)    Blood Gas Venous - Lactate: 2.5 mmoL/L    Troponin T, High Sensitivity (19 @ 19:35)    Troponin T, High Sensitivity Result: 24:     Troponin T, High Sensitivity (19 @ 22:18)    Troponin T, High Sensitivity Result: 24:       Urinalysis Basic - ( 2019 21:18 )    Color: Light Yellow / Appearance: Clear / S.018 / pH: x  Gluc: x / Ketone: Small  / Bili: Negative / Urobili: Negative   Blood: x / Protein: Trace / Nitrite: Negative   Leuk Esterase: Negative / RBC: 4 /hpf / WBC 0 /HPF   Sq Epi: x / Non Sq Epi: 0 /hpf / Bacteria: Negative      EKG: HR 87bpm, incomplete RBBB and T wave inversions noted in leads V1-V3, unchanged from prior EKG    RADIOLOGY    < from: Xray Chest 1 View-PORTABLE IMMEDIATE (19 @ 20:24) >      IMPRESSION:     Bibasilar linear opacities that likely represent atelectasis.    WILDER ROSARIO M.D., RADIOLOGY RESIDENT  This document has been electronically signed.  SHARMAINE FORD M.D., ATTENDING RADIOLOGIST  This document has been electronically signed. 2019  8:52PM    < end of copied text > personally reviewed all available labs, imaging and ekg               12.7   16.09 )-----------( 345      ( 2019 19:35 )             36.2     126<L>  |  86<L>  |  13  ----------------------------<  202<H>  4.0   |  23  |  1.19    Ca    10.4      2019 19:35    TPro  7.6  /  Alb  4.7  /  TBili  0.6  /  DBili  x   /  AST  13  /  ALT  10  /  AlkPhos  81        Blood Gas Venous - Lactate (19 @ 19:35)    Blood Gas Venous - Lactate: 4.5 mmoL/L    Blood Gas Venous - Lactate (19 @ 22:18)    Blood Gas Venous - Lactate: 2.5 mmoL/L    Troponin T, High Sensitivity (19 @ 19:35)    Troponin T, High Sensitivity Result: 24:     Troponin T, High Sensitivity (19 @ 22:18)    Troponin T, High Sensitivity Result: 24:       Urinalysis Basic - ( 2019 21:18 )    Color: Light Yellow / Appearance: Clear / S.018 / pH: x  Gluc: x / Ketone: Small  / Bili: Negative / Urobili: Negative   Blood: x / Protein: Trace / Nitrite: Negative   Leuk Esterase: Negative / RBC: 4 /hpf / WBC 0 /HPF   Sq Epi: x / Non Sq Epi: 0 /hpf / Bacteria: Negative      EKG: HR 87bpm, incomplete RBBB and T wave inversions noted in leads V1-V3, unchanged from prior EKG    RADIOLOGY    CXR does not demonstrate pneumothorax on my review    < from: Xray Chest 1 View-PORTABLE IMMEDIATE (19 @ 20:24) >      IMPRESSION:     Bibasilar linear opacities that likely represent atelectasis.    WILDER ROSARIO M.D., RADIOLOGY RESIDENT  This document has been electronically signed.  SHARMAINE FORD M.D., ATTENDING RADIOLOGIST  This document has been electronically signed. 2019  8:52PM    < end of copied text >

## 2019-11-28 NOTE — PROGRESS NOTE ADULT - SUBJECTIVE AND OBJECTIVE BOX
[facility]il[facility] (Gian)   Internal Medicine  PGY-1  Pager: NS--[phone]; [facility]--[phone]    Patient is a 86y old  Male who presents with a chief complaint of 86M w/ cough and fever (2019 04:41)      SUBJECTIVE / OVERNIGHT EVENTS: No acute event ON. Denies HA/CP/SOB/abd pain/n/v/d/fever/chills.    MEDICATIONS  (STANDING):  albuterol/ipratropium for Nebulization 3 milliLiter(s) Nebulizer every 6 hours  atorvastatin 80 milliGRAM(s) Oral at bedtime  chlorhexidine 4% Liquid 1 Application(s) Topical <User Schedule>  dextrose 5%. 1000 milliLiter(s) (50 mL/Hr) IV Continuous <Continuous>  dextrose 50% Injectable 12.5 Gram(s) IV Push once  dextrose 50% Injectable 25 Gram(s) IV Push once  dextrose 50% Injectable 25 Gram(s) IV Push once  guaiFENesin  milliGRAM(s) Oral every 12 hours  heparin  Injectable 5000 Unit(s) SubCutaneous every 8 hours  insulin lispro (HumaLOG) corrective regimen sliding scale   SubCutaneous three times a day before meals  insulin lispro (HumaLOG) corrective regimen sliding scale   SubCutaneous at bedtime  pantoprazole    Tablet 40 milliGRAM(s) Oral before breakfast  polyethylene glycol 3350 17 Gram(s) Oral daily  predniSONE   Tablet 40 milliGRAM(s) Oral daily  senna 2 Tablet(s) Oral at bedtime    MEDICATIONS  (PRN):  benzocaine 15 mG/menthol 3.6 mG (Sugar-Free) Lozenge 1 Lozenge Oral every 2 hours PRN Sore Throat  benzonatate 100 milliGRAM(s) Oral three times a day PRN Cough  dextrose 40% Gel 15 Gram(s) Oral once PRN Blood Glucose LESS THAN 70 milliGRAM(s)/deciliter  glucagon  Injectable 1 milliGRAM(s) IntraMuscular once PRN Glucose LESS THAN 70 milligrams/deciliter        OBJECTIVE:    Vital Signs Last 24 Hrs  T(C): 36.5 (2019 06:02), Max: 37.9 (2019 18:56)  T(F): 97.7 (2019 06:02), Max: 100.3 (2019 18:56)  HR: 70 (2019 06:02) (67 - 98)  BP: 153/75 (2019 06:02) (106/88 - 157/76)  BP(mean): --  RR: 18 (2019 06:02) (18 - 24)  SpO2: 99% (2019 06:02) (94% - 100%)    PHYSICAL EXAM:  GENERAL: NAD, well-developed  HEAD:  Atraumatic, Normocephalic  EYES: EOMI, PERRLA, conjunctiva and sclera clear  NECK: Supple, No JVD  CHEST/LUNG: Clear to auscultation bilaterally; No wheeze  HEART: Regular rate and rhythm; No murmurs, rubs, or gallops  ABDOMEN: Soft, Nontender, Nondistended; Bowel sounds present  EXTREMITIES:  2+ Peripheral Pulses, No clubbing, cyanosis, or edema  PSYCH: AAOx3  NEUROLOGY: non-focal  SKIN: No rashes or lesions    CAPILLARY BLOOD GLUCOSE      POCT Blood Glucose.: 230 mg/dL (2019 00:00)    I&O's Summary    2019 07:01  -  2019 07:00  --------------------------------------------------------  IN: 250 mL / OUT: 2350 mL / NET: -2100 mL        LABS:                        11.3   11.84 )-----------( 311      ( 2019 08:56 )             32.2     11-28    132<L>  |  95<L>  |  14  ----------------------------<  260<H>  4.2   |  24  |  1.17    Ca    9.7      2019 07:09    TPro  7.6  /  Alb  4.7  /  TBili  0.6  /  DBili  x   /  AST  13  /  ALT  10  /  AlkPhos  81  11-27    PT/INR - ( 2019 19:35 )   PT: 11.3 sec;   INR: 0.99 ratio         PTT - ( 2019 19:35 )  PTT:26.4 sec      Urinalysis Basic - ( 2019 21:18 )    Color: Light Yellow / Appearance: Clear / S.018 / pH: x  Gluc: x / Ketone: Small  / Bili: Negative / Urobili: Negative   Blood: x / Protein: Trace / Nitrite: Negative   Leuk Esterase: Negative / RBC: 4 /hpf / WBC 0 /HPF   Sq Epi: x / Non Sq Epi: 0 /hpf / Bacteria: Negative            RADIOLOGY & ADDITIONAL TESTS: [facility] (Gian)   Internal Medicine  PGY-1  Pager: NS--[phone]; [facility]--[phone]    Patient is a 86y old  Male who presents with a chief complaint of 86M w/ cough and fever (2019 04:41)      SUBJECTIVE / OVERNIGHT EVENTS: No acute event ON. Denies HA/CP/abd pain/n/v/d/fever/chills. Reports having productive cough that was initially clear but now yellowish in color. Reports pain in his throat when he coughs. States that he has no urinary retention/straining/hematuria/dysuria before. Pain in his suprapubic region after being straight cath.    MEDICATIONS  (STANDING):  albuterol/ipratropium for Nebulization 3 milliLiter(s) Nebulizer every 6 hours  atorvastatin 80 milliGRAM(s) Oral at bedtime  chlorhexidine 4% Liquid 1 Application(s) Topical <User Schedule>  dextrose 5%. 1000 milliLiter(s) (50 mL/Hr) IV Continuous <Continuous>  dextrose 50% Injectable 12.5 Gram(s) IV Push once  dextrose 50% Injectable 25 Gram(s) IV Push once  dextrose 50% Injectable 25 Gram(s) IV Push once  guaiFENesin  milliGRAM(s) Oral every 12 hours  heparin  Injectable 5000 Unit(s) SubCutaneous every 8 hours  insulin lispro (HumaLOG) corrective regimen sliding scale   SubCutaneous three times a day before meals  insulin lispro (HumaLOG) corrective regimen sliding scale   SubCutaneous at bedtime  pantoprazole    Tablet 40 milliGRAM(s) Oral before breakfast  polyethylene glycol 3350 17 Gram(s) Oral daily  predniSONE   Tablet 40 milliGRAM(s) Oral daily  senna 2 Tablet(s) Oral at bedtime    MEDICATIONS  (PRN):  benzocaine 15 mG/menthol 3.6 mG (Sugar-Free) Lozenge 1 Lozenge Oral every 2 hours PRN Sore Throat  benzonatate 100 milliGRAM(s) Oral three times a day PRN Cough  dextrose 40% Gel 15 Gram(s) Oral once PRN Blood Glucose LESS THAN 70 milliGRAM(s)/deciliter  glucagon  Injectable 1 milliGRAM(s) IntraMuscular once PRN Glucose LESS THAN 70 milligrams/deciliter        OBJECTIVE:    Vital Signs Last 24 Hrs  T(C): 36.5 (2019 06:02), Max: 37.9 (2019 18:56)  T(F): 97.7 (2019 06:02), Max: 100.3 (2019 18:56)  HR: 70 (2019 06:02) (67 - 98)  BP: 153/75 (2019 06:02) (106/88 - 157/76)  BP(mean): --  RR: 18 (2019 06:02) (18 - 24)  SpO2: 99% (2019 06:02) (94% - 100%)    PHYSICAL EXAM:  GENERAL: NAD, well-developed  HEAD:  Atraumatic, Normocephalic  EYES: EOMI, PERRLA, conjunctiva and sclera clear  NECK: Supple, No JVD  MOUTH: redness in the back of his throat  CHEST/LUNG: Diffuse wheezing  HEART: Regular rate and rhythm; No murmurs, rubs, or gallops  ABDOMEN: Soft, mild tenderness to deep palpation in the suprapubic region, Nondistended; Bowel sounds present  EXTREMITIES:  2+ Peripheral Pulses, No clubbing, cyanosis, or edema. Mild swelling and erythema of L ankle  PSYCH: AAOx3  NEUROLOGY: non-focal  SKIN: No rashes or lesions    CAPILLARY BLOOD GLUCOSE      POCT Blood Glucose.: 230 mg/dL (2019 00:00)    I&O's Summary    2019 07:01  -  2019 07:00  --------------------------------------------------------  IN: 250 mL / OUT: 2350 mL / NET: -2100 mL        LABS:                        11.3   11.84 )-----------( 311      ( 2019 08:56 )             32.2     11-28    132<L>  |  95<L>  |  14  ----------------------------<  260<H>  4.2   |  24  |  1.17    Ca    9.7      2019 07:09    TPro  7.6  /  Alb  4.7  /  TBili  0.6  /  DBili  x   /  AST  13  /  ALT  10  /  AlkPhos  81  11-27    PT/INR - ( 2019 19:35 )   PT: 11.3 sec;   INR: 0.99 ratio         PTT - ( 2019 19:35 )  PTT:26.4 sec      Urinalysis Basic - ( 2019 21:18 )    Color: Light Yellow / Appearance: Clear / S.018 / pH: x  Gluc: x / Ketone: Small  / Bili: Negative / Urobili: Negative   Blood: x / Protein: Trace / Nitrite: Negative   Leuk Esterase: Negative / RBC: 4 /hpf / WBC 0 /HPF   Sq Epi: x / Non Sq Epi: 0 /hpf / Bacteria: Negative            RADIOLOGY & ADDITIONAL TESTS: [facility] (Gina)   Internal Medicine  PGY-1  Pager: [phone]; [facility]--[phone]    Patient is a 86y old  Male who presents with a chief complaint of 86M w/ cough and fever (2019 04:41)      SUBJECTIVE / OVERNIGHT EVENTS: No acute event ON. Denies HA/CP/abd pain/n/v/d/fever/chills. Reports having productive cough that was initially clear but now yellowish in color. Reports pain in his throat when he coughs. States that he has no urinary retention/straining/hematuria/dysuria before. Pain in his suprapubic region after being straight cath.    MEDICATIONS  (STANDING):  albuterol/ipratropium for Nebulization 3 milliLiter(s) Nebulizer every 6 hours  atorvastatin 80 milliGRAM(s) Oral at bedtime  chlorhexidine 4% Liquid 1 Application(s) Topical <User Schedule>  dextrose 5%. 1000 milliLiter(s) (50 mL/Hr) IV Continuous <Continuous>  dextrose 50% Injectable 12.5 Gram(s) IV Push once  dextrose 50% Injectable 25 Gram(s) IV Push once  dextrose 50% Injectable 25 Gram(s) IV Push once  guaiFENesin  milliGRAM(s) Oral every 12 hours  heparin  Injectable 5000 Unit(s) SubCutaneous every 8 hours  insulin lispro (HumaLOG) corrective regimen sliding scale   SubCutaneous three times a day before meals  insulin lispro (HumaLOG) corrective regimen sliding scale   SubCutaneous at bedtime  pantoprazole    Tablet 40 milliGRAM(s) Oral before breakfast  polyethylene glycol 3350 17 Gram(s) Oral daily  predniSONE   Tablet 40 milliGRAM(s) Oral daily  senna 2 Tablet(s) Oral at bedtime    MEDICATIONS  (PRN):  benzocaine 15 mG/menthol 3.6 mG (Sugar-Free) Lozenge 1 Lozenge Oral every 2 hours PRN Sore Throat  benzonatate 100 milliGRAM(s) Oral three times a day PRN Cough  dextrose 40% Gel 15 Gram(s) Oral once PRN Blood Glucose LESS THAN 70 milliGRAM(s)/deciliter  glucagon  Injectable 1 milliGRAM(s) IntraMuscular once PRN Glucose LESS THAN 70 milligrams/deciliter        OBJECTIVE:    Vital Signs Last 24 Hrs  T(C): 36.5 (2019 06:02), Max: 37.9 (2019 18:56)  T(F): 97.7 (2019 06:02), Max: 100.3 (2019 18:56)  HR: 70 (2019 06:02) (67 - 98)  BP: 153/75 (2019 06:02) (106/88 - 157/76)  BP(mean): --  RR: 18 (2019 06:02) (18 - 24)  SpO2: 99% (2019 06:02) (94% - 100%)    PHYSICAL EXAM:  GENERAL: NAD, well-developed  HEAD:  Atraumatic, Normocephalic  EYES: EOMI, PERRLA, conjunctiva and sclera clear  NECK: Supple, No JVD  MOUTH: erythema in the back of his throat. No lesions noted  CHEST/LUNG: Diffuse wheezing  HEART: Regular rate and rhythm; No murmurs, rubs, or gallops  ABDOMEN: Soft, mild tenderness to deep palpation in the suprapubic region, Nondistended; Bowel sounds present  EXTREMITIES:  2+ Peripheral Pulses, No clubbing, cyanosis, or edema. Mild swelling and erythema of L ankle  PSYCH: AAOx3  NEUROLOGY: non-focal  SKIN: No rashes or lesions    CAPILLARY BLOOD GLUCOSE      POCT Blood Glucose.: 230 mg/dL (2019 00:00)    I&O's Summary    2019 07:01  -  2019 07:00  --------------------------------------------------------  IN: 250 mL / OUT: 2350 mL / NET: -2100 mL        LABS:                        11.3   11.84 )-----------( 311      ( 2019 08:56 )             32.2     11-28    132<L>  |  95<L>  |  14  ----------------------------<  260<H>  4.2   |  24  |  1.17    Ca    9.7      2019 07:09    TPro  7.6  /  Alb  4.7  /  TBili  0.6  /  DBili  x   /  AST  13  /  ALT  10  /  AlkPhos  81  11-    PT/INR - ( 2019 19:35 )   PT: 11.3 sec;   INR: 0.99 ratio         PTT - ( 2019 19:35 )  PTT:26.4 sec      Urinalysis Basic - ( 2019 21:18 )    Color: Light Yellow / Appearance: Clear / S.018 / pH: x  Gluc: x / Ketone: Small  / Bili: Negative / Urobili: Negative   Blood: x / Protein: Trace / Nitrite: Negative   Leuk Esterase: Negative / RBC: 4 /hpf / WBC 0 /HPF   Sq Epi: x / Non Sq Epi: 0 /hpf / Bacteria: Negative            RADIOLOGY & ADDITIONAL TESTS:

## 2019-11-28 NOTE — PROCEDURE NOTE - NSPROCDETAILS_GEN_ALL_CORE
sterile technique, indwelling urinary device inserted/sterile technique, old cysto removed, new tube inserted/a urinary catheter insertion kit was used for all insertion materials

## 2019-11-28 NOTE — PROGRESS NOTE ADULT - PROBLEM SELECTOR PLAN 9
- DVT ppx heparin subq  - DASH/CC diet  - Dispo pending resolution of symptoms    Madeline Montes MD  PGY 3 Internal Medicine  Pager [phone] - DVT ppx heparin subq  - DASH/CC diet  - Dispo pending resolution of symptoms

## 2019-11-28 NOTE — H&P ADULT - PROBLEM SELECTOR PLAN 9
- DVT ppx heparin subq  - DASH/CC diet - DVT ppx heparin subq  - DASH/CC diet  - Dispo pending resolution of symptoms    Madeline Montes MD  PGY 3 Internal Medicine  Pager [phone]

## 2019-11-28 NOTE — H&P ADULT - PROBLEM SELECTOR PLAN 4
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

## 2019-11-28 NOTE — PATIENT PROFILE ADULT - NSPROHMCARDIOMGMTSTRAT_GEN_A_NUR
activity/diet modification/medical device/routine screening/weight management/fluid modification/adequate rest

## 2019-11-28 NOTE — PROGRESS NOTE ADULT - PROBLEM SELECTOR PLAN 3
- patient with tachypnea and leukocytosis with RVP + for parainfluenza virus  - s/p 2.2L NS in the ED along with ceftriaxone/azithromycin  - BCx and UCx collected, UA negative, CXR grossly clear  - will monitor off abx at this time given likely viral etiology of symptoms - patient with tachypnea and leukocytosis with RVP + for parainfluenza virus  - s/p 2.2L NS in the ED along with ceftriaxone/azithromycin  - f/u BCx and UCx, UA negative, CXR grossly clear  - monitor off abx at this time given likely viral etiology of symptoms

## 2019-11-28 NOTE — H&P ADULT - NSHPREVIEWOFSYSTEMS_GEN_ALL_CORE
REVIEW OF SYSTEMS    CONSTITUTIONAL:  + chills   HEENT: + sore throat and congestion, denies blurry vision   SKIN:  Denies rash or itching.  CARDIOVASCULAR:  Denies chest pain, MO  RESPIRATORY:  + cough productive of clear mucus   GASTROINTESTINAL:  Denies abdominal pain, nausea, or vomiting   GENITOURINARY:  Denies any hematuria, dysuria  NEUROLOGICAL:  Denies headache, dizziness, or near syncope   MUSCULOSKELETAL:  Denies muscle, back pain, joint pain or stiffness.  HEMATOLOGIC:  Denies anemia, bleeding or bruising.  LYMPHATICS:  Denies enlarged nodes. CONSTITUTIONAL: fever+, chills+  EYES: No eye pain, no visual disturbance  Mouth: no pain in mouth, no cuts, sore throat+  RESPIRATORY: cough+, sob+  CARDIOVASCULAR: No CP, no palpitations  GASTROINTESTINAL: no abdominal pain, no n/v/d  GENITOURINARY: No dysuria, no hematuria (prior to ED visit)  Heme: No easy bruising, no swelling appreciated in neck/armpit/groin  NEUROLOGICAL: No headaches, no paralysis  SKIN: No itching, no rashes  MUSCULOSKELETAL: No joint swelling, diffuse muscle pains

## 2019-11-29 LAB
ANION GAP SERPL CALC-SCNC: 14 MMOL/L — SIGNIFICANT CHANGE UP (ref 5–17)
BUN SERPL-MCNC: 22 MG/DL — SIGNIFICANT CHANGE UP (ref 7–23)
CALCIUM SERPL-MCNC: 9.3 MG/DL — SIGNIFICANT CHANGE UP (ref 8.4–10.5)
CHLORIDE SERPL-SCNC: 94 MMOL/L — LOW (ref 96–108)
CO2 SERPL-SCNC: 22 MMOL/L — SIGNIFICANT CHANGE UP (ref 22–31)
CREAT SERPL-MCNC: 1.43 MG/DL — HIGH (ref 0.5–1.3)
GLUCOSE BLDC GLUCOMTR-MCNC: 243 MG/DL — HIGH (ref 70–99)
GLUCOSE BLDC GLUCOMTR-MCNC: 248 MG/DL — HIGH (ref 70–99)
GLUCOSE BLDC GLUCOMTR-MCNC: 254 MG/DL — HIGH (ref 70–99)
GLUCOSE BLDC GLUCOMTR-MCNC: 371 MG/DL — HIGH (ref 70–99)
GLUCOSE SERPL-MCNC: 219 MG/DL — HIGH (ref 70–99)
HCT VFR BLD CALC: 27.3 % — LOW (ref 39–50)
HGB BLD-MCNC: 9.5 G/DL — LOW (ref 13–17)
LACTATE SERPL-SCNC: 2.1 MMOL/L — HIGH (ref 0.7–2)
MAGNESIUM SERPL-MCNC: 1.7 MG/DL — SIGNIFICANT CHANGE UP (ref 1.6–2.6)
MCHC RBC-ENTMCNC: 31.1 PG — SIGNIFICANT CHANGE UP (ref 27–34)
MCHC RBC-ENTMCNC: 34.8 GM/DL — SIGNIFICANT CHANGE UP (ref 32–36)
MCV RBC AUTO: 89.5 FL — SIGNIFICANT CHANGE UP (ref 80–100)
PLATELET # BLD AUTO: 279 K/UL — SIGNIFICANT CHANGE UP (ref 150–400)
POTASSIUM SERPL-MCNC: 3.9 MMOL/L — SIGNIFICANT CHANGE UP (ref 3.5–5.3)
POTASSIUM SERPL-SCNC: 3.9 MMOL/L — SIGNIFICANT CHANGE UP (ref 3.5–5.3)
RBC # BLD: 3.05 M/UL — LOW (ref 4.2–5.8)
RBC # FLD: 13.6 % — SIGNIFICANT CHANGE UP (ref 10.3–14.5)
SODIUM SERPL-SCNC: 130 MMOL/L — LOW (ref 135–145)
WBC # BLD: 14.3 K/UL — HIGH (ref 3.8–10.5)
WBC # FLD AUTO: 14.3 K/UL — HIGH (ref 3.8–10.5)

## 2019-11-29 PROCEDURE — 99233 SBSQ HOSP IP/OBS HIGH 50: CPT

## 2019-11-29 RX ORDER — SODIUM CHLORIDE 9 MG/ML
1000 INJECTION INTRAMUSCULAR; INTRAVENOUS; SUBCUTANEOUS
Refills: 0 | Status: DISCONTINUED | OUTPATIENT
Start: 2019-11-29 | End: 2019-12-01

## 2019-11-29 RX ORDER — METOPROLOL TARTRATE 50 MG
25 TABLET ORAL
Refills: 0 | Status: DISCONTINUED | OUTPATIENT
Start: 2019-11-29 | End: 2019-12-03

## 2019-11-29 RX ORDER — MAGNESIUM OXIDE 400 MG ORAL TABLET 241.3 MG
400 TABLET ORAL ONCE
Refills: 0 | Status: COMPLETED | OUTPATIENT
Start: 2019-11-29 | End: 2019-11-29

## 2019-11-29 RX ORDER — SIMETHICONE 80 MG/1
80 TABLET, CHEWABLE ORAL
Refills: 0 | Status: DISCONTINUED | OUTPATIENT
Start: 2019-11-29 | End: 2019-12-03

## 2019-11-29 RX ORDER — ACETAMINOPHEN 500 MG
650 TABLET ORAL EVERY 6 HOURS
Refills: 0 | Status: DISCONTINUED | OUTPATIENT
Start: 2019-11-29 | End: 2019-12-03

## 2019-11-29 RX ORDER — INSULIN GLARGINE 100 [IU]/ML
12 INJECTION, SOLUTION SUBCUTANEOUS AT BEDTIME
Refills: 0 | Status: DISCONTINUED | OUTPATIENT
Start: 2019-11-29 | End: 2019-12-02

## 2019-11-29 RX ORDER — INSULIN LISPRO 100/ML
3 VIAL (ML) SUBCUTANEOUS
Refills: 0 | Status: DISCONTINUED | OUTPATIENT
Start: 2019-11-29 | End: 2019-12-03

## 2019-11-29 RX ORDER — SODIUM CHLORIDE 0.65 %
1 AEROSOL, SPRAY (ML) NASAL ONCE
Refills: 0 | Status: COMPLETED | OUTPATIENT
Start: 2019-11-29 | End: 2019-11-29

## 2019-11-29 RX ORDER — AMLODIPINE BESYLATE 2.5 MG/1
5 TABLET ORAL DAILY
Refills: 0 | Status: DISCONTINUED | OUTPATIENT
Start: 2019-11-29 | End: 2019-12-03

## 2019-11-29 RX ADMIN — Medication 40 MILLIGRAM(S): at 05:39

## 2019-11-29 RX ADMIN — Medication 10: at 13:58

## 2019-11-29 RX ADMIN — Medication 1 SPRAY(S): at 22:26

## 2019-11-29 RX ADMIN — Medication 600 MILLIGRAM(S): at 17:47

## 2019-11-29 RX ADMIN — HEPARIN SODIUM 5000 UNIT(S): 5000 INJECTION INTRAVENOUS; SUBCUTANEOUS at 21:58

## 2019-11-29 RX ADMIN — SODIUM CHLORIDE 75 MILLILITER(S): 9 INJECTION INTRAMUSCULAR; INTRAVENOUS; SUBCUTANEOUS at 17:48

## 2019-11-29 RX ADMIN — INSULIN GLARGINE 12 UNIT(S): 100 INJECTION, SOLUTION SUBCUTANEOUS at 21:56

## 2019-11-29 RX ADMIN — MAGNESIUM OXIDE 400 MG ORAL TABLET 400 MILLIGRAM(S): 241.3 TABLET ORAL at 13:59

## 2019-11-29 RX ADMIN — Medication 650 MILLIGRAM(S): at 15:00

## 2019-11-29 RX ADMIN — POLYETHYLENE GLYCOL 3350 17 GRAM(S): 17 POWDER, FOR SOLUTION ORAL at 13:59

## 2019-11-29 RX ADMIN — PANTOPRAZOLE SODIUM 40 MILLIGRAM(S): 20 TABLET, DELAYED RELEASE ORAL at 09:28

## 2019-11-29 RX ADMIN — CHLORHEXIDINE GLUCONATE 1 APPLICATION(S): 213 SOLUTION TOPICAL at 05:40

## 2019-11-29 RX ADMIN — Medication 81 MILLIGRAM(S): at 13:59

## 2019-11-29 RX ADMIN — Medication 600 MILLIGRAM(S): at 05:38

## 2019-11-29 RX ADMIN — Medication 3 MILLILITER(S): at 00:26

## 2019-11-29 RX ADMIN — Medication 3 MILLILITER(S): at 05:44

## 2019-11-29 RX ADMIN — Medication 4: at 17:47

## 2019-11-29 RX ADMIN — Medication 3 MILLILITER(S): at 17:39

## 2019-11-29 RX ADMIN — Medication 3 UNIT(S): at 17:47

## 2019-11-29 RX ADMIN — Medication 650 MILLIGRAM(S): at 14:00

## 2019-11-29 RX ADMIN — Medication 6: at 09:18

## 2019-11-29 RX ADMIN — Medication 3 MILLILITER(S): at 23:47

## 2019-11-29 RX ADMIN — HEPARIN SODIUM 5000 UNIT(S): 5000 INJECTION INTRAVENOUS; SUBCUTANEOUS at 14:00

## 2019-11-29 RX ADMIN — TAMSULOSIN HYDROCHLORIDE 0.4 MILLIGRAM(S): 0.4 CAPSULE ORAL at 21:57

## 2019-11-29 RX ADMIN — HEPARIN SODIUM 5000 UNIT(S): 5000 INJECTION INTRAVENOUS; SUBCUTANEOUS at 05:38

## 2019-11-29 NOTE — PROGRESS NOTE ADULT - PROBLEM SELECTOR PLAN 4
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

## 2019-11-29 NOTE — PROGRESS NOTE ADULT - PROBLEM SELECTOR PLAN 6
- CAD s/p stents most recently 2017  - c/w ASA   - hold plavix given acute hematuria  - c/w hold metoprolol 25mg BID in the setting of sepsis at this time   - TTE from 2017 with EF 55% with mild systolic and diastolic dysfunction  - no signs of acute volume overload or HF at this time - CAD s/p stents most recently 2017  - c/w ASA   - hold plavix given acute hematuria  - restart metoprolol 25mg BID  - TTE from 2017 with EF 55% with mild systolic and diastolic dysfunction  - no signs of acute volume overload or HF at this time

## 2019-11-29 NOTE — PROGRESS NOTE ADULT - SUBJECTIVE AND OBJECTIVE BOX
[facility]il[facility] (Gian)   Internal Medicine  PGY-1  Pager: NS--[phone]; [facility]--[phone]    Patient is a 86y old  Male who presents with a chief complaint of 86M w/ cough and fever (2019 09:38)      SUBJECTIVE / OVERNIGHT EVENTS: No acute event ON. Denies HA/CP/SOB/abd pain/n/v/d/fever/chills. Cough and breathing are better. Suprapubic tenderness/pressuring sensation is still there.    MEDICATIONS  (STANDING):  albuterol/ipratropium for Nebulization 3 milliLiter(s) Nebulizer every 6 hours  aspirin  chewable 81 milliGRAM(s) Oral daily  atorvastatin 80 milliGRAM(s) Oral at bedtime  chlorhexidine 4% Liquid 1 Application(s) Topical <User Schedule>  dextrose 5%. 1000 milliLiter(s) (50 mL/Hr) IV Continuous <Continuous>  dextrose 50% Injectable 12.5 Gram(s) IV Push once  dextrose 50% Injectable 25 Gram(s) IV Push once  dextrose 50% Injectable 25 Gram(s) IV Push once  guaiFENesin  milliGRAM(s) Oral every 12 hours  heparin  Injectable 5000 Unit(s) SubCutaneous every 8 hours  insulin lispro (HumaLOG) corrective regimen sliding scale   SubCutaneous three times a day before meals  insulin lispro (HumaLOG) corrective regimen sliding scale   SubCutaneous at bedtime  magnesium oxide 400 milliGRAM(s) Oral once  pantoprazole    Tablet 40 milliGRAM(s) Oral before breakfast  polyethylene glycol 3350 17 Gram(s) Oral daily  predniSONE   Tablet 40 milliGRAM(s) Oral daily  senna 2 Tablet(s) Oral at bedtime  tamsulosin 0.4 milliGRAM(s) Oral at bedtime    MEDICATIONS  (PRN):  benzocaine 15 mG/menthol 3.6 mG (Sugar-Free) Lozenge 1 Lozenge Oral every 2 hours PRN Sore Throat  benzonatate 100 milliGRAM(s) Oral three times a day PRN Cough  dextrose 40% Gel 15 Gram(s) Oral once PRN Blood Glucose LESS THAN 70 milliGRAM(s)/deciliter  glucagon  Injectable 1 milliGRAM(s) IntraMuscular once PRN Glucose LESS THAN 70 milligrams/deciliter  simethicone 80 milliGRAM(s) Chew four times a day PRN Bloating        OBJECTIVE:    Vital Signs Last 24 Hrs  T(C): 36.4 (2019 05:35), Max: 37.7 (2019 10:15)  T(F): 97.5 (2019 05:35), Max: 99.9 (2019 10:15)  HR: 89 (2019 05:46) (69 - 95)  BP: 122/67 (2019 05:35) (119/61 - 146/68)  BP(mean): --  RR: 20 (2019 05:35) (18 - 20)  SpO2: 96% (2019 05:46) (95% - 99%)    PHYSICAL EXAM:  GENERAL: NAD, well-developed  HEAD:  Atraumatic, Normocephalic  EYES: EOMI, PERRLA, conjunctiva and sclera clear  NECK: Supple, No JVD  MOUTH: erythema in the back of his throat. No lesions noted  CHEST/LUNG: Diffuse wheezing  HEART: Regular rate and rhythm; No murmurs, rubs, or gallops  ABDOMEN: Soft, tenderness to palpation in the suprapubic region, Nondistended; Bowel sounds present  EXTREMITIES:  2+ Peripheral Pulses, No clubbing, cyanosis, or edema. Mild swelling and erythema of L ankle  PSYCH: AAOx3  NEUROLOGY: non-focal  SKIN: No rashes or lesions    CAPILLARY BLOOD GLUCOSE      POCT Blood Glucose.: 254 mg/dL (2019 08:53)  POCT Blood Glucose.: 191 mg/dL (2019 22:18)  POCT Blood Glucose.: 235 mg/dL (2019 18:48)  POCT Blood Glucose.: 308 mg/dL (2019 13:39)  POCT Blood Glucose.: 258 mg/dL (2019 09:43)    I&O's Summary    2019 07:01  -  2019 07:00  --------------------------------------------------------  IN: 730 mL / OUT: 1875 mL / NET: -1145 mL        LABS:                        9.5    14.30 )-----------( 279      ( 2019 08:37 )             27.3         130<L>  |  94<L>  |  22  ----------------------------<  219<H>  3.9   |  22  |  1.43<H>    Ca    9.3      2019 06:41  Mg     1.7         TPro  7.6  /  Alb  4.7  /  TBili  0.6  /  DBili  x   /  AST  13  /  ALT  10  /  AlkPhos  81      PT/INR - ( 2019 19:35 )   PT: 11.3 sec;   INR: 0.99 ratio         PTT - ( 2019 19:35 )  PTT:26.4 sec      Urinalysis Basic - ( 2019 21:18 )    Color: Light Yellow / Appearance: Clear / S.018 / pH: x  Gluc: x / Ketone: Small  / Bili: Negative / Urobili: Negative   Blood: x / Protein: Trace / Nitrite: Negative   Leuk Esterase: Negative / RBC: 4 /hpf / WBC 0 /HPF   Sq Epi: x / Non Sq Epi: 0 /hpf / Bacteria: Negative            RADIOLOGY & ADDITIONAL TESTS: [facility] (Gian)   Internal Medicine  PGY-1  Pager: NS--[phone]; [facility]--[phone]    Patient is a 86y old  Male who presents with a chief complaint of 86M w/ cough and fever (2019 09:38)      SUBJECTIVE / OVERNIGHT EVENTS: No acute event ON. Denies HA/CP/SOB/abd pain/n/v/d/fever/chills. Cough and breathing are better. Suprapubic tenderness/pressuring sensation is still there. Still has hematuria.    MEDICATIONS  (STANDING):  albuterol/ipratropium for Nebulization 3 milliLiter(s) Nebulizer every 6 hours  aspirin  chewable 81 milliGRAM(s) Oral daily  atorvastatin 80 milliGRAM(s) Oral at bedtime  chlorhexidine 4% Liquid 1 Application(s) Topical <User Schedule>  dextrose 5%. 1000 milliLiter(s) (50 mL/Hr) IV Continuous <Continuous>  dextrose 50% Injectable 12.5 Gram(s) IV Push once  dextrose 50% Injectable 25 Gram(s) IV Push once  dextrose 50% Injectable 25 Gram(s) IV Push once  guaiFENesin  milliGRAM(s) Oral every 12 hours  heparin  Injectable 5000 Unit(s) SubCutaneous every 8 hours  insulin lispro (HumaLOG) corrective regimen sliding scale   SubCutaneous three times a day before meals  insulin lispro (HumaLOG) corrective regimen sliding scale   SubCutaneous at bedtime  magnesium oxide 400 milliGRAM(s) Oral once  pantoprazole    Tablet 40 milliGRAM(s) Oral before breakfast  polyethylene glycol 3350 17 Gram(s) Oral daily  predniSONE   Tablet 40 milliGRAM(s) Oral daily  senna 2 Tablet(s) Oral at bedtime  tamsulosin 0.4 milliGRAM(s) Oral at bedtime    MEDICATIONS  (PRN):  benzocaine 15 mG/menthol 3.6 mG (Sugar-Free) Lozenge 1 Lozenge Oral every 2 hours PRN Sore Throat  benzonatate 100 milliGRAM(s) Oral three times a day PRN Cough  dextrose 40% Gel 15 Gram(s) Oral once PRN Blood Glucose LESS THAN 70 milliGRAM(s)/deciliter  glucagon  Injectable 1 milliGRAM(s) IntraMuscular once PRN Glucose LESS THAN 70 milligrams/deciliter  simethicone 80 milliGRAM(s) Chew four times a day PRN Bloating        OBJECTIVE:    Vital Signs Last 24 Hrs  T(C): 36.4 (2019 05:35), Max: 37.7 (2019 10:15)  T(F): 97.5 (2019 05:35), Max: 99.9 (2019 10:15)  HR: 89 (2019 05:46) (69 - 95)  BP: 122/67 (2019 05:35) (119/61 - 146/68)  BP(mean): --  RR: 20 (2019 05:35) (18 - 20)  SpO2: 96% (2019 05:46) (95% - 99%)    PHYSICAL EXAM:  GENERAL: NAD, well-developed  HEAD:  Atraumatic, Normocephalic  EYES: EOMI, PERRLA, conjunctiva and sclera clear  NECK: Supple, No JVD  MOUTH: erythema in the back of his throat. No lesions noted  CHEST/LUNG: Diffuse wheezing  HEART: Regular rate and rhythm; No murmurs, rubs, or gallops  ABDOMEN: Soft, tenderness to palpation in the suprapubic region, Nondistended; Bowel sounds present  EXTREMITIES:  2+ Peripheral Pulses, No clubbing, cyanosis, or edema. Mild swelling and erythema of L ankle  PSYCH: AAOx3  NEUROLOGY: non-focal  SKIN: No rashes or lesions    CAPILLARY BLOOD GLUCOSE      POCT Blood Glucose.: 254 mg/dL (2019 08:53)  POCT Blood Glucose.: 191 mg/dL (2019 22:18)  POCT Blood Glucose.: 235 mg/dL (2019 18:48)  POCT Blood Glucose.: 308 mg/dL (2019 13:39)  POCT Blood Glucose.: 258 mg/dL (2019 09:43)    I&O's Summary    2019 07:01  -  2019 07:00  --------------------------------------------------------  IN: 730 mL / OUT: 1875 mL / NET: -1145 mL        LABS:                        9.5    14.30 )-----------( 279      ( 2019 08:37 )             27.3         130<L>  |  94<L>  |  22  ----------------------------<  219<H>  3.9   |  22  |  1.43<H>    Ca    9.3      2019 06:41  Mg     1.7         TPro  7.6  /  Alb  4.7  /  TBili  0.6  /  DBili  x   /  AST  13  /  ALT  10  /  AlkPhos  81      PT/INR - ( 2019 19:35 )   PT: 11.3 sec;   INR: 0.99 ratio         PTT - ( 2019 19:35 )  PTT:26.4 sec      Urinalysis Basic - ( 2019 21:18 )    Color: Light Yellow / Appearance: Clear / S.018 / pH: x  Gluc: x / Ketone: Small  / Bili: Negative / Urobili: Negative   Blood: x / Protein: Trace / Nitrite: Negative   Leuk Esterase: Negative / RBC: 4 /hpf / WBC 0 /HPF   Sq Epi: x / Non Sq Epi: 0 /hpf / Bacteria: Negative            RADIOLOGY & ADDITIONAL TESTS:

## 2019-11-29 NOTE — PROGRESS NOTE ADULT - PROBLEM SELECTOR PLAN 8
- patient on amlodipine, HCTZ-lisinopril at home  - will hold at this time given relative hypotension and sepsis. Can restart once BP stable - patient on amlodipine, HCTZ-lisinopril at home  - will restart amlodipine for now given elevated Cr

## 2019-11-29 NOTE — PROGRESS NOTE ADULT - PROBLEM SELECTOR PLAN 1
- patient with respiratory failure and new hypoxia secondary to acute parainfluenza virus infection  - supplemental oxygen as needed with plan to titrate off as soon as tolerated  - CXR with no signs of consolidation to suggest PNA  - treat parainfluenza infection symptomatically and expect resolution of hypoxia  -s/p decadron in ED, continue with course of prednisone 40 d - patient with respiratory failure and new hypoxia secondary to acute parainfluenza virus infection  - Now off O2. Satting 95% ORA  - CXR with no signs of consolidation to suggest PNA  - supportive care for parainfluenza infection  - will decrease prednisone to 20qd for 3 more days

## 2019-11-29 NOTE — PROGRESS NOTE ADULT - PROBLEM SELECTOR PLAN 3
- patient with tachypnea and leukocytosis with RVP + for parainfluenza virus  - s/p 2.2L NS in the ED along with ceftriaxone/azithromycin  - f/u BCx and UCx, UA negative, CXR grossly clear  - monitor off abx at this time given likely viral etiology of symptoms - patient with tachypnea and leukocytosis with RVP + for parainfluenza virus  - s/p 2.2L NS in the ED along with ceftriaxone/azithromycin  - BCx and UCx 11/27: NGTD, UA negative, CXR grossly clear  - monitor off abx at this time given likely viral etiology of symptoms

## 2019-11-29 NOTE — PROGRESS NOTE ADULT - PROBLEM SELECTOR PLAN 7
- A1c 7.6 from October 2019  - on metformin, glimepiride, and Tradjenta at home  - c/w moderate SS before meals and at bedtime for now. If continues to be elevated, in the setting of steroid, will start lantus qhs - A1c 7.6 from October 2019  - on metformin, glimepiride, and Tradjenta at home  - c/w moderate SS before meals and at bedtime  - hyperglycemic on sliding scale, will start lantus 12 units and premeal 3 units

## 2019-11-29 NOTE — PROGRESS NOTE ADULT - PROBLEM SELECTOR PLAN 2
- RVP + for parainfluenza virus  - continue supportive care measures including duonebs q6 hours, cepacol cough drops, tessalon pearls, and mucinex q12 hours  - s/p ceftriaxone and azithromycin in the ED but will monitor off abx at this time given lack of consolidation on CXR to suggest superimposed PNA - RVP + for parainfluenza virus  - continue supportive care measures including duonebs q6 hours, cepacol cough drops, tessalon pearls, and mucinex q12 hours  - s/p ceftriaxone and azithromycin in the ED but continue to monitor off abx at this time given lack of consolidation on CXR to suggest superimposed PNA - RVP + for parainfluenza virus  - continue supportive care measures including duonebs q6 hours, cepacol cough drops, tessalon pearls, and mucinex q12 hours  - s/p ceftriaxone and azithromycin in the ED but continue to monitor off abx at this time given lack of consolidation on CXR to suggest superimposed PNA  - f/u throat swab

## 2019-11-29 NOTE — PROGRESS NOTE ADULT - ASSESSMENT
85 y/o M with PMHx CAD s/p 3 stents (most recent 2017), DM2, HLD, HTN, recent admission for L ankle fracture s/p ORIF who presents to the hospital with 3 days of cough and fever, admitted for sepsis secondary to parainfluenza virus complicated by acute hypoxic respiratory failure. Now improving.

## 2019-11-29 NOTE — PROGRESS NOTE ADULT - PROBLEM SELECTOR PLAN 5
- patient with new urinary retention today after straight cath for clean catch urine sample  - bladder scan reading > 790, magallanes placed with clots and gross hematuria  - urology consulted recs appreciated  - continue to hold plavix   - c/w ASA  - start flomax and plan for trial of voids when improves - patient with new urinary retention today after straight cath for clean catch urine sample  - bladder scan reading > 790, magallanes placed with clots and gross hematuria  - urology consulted recs appreciated  - continue to hold plavix   - c/w ASA  - c/w flomax and plan for trial of voids when improves

## 2019-11-30 LAB
ALBUMIN SERPL ELPH-MCNC: 4 G/DL — SIGNIFICANT CHANGE UP (ref 3.3–5)
ALP SERPL-CCNC: 57 U/L — SIGNIFICANT CHANGE UP (ref 40–120)
ALT FLD-CCNC: 10 U/L — SIGNIFICANT CHANGE UP (ref 10–45)
ANION GAP SERPL CALC-SCNC: 16 MMOL/L — SIGNIFICANT CHANGE UP (ref 5–17)
ANION GAP SERPL CALC-SCNC: 19 MMOL/L — HIGH (ref 5–17)
AST SERPL-CCNC: 16 U/L — SIGNIFICANT CHANGE UP (ref 10–40)
BILIRUB SERPL-MCNC: 0.4 MG/DL — SIGNIFICANT CHANGE UP (ref 0.2–1.2)
BUN SERPL-MCNC: 21 MG/DL — SIGNIFICANT CHANGE UP (ref 7–23)
BUN SERPL-MCNC: 25 MG/DL — HIGH (ref 7–23)
CALCIUM SERPL-MCNC: 9.2 MG/DL — SIGNIFICANT CHANGE UP (ref 8.4–10.5)
CALCIUM SERPL-MCNC: 9.3 MG/DL — SIGNIFICANT CHANGE UP (ref 8.4–10.5)
CHLORIDE SERPL-SCNC: 91 MMOL/L — LOW (ref 96–108)
CHLORIDE SERPL-SCNC: 95 MMOL/L — LOW (ref 96–108)
CK MB CFR SERPL CALC: 3.9 NG/ML — SIGNIFICANT CHANGE UP (ref 0–6.7)
CK MB CFR SERPL CALC: 4.4 NG/ML — SIGNIFICANT CHANGE UP (ref 0–6.7)
CK SERPL-CCNC: 28 U/L — LOW (ref 30–200)
CO2 SERPL-SCNC: 18 MMOL/L — LOW (ref 22–31)
CO2 SERPL-SCNC: 20 MMOL/L — LOW (ref 22–31)
CREAT SERPL-MCNC: 1.14 MG/DL — SIGNIFICANT CHANGE UP (ref 0.5–1.3)
CREAT SERPL-MCNC: 1.29 MG/DL — SIGNIFICANT CHANGE UP (ref 0.5–1.3)
GLUCOSE BLDC GLUCOMTR-MCNC: 120 MG/DL — HIGH (ref 70–99)
GLUCOSE BLDC GLUCOMTR-MCNC: 131 MG/DL — HIGH (ref 70–99)
GLUCOSE BLDC GLUCOMTR-MCNC: 217 MG/DL — HIGH (ref 70–99)
GLUCOSE BLDC GLUCOMTR-MCNC: 235 MG/DL — HIGH (ref 70–99)
GLUCOSE BLDC GLUCOMTR-MCNC: 239 MG/DL — HIGH (ref 70–99)
GLUCOSE SERPL-MCNC: 225 MG/DL — HIGH (ref 70–99)
GLUCOSE SERPL-MCNC: 236 MG/DL — HIGH (ref 70–99)
HCT VFR BLD CALC: 27.9 % — LOW (ref 39–50)
HCT VFR BLD CALC: 29.1 % — LOW (ref 39–50)
HGB BLD-MCNC: 10.2 G/DL — LOW (ref 13–17)
HGB BLD-MCNC: 9.6 G/DL — LOW (ref 13–17)
LACTATE SERPL-SCNC: 1.7 MMOL/L — SIGNIFICANT CHANGE UP (ref 0.7–2)
MAGNESIUM SERPL-MCNC: 1.8 MG/DL — SIGNIFICANT CHANGE UP (ref 1.6–2.6)
MCHC RBC-ENTMCNC: 30.4 PG — SIGNIFICANT CHANGE UP (ref 27–34)
MCHC RBC-ENTMCNC: 30.7 PG — SIGNIFICANT CHANGE UP (ref 27–34)
MCHC RBC-ENTMCNC: 34.4 GM/DL — SIGNIFICANT CHANGE UP (ref 32–36)
MCHC RBC-ENTMCNC: 35.1 GM/DL — SIGNIFICANT CHANGE UP (ref 32–36)
MCV RBC AUTO: 87.7 FL — SIGNIFICANT CHANGE UP (ref 80–100)
MCV RBC AUTO: 88.3 FL — SIGNIFICANT CHANGE UP (ref 80–100)
NRBC # BLD: 0 /100 WBCS — SIGNIFICANT CHANGE UP (ref 0–0)
PHOSPHATE SERPL-MCNC: 3.4 MG/DL — SIGNIFICANT CHANGE UP (ref 2.5–4.5)
PLATELET # BLD AUTO: 310 K/UL — SIGNIFICANT CHANGE UP (ref 150–400)
PLATELET # BLD AUTO: 311 K/UL — SIGNIFICANT CHANGE UP (ref 150–400)
POTASSIUM SERPL-MCNC: 3.8 MMOL/L — SIGNIFICANT CHANGE UP (ref 3.5–5.3)
POTASSIUM SERPL-MCNC: 4.1 MMOL/L — SIGNIFICANT CHANGE UP (ref 3.5–5.3)
POTASSIUM SERPL-SCNC: 3.8 MMOL/L — SIGNIFICANT CHANGE UP (ref 3.5–5.3)
POTASSIUM SERPL-SCNC: 4.1 MMOL/L — SIGNIFICANT CHANGE UP (ref 3.5–5.3)
PROCALCITONIN SERPL-MCNC: 0.22 NG/ML — HIGH (ref 0.02–0.1)
PROT SERPL-MCNC: 6.5 G/DL — SIGNIFICANT CHANGE UP (ref 6–8.3)
RBC # BLD: 3.16 M/UL — LOW (ref 4.2–5.8)
RBC # BLD: 3.32 M/UL — LOW (ref 4.2–5.8)
RBC # FLD: 13.3 % — SIGNIFICANT CHANGE UP (ref 10.3–14.5)
RBC # FLD: 13.5 % — SIGNIFICANT CHANGE UP (ref 10.3–14.5)
S PYO DNA THROAT QL NAA+PROBE: SIGNIFICANT CHANGE UP
SODIUM SERPL-SCNC: 128 MMOL/L — LOW (ref 135–145)
SODIUM SERPL-SCNC: 131 MMOL/L — LOW (ref 135–145)
TROPONIN T, HIGH SENSITIVITY RESULT: 36 NG/L — SIGNIFICANT CHANGE UP (ref 0–51)
TROPONIN T, HIGH SENSITIVITY RESULT: 48 NG/L — SIGNIFICANT CHANGE UP (ref 0–51)
WBC # BLD: 13.03 K/UL — HIGH (ref 3.8–10.5)
WBC # BLD: 16.52 K/UL — HIGH (ref 3.8–10.5)
WBC # FLD AUTO: 13.03 K/UL — HIGH (ref 3.8–10.5)
WBC # FLD AUTO: 16.52 K/UL — HIGH (ref 3.8–10.5)

## 2019-11-30 PROCEDURE — 93010 ELECTROCARDIOGRAM REPORT: CPT

## 2019-11-30 PROCEDURE — 99232 SBSQ HOSP IP/OBS MODERATE 35: CPT | Mod: GC

## 2019-11-30 RX ORDER — FAMOTIDINE 10 MG/ML
40 INJECTION INTRAVENOUS ONCE
Refills: 0 | Status: COMPLETED | OUTPATIENT
Start: 2019-11-30 | End: 2019-11-30

## 2019-11-30 RX ORDER — SIMETHICONE 80 MG/1
80 TABLET, CHEWABLE ORAL ONCE
Refills: 0 | Status: COMPLETED | OUTPATIENT
Start: 2019-11-30 | End: 2019-11-30

## 2019-11-30 RX ADMIN — HEPARIN SODIUM 5000 UNIT(S): 5000 INJECTION INTRAVENOUS; SUBCUTANEOUS at 22:05

## 2019-11-30 RX ADMIN — AMLODIPINE BESYLATE 5 MILLIGRAM(S): 2.5 TABLET ORAL at 06:35

## 2019-11-30 RX ADMIN — Medication 600 MILLIGRAM(S): at 18:30

## 2019-11-30 RX ADMIN — Medication 81 MILLIGRAM(S): at 12:02

## 2019-11-30 RX ADMIN — Medication 3 UNIT(S): at 18:16

## 2019-11-30 RX ADMIN — PANTOPRAZOLE SODIUM 40 MILLIGRAM(S): 20 TABLET, DELAYED RELEASE ORAL at 06:36

## 2019-11-30 RX ADMIN — TAMSULOSIN HYDROCHLORIDE 0.4 MILLIGRAM(S): 0.4 CAPSULE ORAL at 22:05

## 2019-11-30 RX ADMIN — Medication 3 UNIT(S): at 14:30

## 2019-11-30 RX ADMIN — HEPARIN SODIUM 5000 UNIT(S): 5000 INJECTION INTRAVENOUS; SUBCUTANEOUS at 14:30

## 2019-11-30 RX ADMIN — Medication 25 MILLIGRAM(S): at 06:35

## 2019-11-30 RX ADMIN — INSULIN GLARGINE 12 UNIT(S): 100 INJECTION, SOLUTION SUBCUTANEOUS at 22:05

## 2019-11-30 RX ADMIN — Medication 650 MILLIGRAM(S): at 22:37

## 2019-11-30 RX ADMIN — Medication 4: at 09:20

## 2019-11-30 RX ADMIN — FAMOTIDINE 40 MILLIGRAM(S): 10 INJECTION INTRAVENOUS at 01:18

## 2019-11-30 RX ADMIN — Medication 600 MILLIGRAM(S): at 06:35

## 2019-11-30 RX ADMIN — POLYETHYLENE GLYCOL 3350 17 GRAM(S): 17 POWDER, FOR SOLUTION ORAL at 12:02

## 2019-11-30 RX ADMIN — Medication 3 UNIT(S): at 09:21

## 2019-11-30 RX ADMIN — Medication 650 MILLIGRAM(S): at 22:07

## 2019-11-30 RX ADMIN — Medication 25 MILLIGRAM(S): at 18:16

## 2019-11-30 RX ADMIN — Medication 4: at 14:30

## 2019-11-30 RX ADMIN — Medication 650 MILLIGRAM(S): at 00:46

## 2019-11-30 RX ADMIN — Medication 20 MILLIGRAM(S): at 06:36

## 2019-11-30 RX ADMIN — SIMETHICONE 80 MILLIGRAM(S): 80 TABLET, CHEWABLE ORAL at 00:45

## 2019-11-30 RX ADMIN — HEPARIN SODIUM 5000 UNIT(S): 5000 INJECTION INTRAVENOUS; SUBCUTANEOUS at 06:35

## 2019-11-30 RX ADMIN — Medication 3 MILLILITER(S): at 18:02

## 2019-11-30 RX ADMIN — CHLORHEXIDINE GLUCONATE 1 APPLICATION(S): 213 SOLUTION TOPICAL at 12:02

## 2019-11-30 RX ADMIN — Medication 650 MILLIGRAM(S): at 01:17

## 2019-11-30 NOTE — CONSULT NOTE ADULT - SUBJECTIVE AND OBJECTIVE BOX
Anant Cook MD  Interventional Cardiology / Endovascular Specialist  [place] Office : [phone] [place] [address] [place] [address]  Tel:   [place] Office : [address] [facility] [place] [address]  Tel: [phone]  Cell : [phone]      HISTORY OF PRESENTING ILLNESS:    85 y/o M with PMHx CAD s/p cabg and 3 stents (most recent 2017), DM2, HLD, HTN, recent admission for L ankle fracture s/p ORIF who presents to the hospital with 3 days of cough and fever. History obtained from patient and daughter at the bedside. The patient was admitted to the hospital 10/19-10/22 s/p a L ankle fracture and was discharged to rehab. He left rehab last Friday. Since then, he was doing well until Monday when he started to develop a wet cough associated with a sore throat and chills at home. He never took his temperature. He denied CP or SOB. He saw his PCP today for his symptoms who sent him to to the ED because he was concerned he may have PNA. The patient denies any abdominal pain, nausea, or vomiting. He endorses poor appetite for the last few days. He denies sick contacts and did not receive the flu shot this year. Of note, patient had been straight cathed in the ED for a clean urine sample and when seen, patient was having trouble urinating and endorsing the urge to urinate.     Upon arrival to the ED, patient's VS were T: 100.3 oral, BP: 132/90, HR: 86, RR: 22, sating 98% on RA but subsequently his O2 sat dropped to 93% so he was placed on 3L of NC. He was given 2.2L of NS, ceftriaxone, azithromycin, tylenol, and decadron 6mg.     Course complicated by hematuria and asa/plavix held '  Cards consulted for CP   Cardiac enzymes - monica denies CP today     PAST MEDICAL & SURGICAL HISTORY:  HLD (hyperlipidemia)  CAD (coronary artery disease)  DM (diabetes mellitus)  HTN (hypertension)  Status post ORIF of fracture of ankle: Left, Oct 2019  S/P CABG (coronary artery bypass graft)      SOCIAL HISTORY: Substance Use (street drugs): ( x ) never used  (  ) other:    FAMILY HISTORY:  No pertinent family history in first degree relatives      REVIEW OF SYSTEMS:  CONSTITUTIONAL: No fever, weight loss, or fatigue  EYES: No eye pain, visual disturbances, or discharge  ENMT:  No difficulty hearing, tinnitus, vertigo; No sinus or throat pain  BREASTS: No pain, masses, or nipple discharge  GASTROINTESTINAL: No abdominal or epigastric pain. No nausea, vomiting, or hematemesis; No diarrhea or constipation. No melena or hematochezia.  GENITOURINARY: No dysuria, frequency, hematuria, or incontinence  NEUROLOGICAL: No headaches, memory loss, loss of strength, numbness, or tremors  ENDOCRINE: No heat or cold intolerance; No hair loss  MUSCULOSKELETAL: No joint pain or swelling; No muscle, back, or extremity pain  PSYCHIATRIC: No depression, anxiety, mood swings, or difficulty sleeping  HEME/LYMPH: No easy bruising, or bleeding gums    C/O Dry mouth     MEDICATIONS:  amLODIPine   Tablet 5 milliGRAM(s) Oral daily  aspirin  chewable 81 milliGRAM(s) Oral daily  heparin  Injectable 5000 Unit(s) SubCutaneous every 8 hours  metoprolol tartrate 25 milliGRAM(s) Oral two times a day  tamsulosin 0.4 milliGRAM(s) Oral at bedtime      albuterol/ipratropium for Nebulization 3 milliLiter(s) Nebulizer every 6 hours  benzonatate 100 milliGRAM(s) Oral three times a day PRN  guaiFENesin  milliGRAM(s) Oral every 12 hours    acetaminophen   Tablet .. 650 milliGRAM(s) Oral every 6 hours PRN    pantoprazole    Tablet 40 milliGRAM(s) Oral before breakfast  polyethylene glycol 3350 17 Gram(s) Oral daily  senna 2 Tablet(s) Oral at bedtime  simethicone 80 milliGRAM(s) Chew four times a day PRN    dextrose 40% Gel 15 Gram(s) Oral once PRN  dextrose 50% Injectable 12.5 Gram(s) IV Push once  dextrose 50% Injectable 25 Gram(s) IV Push once  dextrose 50% Injectable 25 Gram(s) IV Push once  glucagon  Injectable 1 milliGRAM(s) IntraMuscular once PRN  insulin glargine Injectable (LANTUS) 12 Unit(s) SubCutaneous at bedtime  insulin lispro (HumaLOG) corrective regimen sliding scale   SubCutaneous three times a day before meals  insulin lispro (HumaLOG) corrective regimen sliding scale   SubCutaneous at bedtime  insulin lispro Injectable (HumaLOG) 3 Unit(s) SubCutaneous three times a day before meals  predniSONE   Tablet 20 milliGRAM(s) Oral daily    benzocaine 15 mG/menthol 3.6 mG (Sugar-Free) Lozenge 1 Lozenge Oral every 2 hours PRN  chlorhexidine 4% Liquid 1 Application(s) Topical <User Schedule>  dextrose 5%. 1000 milliLiter(s) IV Continuous <Continuous>  sodium chloride 0.9%. 1000 milliLiter(s) IV Continuous <Continuous>      FAMILY HISTORY:  No pertinent family history in first degree relatives        Allergies    No Known Allergies    Intolerances    	      PHYSICAL EXAM:  T(C): 36.5 (11-30-19 @ 13:48), Max: 36.9 (11-30-19 @ 06:14)  HR: 73 (11-30-19 @ 18:17) (65 - 98)  BP: 123/83 (11-30-19 @ 18:17) (123/76 - 163/79)  RR: 20 (11-30-19 @ 18:17) (16 - 20)  SpO2: 100% (11-30-19 @ 18:17) (95% - 100%)  Wt(kg): --  I&O's Summary    29 Nov 2019 07:01  -  30 Nov 2019 07:00  --------------------------------------------------------  IN: 1795 mL / OUT: 3650 mL / NET: -1855 mL    30 Nov 2019 07:01  -  30 Nov 2019 18:42  --------------------------------------------------------  IN: 0 mL / OUT: 800 mL / NET: -800 mL        GENERAL: NAD  EYES: EOMI, PERRLA, conjunctiva and sclera clear  ENMT: No tonsillar erythema, exudates, or enlargement; Dry mucous membranes,  Cardiovascular: Normal S1 S2, No JVD, No murmurs  Respiratory: Lungs clear to auscultation	  Gastrointestinal:  Soft, Non-tender, + BS	  Extremities: No clubbing, cyanosis or edema  LYMPH: No lymphadenopathy noted      LABS:	 	    CARDIAC MARKERS:                                  9.6    13.03 )-----------( 311      ( 30 Nov 2019 10:46 )             27.9     11-30    131<L>  |  95<L>  |  21  ----------------------------<  225<H>  3.8   |  20<L>  |  1.14    Ca    9.2      30 Nov 2019 06:54  Phos  3.4     11-30  Mg     1.8     11-30    TPro  6.5  /  Alb  4.0  /  TBili  0.4  /  DBili  x   /  AST  16  /  ALT  10  /  AlkPhos  57  11-30    proBNP:   Lipid Profile:   HgA1c:   TSH:     Consultant(s) Notes Reviewed:  [x ] YES  [ ] NO    Care Discussed with Consultants/Other Providers [ x] YES  [ ] NO    Imaging Personally Reviewed independently:  [x] YES  [ ] NO    All labs, radiologic studies, vitals, orders and medications list reviewed. Patient is seen and examined at bedside. Case discussed with medical team.    ASSESSMENT/PLAN:

## 2019-11-30 NOTE — PROGRESS NOTE ADULT - SUBJECTIVE AND OBJECTIVE BOX
Radames Rosado, Internal Medicine  PGY-2, [phone], [id number]    Patient is a 86y old  Male who presents with a chief complaint of 86M w/ cough and fever (2019 09:38)      SUBJECTIVE / OVERNIGHT EVENTS: pt complained substernal chest pain and shortness of breath overnight, so pt was started on 3LNC and trops were drawn. EKG showed new TWI in the lateral leads, but did not meet sgarbossa criteria for MI in a LBBB. pt stated the pain traveled to the epigastric region and then after 15 mins the chest pain resolved. The epigastric pain resoved after simethicone and pepcid. This AM pt denies any chest pain, palpitations, shortness of breath, light headedness, or dizziness. Pt states the epigastric pain is much improved.     MEDICATIONS  (STANDING):  albuterol/ipratropium for Nebulization 3 milliLiter(s) Nebulizer every 6 hours  aspirin  chewable 81 milliGRAM(s) Oral daily  atorvastatin 80 milliGRAM(s) Oral at bedtime  chlorhexidine 4% Liquid 1 Application(s) Topical <User Schedule>  dextrose 5%. 1000 milliLiter(s) (50 mL/Hr) IV Continuous <Continuous>  dextrose 50% Injectable 12.5 Gram(s) IV Push once  dextrose 50% Injectable 25 Gram(s) IV Push once  dextrose 50% Injectable 25 Gram(s) IV Push once  guaiFENesin  milliGRAM(s) Oral every 12 hours  heparin  Injectable 5000 Unit(s) SubCutaneous every 8 hours  insulin lispro (HumaLOG) corrective regimen sliding scale   SubCutaneous three times a day before meals  insulin lispro (HumaLOG) corrective regimen sliding scale   SubCutaneous at bedtime  magnesium oxide 400 milliGRAM(s) Oral once  pantoprazole    Tablet 40 milliGRAM(s) Oral before breakfast  polyethylene glycol 3350 17 Gram(s) Oral daily  predniSONE   Tablet 40 milliGRAM(s) Oral daily  senna 2 Tablet(s) Oral at bedtime  tamsulosin 0.4 milliGRAM(s) Oral at bedtime    MEDICATIONS  (PRN):  benzocaine 15 mG/menthol 3.6 mG (Sugar-Free) Lozenge 1 Lozenge Oral every 2 hours PRN Sore Throat  benzonatate 100 milliGRAM(s) Oral three times a day PRN Cough  dextrose 40% Gel 15 Gram(s) Oral once PRN Blood Glucose LESS THAN 70 milliGRAM(s)/deciliter  glucagon  Injectable 1 milliGRAM(s) IntraMuscular once PRN Glucose LESS THAN 70 milligrams/deciliter  simethicone 80 milliGRAM(s) Chew four times a day PRN Bloating        OBJECTIVE:    Vital Signs Last 24 Hrs  Vital Signs Last 24 Hrs  T(C): 36.9 (2019 06:14), Max: 36.9 (2019 06:14)  T(F): 98.4 (2019 06:14), Max: 98.4 (2019 06:14)  HR: 69 (2019 06:14) (69 - 98)  BP: 146/70 (2019 06:14) (123/76 - 163/79)  BP(mean): --  RR: 16 (2019 06:14) (16 - 20)  SpO2: 99% (2019 06:14) (95% - 100%)    PHYSICAL EXAM:  GENERAL: NAD, well-developed  HEAD:  Atraumatic, Normocephalic  EYES: EOMI, PERRLA, conjunctiva and sclera clear  NECK: Supple, No JVD  MOUTH: No lesions noted  CHEST/LUNG: CTAB no wheezing, rales or rhonchi  HEART: Regular rate and rhythm; No murmurs, rubs, or gallops  ABDOMEN: Soft, tenderness to palpation in the suprapubic region, Nondistended; Bowel sounds present  EXTREMITIES:  2+ Peripheral Pulses, No clubbing, cyanosis, or edema. Mild swelling and erythema of L ankle  : Pink>red color in magallanes  PSYCH: AAOx3  NEUROLOGY: non-focal  SKIN: No rashes or lesions    CAPILLARY BLOOD GLUCOSE      POCT Blood Glucose.: 254 mg/dL (2019 08:53)  POCT Blood Glucose.: 191 mg/dL (2019 22:18)  POCT Blood Glucose.: 235 mg/dL (2019 18:48)  POCT Blood Glucose.: 308 mg/dL (2019 13:39)  POCT Blood Glucose.: 258 mg/dL (2019 09:43)    I&O's Summary    2019 07:01  -  2019 07:00  --------------------------------------------------------  IN: 730 mL / OUT: 1875 mL / NET: -1145 mL          LABS:                         9.6    13.03 )-----------( 311      ( 2019 10:46 )             27.9         131<L>  |  95<L>  |  21  ----------------------------<  225<H>  3.8   |  20<L>  |  1.14    Ca    9.2      2019 06:54  Phos  3.4       Mg     1.8         TPro  6.5  /  Alb  4.0  /  TBili  0.4  /  DBili  x   /  AST  16  /  ALT  10  /  AlkPhos  57            Lactate, Blood: 1.7 mmol/L ( @ 11:18)      RADIOLOGY, EKG & ADDITIONAL TESTS: Reviewed.     Urinalysis Basic - ( 2019 21:18 )    Color: Light Yellow / Appearance: Clear / S.018 / pH: x  Gluc: x / Ketone: Small  / Bili: Negative / Urobili: Negative   Blood: x / Protein: Trace / Nitrite: Negative   Leuk Esterase: Negative / RBC: 4 /hpf / WBC 0 /HPF   Sq Epi: x / Non Sq Epi: 0 /hpf / Bacteria: Negative            RADIOLOGY & ADDITIONAL TESTS: Radames Rosado, Internal Medicine  PGY-2, [phone], [id number]    Patient is a 86y old  Male who presents with a chief complaint of 86M w/ cough and fever (2019 09:38)      SUBJECTIVE / OVERNIGHT EVENTS: pt complained substernal chest pain and shortness of breath overnight, so pt was started on 3LNC and trops were drawn. EKG showed new TWI and ST depressions in the lateral leads. Pt stated the pain traveled to the epigastric region and then after 15 mins the chest pain resolved. The epigastric pain resoved after simethicone and pepcid. This AM pt denies any chest pain, palpitations, shortness of breath, light headedness, or dizziness. Pt states the epigastric pain is much improved.     MEDICATIONS  (STANDING):  albuterol/ipratropium for Nebulization 3 milliLiter(s) Nebulizer every 6 hours  aspirin  chewable 81 milliGRAM(s) Oral daily  atorvastatin 80 milliGRAM(s) Oral at bedtime  chlorhexidine 4% Liquid 1 Application(s) Topical <User Schedule>  dextrose 5%. 1000 milliLiter(s) (50 mL/Hr) IV Continuous <Continuous>  dextrose 50% Injectable 12.5 Gram(s) IV Push once  dextrose 50% Injectable 25 Gram(s) IV Push once  dextrose 50% Injectable 25 Gram(s) IV Push once  guaiFENesin  milliGRAM(s) Oral every 12 hours  heparin  Injectable 5000 Unit(s) SubCutaneous every 8 hours  insulin lispro (HumaLOG) corrective regimen sliding scale   SubCutaneous three times a day before meals  insulin lispro (HumaLOG) corrective regimen sliding scale   SubCutaneous at bedtime  magnesium oxide 400 milliGRAM(s) Oral once  pantoprazole    Tablet 40 milliGRAM(s) Oral before breakfast  polyethylene glycol 3350 17 Gram(s) Oral daily  predniSONE   Tablet 40 milliGRAM(s) Oral daily  senna 2 Tablet(s) Oral at bedtime  tamsulosin 0.4 milliGRAM(s) Oral at bedtime    MEDICATIONS  (PRN):  benzocaine 15 mG/menthol 3.6 mG (Sugar-Free) Lozenge 1 Lozenge Oral every 2 hours PRN Sore Throat  benzonatate 100 milliGRAM(s) Oral three times a day PRN Cough  dextrose 40% Gel 15 Gram(s) Oral once PRN Blood Glucose LESS THAN 70 milliGRAM(s)/deciliter  glucagon  Injectable 1 milliGRAM(s) IntraMuscular once PRN Glucose LESS THAN 70 milligrams/deciliter  simethicone 80 milliGRAM(s) Chew four times a day PRN Bloating        OBJECTIVE:    Vital Signs Last 24 Hrs  Vital Signs Last 24 Hrs  T(C): 36.9 (2019 06:14), Max: 36.9 (2019 06:14)  T(F): 98.4 (2019 06:14), Max: 98.4 (2019 06:14)  HR: 69 (2019 06:14) (69 - 98)  BP: 146/70 (2019 06:14) (123/76 - 163/79)  BP(mean): --  RR: 16 (2019 06:14) (16 - 20)  SpO2: 99% (2019 06:14) (95% - 100%)    PHYSICAL EXAM:  GENERAL: NAD, well-developed  HEAD:  Atraumatic, Normocephalic  EYES: EOMI, PERRLA, conjunctiva and sclera clear  NECK: Supple, No JVD  MOUTH: No lesions noted  CHEST/LUNG: CTAB no wheezing, rales or rhonchi  HEART: Regular rate and rhythm; No murmurs, rubs, or gallops  ABDOMEN: Soft, tenderness to palpation in the suprapubic region, Nondistended; Bowel sounds present  EXTREMITIES:  2+ Peripheral Pulses, No clubbing, cyanosis, or edema. Mild swelling and erythema of L ankle  : Pink>red color in magallanes  PSYCH: AAOx3  NEUROLOGY: non-focal  SKIN: No rashes or lesions    CAPILLARY BLOOD GLUCOSE      POCT Blood Glucose.: 254 mg/dL (2019 08:53)  POCT Blood Glucose.: 191 mg/dL (2019 22:18)  POCT Blood Glucose.: 235 mg/dL (2019 18:48)  POCT Blood Glucose.: 308 mg/dL (2019 13:39)  POCT Blood Glucose.: 258 mg/dL (2019 09:43)    I&O's Summary    2019 07:01  -  2019 07:00  --------------------------------------------------------  IN: 730 mL / OUT: 1875 mL / NET: -1145 mL          LABS:                         9.6    13.03 )-----------( 311      ( 2019 10:46 )             27.9     11-30    131<L>  |  95<L>  |  21  ----------------------------<  225<H>  3.8   |  20<L>  |  1.14    Ca    9.2      2019 06:54  Phos  3.4       Mg     1.8         TPro  6.5  /  Alb  4.0  /  TBili  0.4  /  DBili  x   /  AST  16  /  ALT  10  /  AlkPhos  57            Lactate, Blood: 1.7 mmol/L ( @ 11:18)      RADIOLOGY, EKG & ADDITIONAL TESTS: Reviewed.     Urinalysis Basic - ( 2019 21:18 )    Color: Light Yellow / Appearance: Clear / S.018 / pH: x  Gluc: x / Ketone: Small  / Bili: Negative / Urobili: Negative   Blood: x / Protein: Trace / Nitrite: Negative   Leuk Esterase: Negative / RBC: 4 /hpf / WBC 0 /HPF   Sq Epi: x / Non Sq Epi: 0 /hpf / Bacteria: Negative            RADIOLOGY & ADDITIONAL TESTS:

## 2019-11-30 NOTE — PROGRESS NOTE ADULT - PROBLEM SELECTOR PLAN 2
- RVP + for parainfluenza virus  - continue supportive care measures including duonebs q6 hours, cepacol cough drops, tessalon pearls, and mucinex q12 hours  - s/p ceftriaxone and azithromycin in the ED but continue to monitor off abx at this time given lack of consolidation on CXR to suggest superimposed PNA  - f/u throat swab

## 2019-11-30 NOTE — CHART NOTE - NSCHARTNOTEFT_GEN_A_CORE
Called to assess pt for chest pain. On assessment patient endorses chest pain in mid sternal region x 15 mins. Describes pain as "burning" 9/10 in severity, non-radiating however also feels pain in arms B/L. Pt states pain is alleviated by burping,. Endorses associated SOB. Denies fevers, chills, nausea, vomiting, diaphoresis, palpitations.    Vitals: Afebrile. T 88 /79  RR 20 O2 100% on NC    PHYSICAL  GENERAL: Elderly male in moderate distress 2/2 pain  CHEST/LUNG: Expiratory wheezing noted B./L  HEART: RRR, no murmurs/r/g  ABDOMEN: Soft, NTND  EXTREMITIES:  2+ Peripheral Pulses  PSYCH: AAOx3    A/P  Presentation likely 2/2 acid reflex. Also concerning for cardiac etiology although less likely  -Simethicone and pepcid now  -Tylenol for pain  -Will obtain EKG and check cardiac enzymes Called to assess pt for chest pain. On assessment patient endorses chest pain in mid sternal region x 15 mins. Describes pain as "burning" 9/10 in severity, non-radiating however also feels pain in arms B/L. Pt states pain is alleviated by burping,. Endorses associated SOB. Denies fevers, chills, nausea, vomiting, diaphoresis, palpitations.    Vitals: Afebrile. HR 88 /79  RR 20 O2 100% on NC    PHYSICAL  GENERAL: Elderly male in moderate distress 2/2 pain  CHEST/LUNG: Expiratory wheezing noted B./L  HEART: RRR, no murmurs/r/g  ABDOMEN: Soft, NTND  EXTREMITIES:  2+ Peripheral Pulses  PSYCH: AAOx3    A/P  Presentation likely 2/2 acid reflex. Also concerning for cardiac etiology although less likely  -Simethicone and pepcid now  -Tylenol for pain  -Will obtain EKG and check cardiac enzymes

## 2019-11-30 NOTE — PROGRESS NOTE ADULT - PROBLEM SELECTOR PLAN 5
- patient with new urinary retention today after straight cath for clean catch urine sample  - pink>red urine in magallanes  - f/u urology recs  - continue to hold plavix   - c/w ASA  - c/w flomax and plan for trial of voids when improves

## 2019-11-30 NOTE — PROGRESS NOTE ADULT - PROBLEM SELECTOR PLAN 7
- A1c 7.6 from October 2019  - on metformin, glimepiride, and Tradjenta at home  - c/w moderate SS before meals and at bedtime  - hyperglycemic on sliding scale, will start lantus 12 units and premeal 3 units

## 2019-11-30 NOTE — PROGRESS NOTE ADULT - ASSESSMENT
85 y/o M with PMHx CAD s/p 3 stents (most recent 2017), DM2, HLD, HTN, recent admission for L ankle fracture s/p ORIF who presents to the hospital with 3 days of cough and fever, admitted for sepsis secondary to parainfluenza virus complicated by acute hypoxic respiratory failure. Now improving.  Course c/b chest pain with new TWI. 87 y/o M with PMHx CAD s/p 3 stents (most recent 2017), DM2, HLD, HTN, recent admission for L ankle fracture s/p ORIF who presents to the hospital with 3 days of cough and fever, admitted for sepsis secondary to parainfluenza virus complicated by acute hypoxic respiratory failure. Now improving.  Course c/b chest pain with new TWI and ST Depressions in the lateral leads.

## 2019-11-30 NOTE — PROGRESS NOTE ADULT - PROBLEM SELECTOR PLAN 1
-resolved  - secondary to acute parainfluenza virus infection  - Now off O2. Satting 98% ORA  - CXR with no signs of consolidation to suggest PNA  - supportive care for parainfluenza infection  - will decrease prednisone to 20qd for 2 more days

## 2019-11-30 NOTE — PROGRESS NOTE ADULT - PROBLEM SELECTOR PLAN 4
-  Na of 126 -->131  - urine studies suggestive post-obstructive diuresis  - no acute mental status changes at this time

## 2019-11-30 NOTE — PROGRESS NOTE ADULT - PROBLEM SELECTOR PLAN 3
- patient with tachypnea and leukocytosis with RVP + for parainfluenza virus  - s/p 2.2L NS in the ED along with ceftriaxone/azithromycin  - BCx and UCx 11/27: NGTD, UA negative, CXR grossly clear  - monitor off abx at this time given likely viral etiology of symptoms

## 2019-11-30 NOTE — CONSULT NOTE ADULT - ASSESSMENT
EKG SR RBBB with repol changes unchanged from admission slighlty more tachy     Echo  < from: Transthoracic Echocardiogram (08.06.17 @ 12:00) >  1. Mildly dilated left atrium.  LA volume index = 36 cc/m2.  2. Normal left ventricular internal dimensions and wall  thicknesses.  3. Mild segmental left ventricular systolic dysfunction.  Hypokinesis of the basal inferoseptum    < end of copied text >      Assessment and Plan     1) Chest pain: atypical EKG unchanged, CE -ve, given h/o CAD /CABG and asa /plavix being held consider echo to compare to 2017 r/o new WMA    2) Hematuria : t/t per urology  restarted asa     3) Parainfluenza : t/t primary team

## 2019-11-30 NOTE — PROGRESS NOTE ADULT - PROBLEM SELECTOR PLAN 6
- CAD s/p CABG and stents most recently 2017  -pt with chest pain overnight x15 minutes, now with New TWI in the lateral leads. HS Trops 36-->48.  -cardiology consult  -repeat trop  - c/w ASA   - hold plavix given acute hematuria  - c/w metoprolol 25mg BID  - TTE from 2017 with EF 55% with mild systolic and diastolic dysfunction  - no signs of acute volume overload or HF at this time - CAD s/p CABG and stents most recently 2017  -pt with chest pain overnight x15 minutes, now with New TWI and ST depressions in the lateral leads. HS Trops 36-->48.  -cardiology consult  -repeat trop  - c/w ASA   - hold plavix given acute hematuria  - c/w metoprolol 25mg BID  - TTE from 2017 with EF 55% with mild systolic and diastolic dysfunction  - no signs of acute volume overload or HF at this time

## 2019-11-30 NOTE — PROGRESS NOTE ADULT - PROBLEM SELECTOR PLAN 8
- patient on amlodipine, HCTZ-lisinopril at home  - will restart amlodipine for now given elevated Cr

## 2019-11-30 NOTE — PROGRESS NOTE ADULT - ASSESSMENT
86M with CAD s/p stents on ASA/plavix and DM presenting with sepsis 2/2 parainfluenza. Found to have gross hematuria after traumatic straight catheterization      Rec:  - Keep magallanes in place for now  - No need for CBI at this time  - Okay to restart plavix if necessary from cardiovascular perspective  - If restarting plavix, monitor urine color  - Irrigate prn  - Hydration 86M with CAD s/p stents on ASA/plavix and DM presenting with sepsis 2/2 parainfluenza. Found to have gross hematuria after traumatic straight catheterization      Rec:  - Keep magallanes in place for now  - No need for CBI at this time  - No contraindication to restarting plavix if necessary from cardiovascular perspective  - If restarting plavix, monitor urine color  - Irrigate prn  - Hydration

## 2019-11-30 NOTE — PROGRESS NOTE ADULT - SUBJECTIVE AND OBJECTIVE BOX
UROLOGY DAILY PROGRESS NOTE:     Subjective:    Patient with episode of chest discomfort overnight. Otherwise, feels fine now. Tolerating catheter.    Objective:    NAD, awake and alert  Respirations nonlabored  Abdomen soft, nontender, nondistended  Ramirez urine translucent pink    MEDICATIONS  (STANDING):  albuterol/ipratropium for Nebulization 3 milliLiter(s) Nebulizer every 6 hours  amLODIPine   Tablet 5 milliGRAM(s) Oral daily  aspirin  chewable 81 milliGRAM(s) Oral daily  chlorhexidine 4% Liquid 1 Application(s) Topical <User Schedule>  dextrose 5%. 1000 milliLiter(s) (50 mL/Hr) IV Continuous <Continuous>  dextrose 50% Injectable 12.5 Gram(s) IV Push once  dextrose 50% Injectable 25 Gram(s) IV Push once  dextrose 50% Injectable 25 Gram(s) IV Push once  guaiFENesin  milliGRAM(s) Oral every 12 hours  heparin  Injectable 5000 Unit(s) SubCutaneous every 8 hours  insulin glargine Injectable (LANTUS) 12 Unit(s) SubCutaneous at bedtime  insulin lispro (HumaLOG) corrective regimen sliding scale   SubCutaneous three times a day before meals  insulin lispro (HumaLOG) corrective regimen sliding scale   SubCutaneous at bedtime  insulin lispro Injectable (HumaLOG) 3 Unit(s) SubCutaneous three times a day before meals  metoprolol tartrate 25 milliGRAM(s) Oral two times a day  pantoprazole    Tablet 40 milliGRAM(s) Oral before breakfast  polyethylene glycol 3350 17 Gram(s) Oral daily  predniSONE   Tablet 20 milliGRAM(s) Oral daily  senna 2 Tablet(s) Oral at bedtime  sodium chloride 0.9%. 1000 milliLiter(s) (75 mL/Hr) IV Continuous <Continuous>  tamsulosin 0.4 milliGRAM(s) Oral at bedtime    MEDICATIONS  (PRN):  acetaminophen   Tablet .. 650 milliGRAM(s) Oral every 6 hours PRN Mild Pain (1 - 3), Moderate Pain (4 - 6)  benzocaine 15 mG/menthol 3.6 mG (Sugar-Free) Lozenge 1 Lozenge Oral every 2 hours PRN Sore Throat  benzonatate 100 milliGRAM(s) Oral three times a day PRN Cough  dextrose 40% Gel 15 Gram(s) Oral once PRN Blood Glucose LESS THAN 70 milliGRAM(s)/deciliter  glucagon  Injectable 1 milliGRAM(s) IntraMuscular once PRN Glucose LESS THAN 70 milligrams/deciliter  simethicone 80 milliGRAM(s) Chew four times a day PRN Bloating      Vital Signs Last 24 Hrs  T(C): 36.5 (30 Nov 2019 13:48), Max: 36.9 (30 Nov 2019 06:14)  T(F): 97.7 (30 Nov 2019 13:48), Max: 98.4 (30 Nov 2019 06:14)  HR: 71 (30 Nov 2019 13:48) (69 - 98)  BP: 150/80 (30 Nov 2019 13:48) (123/76 - 163/79)  BP(mean): --  RR: 20 (30 Nov 2019 13:48) (16 - 20)  SpO2: 100% (30 Nov 2019 13:48) (95% - 100%)    I&O's Detail    29 Nov 2019 07:01  -  30 Nov 2019 07:00  --------------------------------------------------------  IN:    Oral Fluid: 1120 mL    sodium chloride 0.9%.: 675 mL  Total IN: 1795 mL    OUT:    Indwelling Catheter - Urethral: 3650 mL  Total OUT: 3650 mL    Total NET: -1855 mL      30 Nov 2019 07:01  -  30 Nov 2019 15:04  --------------------------------------------------------  IN:  Total IN: 0 mL    OUT:    Indwelling Catheter - Urethral: 800 mL  Total OUT: 800 mL    Total NET: -800 mL          Daily     Daily     LABS:                        9.6    13.03 )-----------( 311      ( 30 Nov 2019 10:46 )             27.9     11-30    131<L>  |  95<L>  |  21  ----------------------------<  225<H>  3.8   |  20<L>  |  1.14    Ca    9.2      30 Nov 2019 06:54  Phos  3.4     11-30  Mg     1.8     11-30    TPro  6.5  /  Alb  4.0  /  TBili  0.4  /  DBili  x   /  AST  16  /  ALT  10  /  AlkPhos  57  11-30

## 2019-12-01 ENCOUNTER — TRANSCRIPTION ENCOUNTER (OUTPATIENT)
Age: 84
End: 2019-12-01

## 2019-12-01 LAB
ANION GAP SERPL CALC-SCNC: 14 MMOL/L — SIGNIFICANT CHANGE UP (ref 5–17)
BUN SERPL-MCNC: 22 MG/DL — SIGNIFICANT CHANGE UP (ref 7–23)
CALCIUM SERPL-MCNC: 9.5 MG/DL — SIGNIFICANT CHANGE UP (ref 8.4–10.5)
CHLORIDE SERPL-SCNC: 97 MMOL/L — SIGNIFICANT CHANGE UP (ref 96–108)
CO2 SERPL-SCNC: 22 MMOL/L — SIGNIFICANT CHANGE UP (ref 22–31)
CREAT SERPL-MCNC: 1.23 MG/DL — SIGNIFICANT CHANGE UP (ref 0.5–1.3)
GLUCOSE BLDC GLUCOMTR-MCNC: 115 MG/DL — HIGH (ref 70–99)
GLUCOSE BLDC GLUCOMTR-MCNC: 205 MG/DL — HIGH (ref 70–99)
GLUCOSE BLDC GLUCOMTR-MCNC: 396 MG/DL — HIGH (ref 70–99)
GLUCOSE BLDC GLUCOMTR-MCNC: 64 MG/DL — LOW (ref 70–99)
GLUCOSE BLDC GLUCOMTR-MCNC: 67 MG/DL — LOW (ref 70–99)
GLUCOSE BLDC GLUCOMTR-MCNC: 72 MG/DL — SIGNIFICANT CHANGE UP (ref 70–99)
GLUCOSE BLDC GLUCOMTR-MCNC: 87 MG/DL — SIGNIFICANT CHANGE UP (ref 70–99)
GLUCOSE SERPL-MCNC: 96 MG/DL — SIGNIFICANT CHANGE UP (ref 70–99)
HCT VFR BLD CALC: 31.6 % — LOW (ref 39–50)
HGB BLD-MCNC: 10.8 G/DL — LOW (ref 13–17)
MAGNESIUM SERPL-MCNC: 1.8 MG/DL — SIGNIFICANT CHANGE UP (ref 1.6–2.6)
MCHC RBC-ENTMCNC: 30.9 PG — SIGNIFICANT CHANGE UP (ref 27–34)
MCHC RBC-ENTMCNC: 34.2 GM/DL — SIGNIFICANT CHANGE UP (ref 32–36)
MCV RBC AUTO: 90.3 FL — SIGNIFICANT CHANGE UP (ref 80–100)
PHOSPHATE SERPL-MCNC: 3.4 MG/DL — SIGNIFICANT CHANGE UP (ref 2.5–4.5)
PLATELET # BLD AUTO: 344 K/UL — SIGNIFICANT CHANGE UP (ref 150–400)
POTASSIUM SERPL-MCNC: 4.5 MMOL/L — SIGNIFICANT CHANGE UP (ref 3.5–5.3)
POTASSIUM SERPL-SCNC: 4.5 MMOL/L — SIGNIFICANT CHANGE UP (ref 3.5–5.3)
RBC # BLD: 3.5 M/UL — LOW (ref 4.2–5.8)
RBC # FLD: 13.4 % — SIGNIFICANT CHANGE UP (ref 10.3–14.5)
SODIUM SERPL-SCNC: 133 MMOL/L — LOW (ref 135–145)
TROPONIN T, HIGH SENSITIVITY RESULT: 36 NG/L — SIGNIFICANT CHANGE UP (ref 0–51)
WBC # BLD: 9.84 K/UL — SIGNIFICANT CHANGE UP (ref 3.8–10.5)
WBC # FLD AUTO: 9.84 K/UL — SIGNIFICANT CHANGE UP (ref 3.8–10.5)

## 2019-12-01 PROCEDURE — 99232 SBSQ HOSP IP/OBS MODERATE 35: CPT

## 2019-12-01 RX ORDER — DEXTROSE 50 % IN WATER 50 %
25 SYRINGE (ML) INTRAVENOUS ONCE
Refills: 0 | Status: COMPLETED | OUTPATIENT
Start: 2019-12-01 | End: 2019-12-01

## 2019-12-01 RX ORDER — SODIUM CHLORIDE 0.65 %
1 AEROSOL, SPRAY (ML) NASAL
Refills: 0 | Status: DISCONTINUED | OUTPATIENT
Start: 2019-12-01 | End: 2019-12-03

## 2019-12-01 RX ORDER — DEXTROSE 50 % IN WATER 50 %
15 SYRINGE (ML) INTRAVENOUS ONCE
Refills: 0 | Status: COMPLETED | OUTPATIENT
Start: 2019-12-01 | End: 2019-12-01

## 2019-12-01 RX ORDER — LANOLIN ALCOHOL/MO/W.PET/CERES
3 CREAM (GRAM) TOPICAL ONCE
Refills: 0 | Status: COMPLETED | OUTPATIENT
Start: 2019-12-01 | End: 2019-12-01

## 2019-12-01 RX ADMIN — CHLORHEXIDINE GLUCONATE 1 APPLICATION(S): 213 SOLUTION TOPICAL at 06:15

## 2019-12-01 RX ADMIN — Medication 1 SPRAY(S): at 09:09

## 2019-12-01 RX ADMIN — Medication 4: at 18:16

## 2019-12-01 RX ADMIN — Medication 81 MILLIGRAM(S): at 14:20

## 2019-12-01 RX ADMIN — HEPARIN SODIUM 5000 UNIT(S): 5000 INJECTION INTRAVENOUS; SUBCUTANEOUS at 22:42

## 2019-12-01 RX ADMIN — Medication 3 MILLIGRAM(S): at 00:12

## 2019-12-01 RX ADMIN — Medication 25 MILLIGRAM(S): at 06:15

## 2019-12-01 RX ADMIN — Medication 3 UNIT(S): at 09:10

## 2019-12-01 RX ADMIN — Medication 10: at 14:19

## 2019-12-01 RX ADMIN — Medication 20 MILLIGRAM(S): at 06:15

## 2019-12-01 RX ADMIN — Medication 600 MILLIGRAM(S): at 06:14

## 2019-12-01 RX ADMIN — HEPARIN SODIUM 5000 UNIT(S): 5000 INJECTION INTRAVENOUS; SUBCUTANEOUS at 14:18

## 2019-12-01 RX ADMIN — PANTOPRAZOLE SODIUM 40 MILLIGRAM(S): 20 TABLET, DELAYED RELEASE ORAL at 06:15

## 2019-12-01 RX ADMIN — Medication 25 MILLILITER(S): at 23:46

## 2019-12-01 RX ADMIN — HEPARIN SODIUM 5000 UNIT(S): 5000 INJECTION INTRAVENOUS; SUBCUTANEOUS at 06:15

## 2019-12-01 RX ADMIN — TAMSULOSIN HYDROCHLORIDE 0.4 MILLIGRAM(S): 0.4 CAPSULE ORAL at 22:42

## 2019-12-01 RX ADMIN — Medication 3 UNIT(S): at 14:19

## 2019-12-01 RX ADMIN — Medication 15 GRAM(S): at 22:41

## 2019-12-01 RX ADMIN — Medication 3 UNIT(S): at 18:17

## 2019-12-01 RX ADMIN — SENNA PLUS 2 TABLET(S): 8.6 TABLET ORAL at 22:43

## 2019-12-01 RX ADMIN — SIMETHICONE 80 MILLIGRAM(S): 80 TABLET, CHEWABLE ORAL at 21:29

## 2019-12-01 RX ADMIN — AMLODIPINE BESYLATE 5 MILLIGRAM(S): 2.5 TABLET ORAL at 06:14

## 2019-12-01 RX ADMIN — Medication 600 MILLIGRAM(S): at 18:17

## 2019-12-01 RX ADMIN — Medication 25 MILLIGRAM(S): at 18:18

## 2019-12-01 NOTE — DISCHARGE NOTE PROVIDER - CARE PROVIDERS DIRECT ADDRESSES
,DirectAddress_Unknown ,DirectAddress_Unknown,[email or web address]

## 2019-12-01 NOTE — PROGRESS NOTE ADULT - PROBLEM SELECTOR PLAN 8
- patient on amlodipine, HCTZ-lisinopril at home  - will restart amlodipine for now given elevated Cr - patient on amlodipine, HCTZ-lisinopril at home  - resume home meds - patient on amlodipine, HCTZ-lisinopril at home  - c/w amlodipine for now

## 2019-12-01 NOTE — PROGRESS NOTE ADULT - ASSESSMENT
87 y/o M with PMHx CAD s/p 3 stents (most recent 2017), DM2, HLD, HTN, recent admission for L ankle fracture s/p ORIF who presents to the hospital with 3 days of cough and fever, admitted for sepsis secondary to parainfluenza virus complicated by acute hypoxic respiratory failure. Now improving.  Course c/b chest pain with new TWI and ST Depressions in the lateral leads. 85 y/o M with PMHx CAD s/p 3 stents (most recent 2017), DM2, HLD, HTN, recent admission for L ankle fracture s/p ORIF who presents to the hospital with 3 days of cough and fever, admitted for sepsis secondary to parainfluenza virus complicated by acute hypoxic respiratory failure. Now improving. Course c/b chest pain with new TWI and ST Depressions in the lateral leads.

## 2019-12-01 NOTE — PROGRESS NOTE ADULT - SUBJECTIVE AND OBJECTIVE BOX
Subjective    Seen & examined at bedside   New chest pain yesterday with suspected TWI  Awaiting cardiology consult, but possibly will need to be anticoagulated    Objective    Vital signs  T(F): , Max: 97.7 (11-30-19 @ 13:48)  HR: 63 (12-01-19 @ 06:05)  BP: 141/71 (12-01-19 @ 06:05)  SpO2: 97% (12-01-19 @ 06:05)  Wt(kg): --    Output     11-30 @ 07:01  -  12-01 @ 07:00  --------------------------------------------------------  IN: 0 mL / OUT: 2650 mL / NET: -2650 mL    Physical Exam  Gen NAD  Abd soft, NT, ND   Ramirez secured draining clear tea colored urine    Labs  12-01 @ 08:43  WBC 9.84  / Hct 31.6  / SCr --       12-01 @ 07:10  WBC --    / Hct --    / SCr 1.23     Urine Cx: Culture - Urine (11.27.19 @ 23:18)    Specimen Source: .Urine Clean Catch (Midstream)    Culture Results:   No growth    Blood Cx: Culture - Urine (11.27.19 @ 23:18)    Specimen Source: .Urine Clean Catch (Midstream)    Culture Results:   No growth

## 2019-12-01 NOTE — PROGRESS NOTE ADULT - PROBLEM SELECTOR PLAN 3
- patient with tachypnea and leukocytosis with RVP + for parainfluenza virus  - s/p 2.2L NS in the ED along with ceftriaxone/azithromycin  - BCx and UCx 11/27: NGTD, UA negative, CXR grossly clear  - monitor off abx at this time given likely viral etiology of symptoms Resolved  - patient with tachypnea and leukocytosis with RVP + for parainfluenza virus  - s/p 2.2L NS in the ED along with ceftriaxone/azithromycin  - BCx and UCx 11/27: NGTD, UA negative, CXR grossly clear  - monitor off abx at this time given likely viral etiology of symptoms

## 2019-12-01 NOTE — PROVIDER CONTACT NOTE (CHANGE IN STATUS NOTIFICATION) - BACKGROUND
patient with type 2 dm, blood sugar was 67 and 64 , dextrose 40% -15 gm gel provided  and repeats blood sugar-87 . The blood sugar ( 3 rd ) after 15 mts of second finger stick-72

## 2019-12-01 NOTE — DISCHARGE NOTE PROVIDER - NSDCCPCAREPLAN_GEN_ALL_CORE_FT
PRINCIPAL DISCHARGE DIAGNOSIS  Diagnosis: Hypoxia  Assessment and Plan of Treatment: You were admitted to the hospital because you had trouble breathing and oxygen saturation in your blood was low. This was likely caused by the parainfluenza infection. You were being treated with supportive care medications and your symptoms have improved during your hospital stay. Please follow-up with your primary care physician after discharge.      SECONDARY DISCHARGE DIAGNOSES  Diagnosis: Parainfluenza infection  Assessment and Plan of Treatment: You were found to have parainfluenza infection during this admission.  You were being treated with supportive care medications and your symptoms have improved during your hospital stay. Please follow-up with your primary care physician after discharge.    Diagnosis: Urinary retention  Assessment and Plan of Treatment: You were found to retain urine when you were admitted and a magallanes catheter was placed to relieve the urine. The course was complicated by having blood in your urine, likely from prostate enlargement and the straight cath hitting the vessels around it when going in. Please continue taking flomax after discharge and follow-up with urology after discharge. PRINCIPAL DISCHARGE DIAGNOSIS  Diagnosis: Hypoxia  Assessment and Plan of Treatment: You were admitted to the hospital because you had trouble breathing and oxygen saturation in your blood was low. This was likely caused by the parainfluenza infection. You were being treated with supportive care medications and your symptoms have improved during your hospital stay. Please follow-up with your primary care physician after discharge.      SECONDARY DISCHARGE DIAGNOSES  Diagnosis: Parainfluenza infection  Assessment and Plan of Treatment: You were found to have parainfluenza infection during this admission.  You were being treated with supportive care medications and your symptoms have improved during your hospital stay. Please follow-up with your primary care physician after discharge.    Diagnosis: CAD (coronary artery disease)  Assessment and Plan of Treatment: You had hematuria during your hospital stay and we were holding your plavix given blood in your urine. Please follow-up with your primary care doctor and cardiologist to discuss whether to restart the medication.    Diagnosis: Urinary retention  Assessment and Plan of Treatment: You were found to retain urine when you were admitted and a magallanes catheter was placed to relieve the urine. The course was complicated by having blood in your urine, likely from prostate enlargement and the straight cath hitting the vessels around it when going in. Please continue taking flomax after discharge and follow-up with urology after discharge. PRINCIPAL DISCHARGE DIAGNOSIS  Diagnosis: Hypoxia  Assessment and Plan of Treatment: You were admitted to the hospital because you had trouble breathing and oxygen saturation in your blood was low. This was likely caused by the parainfluenza infection. You were being treated with supportive care medications and your symptoms have improved during your hospital stay. Please follow-up with your primary care physician after discharge.      SECONDARY DISCHARGE DIAGNOSES  Diagnosis: Parainfluenza infection  Assessment and Plan of Treatment: You were found to have parainfluenza infection during this admission.  You were being treated with supportive care medications and your symptoms have improved during your hospital stay. Please follow-up with your primary care physician after discharge.    Diagnosis: CAD (coronary artery disease)  Assessment and Plan of Treatment: You had hematuria during your hospital stay and we were holding your plavix given blood in your urine. Please follow-up with your primary care doctor and cardiologist to discuss whether to restart the medication.    Diagnosis: Urinary retention  Assessment and Plan of Treatment: You were found to retain urine when you were admitted and a magallanes catheter was placed to relieve the urine. The course was complicated by having blood in your urine, likely from prostate enlargement and the straight cath hitting the vessels around it when going in. We have started you on flomax during this admission. Please continue taking flomax after discharge and follow-up with primary care physician after discharge. PRINCIPAL DISCHARGE DIAGNOSIS  Diagnosis: Hypoxia  Assessment and Plan of Treatment: You were admitted to the hospital because you had trouble breathing and oxygen saturation in your blood was low. This was likely caused by the parainfluenza infection. You were being treated with supportive care medications and your symptoms have improved during your hospital stay. Please follow-up with your primary care physician after discharge.      SECONDARY DISCHARGE DIAGNOSES  Diagnosis: Parainfluenza infection  Assessment and Plan of Treatment: You were found to have parainfluenza infection during this admission.  You were being treated with supportive care medications and your symptoms have improved during your hospital stay. Please follow-up with your primary care physician after discharge.    Diagnosis: CAD (coronary artery disease)  Assessment and Plan of Treatment: You had hematuria during your hospital stay and we were holding your plavix given blood in your urine. Please follow-up with your primary care doctor and cardiologist to discuss when to restart the medication.    Diagnosis: Urinary retention  Assessment and Plan of Treatment: You were found to retain urine when you were admitted and a magallanes catheter was placed to relieve the urine. The course was complicated by having blood in your urine, likely from prostate enlargement and the straight cath hitting the vessels around it when going in. We have started you on flomax during this admission. Please continue taking flomax after discharge and follow-up with primary care physician after discharge.

## 2019-12-01 NOTE — DISCHARGE NOTE PROVIDER - NSDCMRMEDTOKEN_GEN_ALL_CORE_FT
amLODIPine 5 mg oral tablet: 1 tab(s) orally once a day  aspirin 81 mg oral tablet, chewable: 1 tab(s) orally once a day  atorvastatin 80 mg oral tablet: 1 tab(s) orally once a day (at bedtime)  glimepiride 4 mg oral tablet: 1 tab(s) orally once a day  lisinopril-hydrochlorothiazide 20 mg-12.5 mg oral tablet: 1 tab(s) orally once a day  Metamucil 3.4 g/3.7 g oral powder for reconstitution: 3.4 gram(s) orally once a day, As Needed  metFORMIN 500 mg oral tablet: 2 tab(s) orally 2 times a day  metoprolol tartrate 25 mg oral tablet: 1 tab(s) orally 2 times a day  pantoprazole 40 mg oral delayed release tablet: 1 tab(s) orally once a day (before a meal)  Plavix 75 mg oral tablet: 1 tab(s) orally once a day  polyethylene glycol 3350 oral powder for reconstitution: 17 gram(s) orally once a day -Constipation ( Hold for diarrhea )  senna oral tablet: 2 tab(s) orally once a day (at bedtime)  Tradjenta 5 mg oral tablet: 1 tab(s) orally once a day  Vitamin C 1000 mg oral tablet: 1 tab(s) orally once a day amLODIPine 5 mg oral tablet: 1 tab(s) orally once a day  aspirin 81 mg oral tablet, chewable: 1 tab(s) orally once a day  atorvastatin 80 mg oral tablet: 1 tab(s) orally once a day (at bedtime)  benzonatate 100 mg oral capsule: 1 cap(s) orally 3 times a day, As needed, Cough  glimepiride 4 mg oral tablet: 1 tab(s) orally once a day  guaiFENesin 600 mg oral tablet, extended release: 1 tab(s) orally every 12 hours  lisinopril-hydrochlorothiazide 20 mg-12.5 mg oral tablet: 1 tab(s) orally once a day  Metamucil 3.4 g/3.7 g oral powder for reconstitution: 3.4 gram(s) orally once a day, As Needed  metFORMIN 500 mg oral tablet: 2 tab(s) orally 2 times a day  metoprolol tartrate 25 mg oral tablet: 1 tab(s) orally 2 times a day  pantoprazole 40 mg oral delayed release tablet: 1 tab(s) orally once a day (before a meal)  Plavix 75 mg oral tablet: 1 tab(s) orally once a day  polyethylene glycol 3350 oral powder for reconstitution: 17 gram(s) orally once a day -Constipation ( Hold for diarrhea )  senna oral tablet: 2 tab(s) orally once a day (at bedtime)  simethicone 80 mg oral tablet, chewable: 1 tab(s) orally 4 times a day, As needed, Bloating  tamsulosin 0.4 mg oral capsule: 1 cap(s) orally once a day (at bedtime)  Tradjenta 5 mg oral tablet: 1 tab(s) orally once a day  Vitamin C 1000 mg oral tablet: 1 tab(s) orally once a day amLODIPine 5 mg oral tablet: 1 tab(s) orally once a day  aspirin 81 mg oral tablet, chewable: 1 tab(s) orally once a day  atorvastatin 80 mg oral tablet: 1 tab(s) orally once a day (at bedtime)  benzonatate 100 mg oral capsule: 1 cap(s) orally 3 times a day, As needed, Cough  glimepiride 4 mg oral tablet: 1 tab(s) orally once a day  guaiFENesin 600 mg oral tablet, extended release: 1 tab(s) orally every 12 hours  lisinopril-hydrochlorothiazide 20 mg-12.5 mg oral tablet: 1 tab(s) orally once a day  Metamucil 3.4 g/3.7 g oral powder for reconstitution: 3.4 gram(s) orally once a day, As Needed  metFORMIN 500 mg oral tablet: 2 tab(s) orally 2 times a day  metoprolol tartrate 25 mg oral tablet: 1 tab(s) orally 2 times a day  pantoprazole 40 mg oral delayed release tablet: 1 tab(s) orally once a day (before a meal)  polyethylene glycol 3350 oral powder for reconstitution: 17 gram(s) orally once a day -Constipation ( Hold for diarrhea )  senna oral tablet: 2 tab(s) orally once a day (at bedtime)  simethicone 80 mg oral tablet, chewable: 1 tab(s) orally 4 times a day, As needed, Bloating  tamsulosin 0.4 mg oral capsule: 1 cap(s) orally once a day (at bedtime)  Tradjenta 5 mg oral tablet: 1 tab(s) orally once a day  Vitamin C 1000 mg oral tablet: 1 tab(s) orally once a day

## 2019-12-01 NOTE — PROGRESS NOTE ADULT - ASSESSMENT
EKG SR RBBB with repol changes unchanged from admission slighlty more tachy     Echo  < from: Transthoracic Echocardiogram (08.06.17 @ 12:00) >  1. Mildly dilated left atrium.  LA volume index = 36 cc/m2.  2. Normal left ventricular internal dimensions and wall  thicknesses.  3. Mild segmental left ventricular systolic dysfunction.  Hypokinesis of the basal inferoseptum    < end of copied text >      Assessment and Plan     1) Chest pain: atypical EKG unchanged, CE -ve, given h/o CAD /CABG on asa plavix being held echo pending     2) Hematuria : improving t/t per urology  restarted asa     3) Parainfluenza : t/t primary team

## 2019-12-01 NOTE — PROGRESS NOTE ADULT - ASSESSMENT
86M with CAD s/p stents on ASA/plavix and DM presenting with sepsis 2/2 parainfluenza. Found to have gross hematuria after traumatic straight catheterization      Rec:  - Keep magallanes in place for now  - No need for CBI at this time  - No contraindication to restarting plavix if necessary from cardiovascular perspective  - If restarting plavix, monitor urine color  - Irrigate prn  - Hydration

## 2019-12-01 NOTE — PROGRESS NOTE ADULT - PROBLEM SELECTOR PLAN 1
-resolved  - secondary to acute parainfluenza virus infection  - Now off O2. Satting 98% ORA  - CXR with no signs of consolidation to suggest PNA  - supportive care for parainfluenza infection  - will decrease prednisone to 20qd for 2 more days -resolved  - secondary to acute parainfluenza virus infection  - Now off O2. Satting 97% ORA  - CXR with no signs of consolidation to suggest PNA  - supportive care for parainfluenza infection  - c/w a course of prednisone 20qd (last dose is 12/2)

## 2019-12-01 NOTE — DISCHARGE NOTE PROVIDER - NSFOLLOWUPCLINICS_GEN_ALL_ED_FT
[facility] - Urology  Urology  [address], [address] [place] [address]  Phone: [phone]  Fax:   Follow Up Time:

## 2019-12-01 NOTE — PROGRESS NOTE ADULT - PROBLEM SELECTOR PLAN 4
-  Na of 126 -->131  - urine studies suggestive post-obstructive diuresis  - no acute mental status changes at this time Improving  - urine studies suggestive post-obstructive diuresis  - no acute mental status changes at this time

## 2019-12-01 NOTE — PROGRESS NOTE ADULT - PROBLEM SELECTOR PLAN 7
- A1c 7.6 from October 2019  - on metformin, glimepiride, and Tradjenta at home  - c/w moderate SS before meals and at bedtime  - hyperglycemic on sliding scale, will start lantus 12 units and premeal 3 units - A1c 7.6 from October 2019  - on metformin, glimepiride, and Tradjenta at home  - c/w moderate SS before meals and at bedtime  - hyperglycemic on sliding scale, c/w lantus 12 units and premeal 3 units

## 2019-12-01 NOTE — PROGRESS NOTE ADULT - SUBJECTIVE AND OBJECTIVE BOX
[facility] ([facility])   Internal Medicine  PGY-1  Pager: [phone]; [phone]--[phone]    Patient is a 86y old  Male who presents with a chief complaint of 86M w/ cough and fever (30 Nov 2019 18:42)      SUBJECTIVE / OVERNIGHT EVENTS: No acute event ON. Denies HA/CP/SOB/abd pain/n/v/d/fever/chills.    MEDICATIONS  (STANDING):  albuterol/ipratropium for Nebulization 3 milliLiter(s) Nebulizer every 6 hours  amLODIPine   Tablet 5 milliGRAM(s) Oral daily  aspirin  chewable 81 milliGRAM(s) Oral daily  chlorhexidine 4% Liquid 1 Application(s) Topical <User Schedule>  dextrose 5%. 1000 milliLiter(s) (50 mL/Hr) IV Continuous <Continuous>  dextrose 50% Injectable 12.5 Gram(s) IV Push once  dextrose 50% Injectable 25 Gram(s) IV Push once  dextrose 50% Injectable 25 Gram(s) IV Push once  guaiFENesin  milliGRAM(s) Oral every 12 hours  heparin  Injectable 5000 Unit(s) SubCutaneous every 8 hours  insulin glargine Injectable (LANTUS) 12 Unit(s) SubCutaneous at bedtime  insulin lispro (HumaLOG) corrective regimen sliding scale   SubCutaneous three times a day before meals  insulin lispro (HumaLOG) corrective regimen sliding scale   SubCutaneous at bedtime  insulin lispro Injectable (HumaLOG) 3 Unit(s) SubCutaneous three times a day before meals  metoprolol tartrate 25 milliGRAM(s) Oral two times a day  pantoprazole    Tablet 40 milliGRAM(s) Oral before breakfast  polyethylene glycol 3350 17 Gram(s) Oral daily  predniSONE   Tablet 20 milliGRAM(s) Oral daily  senna 2 Tablet(s) Oral at bedtime  sodium chloride 0.9%. 1000 milliLiter(s) (75 mL/Hr) IV Continuous <Continuous>  tamsulosin 0.4 milliGRAM(s) Oral at bedtime    MEDICATIONS  (PRN):  acetaminophen   Tablet .. 650 milliGRAM(s) Oral every 6 hours PRN Mild Pain (1 - 3), Moderate Pain (4 - 6)  benzocaine 15 mG/menthol 3.6 mG (Sugar-Free) Lozenge 1 Lozenge Oral every 2 hours PRN Sore Throat  benzonatate 100 milliGRAM(s) Oral three times a day PRN Cough  dextrose 40% Gel 15 Gram(s) Oral once PRN Blood Glucose LESS THAN 70 milliGRAM(s)/deciliter  glucagon  Injectable 1 milliGRAM(s) IntraMuscular once PRN Glucose LESS THAN 70 milligrams/deciliter  simethicone 80 milliGRAM(s) Chew four times a day PRN Bloating  sodium chloride 0.65% Nasal 1 Spray(s) Both Nostrils five times a day PRN Nasal Congestion        OBJECTIVE:    Vital Signs Last 24 Hrs  T(C): 36.4 (01 Dec 2019 06:05), Max: 36.5 (30 Nov 2019 13:48)  T(F): 97.6 (01 Dec 2019 06:05), Max: 97.7 (30 Nov 2019 13:48)  HR: 63 (01 Dec 2019 06:05) (59 - 73)  BP: 141/71 (01 Dec 2019 06:05) (123/83 - 163/73)  BP(mean): --  RR: 20 (01 Dec 2019 06:05) (19 - 20)  SpO2: 97% (01 Dec 2019 06:05) (97% - 100%)    PHYSICAL EXAM:  GENERAL: NAD, well-developed  HEAD:  Atraumatic, Normocephalic  EYES: EOMI, PERRLA, conjunctiva and sclera clear  NECK: Supple, No JVD  CHEST/LUNG: Clear to auscultation bilaterally; No wheeze  HEART: Regular rate and rhythm; No murmurs, rubs, or gallops  ABDOMEN: Soft, Nontender, Nondistended; Bowel sounds present  EXTREMITIES:  2+ Peripheral Pulses, No clubbing, cyanosis, or edema  PSYCH: AAOx3  NEUROLOGY: non-focal  SKIN: No rashes or lesions    CAPILLARY BLOOD GLUCOSE      POCT Blood Glucose.: 120 mg/dL (30 Nov 2019 21:54)  POCT Blood Glucose.: 131 mg/dL (30 Nov 2019 18:14)  POCT Blood Glucose.: 239 mg/dL (30 Nov 2019 14:10)  POCT Blood Glucose.: 217 mg/dL (30 Nov 2019 09:15)    I&O's Summary    30 Nov 2019 07:01  -  01 Dec 2019 07:00  --------------------------------------------------------  IN: 0 mL / OUT: 2650 mL / NET: -2650 mL        LABS:                        9.6    13.03 )-----------( 311      ( 30 Nov 2019 10:46 )             27.9     12-01    133<L>  |  97  |  22  ----------------------------<  96  4.5   |  22  |  1.23    Ca    9.5      01 Dec 2019 07:10  Phos  3.4     12-01  Mg     1.8     12-01    TPro  6.5  /  Alb  4.0  /  TBili  0.4  /  DBili  x   /  AST  16  /  ALT  10  /  AlkPhos  57  11-30      CARDIAC MARKERS ( 30 Nov 2019 11:18 )  x     / x     / 28 U/L / x     / 3.9 ng/mL  CARDIAC MARKERS ( 30 Nov 2019 00:53 )  x     / x     / x     / x     / 4.4 ng/mL              RADIOLOGY & ADDITIONAL TESTS: [facility] ([facility])   Internal Medicine  PGY-1  Pager: [phone]--[phone]; [phone]--[phone]    Patient is a 86y old  Male who presents with a chief complaint of 86M w/ cough and fever (30 Nov 2019 18:42)      SUBJECTIVE / OVERNIGHT EVENTS: No acute event ON. Reports having nasal congestion and feeling very dry in his noses/throat. Denies CP or SOB. Reports that he has had acid reflux problem in the past and had a EGD done last year. He was told it was normal and was prescribed pantoprazole. Reports epigastric pain/gas improved with pepcid and ppi.    MEDICATIONS  (STANDING):  albuterol/ipratropium for Nebulization 3 milliLiter(s) Nebulizer every 6 hours  amLODIPine   Tablet 5 milliGRAM(s) Oral daily  aspirin  chewable 81 milliGRAM(s) Oral daily  chlorhexidine 4% Liquid 1 Application(s) Topical <User Schedule>  dextrose 5%. 1000 milliLiter(s) (50 mL/Hr) IV Continuous <Continuous>  dextrose 50% Injectable 12.5 Gram(s) IV Push once  dextrose 50% Injectable 25 Gram(s) IV Push once  dextrose 50% Injectable 25 Gram(s) IV Push once  guaiFENesin  milliGRAM(s) Oral every 12 hours  heparin  Injectable 5000 Unit(s) SubCutaneous every 8 hours  insulin glargine Injectable (LANTUS) 12 Unit(s) SubCutaneous at bedtime  insulin lispro (HumaLOG) corrective regimen sliding scale   SubCutaneous three times a day before meals  insulin lispro (HumaLOG) corrective regimen sliding scale   SubCutaneous at bedtime  insulin lispro Injectable (HumaLOG) 3 Unit(s) SubCutaneous three times a day before meals  metoprolol tartrate 25 milliGRAM(s) Oral two times a day  pantoprazole    Tablet 40 milliGRAM(s) Oral before breakfast  polyethylene glycol 3350 17 Gram(s) Oral daily  predniSONE   Tablet 20 milliGRAM(s) Oral daily  senna 2 Tablet(s) Oral at bedtime  sodium chloride 0.9%. 1000 milliLiter(s) (75 mL/Hr) IV Continuous <Continuous>  tamsulosin 0.4 milliGRAM(s) Oral at bedtime    MEDICATIONS  (PRN):  acetaminophen   Tablet .. 650 milliGRAM(s) Oral every 6 hours PRN Mild Pain (1 - 3), Moderate Pain (4 - 6)  benzocaine 15 mG/menthol 3.6 mG (Sugar-Free) Lozenge 1 Lozenge Oral every 2 hours PRN Sore Throat  benzonatate 100 milliGRAM(s) Oral three times a day PRN Cough  dextrose 40% Gel 15 Gram(s) Oral once PRN Blood Glucose LESS THAN 70 milliGRAM(s)/deciliter  glucagon  Injectable 1 milliGRAM(s) IntraMuscular once PRN Glucose LESS THAN 70 milligrams/deciliter  simethicone 80 milliGRAM(s) Chew four times a day PRN Bloating  sodium chloride 0.65% Nasal 1 Spray(s) Both Nostrils five times a day PRN Nasal Congestion        OBJECTIVE:    Vital Signs Last 24 Hrs  T(C): 36.4 (01 Dec 2019 06:05), Max: 36.5 (30 Nov 2019 13:48)  T(F): 97.6 (01 Dec 2019 06:05), Max: 97.7 (30 Nov 2019 13:48)  HR: 63 (01 Dec 2019 06:05) (59 - 73)  BP: 141/71 (01 Dec 2019 06:05) (123/83 - 163/73)  BP(mean): --  RR: 20 (01 Dec 2019 06:05) (19 - 20)  SpO2: 97% (01 Dec 2019 06:05) (97% - 100%)    PHYSICAL EXAM:  GENERAL: NAD, well-developed  HEAD:  Atraumatic, Normocephalic  EYES: EOMI, PERRLA, conjunctiva and sclera clear  NECK: Supple, No JVD  MOUTH: No lesions noted  CHEST/LUNG: CTAB no wheezing, rales or rhonchi  HEART: Regular rate and rhythm; No murmurs, rubs, or gallops  ABDOMEN: Soft, mild tenderness to palpation in the suprapubic region, Nondistended; Bowel sounds present  EXTREMITIES:  2+ Peripheral Pulses, No clubbing, cyanosis, or edema. Mild swelling and erythema of L ankle  : Pink-red color in magallanes  PSYCH: AAOx3  NEUROLOGY: non-focal  SKIN: No rashes or lesions    CAPILLARY BLOOD GLUCOSE      POCT Blood Glucose.: 120 mg/dL (30 Nov 2019 21:54)  POCT Blood Glucose.: 131 mg/dL (30 Nov 2019 18:14)  POCT Blood Glucose.: 239 mg/dL (30 Nov 2019 14:10)  POCT Blood Glucose.: 217 mg/dL (30 Nov 2019 09:15)    I&O's Summary    30 Nov 2019 07:01  -  01 Dec 2019 07:00  --------------------------------------------------------  IN: 0 mL / OUT: 2650 mL / NET: -2650 mL        LABS:                        9.6    13.03 )-----------( 311      ( 30 Nov 2019 10:46 )             27.9     12-01    133<L>  |  97  |  22  ----------------------------<  96  4.5   |  22  |  1.23    Ca    9.5      01 Dec 2019 07:10  Phos  3.4     12-01  Mg     1.8     12-01    TPro  6.5  /  Alb  4.0  /  TBili  0.4  /  DBili  x   /  AST  16  /  ALT  10  /  AlkPhos  57  11-30      CARDIAC MARKERS ( 30 Nov 2019 11:18 )  x     / x     / 28 U/L / x     / 3.9 ng/mL  CARDIAC MARKERS ( 30 Nov 2019 00:53 )  x     / x     / x     / x     / 4.4 ng/mL        RADIOLOGY & ADDITIONAL TESTS:

## 2019-12-01 NOTE — DISCHARGE NOTE PROVIDER - HOSPITAL COURSE
85 y/o M with PMHx CAD s/p 3 stents (most recent 2017), DM2, HLD, HTN, recent admission for L ankle fracture s/p ORIF who presents to the hospital with 3 days of cough and fever, admitted for sepsis secondary to parainfluenza virus complicated by acute hypoxic respiratory failure. Patient was treated symptomatically and completed a course of prednisone. Course c/b hematuria 2/2 traumatic straight cath for urinary retention. Urology was consulted and magallanes was placed, pending improvement in hematuria. Course c/b chest pain with unchanged TWI and ST Depressions in the lateral leads. Troponin downtrended. Pending TTE per cardiology recs to compare to TTE in 2017. 87 y/o M with PMHx CAD s/p 3 stents (most recent 2017), DM2, HLD, HTN, recent admission for L ankle fracture s/p ORIF who presents to the hospital with 3 days of cough and fever, admitted for sepsis secondary to parainfluenza virus complicated by acute hypoxic respiratory failure. Patient was treated symptomatically and completed a course of prednisone. Course c/b hematuria 2/2 traumatic straight cath for urinary retention. Urology was consulted and magallanes was placed, pending improvement in hematuria. Course c/b chest pain with unchanged TWI and ST Depressions in the lateral leads. Troponin downtrended. s/p repeat TTE per cardiology recs to compare to TTE in 2017: EF 60%, mild AR, MR, overall preserved LV systolic function. Mild diastolic function. Grossly RV enlargement with normal RV systolic function. Patient passed TOV and hematuria has improved. Patient is stable to be dc home with home PT. 85 y/o M with PMHx CAD s/p 3 stents (most recent 2017), DM2, HLD, HTN, recent admission for L ankle fracture s/p ORIF who presents to the hospital with 3 days of cough and fever, admitted for sepsis secondary to parainfluenza virus complicated by acute hypoxic respiratory failure. Patient was treated symptomatically and completed a course of prednisone. Course c/b hematuria 2/2 traumatic straight cath for urinary retention. Urology was consulted and magallanes was placed, pending improvement in hematuria. Course c/b chest pain with unchanged TWI and ST Depressions in the lateral leads. Troponin downtrended. s/p repeat TTE per cardiology recs to compare to TTE in 2017: EF 60%, mild AR, MR, overall preserved LV systolic function. Mild diastolic function. Grossly RV enlargement with normal RV systolic function. Patient passed TOV and hematuria has improved. Plavix was held in [facility] etting ohematuria. Patient is to f/u with his cardiologist outpatient to discuss when to resume it. Patient is to f/u with PCP and cardio outpt. Patient is stable to be dc home with home PT. This is a 85 y/o M with PMHx CAD s/p 3 stents (most recent 2017), DM2, HLD, HTN, recent admission for L ankle fracture s/p ORIF who presents to the hospital with 3 days of cough and fever, admitted for sepsis secondary to parainfluenza virus complicated by acute hypoxic respiratory failure. Patient was treated symptomatically and completed a course of prednisone. Course c/b hematuria 2/2 traumatic straight cath for urinary retention. Urology was consulted and magallanes was placed, pending improvement in hematuria. Course c/b chest pain with unchanged TWI and ST Depressions in the lateral leads. Troponin downtrended. s/p repeat TTE per cardiology recs to compare to TTE in 2017: EF 60%, mild AR, MR, overall preserved LV systolic function. Mild diastolic function. Grossly RV enlargement with normal RV systolic function. Patient passed TOV and hematuria has improved. Plavix was held in [facility] etting ohematuria. Patient is to f/u with his cardiologist outpatient to discuss when to resume it. Patient is to f/u with PCP and cardio outpt. Patient is stable to be dc home with home PT.

## 2019-12-01 NOTE — DISCHARGE NOTE PROVIDER - PROVIDER TOKENS
FREE:[LAST:[Jayashree],FIRST:[Jairo],PHONE:[phone]],FAX:[(   )    -],ADDRESS:[[phone] [id number]]] FREE:[LAST:[Blanc],FIRST:[Jairo],PHONE:[phone]],FAX:[(   )    -],ADDRESS:[[address] [address]]],PROVIDER:[TOKEN:[[id number]]]

## 2019-12-01 NOTE — PROGRESS NOTE ADULT - PROBLEM SELECTOR PLAN 5
- patient with new urinary retention today after straight cath for clean catch urine sample  - pink>red urine in magallanes  - f/u urology recs  - continue to hold plavix   - c/w ASA  - c/w flomax and plan for trial of voids when improves - pink-red urine in magallanes  - f/u urology recs  - continue to hold plavix   - c/w ASA  - c/w flomax and plan for trial of voids when improves

## 2019-12-01 NOTE — PROGRESS NOTE ADULT - PROBLEM SELECTOR PLAN 6
- CAD s/p CABG and stents most recently 2017  -pt with chest pain overnight x15 minutes, now with New TWI and ST depressions in the lateral leads. HS Trops 36-->48.  -cardiology consult  -repeat trop  - c/w ASA   - hold plavix given acute hematuria  - c/w metoprolol 25mg BID  - TTE from 2017 with EF 55% with mild systolic and diastolic dysfunction  - no signs of acute volume overload or HF at this time - CAD s/p CABG and stents most recently 2017  - pt with chest pain 11/30, with New TWI and ST depressions in the lateral leads. Trops now downtrended  -cardiology recs appreciate: echo for comparison ordered  - c/w ASA   - hold plavix given acute hematuria  - c/w metoprolol 25mg BID  - TTE from 2017 with EF 55% with mild systolic and diastolic dysfunction  - no signs of acute volume overload or HF at this time

## 2019-12-01 NOTE — PROGRESS NOTE ADULT - PROBLEM SELECTOR PLAN 2
- RVP + for parainfluenza virus  - continue supportive care measures including duonebs q6 hours, cepacol cough drops, tessalon pearls, and mucinex q12 hours  - s/p ceftriaxone and azithromycin in the ED but continue to monitor off abx at this time given lack of consolidation on CXR to suggest superimposed PNA  - f/u throat swab - RVP + for parainfluenza virus  - continue supportive care measures including duonebs q6 hours, cepacol cough drops, tessalon pearls, and mucinex q12 hours  - s/p ceftriaxone and azithromycin in the ED but continue to monitor off abx at this time given lack of consolidation on CXR to suggest superimposed PNA  - strept throat by pcr: neg

## 2019-12-01 NOTE — DISCHARGE NOTE PROVIDER - CARE PROVIDER_API CALL
Jairo Blanc  [address] [place] [address]  Phone: [phone]  Fax: (   )    -  Follow Up Time: Jairo Blanc  [address] [place] [address]  Phone: [phone]  Fax: (   )    -  Follow Up Time:     Luis Eduardo Schaffer)  Cardiovascular Disease; Internal Medicine  [address] [place] [address]  Phone: [phone]  Fax: [phone]  Follow Up Time:

## 2019-12-01 NOTE — PROGRESS NOTE ADULT - SUBJECTIVE AND OBJECTIVE BOX
Anant Cook MD  Interventional Cardiology / Endovascular Specialist  [place] Office : [phone] [place] [address] [place] [address]  Tel:   [place] Office : [address] [facility] [place] [address]  Tel: [phone]  Cell : [phone]    HISTORY OF PRESENTING ILLNESS:    86 yo M with PMHx CAD w CABG, last cath in Aug 2017, DM2, HLD, HTN, cards consulted for chest pain   	  MEDICATIONS:  amLODIPine   Tablet 5 milliGRAM(s) Oral daily  aspirin  chewable 81 milliGRAM(s) Oral daily  heparin  Injectable 5000 Unit(s) SubCutaneous every 8 hours  metoprolol tartrate 25 milliGRAM(s) Oral two times a day  tamsulosin 0.4 milliGRAM(s) Oral at bedtime  albuterol/ipratropium for Nebulization 3 milliLiter(s) Nebulizer every 6 hours  benzonatate 100 milliGRAM(s) Oral three times a day PRN  guaiFENesin  milliGRAM(s) Oral every 12 hours  acetaminophen   Tablet .. 650 milliGRAM(s) Oral every 6 hours PRN  pantoprazole    Tablet 40 milliGRAM(s) Oral before breakfast  polyethylene glycol 3350 17 Gram(s) Oral daily  senna 2 Tablet(s) Oral at bedtime  simethicone 80 milliGRAM(s) Chew four times a day PRN  dextrose 40% Gel 15 Gram(s) Oral once PRN  dextrose 50% Injectable 12.5 Gram(s) IV Push once  dextrose 50% Injectable 25 Gram(s) IV Push once  dextrose 50% Injectable 25 Gram(s) IV Push once  glucagon  Injectable 1 milliGRAM(s) IntraMuscular once PRN  insulin glargine Injectable (LANTUS) 12 Unit(s) SubCutaneous at bedtime  insulin lispro (HumaLOG) corrective regimen sliding scale   SubCutaneous three times a day before meals  insulin lispro (HumaLOG) corrective regimen sliding scale   SubCutaneous at bedtime  insulin lispro Injectable (HumaLOG) 3 Unit(s) SubCutaneous three times a day before meals  predniSONE   Tablet 20 milliGRAM(s) Oral daily  benzocaine 15 mG/menthol 3.6 mG (Sugar-Free) Lozenge 1 Lozenge Oral every 2 hours PRN  chlorhexidine 4% Liquid 1 Application(s) Topical <User Schedule>  dextrose 5%. 1000 milliLiter(s) IV Continuous <Continuous>  sodium chloride 0.65% Nasal 1 Spray(s) Both Nostrils five times a day PRN      PAST MEDICAL/SURGICAL HISTORY  PAST MEDICAL & SURGICAL HISTORY:  HLD (hyperlipidemia)  CAD (coronary artery disease)  DM (diabetes mellitus)  HTN (hypertension)  Status post ORIF of fracture of ankle: Left, Oct 2019  S/P CABG (coronary artery bypass graft)      SOCIAL HISTORY: Substance Use (street drugs): ( x ) never used  (  ) other:    FAMILY HISTORY:  No pertinent family history in first degree relatives      REVIEW OF SYSTEMS:  CONSTITUTIONAL: No fever, weight loss, or fatigue  EYES: No eye pain, visual disturbances, or discharge  ENMT:  No difficulty hearing, tinnitus, vertigo; No sinus or throat pain  BREASTS: No pain, masses, or nipple discharge  GASTROINTESTINAL: No abdominal or epigastric pain. No nausea, vomiting, or hematemesis; No diarrhea or constipation. No melena or hematochezia.  GENITOURINARY: No dysuria, frequency, hematuria, or incontinence  NEUROLOGICAL: No headaches, memory loss, loss of strength, numbness, or tremors  ENDOCRINE: No heat or cold intolerance; No hair loss  MUSCULOSKELETAL: No joint pain or swelling; No muscle, back, or extremity pain  PSYCHIATRIC: No depression, anxiety, mood swings, or difficulty sleeping  HEME/LYMPH: No easy bruising, or bleeding gums  All others negative    PHYSICAL EXAM:  T(C): 36.6 (12-01-19 @ 21:02), Max: 36.6 (12-01-19 @ 13:36)  HR: 56 (12-01-19 @ 21:02) (56 - 72)  BP: 144/70 (12-01-19 @ 21:02) (121/71 - 144/70)  RR: 16 (12-01-19 @ 21:02) (16 - 20)  SpO2: 94% (12-01-19 @ 21:02) (94% - 97%)  Wt(kg): --  I&O's Summary    30 Nov 2019 07:01  -  01 Dec 2019 07:00  --------------------------------------------------------  IN: 0 mL / OUT: 2650 mL / NET: -2650 mL    01 Dec 2019 07:01  -  01 Dec 2019 23:04  --------------------------------------------------------  IN: 0 mL / OUT: 1400 mL / NET: -1400 mL        GENERAL: NAD  EYES: EOMI, PERRLA, conjunctiva and sclera clear  ENMT: No tonsillar erythema, exudates, or enlargement; Dry mucous membranes,  Cardiovascular: Normal S1 S2, No JVD, No murmurs  Respiratory: Lungs clear to auscultation	  Gastrointestinal:  Soft, Non-tender, + BS	  Extremities: No clubbing, cyanosis or edema  LYMPH: No lymphadenopathy noted                              10.8   9.84  )-----------( 344      ( 01 Dec 2019 08:43 )             31.6     12-01    133<L>  |  97  |  22  ----------------------------<  96  4.5   |  22  |  1.23    Ca    9.5      01 Dec 2019 07:10  Phos  3.4     12-01  Mg     1.8     12-01    TPro  6.5  /  Alb  4.0  /  TBili  0.4  /  DBili  x   /  AST  16  /  ALT  10  /  AlkPhos  57  11-30    proBNP:   Lipid Profile:   HgA1c:   TSH:     Consultant(s) Notes Reviewed:  [x ] YES  [ ] NO    Care Discussed with Consultants/Other Providers [ x] YES  [ ] NO    Imaging Personally Reviewed independently:  [x] YES  [ ] NO    All labs, radiologic studies, vitals, orders and medications list reviewed. Patient is seen and examined at bedside. Case discussed with medical team.

## 2019-12-02 LAB
ANION GAP SERPL CALC-SCNC: 14 MMOL/L — SIGNIFICANT CHANGE UP (ref 5–17)
BUN SERPL-MCNC: 25 MG/DL — HIGH (ref 7–23)
CALCIUM SERPL-MCNC: 9.5 MG/DL — SIGNIFICANT CHANGE UP (ref 8.4–10.5)
CHLORIDE SERPL-SCNC: 97 MMOL/L — SIGNIFICANT CHANGE UP (ref 96–108)
CO2 SERPL-SCNC: 23 MMOL/L — SIGNIFICANT CHANGE UP (ref 22–31)
CREAT SERPL-MCNC: 1.33 MG/DL — HIGH (ref 0.5–1.3)
GLUCOSE BLDC GLUCOMTR-MCNC: 104 MG/DL — HIGH (ref 70–99)
GLUCOSE BLDC GLUCOMTR-MCNC: 107 MG/DL — HIGH (ref 70–99)
GLUCOSE BLDC GLUCOMTR-MCNC: 136 MG/DL — HIGH (ref 70–99)
GLUCOSE BLDC GLUCOMTR-MCNC: 178 MG/DL — HIGH (ref 70–99)
GLUCOSE BLDC GLUCOMTR-MCNC: 193 MG/DL — HIGH (ref 70–99)
GLUCOSE BLDC GLUCOMTR-MCNC: 205 MG/DL — HIGH (ref 70–99)
GLUCOSE BLDC GLUCOMTR-MCNC: 211 MG/DL — HIGH (ref 70–99)
GLUCOSE SERPL-MCNC: 115 MG/DL — HIGH (ref 70–99)
HCT VFR BLD CALC: 31.3 % — LOW (ref 39–50)
HGB BLD-MCNC: 10.7 G/DL — LOW (ref 13–17)
MAGNESIUM SERPL-MCNC: 1.8 MG/DL — SIGNIFICANT CHANGE UP (ref 1.6–2.6)
MCHC RBC-ENTMCNC: 30.4 PG — SIGNIFICANT CHANGE UP (ref 27–34)
MCHC RBC-ENTMCNC: 34.2 GM/DL — SIGNIFICANT CHANGE UP (ref 32–36)
MCV RBC AUTO: 88.9 FL — SIGNIFICANT CHANGE UP (ref 80–100)
PHOSPHATE SERPL-MCNC: 3.7 MG/DL — SIGNIFICANT CHANGE UP (ref 2.5–4.5)
PLATELET # BLD AUTO: 376 K/UL — SIGNIFICANT CHANGE UP (ref 150–400)
POTASSIUM SERPL-MCNC: 3.8 MMOL/L — SIGNIFICANT CHANGE UP (ref 3.5–5.3)
POTASSIUM SERPL-SCNC: 3.8 MMOL/L — SIGNIFICANT CHANGE UP (ref 3.5–5.3)
RBC # BLD: 3.52 M/UL — LOW (ref 4.2–5.8)
RBC # FLD: 13.2 % — SIGNIFICANT CHANGE UP (ref 10.3–14.5)
SODIUM SERPL-SCNC: 134 MMOL/L — LOW (ref 135–145)
WBC # BLD: 9.25 K/UL — SIGNIFICANT CHANGE UP (ref 3.8–10.5)
WBC # FLD AUTO: 9.25 K/UL — SIGNIFICANT CHANGE UP (ref 3.8–10.5)

## 2019-12-02 PROCEDURE — 99232 SBSQ HOSP IP/OBS MODERATE 35: CPT

## 2019-12-02 PROCEDURE — 93306 TTE W/DOPPLER COMPLETE: CPT | Mod: 26

## 2019-12-02 RX ORDER — INSULIN GLARGINE 100 [IU]/ML
10 INJECTION, SOLUTION SUBCUTANEOUS AT BEDTIME
Refills: 0 | Status: DISCONTINUED | OUTPATIENT
Start: 2019-12-02 | End: 2019-12-03

## 2019-12-02 RX ORDER — LANOLIN ALCOHOL/MO/W.PET/CERES
3 CREAM (GRAM) TOPICAL AT BEDTIME
Refills: 0 | Status: DISCONTINUED | OUTPATIENT
Start: 2019-12-02 | End: 2019-12-02

## 2019-12-02 RX ORDER — LANOLIN ALCOHOL/MO/W.PET/CERES
3 CREAM (GRAM) TOPICAL ONCE
Refills: 0 | Status: COMPLETED | OUTPATIENT
Start: 2019-12-02 | End: 2019-12-02

## 2019-12-02 RX ADMIN — AMLODIPINE BESYLATE 5 MILLIGRAM(S): 2.5 TABLET ORAL at 06:30

## 2019-12-02 RX ADMIN — Medication 20 MILLIGRAM(S): at 06:31

## 2019-12-02 RX ADMIN — POLYETHYLENE GLYCOL 3350 17 GRAM(S): 17 POWDER, FOR SOLUTION ORAL at 12:25

## 2019-12-02 RX ADMIN — Medication 600 MILLIGRAM(S): at 17:30

## 2019-12-02 RX ADMIN — INSULIN GLARGINE 12 UNIT(S): 100 INJECTION, SOLUTION SUBCUTANEOUS at 00:29

## 2019-12-02 RX ADMIN — SIMETHICONE 80 MILLIGRAM(S): 80 TABLET, CHEWABLE ORAL at 14:44

## 2019-12-02 RX ADMIN — Medication 4: at 18:27

## 2019-12-02 RX ADMIN — Medication 600 MILLIGRAM(S): at 06:30

## 2019-12-02 RX ADMIN — Medication 3 UNIT(S): at 18:27

## 2019-12-02 RX ADMIN — Medication 81 MILLIGRAM(S): at 12:26

## 2019-12-02 RX ADMIN — Medication 1 SPRAY(S): at 22:16

## 2019-12-02 RX ADMIN — Medication 25 MILLIGRAM(S): at 17:30

## 2019-12-02 RX ADMIN — Medication 3 UNIT(S): at 12:25

## 2019-12-02 RX ADMIN — INSULIN GLARGINE 10 UNIT(S): 100 INJECTION, SOLUTION SUBCUTANEOUS at 22:15

## 2019-12-02 RX ADMIN — TAMSULOSIN HYDROCHLORIDE 0.4 MILLIGRAM(S): 0.4 CAPSULE ORAL at 22:15

## 2019-12-02 RX ADMIN — Medication 25 MILLIGRAM(S): at 06:29

## 2019-12-02 RX ADMIN — PANTOPRAZOLE SODIUM 40 MILLIGRAM(S): 20 TABLET, DELAYED RELEASE ORAL at 06:29

## 2019-12-02 RX ADMIN — HEPARIN SODIUM 5000 UNIT(S): 5000 INJECTION INTRAVENOUS; SUBCUTANEOUS at 22:15

## 2019-12-02 RX ADMIN — HEPARIN SODIUM 5000 UNIT(S): 5000 INJECTION INTRAVENOUS; SUBCUTANEOUS at 06:30

## 2019-12-02 RX ADMIN — CHLORHEXIDINE GLUCONATE 1 APPLICATION(S): 213 SOLUTION TOPICAL at 06:30

## 2019-12-02 RX ADMIN — Medication 3 MILLIGRAM(S): at 01:19

## 2019-12-02 RX ADMIN — Medication 2: at 12:25

## 2019-12-02 RX ADMIN — HEPARIN SODIUM 5000 UNIT(S): 5000 INJECTION INTRAVENOUS; SUBCUTANEOUS at 14:45

## 2019-12-02 RX ADMIN — Medication 1 SPRAY(S): at 14:45

## 2019-12-02 RX ADMIN — Medication 3 UNIT(S): at 08:46

## 2019-12-02 NOTE — PROVIDER CONTACT NOTE (OTHER) - SITUATION
patient with hypoglycemia -67, repeats  and fs-64mg/dl, dextrose 40% gel-15 gm given p.o x 1, repeat fingerstick after 15 mts of dextrose-87 mg/dl
C/o nasal congestion
blood sugar-193 after half ampoule of dextrose 50% -25 ml
c/o insomnia
difficulty in falling asleep
hematuria

## 2019-12-02 NOTE — PROGRESS NOTE ADULT - SUBJECTIVE AND OBJECTIVE BOX
Anant Cook MD  Interventional Cardiology / Endovascular Specialist  [place] Office : [phone] [place] [address] [place] [address]  Tel:   [place] Office : [phone] [facility] [place] [address]  Tel: [phone]  Cell : [phone]    HISTORY OF PRESENTING ILLNESS:    86 yo M with PMHx CAD w CABG, last cath in Aug 2017, DM2, HLD, HTN, cards consulted for chest pain   	  MEDICATIONS:  amLODIPine   Tablet 5 milliGRAM(s) Oral daily  aspirin  chewable 81 milliGRAM(s) Oral daily  heparin  Injectable 5000 Unit(s) SubCutaneous every 8 hours  metoprolol tartrate 25 milliGRAM(s) Oral two times a day  tamsulosin 0.4 milliGRAM(s) Oral at bedtime  albuterol/ipratropium for Nebulization 3 milliLiter(s) Nebulizer every 6 hours  benzonatate 100 milliGRAM(s) Oral three times a day PRN  guaiFENesin  milliGRAM(s) Oral every 12 hours  acetaminophen   Tablet .. 650 milliGRAM(s) Oral every 6 hours PRN  pantoprazole    Tablet 40 milliGRAM(s) Oral before breakfast  polyethylene glycol 3350 17 Gram(s) Oral daily  senna 2 Tablet(s) Oral at bedtime  simethicone 80 milliGRAM(s) Chew four times a day PRN  dextrose 40% Gel 15 Gram(s) Oral once PRN  dextrose 50% Injectable 12.5 Gram(s) IV Push once  dextrose 50% Injectable 25 Gram(s) IV Push once  dextrose 50% Injectable 25 Gram(s) IV Push once  glucagon  Injectable 1 milliGRAM(s) IntraMuscular once PRN  insulin glargine Injectable (LANTUS) 10 Unit(s) SubCutaneous at bedtime  insulin lispro (HumaLOG) corrective regimen sliding scale   SubCutaneous three times a day before meals  insulin lispro (HumaLOG) corrective regimen sliding scale   SubCutaneous at bedtime  insulin lispro Injectable (HumaLOG) 3 Unit(s) SubCutaneous three times a day before meals  benzocaine 15 mG/menthol 3.6 mG (Sugar-Free) Lozenge 1 Lozenge Oral every 2 hours PRN  chlorhexidine 4% Liquid 1 Application(s) Topical <User Schedule>  dextrose 5%. 1000 milliLiter(s) IV Continuous <Continuous>  sodium chloride 0.65% Nasal 1 Spray(s) Both Nostrils five times a day PRN      PAST MEDICAL/SURGICAL HISTORY  PAST MEDICAL & SURGICAL HISTORY:  HLD (hyperlipidemia)  CAD (coronary artery disease)  DM (diabetes mellitus)  HTN (hypertension)  Status post ORIF of fracture of ankle: Left, Oct 2019  S/P CABG (coronary artery bypass graft)      SOCIAL HISTORY: Substance Use (street drugs): ( x ) never used  (  ) other:    FAMILY HISTORY:  No pertinent family history in first degree relatives      REVIEW OF SYSTEMS:  CONSTITUTIONAL: No fever, weight loss, or fatigue  EYES: No eye pain, visual disturbances, or discharge  ENMT:  No difficulty hearing, tinnitus, vertigo; No sinus or throat pain  BREASTS: No pain, masses, or nipple discharge  GASTROINTESTINAL: No abdominal or epigastric pain. No nausea, vomiting, or hematemesis; No diarrhea or constipation. No melena or hematochezia.  GENITOURINARY: No dysuria, frequency, hematuria, or incontinence  NEUROLOGICAL: No headaches, memory loss, loss of strength, numbness, or tremors  ENDOCRINE: No heat or cold intolerance; No hair loss  MUSCULOSKELETAL: No joint pain or swelling; No muscle, back, or extremity pain  PSYCHIATRIC: No depression, anxiety, mood swings, or difficulty sleeping  HEME/LYMPH: No easy bruising, or bleeding gums  All others negative    PHYSICAL EXAM:  T(C): 37.1 (12-02-19 @ 20:34), Max: 37.1 (12-02-19 @ 20:34)  HR: 66 (12-02-19 @ 20:34) (59 - 69)  BP: 141/74 (12-02-19 @ 20:34) (100/70 - 156/73)  RR: 18 (12-02-19 @ 20:34) (16 - 18)  SpO2: 97% (12-02-19 @ 20:34) (94% - 100%)  Wt(kg): --  I&O's Summary    01 Dec 2019 07:01  -  02 Dec 2019 07:00  --------------------------------------------------------  IN: 200 mL / OUT: 2750 mL / NET: -2550 mL    02 Dec 2019 07:01  -  02 Dec 2019 22:29  --------------------------------------------------------  IN: 240 mL / OUT: 675 mL / NET: -435 mL        GENERAL: NAD  EYES: EOMI, PERRLA, conjunctiva and sclera clear  ENMT: No tonsillar erythema, exudates, or enlargement; Dry mucous membranes,  Cardiovascular: Normal S1 S2, No JVD, No murmurs  Respiratory: Lungs clear to auscultation	  Gastrointestinal:  Soft, Non-tender, + BS	  Extremities: No clubbing, cyanosis or edema  LYMPH: No lymphadenopathy noted                          10.7   9.25  )-----------( 376      ( 02 Dec 2019 08:27 )             31.3     12-02    134<L>  |  97  |  25<H>  ----------------------------<  115<H>  3.8   |  23  |  1.33<H>    Ca    9.5      02 Dec 2019 06:56  Phos  3.7     12-02  Mg     1.8     12-02      proBNP:   Lipid Profile:   HgA1c:   TSH:     Consultant(s) Notes Reviewed:  [x ] YES  [ ] NO    Care Discussed with Consultants/Other Providers [ x] YES  [ ] NO    Imaging Personally Reviewed independently:  [x] YES  [ ] NO    All labs, radiologic studies, vitals, orders and medications list reviewed. Patient is seen and examined at bedside. Case discussed with medical team.

## 2019-12-02 NOTE — PROGRESS NOTE ADULT - PROBLEM SELECTOR PLAN 1
-resolved  - secondary to acute parainfluenza virus infection  - Now off O2. Satting 97% ORA  - CXR with no signs of consolidation to suggest PNA  - supportive care for parainfluenza infection  - c/w a course of prednisone 20qd (last dose is 12/2) - resolved  - secondary to acute parainfluenza virus infection  - Now off O2. Satting 97% ORA  - CXR with no signs of consolidation to suggest PNA  - supportive care for parainfluenza infection  - s/p a course of prednisone 20qd (last dose is 12/2)

## 2019-12-02 NOTE — PROVIDER CONTACT NOTE (CHANGE IN STATUS NOTIFICATION) - ASSESSMENT
no distress noted.
Fs 211, bp 100/70 placed back to bed
after d/c magallanes noted pt bleeding from his penis that was a steady stream
patient alert and oriented x 4, responsive , no s/s of hypoglycemia noted
patient alert and oriented, anxious, on room air, temp-36.7, hr-98, b.p-153/94 mmhg, rr-20, 02 sat-98% on room air

## 2019-12-02 NOTE — PROGRESS NOTE ADULT - PROBLEM SELECTOR PLAN 4
Improving  - urine studies suggestive post-obstructive diuresis  - no acute mental status changes at this time

## 2019-12-02 NOTE — PROVIDER CONTACT NOTE (OTHER) - ASSESSMENT
patient alert and oriented, responsive
Patient alert and oriented x 4, on room air  , with o2 saturation of 94%
abdomen soft denies pain  hematuria persist
patient alert and oriented
patient alert and oriented x 4, on room air,
patient awake, fully responsive

## 2019-12-02 NOTE — PROGRESS NOTE ADULT - ASSESSMENT
86M with CAD s/p stents on ASA/plavix and DM presenting with sepsis 2/2 parainfluenza. Found to have gross hematuria after traumatic straight catheterization, now resolved    - Keep Ramirez in place for now, start Flomax and may have a trial of void in 1-2 days per the medicine team  - No contraindication to restarting Plavix if necessary from urology perspective  - Urology to sign off, please reconsult as necessary

## 2019-12-02 NOTE — PROVIDER CONTACT NOTE (OTHER) - BACKGROUND
patient with type 2 dm, coronary artery disease
Admitted with sob , diagnosed as para flu infection
admitted with flu,respiratory distress
patient with para flu, cad
patient with type 2 dm , cad
pt admitted with cough and symptoms. positive for para influenza admitted with 3way indwelling cath  for retentionn of urine

## 2019-12-02 NOTE — PROGRESS NOTE ADULT - PROBLEM SELECTOR PLAN 7
- A1c 7.6 from October 2019  - on metformin, glimepiride, and Tradjenta at home  - c/w moderate SS before meals and at bedtime  - hyperglycemic on sliding scale, c/w lantus 12 units and premeal 3 units - A1c 7.6 from October 2019  - on metformin, glimepiride, and Tradjenta at home  - c/w moderate SS before meals and at bedtime  - hypoglycemic event ON, decrease lantus from 12 units to 10 and c/w premeal 3 units

## 2019-12-02 NOTE — PROGRESS NOTE ADULT - ASSESSMENT
EKG SR RBBB with repol changes unchanged from admission slighlty more tachy     Echo  < from: Transthoracic Echocardiogram (08.06.17 @ 12:00) >  1. Mildly dilated left atrium.  LA volume index = 36 cc/m2.  2. Normal left ventricular internal dimensions and wall  thicknesses.  3. Mild segmental left ventricular systolic dysfunction.  Hypokinesis of the basal inferoseptum    < end of copied text >      Assessment and Plan     1) Chest pain: atypical EKG unchanged, CE -ve, given h/o CAD /CABG on asa plavix being held echo unchanged     2) Hematuria : improving t/t per urology  restarted asa     3) Parainfluenza : t/t primary team

## 2019-12-02 NOTE — PROGRESS NOTE ADULT - PROBLEM SELECTOR PLAN 6
- CAD s/p CABG and stents most recently 2017  - pt with chest pain 11/30, with New TWI and ST depressions in the lateral leads. Trops now downtrended  -cardiology recs appreciate: echo for comparison ordered  - c/w ASA   - hold plavix given acute hematuria  - c/w metoprolol 25mg BID  - TTE from 2017 with EF 55% with mild systolic and diastolic dysfunction  - no signs of acute volume overload or HF at this time - CAD s/p CABG and stents most recently 2017  - pt with chest pain 11/30, with TWI and ST depressions in the lateral leads. Trops downtrended  -cardiology recs appreciate: echo--EF60%, mild MR, AR. Overall preserved LV systolic fx. Grossly enlarged EV with normal RV systolic fx. Grossly unchanged from 2017  - c/w ASA   - continue to hold plavix given hematuria  - c/w metoprolol 25mg BID  - TTE from 2017 with EF 55% with mild systolic and diastolic dysfunction  - no signs of acute volume overload or HF at this time

## 2019-12-02 NOTE — PROGRESS NOTE ADULT - PROBLEM SELECTOR PLAN 5
- pink-red urine in magallanes  - f/u urology recs  - continue to hold plavix   - c/w ASA  - c/w flomax and plan for trial of voids when improves - light pink urine in magallanes  - urology recs appreciated  - continue to hold plavix   - c/w ASA  - c/w flomax and plan for trial of voids tonight

## 2019-12-02 NOTE — PROVIDER CONTACT NOTE (OTHER) - RECOMMENDATIONS
repeat blood sugar in 15 minutes and inform m.d
Give lantus 12 units as ordered
Irrigate indwelling cath q6hrs PRN UROLOGY called
Sodium chloride 0.65% nasal spray to both nares x 1
melatonin 3 mg p.o x 1
melatonin 3 mg po x 1 dose

## 2019-12-02 NOTE — PROVIDER CONTACT NOTE (CHANGE IN STATUS NOTIFICATION) - ACTION/TREATMENT ORDERED:
no new orders at this time.
no orders at this time
placed pressure to penis to reduce bleeding, pt placed back in bed, DR Street and Dr Deleon called to bedside to assist pt

## 2019-12-02 NOTE — PROGRESS NOTE ADULT - SUBJECTIVE AND OBJECTIVE BOX
Subjective    Seen & examined at bedside  Urine has cleared    Objective    Vital signs  T(F): , Max: 97.8 (12-01-19 @ 13:36)  HR: 59 (12-02-19 @ 06:13)  BP: 153/77 (12-02-19 @ 06:13)  SpO2: 95% (12-02-19 @ 06:13)  Wt(kg): --    Output     12-01 @ 07:01  -  12-02 @ 07:00  --------------------------------------------------------  IN: 200 mL / OUT: 2750 mL / NET: -2550 mL    Physical Exam  Gen NAD  Abd soft, NT, ND   Ramirez secured draining clear urine    Labs  12-01 @ 08:43  WBC 9.84  / Hct 31.6  / SCr --       12-01 @ 07:10  WBC --    / Hct --    / SCr 1.23     Urine Cx: Culture - Urine (11.27.19 @ 23:18)    Specimen Source: .Urine Clean Catch (Midstream)    Culture Results:   No growth

## 2019-12-02 NOTE — PROGRESS NOTE ADULT - PROBLEM SELECTOR PLAN 3
Resolved  - patient with tachypnea and leukocytosis with RVP + for parainfluenza virus  - s/p 2.2L NS in the ED along with ceftriaxone/azithromycin  - BCx and UCx 11/27: NGTD, UA negative, CXR grossly clear  - monitor off abx at this time given likely viral etiology of symptoms

## 2019-12-02 NOTE — PROGRESS NOTE ADULT - SUBJECTIVE AND OBJECTIVE BOX
[facility] (Gian)   Internal Medicine  PGY-1  Pager: [phone]; [facility]--[phone]    Patient is a 86y old  Male who presents with a chief complaint of 86M w/ cough and fever (02 Dec 2019 07:16)      SUBJECTIVE / OVERNIGHT EVENTS: No acute event ON. Denies HA/CP/SOB/abd pain/n/v/d/fever/chills.    MEDICATIONS  (STANDING):  albuterol/ipratropium for Nebulization 3 milliLiter(s) Nebulizer every 6 hours  amLODIPine   Tablet 5 milliGRAM(s) Oral daily  aspirin  chewable 81 milliGRAM(s) Oral daily  chlorhexidine 4% Liquid 1 Application(s) Topical <User Schedule>  dextrose 5%. 1000 milliLiter(s) (50 mL/Hr) IV Continuous <Continuous>  dextrose 50% Injectable 12.5 Gram(s) IV Push once  dextrose 50% Injectable 25 Gram(s) IV Push once  dextrose 50% Injectable 25 Gram(s) IV Push once  guaiFENesin  milliGRAM(s) Oral every 12 hours  heparin  Injectable 5000 Unit(s) SubCutaneous every 8 hours  insulin glargine Injectable (LANTUS) 10 Unit(s) SubCutaneous at bedtime  insulin lispro (HumaLOG) corrective regimen sliding scale   SubCutaneous three times a day before meals  insulin lispro (HumaLOG) corrective regimen sliding scale   SubCutaneous at bedtime  insulin lispro Injectable (HumaLOG) 3 Unit(s) SubCutaneous three times a day before meals  metoprolol tartrate 25 milliGRAM(s) Oral two times a day  pantoprazole    Tablet 40 milliGRAM(s) Oral before breakfast  polyethylene glycol 3350 17 Gram(s) Oral daily  senna 2 Tablet(s) Oral at bedtime  tamsulosin 0.4 milliGRAM(s) Oral at bedtime    MEDICATIONS  (PRN):  acetaminophen   Tablet .. 650 milliGRAM(s) Oral every 6 hours PRN Mild Pain (1 - 3), Moderate Pain (4 - 6)  benzocaine 15 mG/menthol 3.6 mG (Sugar-Free) Lozenge 1 Lozenge Oral every 2 hours PRN Sore Throat  benzonatate 100 milliGRAM(s) Oral three times a day PRN Cough  dextrose 40% Gel 15 Gram(s) Oral once PRN Blood Glucose LESS THAN 70 milliGRAM(s)/deciliter  glucagon  Injectable 1 milliGRAM(s) IntraMuscular once PRN Glucose LESS THAN 70 milligrams/deciliter  simethicone 80 milliGRAM(s) Chew four times a day PRN Bloating  sodium chloride 0.65% Nasal 1 Spray(s) Both Nostrils five times a day PRN Nasal Congestion        OBJECTIVE:    Vital Signs Last 24 Hrs  T(C): 36.6 (02 Dec 2019 06:13), Max: 37 (02 Dec 2019 02:00)  T(F): 97.8 (02 Dec 2019 06:13), Max: 98.6 (02 Dec 2019 02:00)  HR: 59 (02 Dec 2019 06:13) (56 - 72)  BP: 153/77 (02 Dec 2019 06:13) (121/71 - 153/77)  BP(mean): --  RR: 18 (02 Dec 2019 06:13) (16 - 18)  SpO2: 95% (02 Dec 2019 06:13) (94% - 96%)    PHYSICAL EXAM:  GENERAL: NAD, well-developed  HEAD:  Atraumatic, Normocephalic  EYES: EOMI, PERRLA, conjunctiva and sclera clear  NECK: Supple, No JVD  CHEST/LUNG: Clear to auscultation bilaterally; No wheeze  HEART: Regular rate and rhythm; No murmurs, rubs, or gallops  ABDOMEN: Soft, Nontender, Nondistended; Bowel sounds present  EXTREMITIES:  2+ Peripheral Pulses, No clubbing, cyanosis, or edema  PSYCH: AAOx3  NEUROLOGY: non-focal  SKIN: No rashes or lesions    CAPILLARY BLOOD GLUCOSE      POCT Blood Glucose.: 107 mg/dL (02 Dec 2019 08:31)  POCT Blood Glucose.: 104 mg/dL (02 Dec 2019 06:16)  POCT Blood Glucose.: 193 mg/dL (02 Dec 2019 00:09)  POCT Blood Glucose.: 72 mg/dL (01 Dec 2019 23:27)  POCT Blood Glucose.: 87 mg/dL (01 Dec 2019 23:06)  POCT Blood Glucose.: 64 mg/dL (01 Dec 2019 22:28)  POCT Blood Glucose.: 67 mg/dL (01 Dec 2019 22:25)  POCT Blood Glucose.: 205 mg/dL (01 Dec 2019 18:02)  POCT Blood Glucose.: 396 mg/dL (01 Dec 2019 14:06)  POCT Blood Glucose.: 115 mg/dL (01 Dec 2019 09:07)    I&O's Summary    01 Dec 2019 07:01  -  02 Dec 2019 07:00  --------------------------------------------------------  IN: 200 mL / OUT: 2750 mL / NET: -2550 mL        LABS:                        10.7   9.25  )-----------( 376      ( 02 Dec 2019 08:27 )             31.3     12-02    134<L>  |  97  |  25<H>  ----------------------------<  115<H>  3.8   |  23  |  1.33<H>    Ca    9.5      02 Dec 2019 06:56  Phos  3.7     12-02  Mg     1.8     12-02        CARDIAC MARKERS ( 30 Nov 2019 11:18 )  x     / x     / 28 U/L / x     / 3.9 ng/mL              RADIOLOGY & ADDITIONAL TESTS: [facility] ([facility]ian)   Internal Medicine  PGY-1  Pager: [phone]--[phone]; [facility]--[phone]    Patient is a 86y old  Male who presents with a chief complaint of 86M w/ cough and fever (02 Dec 2019 07:16)      SUBJECTIVE / OVERNIGHT EVENTS: No acute event ON. Hypoglycemic to 60s ON, received juice and D50. Patient asymptomatic. This am, reports having hematuria when trying to have a BM. Urine cleared up to pink later in the morning. Denies HA/CP/SOB/abd pain/n/v/d/fever/chills.    MEDICATIONS  (STANDING):  albuterol/ipratropium for Nebulization 3 milliLiter(s) Nebulizer every 6 hours  amLODIPine   Tablet 5 milliGRAM(s) Oral daily  aspirin  chewable 81 milliGRAM(s) Oral daily  chlorhexidine 4% Liquid 1 Application(s) Topical <User Schedule>  dextrose 5%. 1000 milliLiter(s) (50 mL/Hr) IV Continuous <Continuous>  dextrose 50% Injectable 12.5 Gram(s) IV Push once  dextrose 50% Injectable 25 Gram(s) IV Push once  dextrose 50% Injectable 25 Gram(s) IV Push once  guaiFENesin  milliGRAM(s) Oral every 12 hours  heparin  Injectable 5000 Unit(s) SubCutaneous every 8 hours  insulin glargine Injectable (LANTUS) 10 Unit(s) SubCutaneous at bedtime  insulin lispro (HumaLOG) corrective regimen sliding scale   SubCutaneous three times a day before meals  insulin lispro (HumaLOG) corrective regimen sliding scale   SubCutaneous at bedtime  insulin lispro Injectable (HumaLOG) 3 Unit(s) SubCutaneous three times a day before meals  metoprolol tartrate 25 milliGRAM(s) Oral two times a day  pantoprazole    Tablet 40 milliGRAM(s) Oral before breakfast  polyethylene glycol 3350 17 Gram(s) Oral daily  senna 2 Tablet(s) Oral at bedtime  tamsulosin 0.4 milliGRAM(s) Oral at bedtime    MEDICATIONS  (PRN):  acetaminophen   Tablet .. 650 milliGRAM(s) Oral every 6 hours PRN Mild Pain (1 - 3), Moderate Pain (4 - 6)  benzocaine 15 mG/menthol 3.6 mG (Sugar-Free) Lozenge 1 Lozenge Oral every 2 hours PRN Sore Throat  benzonatate 100 milliGRAM(s) Oral three times a day PRN Cough  dextrose 40% Gel 15 Gram(s) Oral once PRN Blood Glucose LESS THAN 70 milliGRAM(s)/deciliter  glucagon  Injectable 1 milliGRAM(s) IntraMuscular once PRN Glucose LESS THAN 70 milligrams/deciliter  simethicone 80 milliGRAM(s) Chew four times a day PRN Bloating  sodium chloride 0.65% Nasal 1 Spray(s) Both Nostrils five times a day PRN Nasal Congestion        OBJECTIVE:    Vital Signs Last 24 Hrs  T(C): 36.6 (02 Dec 2019 06:13), Max: 37 (02 Dec 2019 02:00)  T(F): 97.8 (02 Dec 2019 06:13), Max: 98.6 (02 Dec 2019 02:00)  HR: 59 (02 Dec 2019 06:13) (56 - 72)  BP: 153/77 (02 Dec 2019 06:13) (121/71 - 153/77)  BP(mean): --  RR: 18 (02 Dec 2019 06:13) (16 - 18)  SpO2: 95% (02 Dec 2019 06:13) (94% - 96%)    PHYSICAL EXAM:  GENERAL: NAD, well-developed  HEAD:  Atraumatic, Normocephalic  EYES: EOMI, PERRLA, conjunctiva and sclera clear  NECK: Supple, No JVD  MOUTH: No lesions noted  CHEST/LUNG: CTAB no wheezing, rales or rhonchi  HEART: Regular rate and rhythm; No murmurs, rubs, or gallops  ABDOMEN: Soft, nontender, nondistended; Bowel sounds present  EXTREMITIES:  2+ Peripheral Pulses, No clubbing, cyanosis, or edema. Mild swelling and erythema of L ankle  : light pink color in magallanes  PSYCH: AAOx3  NEUROLOGY: non-focal  SKIN: No rashes or lesions      CAPILLARY BLOOD GLUCOSE      POCT Blood Glucose.: 107 mg/dL (02 Dec 2019 08:31)  POCT Blood Glucose.: 104 mg/dL (02 Dec 2019 06:16)  POCT Blood Glucose.: 193 mg/dL (02 Dec 2019 00:09)  POCT Blood Glucose.: 72 mg/dL (01 Dec 2019 23:27)  POCT Blood Glucose.: 87 mg/dL (01 Dec 2019 23:06)  POCT Blood Glucose.: 64 mg/dL (01 Dec 2019 22:28)  POCT Blood Glucose.: 67 mg/dL (01 Dec 2019 22:25)  POCT Blood Glucose.: 205 mg/dL (01 Dec 2019 18:02)  POCT Blood Glucose.: 396 mg/dL (01 Dec 2019 14:06)  POCT Blood Glucose.: 115 mg/dL (01 Dec 2019 09:07)    I&O's Summary    01 Dec 2019 07:01  -  02 Dec 2019 07:00  --------------------------------------------------------  IN: 200 mL / OUT: 2750 mL / NET: -2550 mL        LABS:                        10.7   9.25  )-----------( 376      ( 02 Dec 2019 08:27 )             31.3     12-02    134<L>  |  97  |  25<H>  ----------------------------<  115<H>  3.8   |  23  |  1.33<H>    Ca    9.5      02 Dec 2019 06:56  Phos  3.7     12-02  Mg     1.8     12-02        CARDIAC MARKERS ( 30 Nov 2019 11:18 )  x     / x     / 28 U/L / x     / 3.9 ng/mL          RADIOLOGY & ADDITIONAL TESTS:  < from: Transthoracic Echocardiogram (12.02.19 @ 08:07) >  Conclusions:  1. Mitral annular calcification, otherwise normal mitral  valve. Mild mitral regurgitation.  2. Calcified trileaflet aortic valve with normal opening.  Mild aortic regurgitation.  3. Overall preserved left ventricular systolic function.  The basal inferior wall is hypokinetic.  4. Mild diastolic dysfunction (Stage I).  5. The right ventricle is not well visualized; grossly  right ventricular enlargement with normal right ventricular  systolic function.  6. Normal pericardium with no pericardial effusion.    < end of copied text >

## 2019-12-02 NOTE — PROGRESS NOTE ADULT - PROBLEM SELECTOR PLAN 2
- RVP + for parainfluenza virus  - continue supportive care measures including duonebs q6 hours, cepacol cough drops, tessalon pearls, and mucinex q12 hours  - s/p ceftriaxone and azithromycin in the ED but continue to monitor off abx at this time given lack of consolidation on CXR to suggest superimposed PNA  - strept throat by pcr: neg

## 2019-12-02 NOTE — PROGRESS NOTE ADULT - ASSESSMENT
85 y/o M with PMHx CAD s/p 3 stents (most recent 2017), DM2, HLD, HTN, recent admission for L ankle fracture s/p ORIF who presents to the hospital with 3 days of cough and fever, admitted for sepsis secondary to parainfluenza virus complicated by acute hypoxic respiratory failure. Now improving. Course c/b chest pain with new TWI and ST Depressions in the lateral leads. 85 y/o M with PMHx CAD s/p 3 stents (most recent 2017), DM2, HLD, HTN, recent admission for L ankle fracture s/p ORIF who presents to the hospital with 3 days of cough and fever, admitted for sepsis secondary to parainfluenza virus complicated by acute hypoxic respiratory failure. Course c/b chest pain with TWI and ST Depressions in the lateral leads unchanged from prior EKG. Now breathing improved and hematuria improving

## 2019-12-03 ENCOUNTER — TRANSCRIPTION ENCOUNTER (OUTPATIENT)
Age: 84
End: 2019-12-03

## 2019-12-03 VITALS
TEMPERATURE: 98 F | RESPIRATION RATE: 16 BRPM | SYSTOLIC BLOOD PRESSURE: 132 MMHG | HEART RATE: 76 BPM | OXYGEN SATURATION: 95 % | DIASTOLIC BLOOD PRESSURE: 75 MMHG

## 2019-12-03 LAB
ANION GAP SERPL CALC-SCNC: 10 MMOL/L — SIGNIFICANT CHANGE UP (ref 5–17)
BUN SERPL-MCNC: 21 MG/DL — SIGNIFICANT CHANGE UP (ref 7–23)
CALCIUM SERPL-MCNC: 8.9 MG/DL — SIGNIFICANT CHANGE UP (ref 8.4–10.5)
CHLORIDE SERPL-SCNC: 99 MMOL/L — SIGNIFICANT CHANGE UP (ref 96–108)
CO2 SERPL-SCNC: 24 MMOL/L — SIGNIFICANT CHANGE UP (ref 22–31)
CREAT SERPL-MCNC: 1.37 MG/DL — HIGH (ref 0.5–1.3)
CULTURE RESULTS: SIGNIFICANT CHANGE UP
CULTURE RESULTS: SIGNIFICANT CHANGE UP
GLUCOSE BLDC GLUCOMTR-MCNC: 187 MG/DL — HIGH (ref 70–99)
GLUCOSE BLDC GLUCOMTR-MCNC: 198 MG/DL — HIGH (ref 70–99)
GLUCOSE BLDC GLUCOMTR-MCNC: 76 MG/DL — SIGNIFICANT CHANGE UP (ref 70–99)
GLUCOSE SERPL-MCNC: 103 MG/DL — HIGH (ref 70–99)
HCT VFR BLD CALC: 28.7 % — LOW (ref 39–50)
HGB BLD-MCNC: 10 G/DL — LOW (ref 13–17)
MAGNESIUM SERPL-MCNC: 1.7 MG/DL — SIGNIFICANT CHANGE UP (ref 1.6–2.6)
MCHC RBC-ENTMCNC: 31.1 PG — SIGNIFICANT CHANGE UP (ref 27–34)
MCHC RBC-ENTMCNC: 34.8 GM/DL — SIGNIFICANT CHANGE UP (ref 32–36)
MCV RBC AUTO: 89.1 FL — SIGNIFICANT CHANGE UP (ref 80–100)
PHOSPHATE SERPL-MCNC: 3.8 MG/DL — SIGNIFICANT CHANGE UP (ref 2.5–4.5)
PLATELET # BLD AUTO: 368 K/UL — SIGNIFICANT CHANGE UP (ref 150–400)
POTASSIUM SERPL-MCNC: 3.6 MMOL/L — SIGNIFICANT CHANGE UP (ref 3.5–5.3)
POTASSIUM SERPL-SCNC: 3.6 MMOL/L — SIGNIFICANT CHANGE UP (ref 3.5–5.3)
RBC # BLD: 3.22 M/UL — LOW (ref 4.2–5.8)
RBC # FLD: 13.1 % — SIGNIFICANT CHANGE UP (ref 10.3–14.5)
SODIUM SERPL-SCNC: 133 MMOL/L — LOW (ref 135–145)
SPECIMEN SOURCE: SIGNIFICANT CHANGE UP
SPECIMEN SOURCE: SIGNIFICANT CHANGE UP
WBC # BLD: 8.48 K/UL — SIGNIFICANT CHANGE UP (ref 3.8–10.5)
WBC # FLD AUTO: 8.48 K/UL — SIGNIFICANT CHANGE UP (ref 3.8–10.5)

## 2019-12-03 PROCEDURE — 83735 ASSAY OF MAGNESIUM: CPT

## 2019-12-03 PROCEDURE — 71045 X-RAY EXAM CHEST 1 VIEW: CPT

## 2019-12-03 PROCEDURE — 70491 CT SOFT TISSUE NECK W/DYE: CPT

## 2019-12-03 PROCEDURE — 97161 PT EVAL LOW COMPLEX 20 MIN: CPT

## 2019-12-03 PROCEDURE — 83935 ASSAY OF URINE OSMOLALITY: CPT

## 2019-12-03 PROCEDURE — 82330 ASSAY OF CALCIUM: CPT

## 2019-12-03 PROCEDURE — 82553 CREATINE MB FRACTION: CPT

## 2019-12-03 PROCEDURE — 85730 THROMBOPLASTIN TIME PARTIAL: CPT

## 2019-12-03 PROCEDURE — 87040 BLOOD CULTURE FOR BACTERIA: CPT

## 2019-12-03 PROCEDURE — 87651 STREP A DNA AMP PROBE: CPT

## 2019-12-03 PROCEDURE — 99239 HOSP IP/OBS DSCHRG MGMT >30: CPT

## 2019-12-03 PROCEDURE — 84484 ASSAY OF TROPONIN QUANT: CPT

## 2019-12-03 PROCEDURE — 87486 CHLMYD PNEUM DNA AMP PROBE: CPT

## 2019-12-03 PROCEDURE — 82550 ASSAY OF CK (CPK): CPT

## 2019-12-03 PROCEDURE — 82947 ASSAY GLUCOSE BLOOD QUANT: CPT

## 2019-12-03 PROCEDURE — 87086 URINE CULTURE/COLONY COUNT: CPT

## 2019-12-03 PROCEDURE — 84145 PROCALCITONIN (PCT): CPT

## 2019-12-03 PROCEDURE — 82803 BLOOD GASES ANY COMBINATION: CPT

## 2019-12-03 PROCEDURE — 85027 COMPLETE CBC AUTOMATED: CPT

## 2019-12-03 PROCEDURE — 84100 ASSAY OF PHOSPHORUS: CPT

## 2019-12-03 PROCEDURE — 84300 ASSAY OF URINE SODIUM: CPT

## 2019-12-03 PROCEDURE — 93306 TTE W/DOPPLER COMPLETE: CPT

## 2019-12-03 PROCEDURE — 82565 ASSAY OF CREATININE: CPT

## 2019-12-03 PROCEDURE — 84295 ASSAY OF SERUM SODIUM: CPT

## 2019-12-03 PROCEDURE — 82435 ASSAY OF BLOOD CHLORIDE: CPT

## 2019-12-03 PROCEDURE — 83605 ASSAY OF LACTIC ACID: CPT

## 2019-12-03 PROCEDURE — 87633 RESP VIRUS 12-25 TARGETS: CPT

## 2019-12-03 PROCEDURE — 93005 ELECTROCARDIOGRAM TRACING: CPT

## 2019-12-03 PROCEDURE — 96375 TX/PRO/DX INJ NEW DRUG ADDON: CPT | Mod: XU

## 2019-12-03 PROCEDURE — 51701 INSERT BLADDER CATHETER: CPT

## 2019-12-03 PROCEDURE — 87581 M.PNEUMON DNA AMP PROBE: CPT

## 2019-12-03 PROCEDURE — 82962 GLUCOSE BLOOD TEST: CPT

## 2019-12-03 PROCEDURE — 85610 PROTHROMBIN TIME: CPT

## 2019-12-03 PROCEDURE — 99285 EMERGENCY DEPT VISIT HI MDM: CPT | Mod: 25

## 2019-12-03 PROCEDURE — 94640 AIRWAY INHALATION TREATMENT: CPT

## 2019-12-03 PROCEDURE — 80048 BASIC METABOLIC PNL TOTAL CA: CPT

## 2019-12-03 PROCEDURE — 85014 HEMATOCRIT: CPT

## 2019-12-03 PROCEDURE — 84132 ASSAY OF SERUM POTASSIUM: CPT

## 2019-12-03 PROCEDURE — 81001 URINALYSIS AUTO W/SCOPE: CPT

## 2019-12-03 PROCEDURE — 82436 ASSAY OF URINE CHLORIDE: CPT

## 2019-12-03 PROCEDURE — 87798 DETECT AGENT NOS DNA AMP: CPT

## 2019-12-03 PROCEDURE — 80053 COMPREHEN METABOLIC PANEL: CPT

## 2019-12-03 PROCEDURE — 96374 THER/PROPH/DIAG INJ IV PUSH: CPT | Mod: XU

## 2019-12-03 RX ORDER — CLOPIDOGREL BISULFATE 75 MG/1
1 TABLET, FILM COATED ORAL
Qty: 0 | Refills: 0 | DISCHARGE

## 2019-12-03 RX ORDER — MAGNESIUM OXIDE 400 MG ORAL TABLET 241.3 MG
400 TABLET ORAL ONCE
Refills: 0 | Status: COMPLETED | OUTPATIENT
Start: 2019-12-03 | End: 2019-12-03

## 2019-12-03 RX ORDER — SIMETHICONE 80 MG/1
1 TABLET, CHEWABLE ORAL
Qty: 120 | Refills: 0
Start: 2019-12-03 | End: 2020-01-01

## 2019-12-03 RX ORDER — TAMSULOSIN HYDROCHLORIDE 0.4 MG/1
1 CAPSULE ORAL
Qty: 30 | Refills: 1
Start: 2019-12-03 | End: 2020-01-31

## 2019-12-03 RX ORDER — LANOLIN ALCOHOL/MO/W.PET/CERES
3 CREAM (GRAM) TOPICAL AT BEDTIME
Refills: 0 | Status: DISCONTINUED | OUTPATIENT
Start: 2019-12-03 | End: 2019-12-03

## 2019-12-03 RX ADMIN — PANTOPRAZOLE SODIUM 40 MILLIGRAM(S): 20 TABLET, DELAYED RELEASE ORAL at 06:53

## 2019-12-03 RX ADMIN — Medication 25 MILLIGRAM(S): at 06:53

## 2019-12-03 RX ADMIN — Medication 3 UNIT(S): at 11:30

## 2019-12-03 RX ADMIN — Medication 600 MILLIGRAM(S): at 06:53

## 2019-12-03 RX ADMIN — Medication 3 MILLILITER(S): at 12:14

## 2019-12-03 RX ADMIN — CHLORHEXIDINE GLUCONATE 1 APPLICATION(S): 213 SOLUTION TOPICAL at 11:23

## 2019-12-03 RX ADMIN — Medication 2: at 14:16

## 2019-12-03 RX ADMIN — Medication 81 MILLIGRAM(S): at 11:24

## 2019-12-03 RX ADMIN — Medication 3 UNIT(S): at 14:16

## 2019-12-03 RX ADMIN — AMLODIPINE BESYLATE 5 MILLIGRAM(S): 2.5 TABLET ORAL at 06:53

## 2019-12-03 RX ADMIN — MAGNESIUM OXIDE 400 MG ORAL TABLET 400 MILLIGRAM(S): 241.3 TABLET ORAL at 11:24

## 2019-12-03 RX ADMIN — HEPARIN SODIUM 5000 UNIT(S): 5000 INJECTION INTRAVENOUS; SUBCUTANEOUS at 06:53

## 2019-12-03 RX ADMIN — Medication 3 MILLIGRAM(S): at 02:12

## 2019-12-03 RX ADMIN — HEPARIN SODIUM 5000 UNIT(S): 5000 INJECTION INTRAVENOUS; SUBCUTANEOUS at 14:17

## 2019-12-03 NOTE — PROGRESS NOTE ADULT - PROBLEM SELECTOR PLAN 10
Transitions of Care Status:  1.  Name of PCP: Dr. Jairo Blanc  2.  PCP Contacted on Admission: [ ] Y    [ X] N    3.  PCP contacted at Discharge: [ ] Y    [ ] N    [ ] N/A  4.  Post-Discharge Appointment Date and Location:  5.  Summary of Handoff given to PCP: Transitions of Care Status:  1.  Name of PCP: Dr. Jairo Blanc  2.  PCP Contacted on Admission: [ ] Y    [ X] N    3.  PCP contacted at Discharge: [X] Y    [ ] N    [ ] N/A  4.  Post-Discharge Appointment Date and Location:  5.  Summary of Handoff given to PCP: left voicemail and call back number

## 2019-12-03 NOTE — PROGRESS NOTE ADULT - PROBLEM SELECTOR PLAN 9
- DVT ppx heparin subq  - DASH/CC diet  - Dispo pending resolution of symptoms - DVT ppx heparin subq  - DASH/CC diet  - Dispo: home with home PT

## 2019-12-03 NOTE — PROGRESS NOTE ADULT - REASON FOR ADMISSION
86M w/ cough and fever

## 2019-12-03 NOTE — DISCHARGE NOTE NURSING/CASE MANAGEMENT/SOCIAL WORK - PATIENT PORTAL LINK FT
You can access the FollowMyHealth Patient Portal offered by [facility] by registering at the following website: [email or web address]. By joining [facility]’s FollowMyHealth portal, you will also be able to view your health information using other applications (apps) compatible with our system.

## 2019-12-03 NOTE — PROGRESS NOTE ADULT - ASSESSMENT
85 y/o M with PMHx CAD s/p 3 stents (most recent 2017), DM2, HLD, HTN, recent admission for L ankle fracture s/p ORIF who presents to the hospital with 3 days of cough and fever, admitted for sepsis secondary to parainfluenza virus complicated by acute hypoxic respiratory failure. Course c/b chest pain with TWI and ST Depressions in the lateral leads unchanged from prior EKG. Now breathing improved and hematuria improving 85 y/o M with PMHx CAD s/p 3 stents (most recent 2017), DM2, HLD, HTN, recent admission for L ankle fracture s/p ORIF who presents to the hospital with 3 days of cough and fever, admitted for sepsis secondary to parainfluenza virus complicated by acute hypoxic respiratory failure. Course c/b chest pain with TWI and ST Depressions in the lateral leads unchanged from prior EKG. Now breathing improved and hematuria improving. Passed TOV. Stable to dc home today with outpt f/u

## 2019-12-03 NOTE — PHYSICAL THERAPY INITIAL EVALUATION ADULT - ACTIVE RANGE OF MOTION EXAMINATION, REHAB EVAL
bilateral upper extremity Active ROM was WFL (within functional limits)/bilateral  lower extremity Active ROM was WFL (within functional limits)/LLE ankle NT 2/2 ORIF

## 2019-12-03 NOTE — PROGRESS NOTE ADULT - PROBLEM SELECTOR PLAN 1
- resolved  - secondary to acute parainfluenza virus infection  - Now off O2. Satting 97% ORA  - CXR with no signs of consolidation to suggest PNA  - supportive care for parainfluenza infection  - s/p a course of prednisone 20qd (last dose is 12/2)

## 2019-12-03 NOTE — PROGRESS NOTE ADULT - PROBLEM SELECTOR PLAN 4
Improving  - urine studies suggestive post-obstructive diuresis  - no acute mental status changes at this time Stable  - urine studies suggestive post-obstructive diuresis  - no acute mental status changes at this time

## 2019-12-03 NOTE — PHYSICAL THERAPY INITIAL EVALUATION ADULT - PERTINENT HX OF CURRENT PROBLEM, REHAB EVAL
87 y/o M, PMHx CAD s/p cabg and 3 stents (most recent 2017), DM2, HLD, HTN, recent admission for L ankle fracture s/p ORIF. Pt p/w 3 days of cough and fever. Pt with previous admission 10/19-10/22 s/p a L ankle fracture and was d/c to rehab. He left rehab last Friday. Since then, he was doing well until Monday when he started to develop a wet cough associated with a sore throat and chills at home. Pt dx with sepsis 2/2 parainfluenza.

## 2019-12-03 NOTE — PROGRESS NOTE ADULT - PROBLEM SELECTOR PLAN 6
- CAD s/p CABG and stents most recently 2017  - pt with chest pain 11/30, with TWI and ST depressions in the lateral leads. Trops downtrended  -cardiology recs appreciate: echo--EF60%, mild MR, AR. Overall preserved LV systolic fx. Grossly enlarged EV with normal RV systolic fx. Grossly unchanged from 2017  - c/w ASA   - continue to hold plavix given hematuria  - c/w metoprolol 25mg BID  - TTE from 2017 with EF 55% with mild systolic and diastolic dysfunction  - no signs of acute volume overload or HF at this time - CAD s/p CABG and stents most recently 2017  - pt with chest pain 11/30, with TWI and ST depressions in the lateral leads. Trops downtrended  -cardiology recs appreciate: echo--EF60%, mild MR, AR. Overall preserved LV systolic fx. Grossly enlarged EV with normal RV systolic fx. Grossly unchanged from 2017  - c/w ASA   - continue to hold plavix given hematuria. pt is to f/u with cardiologist outpt regarding restarting plavix  - c/w metoprolol 25mg BID  - TTE from 2017 with EF 55% with mild systolic and diastolic dysfunction  - no signs of acute volume overload or HF at this time

## 2019-12-03 NOTE — PROGRESS NOTE ADULT - SUBJECTIVE AND OBJECTIVE BOX
[facility] (Gian)   Internal Medicine  PGY-1  Pager: NS--[phone]; [facility]--[phone]    Patient is a 86y old  Male who presents with a chief complaint of 86M w/ cough and fever (02 Dec 2019 14:28)      SUBJECTIVE / OVERNIGHT EVENTS: No acute event ON. Denies HA/CP/SOB/abd pain/n/v/d/fever/chills.    MEDICATIONS  (STANDING):  albuterol/ipratropium for Nebulization 3 milliLiter(s) Nebulizer every 6 hours  amLODIPine   Tablet 5 milliGRAM(s) Oral daily  aspirin  chewable 81 milliGRAM(s) Oral daily  chlorhexidine 4% Liquid 1 Application(s) Topical <User Schedule>  dextrose 5%. 1000 milliLiter(s) (50 mL/Hr) IV Continuous <Continuous>  dextrose 50% Injectable 12.5 Gram(s) IV Push once  dextrose 50% Injectable 25 Gram(s) IV Push once  dextrose 50% Injectable 25 Gram(s) IV Push once  guaiFENesin  milliGRAM(s) Oral every 12 hours  heparin  Injectable 5000 Unit(s) SubCutaneous every 8 hours  insulin glargine Injectable (LANTUS) 10 Unit(s) SubCutaneous at bedtime  insulin lispro (HumaLOG) corrective regimen sliding scale   SubCutaneous three times a day before meals  insulin lispro (HumaLOG) corrective regimen sliding scale   SubCutaneous at bedtime  insulin lispro Injectable (HumaLOG) 3 Unit(s) SubCutaneous three times a day before meals  magnesium oxide 400 milliGRAM(s) Oral once  melatonin 3 milliGRAM(s) Oral at bedtime  metoprolol tartrate 25 milliGRAM(s) Oral two times a day  pantoprazole    Tablet 40 milliGRAM(s) Oral before breakfast  polyethylene glycol 3350 17 Gram(s) Oral daily  senna 2 Tablet(s) Oral at bedtime  tamsulosin 0.4 milliGRAM(s) Oral at bedtime    MEDICATIONS  (PRN):  acetaminophen   Tablet .. 650 milliGRAM(s) Oral every 6 hours PRN Mild Pain (1 - 3), Moderate Pain (4 - 6)  benzocaine 15 mG/menthol 3.6 mG (Sugar-Free) Lozenge 1 Lozenge Oral every 2 hours PRN Sore Throat  benzonatate 100 milliGRAM(s) Oral three times a day PRN Cough  dextrose 40% Gel 15 Gram(s) Oral once PRN Blood Glucose LESS THAN 70 milliGRAM(s)/deciliter  glucagon  Injectable 1 milliGRAM(s) IntraMuscular once PRN Glucose LESS THAN 70 milligrams/deciliter  simethicone 80 milliGRAM(s) Chew four times a day PRN Bloating  sodium chloride 0.65% Nasal 1 Spray(s) Both Nostrils five times a day PRN Nasal Congestion        OBJECTIVE:    Vital Signs Last 24 Hrs  T(C): 36.6 (03 Dec 2019 06:17), Max: 37.1 (02 Dec 2019 20:34)  T(F): 97.9 (03 Dec 2019 06:17), Max: 98.7 (02 Dec 2019 20:34)  HR: 65 (03 Dec 2019 06:17) (61 - 69)  BP: 128/72 (03 Dec 2019 06:17) (100/70 - 156/73)  BP(mean): --  RR: 16 (03 Dec 2019 06:17) (16 - 18)  SpO2: 95% (03 Dec 2019 06:17) (94% - 100%)    PHYSICAL EXAM:  GENERAL: NAD, well-developed  HEAD:  Atraumatic, Normocephalic  EYES: EOMI, PERRLA, conjunctiva and sclera clear  NECK: Supple, No JVD  CHEST/LUNG: Clear to auscultation bilaterally; No wheeze  HEART: Regular rate and rhythm; No murmurs, rubs, or gallops  ABDOMEN: Soft, Nontender, Nondistended; Bowel sounds present  EXTREMITIES:  2+ Peripheral Pulses, No clubbing, cyanosis, or edema  PSYCH: AAOx3  NEUROLOGY: non-focal  SKIN: No rashes or lesions    CAPILLARY BLOOD GLUCOSE      POCT Blood Glucose.: 136 mg/dL (02 Dec 2019 21:50)  POCT Blood Glucose.: 205 mg/dL (02 Dec 2019 18:20)  POCT Blood Glucose.: 211 mg/dL (02 Dec 2019 13:57)  POCT Blood Glucose.: 178 mg/dL (02 Dec 2019 12:13)    I&O's Summary    02 Dec 2019 07:01  -  03 Dec 2019 07:00  --------------------------------------------------------  IN: 240 mL / OUT: 1050 mL / NET: -810 mL        LABS:                        10.0   8.48  )-----------( 368      ( 03 Dec 2019 08:52 )             28.7     12-03    133<L>  |  99  |  21  ----------------------------<  103<H>  3.6   |  24  |  1.37<H>    Ca    8.9      03 Dec 2019 06:32  Phos  3.8     12-03  Mg     1.7     12-03                    RADIOLOGY & ADDITIONAL TESTS: [facility] (Gian)   Internal Medicine  PGY-1  Pager: NS--[phone]; [facility]--[phone]    Patient is a 86y old  Male who presents with a chief complaint of 86M w/ cough and fever (02 Dec 2019 14:28)      SUBJECTIVE / OVERNIGHT EVENTS: Had an episode of hematuria when pulling out the magallanes. Since then, patient has been voiding well. The urine color is dark yellow. Patient passed trial of void. No acute event ON. Reports still having residual cough intermittently. Denies bladder pain or dysuria. Denies HA/CP/SOB/abd pain/n/v/d/fever/chills.    MEDICATIONS  (STANDING):  albuterol/ipratropium for Nebulization 3 milliLiter(s) Nebulizer every 6 hours  amLODIPine   Tablet 5 milliGRAM(s) Oral daily  aspirin  chewable 81 milliGRAM(s) Oral daily  chlorhexidine 4% Liquid 1 Application(s) Topical <User Schedule>  dextrose 5%. 1000 milliLiter(s) (50 mL/Hr) IV Continuous <Continuous>  dextrose 50% Injectable 12.5 Gram(s) IV Push once  dextrose 50% Injectable 25 Gram(s) IV Push once  dextrose 50% Injectable 25 Gram(s) IV Push once  guaiFENesin  milliGRAM(s) Oral every 12 hours  heparin  Injectable 5000 Unit(s) SubCutaneous every 8 hours  insulin glargine Injectable (LANTUS) 10 Unit(s) SubCutaneous at bedtime  insulin lispro (HumaLOG) corrective regimen sliding scale   SubCutaneous three times a day before meals  insulin lispro (HumaLOG) corrective regimen sliding scale   SubCutaneous at bedtime  insulin lispro Injectable (HumaLOG) 3 Unit(s) SubCutaneous three times a day before meals  magnesium oxide 400 milliGRAM(s) Oral once  melatonin 3 milliGRAM(s) Oral at bedtime  metoprolol tartrate 25 milliGRAM(s) Oral two times a day  pantoprazole    Tablet 40 milliGRAM(s) Oral before breakfast  polyethylene glycol 3350 17 Gram(s) Oral daily  senna 2 Tablet(s) Oral at bedtime  tamsulosin 0.4 milliGRAM(s) Oral at bedtime    MEDICATIONS  (PRN):  acetaminophen   Tablet .. 650 milliGRAM(s) Oral every 6 hours PRN Mild Pain (1 - 3), Moderate Pain (4 - 6)  benzocaine 15 mG/menthol 3.6 mG (Sugar-Free) Lozenge 1 Lozenge Oral every 2 hours PRN Sore Throat  benzonatate 100 milliGRAM(s) Oral three times a day PRN Cough  dextrose 40% Gel 15 Gram(s) Oral once PRN Blood Glucose LESS THAN 70 milliGRAM(s)/deciliter  glucagon  Injectable 1 milliGRAM(s) IntraMuscular once PRN Glucose LESS THAN 70 milligrams/deciliter  simethicone 80 milliGRAM(s) Chew four times a day PRN Bloating  sodium chloride 0.65% Nasal 1 Spray(s) Both Nostrils five times a day PRN Nasal Congestion        OBJECTIVE:    Vital Signs Last 24 Hrs  T(C): 36.6 (03 Dec 2019 06:17), Max: 37.1 (02 Dec 2019 20:34)  T(F): 97.9 (03 Dec 2019 06:17), Max: 98.7 (02 Dec 2019 20:34)  HR: 65 (03 Dec 2019 06:17) (61 - 69)  BP: 128/72 (03 Dec 2019 06:17) (100/70 - 156/73)  BP(mean): --  RR: 16 (03 Dec 2019 06:17) (16 - 18)  SpO2: 95% (03 Dec 2019 06:17) (94% - 100%)    PHYSICAL EXAM:  GENERAL: NAD, well-developed  HEAD:  Atraumatic, Normocephalic  EYES: EOMI, PERRLA, conjunctiva and sclera clear  NECK: Supple, No JVD  CHEST/LUNG: Clear to auscultation bilaterally; No wheeze  HEART: Regular rate and rhythm; No murmurs, rubs, or gallops  ABDOMEN: Soft, Nontender, Nondistended; Bowel sounds present  EXTREMITIES:  2+ Peripheral Pulses, No clubbing, cyanosis, or edema  PSYCH: AAOx3  NEUROLOGY: non-focal  SKIN: No rashes or lesions    CAPILLARY BLOOD GLUCOSE      POCT Blood Glucose.: 136 mg/dL (02 Dec 2019 21:50)  POCT Blood Glucose.: 205 mg/dL (02 Dec 2019 18:20)  POCT Blood Glucose.: 211 mg/dL (02 Dec 2019 13:57)  POCT Blood Glucose.: 178 mg/dL (02 Dec 2019 12:13)    I&O's Summary    02 Dec 2019 07:01  -  03 Dec 2019 07:00  --------------------------------------------------------  IN: 240 mL / OUT: 1050 mL / NET: -810 mL        LABS:                        10.0   8.48  )-----------( 368      ( 03 Dec 2019 08:52 )             28.7     12-03    133<L>  |  99  |  21  ----------------------------<  103<H>  3.6   |  24  |  1.37<H>    Ca    8.9      03 Dec 2019 06:32  Phos  3.8     12-03  Mg     1.7     12-03                    RADIOLOGY & ADDITIONAL TESTS: [facility] (Gian)   Internal Medicine  PGY-1  Pager: NS--[phone]; [facility]--[phone]    Patient is a 86y old  Male who presents with a chief complaint of 86M w/ cough and fever (02 Dec 2019 14:28)      SUBJECTIVE / OVERNIGHT EVENTS: Had an episode of hematuria when pulling out the magallanes. Since then, patient has been voiding well. The urine color is dark yellow. Patient passed trial of void. No acute event ON. Reports still having residual cough intermittently. Denies bladder pain or dysuria. Denies HA/CP/SOB/abd pain/n/v/d/fever/chills.    MEDICATIONS  (STANDING):  albuterol/ipratropium for Nebulization 3 milliLiter(s) Nebulizer every 6 hours  amLODIPine   Tablet 5 milliGRAM(s) Oral daily  aspirin  chewable 81 milliGRAM(s) Oral daily  chlorhexidine 4% Liquid 1 Application(s) Topical <User Schedule>  dextrose 5%. 1000 milliLiter(s) (50 mL/Hr) IV Continuous <Continuous>  dextrose 50% Injectable 12.5 Gram(s) IV Push once  dextrose 50% Injectable 25 Gram(s) IV Push once  dextrose 50% Injectable 25 Gram(s) IV Push once  guaiFENesin  milliGRAM(s) Oral every 12 hours  heparin  Injectable 5000 Unit(s) SubCutaneous every 8 hours  insulin glargine Injectable (LANTUS) 10 Unit(s) SubCutaneous at bedtime  insulin lispro (HumaLOG) corrective regimen sliding scale   SubCutaneous three times a day before meals  insulin lispro (HumaLOG) corrective regimen sliding scale   SubCutaneous at bedtime  insulin lispro Injectable (HumaLOG) 3 Unit(s) SubCutaneous three times a day before meals  magnesium oxide 400 milliGRAM(s) Oral once  melatonin 3 milliGRAM(s) Oral at bedtime  metoprolol tartrate 25 milliGRAM(s) Oral two times a day  pantoprazole    Tablet 40 milliGRAM(s) Oral before breakfast  polyethylene glycol 3350 17 Gram(s) Oral daily  senna 2 Tablet(s) Oral at bedtime  tamsulosin 0.4 milliGRAM(s) Oral at bedtime    MEDICATIONS  (PRN):  acetaminophen   Tablet .. 650 milliGRAM(s) Oral every 6 hours PRN Mild Pain (1 - 3), Moderate Pain (4 - 6)  benzocaine 15 mG/menthol 3.6 mG (Sugar-Free) Lozenge 1 Lozenge Oral every 2 hours PRN Sore Throat  benzonatate 100 milliGRAM(s) Oral three times a day PRN Cough  dextrose 40% Gel 15 Gram(s) Oral once PRN Blood Glucose LESS THAN 70 milliGRAM(s)/deciliter  glucagon  Injectable 1 milliGRAM(s) IntraMuscular once PRN Glucose LESS THAN 70 milligrams/deciliter  simethicone 80 milliGRAM(s) Chew four times a day PRN Bloating  sodium chloride 0.65% Nasal 1 Spray(s) Both Nostrils five times a day PRN Nasal Congestion        OBJECTIVE:    Vital Signs Last 24 Hrs  T(C): 36.6 (03 Dec 2019 06:17), Max: 37.1 (02 Dec 2019 20:34)  T(F): 97.9 (03 Dec 2019 06:17), Max: 98.7 (02 Dec 2019 20:34)  HR: 65 (03 Dec 2019 06:17) (61 - 69)  BP: 128/72 (03 Dec 2019 06:17) (100/70 - 156/73)  BP(mean): --  RR: 16 (03 Dec 2019 06:17) (16 - 18)  SpO2: 95% (03 Dec 2019 06:17) (94% - 100%)    PHYSICAL EXAM:  GENERAL: NAD, well-developed  HEAD:  Atraumatic, Normocephalic  EYES: EOMI, PERRLA, conjunctiva and sclera clear  NECK: Supple, No JVD  MOUTH: No lesions noted  CHEST/LUNG: CTAB no wheezing, rales or rhonchi  HEART: Regular rate and rhythm; No murmurs, rubs, or gallops  ABDOMEN: Soft, nontender, nondistended; Bowel sounds present  EXTREMITIES:  2+ Peripheral Pulses, No clubbing, cyanosis, or edema. Minimal swelling and erythema of L ankle  : dark yellow urine in urinal  PSYCH: AAOx3  NEUROLOGY: non-focal  SKIN: No rashes or lesions    CAPILLARY BLOOD GLUCOSE      POCT Blood Glucose.: 136 mg/dL (02 Dec 2019 21:50)  POCT Blood Glucose.: 205 mg/dL (02 Dec 2019 18:20)  POCT Blood Glucose.: 211 mg/dL (02 Dec 2019 13:57)  POCT Blood Glucose.: 178 mg/dL (02 Dec 2019 12:13)    I&O's Summary    02 Dec 2019 07:01  -  03 Dec 2019 07:00  --------------------------------------------------------  IN: 240 mL / OUT: 1050 mL / NET: -810 mL        LABS:                        10.0   8.48  )-----------( 368      ( 03 Dec 2019 08:52 )             28.7     12-03    133<L>  |  99  |  21  ----------------------------<  103<H>  3.6   |  24  |  1.37<H>    Ca    8.9      03 Dec 2019 06:32  Phos  3.8     12-03  Mg     1.7     12-03                    RADIOLOGY & ADDITIONAL TESTS:

## 2019-12-03 NOTE — PROGRESS NOTE ADULT - ATTENDING COMMENTS
Seen, examined the patient, daughter at bedside  afebrile, no acute SOB, no chest pain, wanted to know why he bleed from urine  - reviewed labs, imaging. WBC 14, Hb 9.5, CXR clear, Ramirez has old blood ( had urinary retention)  - Bronchospasm fro parainfluenza virus  - c/w bronchodilators, prednisone 20mg daily for 3 more days, throat swab  - c/w Flomax, TOV tomorrow per urology, Blood and urine c/s neg  - PT, NWB in left foot for recent ankle fx
Seen, examined the patient, daughter at bedside  Resting in bed, afebrile, no acute SOB, no chest pain, passed TOV for urine retention  - reviewed labs, imaging.   - Bronchospasm from parainfluenza virus improved, completed systemic steroid, c/w bronchodilators  - Cardiology plan noted   - Blood and urine c/s neg  - NWB in left foot for recent ankle fx.  - d/c home with home PT today  - d/c time 37 min

## 2019-12-03 NOTE — PROGRESS NOTE ADULT - PROBLEM SELECTOR PLAN 2
- RVP + for parainfluenza virus  - continue supportive care measures including duonebs q6 hours, cepacol cough drops, tessalon pearls, and mucinex q12 hours  - s/p ceftriaxone and azithromycin in the ED but continue to monitor off abx at this time given lack of consolidation on CXR to suggest superimposed PNA  - strept throat by pcr: neg - RVP + for parainfluenza virus  - continue supportive care measures   - s/p ceftriaxone and azithromycin in the ED but continue to monitor off abx at this time given lack of consolidation on CXR to suggest superimposed PNA  - strept throat by pcr: neg

## 2019-12-03 NOTE — DISCHARGE NOTE NURSING/CASE MANAGEMENT/SOCIAL WORK - NSSCNAMETXT_GEN_ALL_CORE
[facility] (formerly [facility])

## 2019-12-03 NOTE — PROGRESS NOTE ADULT - PROBLEM SELECTOR PLAN 5
- light pink urine in magallanes  - urology recs appreciated  - continue to hold plavix   - c/w ASA  - c/w flomax and plan for trial of voids tonight Passed TOV ON  - dark yellow urine in urinal  - urology recs appreciated  - continue to hold plavix, pt is to f/u with cardiologist outpt regarding restarting plavix  - c/w ASA  - c/w flomax

## 2019-12-03 NOTE — PROGRESS NOTE ADULT - PROBLEM SELECTOR PLAN 7
- A1c 7.6 from October 2019  - on metformin, glimepiride, and Tradjenta at home  - c/w moderate SS before meals and at bedtime  - hypoglycemic event ON, decrease lantus from 12 units to 10 and c/w premeal 3 units - A1c 7.6 from October 2019  - on metformin, glimepiride, and Tradjenta at home  - c/w moderate SS before meals and at bedtime  - hypoglycemic event ON, c/w [id number] and c/w premeal 3 units

## 2019-12-03 NOTE — PROGRESS NOTE ADULT - ASSESSMENT
EKG SR RBBB with repol changes unchanged from admission slighlty more tachy     Echo  < from: Transthoracic Echocardiogram (08.06.17 @ 12:00) >  1. Mildly dilated left atrium.  LA volume index = 36 cc/m2.  2. Normal left ventricular internal dimensions and wall  thicknesses.  3. Mild segmental left ventricular systolic dysfunction.  Hypokinesis of the basal inferoseptum    < end of copied text >      Assessment and Plan     1) Chest pain: atypical EKG unchanged, CE -ve, given h/o CAD /CABG on asa  echo unchanged plavix heing held    2) Hematuria : improving t/t per urology  restarted asa hematuria improved    3) Parainfluenza : t/t primary team

## 2019-12-03 NOTE — PHYSICAL THERAPY INITIAL EVALUATION ADULT - PLANNED THERAPY INTERVENTIONS, PT EVAL
transfer training/gait training/GOAL: Pt will be able to Negotiate up/down 10 steps via stair bumping on glutes, independently, in 2 weeks./strengthening/bed mobility training

## 2019-12-03 NOTE — PROGRESS NOTE ADULT - SUBJECTIVE AND OBJECTIVE BOX
Anant Cook MD  Interventional Cardiology / Advance Heart Failure and Cardiac Transplant Specialist  [place] Office : [address] [place] [address] [place] [address]  Tel:   [place] Office : [address] [facility] [place] [address]  Tel: [phone]  Cell : [phone]    86 yo M with PMHx CAD w CABG, last cath in Aug 2017, DM2, HLD, HTN, cards consulted for chest pain , better today , hematuria improved  	  MEDICATIONS:  amLODIPine   Tablet 5 milliGRAM(s) Oral daily  aspirin  chewable 81 milliGRAM(s) Oral daily  heparin  Injectable 5000 Unit(s) SubCutaneous every 8 hours  metoprolol tartrate 25 milliGRAM(s) Oral two times a day  tamsulosin 0.4 milliGRAM(s) Oral at bedtime      albuterol/ipratropium for Nebulization 3 milliLiter(s) Nebulizer every 6 hours  benzonatate 100 milliGRAM(s) Oral three times a day PRN  guaiFENesin  milliGRAM(s) Oral every 12 hours    acetaminophen   Tablet .. 650 milliGRAM(s) Oral every 6 hours PRN  melatonin 3 milliGRAM(s) Oral at bedtime    pantoprazole    Tablet 40 milliGRAM(s) Oral before breakfast  polyethylene glycol 3350 17 Gram(s) Oral daily  senna 2 Tablet(s) Oral at bedtime  simethicone 80 milliGRAM(s) Chew four times a day PRN    dextrose 40% Gel 15 Gram(s) Oral once PRN  dextrose 50% Injectable 12.5 Gram(s) IV Push once  dextrose 50% Injectable 25 Gram(s) IV Push once  dextrose 50% Injectable 25 Gram(s) IV Push once  glucagon  Injectable 1 milliGRAM(s) IntraMuscular once PRN  insulin glargine Injectable (LANTUS) 10 Unit(s) SubCutaneous at bedtime  insulin lispro (HumaLOG) corrective regimen sliding scale   SubCutaneous three times a day before meals  insulin lispro (HumaLOG) corrective regimen sliding scale   SubCutaneous at bedtime  insulin lispro Injectable (HumaLOG) 3 Unit(s) SubCutaneous three times a day before meals    benzocaine 15 mG/menthol 3.6 mG (Sugar-Free) Lozenge 1 Lozenge Oral every 2 hours PRN  chlorhexidine 4% Liquid 1 Application(s) Topical <User Schedule>  dextrose 5%. 1000 milliLiter(s) IV Continuous <Continuous>  sodium chloride 0.65% Nasal 1 Spray(s) Both Nostrils five times a day PRN      PAST MEDICAL/SURGICAL HISTORY  PAST MEDICAL & SURGICAL HISTORY:  HLD (hyperlipidemia)  CAD (coronary artery disease)  DM (diabetes mellitus)  HTN (hypertension)  Status post ORIF of fracture of ankle: Left, Oct 2019  S/P CABG (coronary artery bypass graft)      SOCIAL HISTORY: Substance Use (street drugs): ( x ) never used  (  ) other:    FAMILY HISTORY:  No pertinent family history in first degree relatives      REVIEW OF SYSTEMS:  CONSTITUTIONAL: No fever, weight loss, or fatigue  EYES: No eye pain, visual disturbances, or discharge  ENMT:  No difficulty hearing, tinnitus, vertigo; No sinus or throat pain  BREASTS: No pain, masses, or nipple discharge  GASTROINTESTINAL: No abdominal or epigastric pain. No nausea, vomiting, or hematemesis; No diarrhea or constipation. No melena or hematochezia.  GENITOURINARY: No dysuria, frequency, hematuria, or incontinence  NEUROLOGICAL: No headaches, memory loss, loss of strength, numbness, or tremors  ENDOCRINE: No heat or cold intolerance; No hair loss  MUSCULOSKELETAL: No joint pain or swelling; No muscle, back, or extremity pain  PSYCHIATRIC: No depression, anxiety, mood swings, or difficulty sleeping  HEME/LYMPH: No easy bruising, or bleeding gums  All others negative    PHYSICAL EXAM:  T(C): 36.7 (12-03-19 @ 14:39), Max: 37.1 (12-02-19 @ 20:34)  HR: 76 (12-03-19 @ 14:39) (65 - 76)  BP: 132/75 (12-03-19 @ 14:39) (128/72 - 156/73)  RR: 16 (12-03-19 @ 14:39) (16 - 18)  SpO2: 95% (12-03-19 @ 14:39) (95% - 98%)  Wt(kg): --  I&O's Summary    02 Dec 2019 07:01  -  03 Dec 2019 07:00  --------------------------------------------------------  IN: 240 mL / OUT: 1050 mL / NET: -810 mL    03 Dec 2019 07:01  -  03 Dec 2019 15:48  --------------------------------------------------------  IN: 0 mL / OUT: 550 mL / NET: -550 mL          GENERAL: NAD   EYES: EOMI, PERRLA, conjunctiva and sclera clear  ENMT: No tonsillar erythema, exudates, or enlargement; Moist mucous membranes, Good dentition, No lesions  Cardiovascular: Normal S1 S2, No JVD, No murmurs, No edema  Respiratory: mild b/l rhonchi  Gastrointestinal:  Soft, Non-tender, + BS	  Extremities: Normal range of motion, No clubbing, cyanosis or edema  LYMPH: No lymphadenopathy noted                                      10.0   8.48  )-----------( 368      ( 03 Dec 2019 08:52 )             28.7     12-03    133<L>  |  99  |  21  ----------------------------<  103<H>  3.6   |  24  |  1.37<H>    Ca    8.9      03 Dec 2019 06:32  Phos  3.8     12-03  Mg     1.7     12-03      proBNP:   Lipid Profile:   HgA1c:   TSH:     Consultant(s) Notes Reviewed:  [x ] YES  [ ] NO    Care Discussed with Consultants/Other Providers [ x] YES  [ ] NO    Imaging Personally Reviewed independently:  [x] YES  [ ] NO    All labs, radiologic studies, vitals, orders and medications list reviewed. Patient is seen and examined at bedside. Case discussed with medical team.

## 2019-12-04 ENCOUNTER — MEDICATION RENEWAL (OUTPATIENT)
Age: 84
End: 2019-12-04

## 2020-04-23 ENCOUNTER — RX CHANGE (OUTPATIENT)
Age: 85
End: 2020-04-23

## 2020-07-08 ENCOUNTER — INPATIENT (INPATIENT)
Facility: HOSPITAL | Age: 85
LOS: 1 days | Discharge: HOME | DRG: 641 | End: 2020-07-10
Attending: INTERNAL MEDICINE | Admitting: INTERNAL MEDICINE
Payer: MEDICARE

## 2020-07-08 VITALS
WEIGHT: 151.9 LBS | DIASTOLIC BLOOD PRESSURE: 81 MMHG | OXYGEN SATURATION: 98 % | RESPIRATION RATE: 17 BRPM | TEMPERATURE: 98 F | HEIGHT: 65 IN | HEART RATE: 65 BPM | SYSTOLIC BLOOD PRESSURE: 179 MMHG

## 2020-07-08 DIAGNOSIS — E87.1 HYPO-OSMOLALITY AND HYPONATREMIA: ICD-10-CM

## 2020-07-08 DIAGNOSIS — Z95.1 PRESENCE OF AORTOCORONARY BYPASS GRAFT: Chronic | ICD-10-CM

## 2020-07-08 DIAGNOSIS — Z98.890 OTHER SPECIFIED POSTPROCEDURAL STATES: Chronic | ICD-10-CM

## 2020-07-08 LAB
ALBUMIN SERPL ELPH-MCNC: 4.5 G/DL — SIGNIFICANT CHANGE UP (ref 3.3–5)
ALP SERPL-CCNC: 56 U/L — SIGNIFICANT CHANGE UP (ref 40–120)
ALT FLD-CCNC: 14 U/L — SIGNIFICANT CHANGE UP (ref 10–45)
ANION GAP SERPL CALC-SCNC: 13 MMOL/L — SIGNIFICANT CHANGE UP (ref 5–17)
ANION GAP SERPL CALC-SCNC: 15 MMOL/L — SIGNIFICANT CHANGE UP (ref 5–17)
APPEARANCE UR: CLEAR — SIGNIFICANT CHANGE UP
APPEARANCE UR: CLEAR — SIGNIFICANT CHANGE UP
APTT BLD: 30.5 SEC — SIGNIFICANT CHANGE UP (ref 27.5–35.5)
AST SERPL-CCNC: 23 U/L — SIGNIFICANT CHANGE UP (ref 10–40)
BACTERIA # UR AUTO: NEGATIVE — SIGNIFICANT CHANGE UP
BACTERIA # UR AUTO: NEGATIVE — SIGNIFICANT CHANGE UP
BASE EXCESS BLDV CALC-SCNC: 1.2 MMOL/L — SIGNIFICANT CHANGE UP (ref -2–2)
BILIRUB SERPL-MCNC: 0.5 MG/DL — SIGNIFICANT CHANGE UP (ref 0.2–1.2)
BILIRUB UR-MCNC: NEGATIVE — SIGNIFICANT CHANGE UP
BILIRUB UR-MCNC: NEGATIVE — SIGNIFICANT CHANGE UP
BUN SERPL-MCNC: 17 MG/DL — SIGNIFICANT CHANGE UP (ref 7–23)
BUN SERPL-MCNC: 19 MG/DL — SIGNIFICANT CHANGE UP (ref 7–23)
CA-I SERPL-SCNC: 1.14 MMOL/L — SIGNIFICANT CHANGE UP (ref 1.12–1.3)
CALCIUM SERPL-MCNC: 9.5 MG/DL — SIGNIFICANT CHANGE UP (ref 8.4–10.5)
CALCIUM SERPL-MCNC: 9.8 MG/DL — SIGNIFICANT CHANGE UP (ref 8.4–10.5)
CHLORIDE BLDV-SCNC: 84 MMOL/L — LOW (ref 96–108)
CHLORIDE SERPL-SCNC: 82 MMOL/L — LOW (ref 96–108)
CHLORIDE SERPL-SCNC: 84 MMOL/L — LOW (ref 96–108)
CK MB CFR SERPL CALC: 4.5 NG/ML — SIGNIFICANT CHANGE UP (ref 0–6.7)
CK SERPL-CCNC: 66 U/L — SIGNIFICANT CHANGE UP (ref 30–200)
CO2 BLDV-SCNC: 27 MMOL/L — SIGNIFICANT CHANGE UP (ref 22–30)
CO2 SERPL-SCNC: 20 MMOL/L — LOW (ref 22–31)
CO2 SERPL-SCNC: 23 MMOL/L — SIGNIFICANT CHANGE UP (ref 22–31)
COLOR SPEC: COLORLESS — SIGNIFICANT CHANGE UP
COLOR SPEC: COLORLESS — SIGNIFICANT CHANGE UP
CORTIS AM PEAK SERPL-MCNC: 5.9 UG/DL — LOW (ref 6–18.4)
CREAT ?TM UR-MCNC: 42 MG/DL — SIGNIFICANT CHANGE UP
CREAT SERPL-MCNC: 1.17 MG/DL — SIGNIFICANT CHANGE UP (ref 0.5–1.3)
CREAT SERPL-MCNC: 1.19 MG/DL — SIGNIFICANT CHANGE UP (ref 0.5–1.3)
DIFF PNL FLD: NEGATIVE — SIGNIFICANT CHANGE UP
DIFF PNL FLD: NEGATIVE — SIGNIFICANT CHANGE UP
EPI CELLS # UR: 0 /HPF — SIGNIFICANT CHANGE UP
EPI CELLS # UR: 0 /HPF — SIGNIFICANT CHANGE UP
GAS PNL BLDV: 115 MMOL/L — CRITICAL LOW (ref 135–145)
GAS PNL BLDV: SIGNIFICANT CHANGE UP
GAS PNL BLDV: SIGNIFICANT CHANGE UP
GLUCOSE BLDC GLUCOMTR-MCNC: 146 MG/DL — HIGH (ref 70–99)
GLUCOSE BLDC GLUCOMTR-MCNC: 281 MG/DL — HIGH (ref 70–99)
GLUCOSE BLDV-MCNC: 186 MG/DL — HIGH (ref 70–99)
GLUCOSE SERPL-MCNC: 144 MG/DL — HIGH (ref 70–99)
GLUCOSE SERPL-MCNC: 188 MG/DL — HIGH (ref 70–99)
GLUCOSE UR QL: NEGATIVE — SIGNIFICANT CHANGE UP
GLUCOSE UR QL: NEGATIVE — SIGNIFICANT CHANGE UP
HCO3 BLDV-SCNC: 26 MMOL/L — SIGNIFICANT CHANGE UP (ref 21–29)
HCT VFR BLD CALC: 34 % — LOW (ref 39–50)
HCT VFR BLDA CALC: 39 % — SIGNIFICANT CHANGE UP (ref 39–50)
HGB BLD CALC-MCNC: 12.6 G/DL — LOW (ref 13–17)
HGB BLD-MCNC: 12.1 G/DL — LOW (ref 13–17)
HYALINE CASTS # UR AUTO: 0 /LPF — SIGNIFICANT CHANGE UP (ref 0–2)
INR BLD: 0.95 RATIO — SIGNIFICANT CHANGE UP (ref 0.88–1.16)
KETONES UR-MCNC: SIGNIFICANT CHANGE UP
KETONES UR-MCNC: SIGNIFICANT CHANGE UP
LACTATE BLDV-MCNC: 1.2 MMOL/L — SIGNIFICANT CHANGE UP (ref 0.7–2)
LEUKOCYTE ESTERASE UR-ACNC: ABNORMAL
LEUKOCYTE ESTERASE UR-ACNC: ABNORMAL
LIDOCAIN IGE QN: 48 U/L — SIGNIFICANT CHANGE UP (ref 7–60)
MAGNESIUM SERPL-MCNC: 1.6 MG/DL — SIGNIFICANT CHANGE UP (ref 1.6–2.6)
MCHC RBC-ENTMCNC: 29.9 PG — SIGNIFICANT CHANGE UP (ref 27–34)
MCHC RBC-ENTMCNC: 35.6 GM/DL — SIGNIFICANT CHANGE UP (ref 32–36)
MCV RBC AUTO: 84 FL — SIGNIFICANT CHANGE UP (ref 80–100)
NITRITE UR-MCNC: NEGATIVE — SIGNIFICANT CHANGE UP
NITRITE UR-MCNC: NEGATIVE — SIGNIFICANT CHANGE UP
NRBC # BLD: 0 /100 WBCS — SIGNIFICANT CHANGE UP (ref 0–0)
NT-PROBNP SERPL-SCNC: 1290 PG/ML — HIGH (ref 0–300)
OSMOLALITY UR: 258 MOS/KG — LOW (ref 300–900)
PCO2 BLDV: 43 MMHG — SIGNIFICANT CHANGE UP (ref 35–50)
PH BLDV: 7.4 — SIGNIFICANT CHANGE UP (ref 7.35–7.45)
PH UR: 6 — SIGNIFICANT CHANGE UP (ref 5–8)
PH UR: 6 — SIGNIFICANT CHANGE UP (ref 5–8)
PLATELET # BLD AUTO: 277 K/UL — SIGNIFICANT CHANGE UP (ref 150–400)
PO2 BLDV: 25 MMHG — SIGNIFICANT CHANGE UP (ref 25–45)
POTASSIUM BLDV-SCNC: 4.1 MMOL/L — SIGNIFICANT CHANGE UP (ref 3.5–5.3)
POTASSIUM SERPL-MCNC: 4.2 MMOL/L — SIGNIFICANT CHANGE UP (ref 3.5–5.3)
POTASSIUM SERPL-MCNC: 4.3 MMOL/L — SIGNIFICANT CHANGE UP (ref 3.5–5.3)
POTASSIUM SERPL-SCNC: 4.2 MMOL/L — SIGNIFICANT CHANGE UP (ref 3.5–5.3)
POTASSIUM SERPL-SCNC: 4.3 MMOL/L — SIGNIFICANT CHANGE UP (ref 3.5–5.3)
PROT SERPL-MCNC: 6.9 G/DL — SIGNIFICANT CHANGE UP (ref 6–8.3)
PROT UR-MCNC: NEGATIVE — SIGNIFICANT CHANGE UP
PROT UR-MCNC: NEGATIVE — SIGNIFICANT CHANGE UP
PROTHROM AB SERPL-ACNC: 11 SEC — SIGNIFICANT CHANGE UP (ref 10.6–13.6)
RBC # BLD: 4.05 M/UL — LOW (ref 4.2–5.8)
RBC # FLD: 14 % — SIGNIFICANT CHANGE UP (ref 10.3–14.5)
RBC CASTS # UR COMP ASSIST: 0 /HPF — SIGNIFICANT CHANGE UP (ref 0–4)
RBC CASTS # UR COMP ASSIST: 1 /HPF — SIGNIFICANT CHANGE UP (ref 0–4)
SAO2 % BLDV: 39 % — LOW (ref 67–88)
SODIUM SERPL-SCNC: 118 MMOL/L — CRITICAL LOW (ref 135–145)
SODIUM SERPL-SCNC: 119 MMOL/L — CRITICAL LOW (ref 135–145)
SODIUM UR-SCNC: 39 MMOL/L — SIGNIFICANT CHANGE UP
SP GR SPEC: 1.01 — LOW (ref 1.01–1.02)
SP GR SPEC: 1.01 — SIGNIFICANT CHANGE UP (ref 1.01–1.02)
TROPONIN T, HIGH SENSITIVITY RESULT: 70 NG/L — HIGH (ref 0–51)
TROPONIN T, HIGH SENSITIVITY RESULT: 79 NG/L — HIGH (ref 0–51)
TROPONIN T, HIGH SENSITIVITY RESULT: 88 NG/L — HIGH (ref 0–51)
TSH SERPL-MCNC: 2.1 UIU/ML — SIGNIFICANT CHANGE UP (ref 0.27–4.2)
UROBILINOGEN FLD QL: NEGATIVE — SIGNIFICANT CHANGE UP
UROBILINOGEN FLD QL: NEGATIVE — SIGNIFICANT CHANGE UP
WBC # BLD: 10.86 K/UL — HIGH (ref 3.8–10.5)
WBC # FLD AUTO: 10.86 K/UL — HIGH (ref 3.8–10.5)
WBC UR QL: 12 /HPF — HIGH (ref 0–5)
WBC UR QL: 6 /HPF — HIGH (ref 0–5)

## 2020-07-08 PROCEDURE — 71045 X-RAY EXAM CHEST 1 VIEW: CPT | Mod: 26

## 2020-07-08 PROCEDURE — 99223 1ST HOSP IP/OBS HIGH 75: CPT | Mod: GC

## 2020-07-08 PROCEDURE — 93010 ELECTROCARDIOGRAM REPORT: CPT | Mod: GC

## 2020-07-08 PROCEDURE — 99285 EMERGENCY DEPT VISIT HI MDM: CPT | Mod: CS,GC

## 2020-07-08 RX ORDER — ONDANSETRON 8 MG/1
4 TABLET, FILM COATED ORAL ONCE
Refills: 0 | Status: COMPLETED | OUTPATIENT
Start: 2020-07-08 | End: 2020-07-08

## 2020-07-08 RX ORDER — PSYLLIUM SEED (WITH DEXTROSE)
3.4 POWDER (GRAM) ORAL
Qty: 0 | Refills: 0 | DISCHARGE

## 2020-07-08 RX ORDER — INSULIN LISPRO 100/ML
VIAL (ML) SUBCUTANEOUS
Refills: 0 | Status: DISCONTINUED | OUTPATIENT
Start: 2020-07-08 | End: 2020-07-10

## 2020-07-08 RX ORDER — FERROUS SULFATE 325(65) MG
325 TABLET ORAL DAILY
Refills: 0 | Status: DISCONTINUED | OUTPATIENT
Start: 2020-07-08 | End: 2020-07-10

## 2020-07-08 RX ORDER — GLUCAGON INJECTION, SOLUTION 0.5 MG/.1ML
1 INJECTION, SOLUTION SUBCUTANEOUS ONCE
Refills: 0 | Status: DISCONTINUED | OUTPATIENT
Start: 2020-07-08 | End: 2020-07-10

## 2020-07-08 RX ORDER — DEXTROSE 50 % IN WATER 50 %
25 SYRINGE (ML) INTRAVENOUS ONCE
Refills: 0 | Status: DISCONTINUED | OUTPATIENT
Start: 2020-07-08 | End: 2020-07-10

## 2020-07-08 RX ORDER — CLOPIDOGREL BISULFATE 75 MG/1
75 TABLET, FILM COATED ORAL DAILY
Refills: 0 | Status: DISCONTINUED | OUTPATIENT
Start: 2020-07-08 | End: 2020-07-10

## 2020-07-08 RX ORDER — ISOSORBIDE MONONITRATE 60 MG/1
30 TABLET, EXTENDED RELEASE ORAL DAILY
Refills: 0 | Status: DISCONTINUED | OUTPATIENT
Start: 2020-07-08 | End: 2020-07-10

## 2020-07-08 RX ORDER — SODIUM CHLORIDE 9 MG/ML
1 INJECTION INTRAMUSCULAR; INTRAVENOUS; SUBCUTANEOUS THREE TIMES A DAY
Refills: 0 | Status: DISCONTINUED | OUTPATIENT
Start: 2020-07-08 | End: 2020-07-09

## 2020-07-08 RX ORDER — HYDROCHLOROTHIAZIDE 25 MG
12.5 TABLET ORAL DAILY
Refills: 0 | Status: DISCONTINUED | OUTPATIENT
Start: 2020-07-08 | End: 2020-07-09

## 2020-07-08 RX ORDER — SODIUM CHLORIDE 9 MG/ML
1 INJECTION INTRAMUSCULAR; INTRAVENOUS; SUBCUTANEOUS THREE TIMES A DAY
Refills: 0 | Status: DISCONTINUED | OUTPATIENT
Start: 2020-07-08 | End: 2020-07-08

## 2020-07-08 RX ORDER — DEXTROSE 50 % IN WATER 50 %
12.5 SYRINGE (ML) INTRAVENOUS ONCE
Refills: 0 | Status: DISCONTINUED | OUTPATIENT
Start: 2020-07-08 | End: 2020-07-10

## 2020-07-08 RX ORDER — PANTOPRAZOLE SODIUM 20 MG/1
40 TABLET, DELAYED RELEASE ORAL
Refills: 0 | Status: DISCONTINUED | OUTPATIENT
Start: 2020-07-08 | End: 2020-07-10

## 2020-07-08 RX ORDER — METFORMIN HYDROCHLORIDE 850 MG/1
2 TABLET ORAL
Qty: 0 | Refills: 0 | DISCHARGE

## 2020-07-08 RX ORDER — LISINOPRIL 2.5 MG/1
20 TABLET ORAL DAILY
Refills: 0 | Status: DISCONTINUED | OUTPATIENT
Start: 2020-07-08 | End: 2020-07-09

## 2020-07-08 RX ORDER — ASPIRIN/CALCIUM CARB/MAGNESIUM 324 MG
324 TABLET ORAL ONCE
Refills: 0 | Status: COMPLETED | OUTPATIENT
Start: 2020-07-08 | End: 2020-07-08

## 2020-07-08 RX ORDER — SODIUM CHLORIDE 9 MG/ML
1000 INJECTION, SOLUTION INTRAVENOUS
Refills: 0 | Status: DISCONTINUED | OUTPATIENT
Start: 2020-07-08 | End: 2020-07-10

## 2020-07-08 RX ORDER — ENOXAPARIN SODIUM 100 MG/ML
40 INJECTION SUBCUTANEOUS DAILY
Refills: 0 | Status: DISCONTINUED | OUTPATIENT
Start: 2020-07-08 | End: 2020-07-10

## 2020-07-08 RX ORDER — METOPROLOL TARTRATE 50 MG
25 TABLET ORAL
Refills: 0 | Status: DISCONTINUED | OUTPATIENT
Start: 2020-07-08 | End: 2020-07-09

## 2020-07-08 RX ORDER — SODIUM CHLORIDE 9 MG/ML
500 INJECTION, SOLUTION INTRAVENOUS
Refills: 0 | Status: DISCONTINUED | OUTPATIENT
Start: 2020-07-08 | End: 2020-07-10

## 2020-07-08 RX ORDER — DEXTROSE 50 % IN WATER 50 %
15 SYRINGE (ML) INTRAVENOUS ONCE
Refills: 0 | Status: DISCONTINUED | OUTPATIENT
Start: 2020-07-08 | End: 2020-07-10

## 2020-07-08 RX ORDER — ASPIRIN/CALCIUM CARB/MAGNESIUM 324 MG
81 TABLET ORAL DAILY
Refills: 0 | Status: DISCONTINUED | OUTPATIENT
Start: 2020-07-08 | End: 2020-07-10

## 2020-07-08 RX ORDER — LANOLIN ALCOHOL/MO/W.PET/CERES
3 CREAM (GRAM) TOPICAL ONCE
Refills: 0 | Status: COMPLETED | OUTPATIENT
Start: 2020-07-08 | End: 2020-07-08

## 2020-07-08 RX ADMIN — Medication 324 MILLIGRAM(S): at 11:08

## 2020-07-08 RX ADMIN — SODIUM CHLORIDE 250 MILLILITER(S): 9 INJECTION, SOLUTION INTRAVENOUS at 18:51

## 2020-07-08 RX ADMIN — Medication 30 MILLILITER(S): at 11:08

## 2020-07-08 RX ADMIN — ONDANSETRON 4 MILLIGRAM(S): 8 TABLET, FILM COATED ORAL at 22:18

## 2020-07-08 RX ADMIN — SODIUM CHLORIDE 1 GRAM(S): 9 INJECTION INTRAMUSCULAR; INTRAVENOUS; SUBCUTANEOUS at 21:43

## 2020-07-08 RX ADMIN — Medication 3: at 21:41

## 2020-07-08 RX ADMIN — ENOXAPARIN SODIUM 40 MILLIGRAM(S): 100 INJECTION SUBCUTANEOUS at 22:18

## 2020-07-08 RX ADMIN — Medication 3 MILLIGRAM(S): at 22:18

## 2020-07-08 RX ADMIN — Medication 30 MILLILITER(S): at 22:18

## 2020-07-08 NOTE — CONSULT NOTE ADULT - SUBJECTIVE AND OBJECTIVE BOX
CHIEF COMPLAINT: belching    HPI: Patient is a 87M with PMH of HTN, CAD s/p 4 stents, DM, presenting with epigastric discomfort and frequent belching. Denies CP, fever, chills, abdominal pain.    VSS. Labs significant for Na 118, previously 133 in 1@      PAST MEDICAL & SURGICAL HISTORY:  HLD (hyperlipidemia)  CAD (coronary artery disease)  DM (diabetes mellitus)  HTN (hypertension)  Status post ORIF of fracture of ankle: Left, Oct 2019  S/P CABG (coronary artery bypass graft)      FAMILY HISTORY:  No pertinent family history in first degree relatives      SOCIAL HISTORY:  Smoking: __ packs x ___ years  EtOH Use:  Marital Status:  Occupation:  Recent Travel:  Country of Birth:  Advance Directives:    Allergies    No Known Allergies    Intolerances        HOME MEDICATIONS:    REVIEW OF SYSTEMS:  Constitutional:   Eyes:  ENT:  CV:  Resp:  GI:  :  MSK:  Integumentary:  Neurological:  Psychiatric:  Endocrine:  Hematologic/Lymphatic:  Allergic/Immunologic:  [ ] All other systems negative  [ ] Unable to assess ROS because ________    OBJECTIVE:  ICU Vital Signs Last 24 Hrs  T(C): 36.7 (2020 17:10), Max: 36.8 (2020 11:03)  T(F): 98 (2020 17:10), Max: 98.2 (2020 11:03)  HR: 62 (2020 17:10) (62 - 82)  BP: 142/75 (2020 17:10) (142/75 - 179/81)  BP(mean): --  ABP: --  ABP(mean): --  RR: 18 (2020 17:10) (17 - 20)  SpO2: 99% (2020 17:10) (97% - 99%)        CAPILLARY BLOOD GLUCOSE      POCT Blood Glucose.: 146 mg/dL (2020 17:41)      PHYSICAL EXAM:  General:   HEENT:   Lymph Nodes:  Neck:   Respiratory:   Cardiovascular:   Abdomen:   Extremities:   Skin:   Neurological:  Psychiatry:    HOSPITAL MEDICATIONS:  MEDICATIONS  (STANDING):  lactated ringers. 500 milliLiter(s) (250 mL/Hr) IV Continuous <Continuous>    MEDICATIONS  (PRN):      LABS:                        12.1   10.86 )-----------( 277      ( 2020 11:04 )             34.0     07-08    118<LL>  |  82<L>  |  19  ----------------------------<  188<H>  4.3   |  23  |  1.19    Ca    9.8      2020 11:04  Mg     1.6     -08    TPro  6.9  /  Alb  4.5  /  TBili  0.5  /  DBili  x   /  AST  23  /  ALT  14  /  AlkPhos  56  07-08    PT/INR - ( 2020 11:04 )   PT: 11.0 sec;   INR: 0.95 ratio         PTT - ( 2020 11:04 )  PTT:30.5 sec  Urinalysis Basic - ( 2020 16:34 )    Color: Colorless / Appearance: Clear / S.009 / pH: x  Gluc: x / Ketone: Trace  / Bili: Negative / Urobili: Negative   Blood: x / Protein: Negative / Nitrite: Negative   Leuk Esterase: Moderate / RBC: 0 /hpf / WBC 6 /HPF   Sq Epi: x / Non Sq Epi: 0 /hpf / Bacteria: Negative        Venous Blood Gas:   @ 11:04  7.40/43/25//39  VBG Lactate: 1.2      MICROBIOLOGY:     RADIOLOGY:  [ ] Reviewed and interpreted by me    EKG: CHIEF COMPLAINT: belching    HPI:     Patient is a 87M with PMH of HTN, CAD s/p 4 stents, DM, presenting with epigastric discomfort and frequent belching. Denies CP, fever, chills, abdominal pain.    VSS on presentation. Labs significant for Na 118 (previously 133 in 2019), troponin 88. CXR with [facility]APD. Urine studies still pending. In the ED, patient received ASA 325mg.      PAST MEDICAL & SURGICAL HISTORY:  HLD (hyperlipidemia)  CAD (coronary artery disease)  DM (diabetes mellitus)  HTN (hypertension)  Status post ORIF of fracture of ankle: Left, Oct 2019  S/P CABG (coronary artery bypass graft)    FAMILY HISTORY:  No pertinent family history in first degree relatives    OBJECTIVE:  ICU Vital Signs Last 24 Hrs  T(C): 36.7 (2020 17:10), Max: 36.8 (2020 11:03)  T(F): 98 (2020 17:10), Max: 98.2 (2020 11:03)  HR: 62 (2020 17:10) (62 - 82)  BP: 142/75 (2020 17:10) (142/75 - 179/81)  BP(mean): --  ABP: --  ABP(mean): --  RR: 18 (2020 17:10) (17 - 20)  SpO2: 99% (2020 17:10) (97% - 99%)    CAPILLARY BLOOD GLUCOSE      POCT Blood Glucose.: 146 mg/dL (2020 17:41)      PHYSICAL EXAM:  Gen: appears mildly uncomfortable, frequent belching  CV: RRR, nl s1/s2, no MRG  Resp: CTAB, no MRG  GI: soft, NT, ND  Neuro: nonfocal     HOSPITAL MEDICATIONS:  MEDICATIONS  (STANDING):  lactated ringers. 500 milliLiter(s) (250 mL/Hr) IV Continuous <Continuous>    MEDICATIONS  (PRN):      LABS:                        12.1   10.86 )-----------( 277      ( 2020 11:04 )             34.0     07-08    118<LL>  |  82<L>  |  19  ----------------------------<  188<H>  4.3   |  23  |  1.19    Ca    9.8      2020 11:04  Mg     1.6     07-08    TPro  6.9  /  Alb  4.5  /  TBili  0.5  /  DBili  x   /  AST  23  /  ALT  14  /  AlkPhos  56  07-08    PT/INR - ( 2020 11:04 )   PT: 11.0 sec;   INR: 0.95 ratio         PTT - ( 2020 11:04 )  PTT:30.5 sec  Urinalysis Basic - ( 2020 16:34 )    Color: Colorless / Appearance: Clear / S.009 / pH: x  Gluc: x / Ketone: Trace  / Bili: Negative / Urobili: Negative   Blood: x / Protein: Negative / Nitrite: Negative   Leuk Esterase: Moderate / RBC: 0 /hpf / WBC 6 /HPF   Sq Epi: x / Non Sq Epi: 0 /hpf / Bacteria: Negative        Venous Blood Gas:   @ 11:04  7.40/43/25/26/39  VBG Lactate: 1.2

## 2020-07-08 NOTE — ED ADULT NURSE NOTE - CHIEF COMPLAINT QUOTE
has two stents place 2 weeks ago, started having epigastric pain yesterday  pain worse after eating    [phone] daughter

## 2020-07-08 NOTE — CONSULT NOTE ADULT - SUBJECTIVE AND OBJECTIVE BOX
[facility] - LULA Guo / LULA Chakraborty / CHANTAL Garcia/ LULA Aggarwal/ LULA Mcclenodn/ ALEM Small / KESHIA Pang / CAITY Benavidez  -------------------------------------------------------------------------------------------------------  The patient seen and examined today.  HPI:  87 M w/ PMH of HTN, CAD s/p 4 stents last placed in 2020 and CABG in , DM presents to the ED w/ epigastric discomfort started yesterday while at rest, a/w belching,  and mildly relieved w belching, no fevers, no chills this AM woke up w/ cp a 6/10 and had improvement to a 5/10 while in the ED didn't take home BP meds, no lightheadedness, no dizziness.  Patient denies confusion or other, no H/O renal or electrolyte problems in the past. Started on Lisinopril/HCTZ 8 months ago?    PAST MEDICAL & SURGICAL HISTORY:  HLD (hyperlipidemia)  CAD (coronary artery disease)  DM (diabetes mellitus)  HTN (hypertension)  Status post ORIF of fracture of ankle: Left, Oct 2019  S/P CABG (coronary artery bypass graft)    Allergies :- No Known Allergies    Home Medications Reviewed  Hospital Medications:   MEDICATIONS  (STANDING):  dextrose 5%. 1000 milliLiter(s) (50 mL/Hr) IV Continuous <Continuous>  dextrose 50% Injectable 12.5 Gram(s) IV Push once  dextrose 50% Injectable 25 Gram(s) IV Push once  dextrose 50% Injectable 25 Gram(s) IV Push once  insulin lispro (HumaLOG) corrective regimen sliding scale   SubCutaneous three times a day before meals  lactated ringers. 500 milliLiter(s) (250 mL/Hr) IV Continuous <Continuous>    SOCIAL HISTORY:  Denies ETOh,Smoking,   FAMILY HISTORY:  No pertinent family history in first degree relatives      REVIEW OF SYSTEMS:  CONSTITUTIONAL: No weakness, fevers or chills  EYES/ENT: No visual changes;  No vertigo or throat pain   NECK: No pain or stiffness  RESPIRATORY: No cough, wheezing, hemoptysis; No shortness of breath  CARDIOVASCULAR: No chest pain or palpitations.  GASTROINTESTINAL: Epigastric burning +  GENITOURINARY: No dysuria, frequency, foamy urine, urinary urgency, incontinence or hematuria  NEUROLOGICAL: No numbness or weakness  SKIN: No itching, burning, rashes, or lesions   VASCULAR: No bilateral lower extremity edema.   All other review of systems is negative unless indicated above.    VITALS:  T(F): 98 (20 @ 17:10), Max: 98.2 (20 @ 11:03)  HR: 62 (20 @ 17:10)  BP: 142/75 (20 @ 17:10)  RR: 18 (20 @ 17:10)  SpO2: 99% (20 @ 17:10)  Wt(kg): --    Height (cm): 165.1 ( @ 10:)  Weight (kg): 68.9 ( @ 10:20)  BMI (kg/m2): 25.3 ( @ 10:20)  BSA (m2): 1.76 ( @ 10:20)    PHYSICAL EXAM:  Constitutional: NAD  HEENT: anicteric sclera, oropharynx clear, MMM  Neck: supple.   Respiratory: Bilateral equal breath sounds , no wheezes, no crackles  Cardiovascular: S1, S2, Regular, Murmur present.  Gastrointestinal: Bowel Sound present, soft, NT/ND  Extremities: No cyanosis or clubbing. No peripheral edema  Neurological: Alert and oriented x 3, no focal deficits  Psychiatric: Normal mood, normal affect  : No CVA tenderness. No magallanes.   Skin: No rashes    Data:      119<LL>  |  84<L>  |  17  ----------------------------<  144<H>  4.2   |  20<L>  |  1.17    Ca    9.5      2020 17:46  Mg     1.6         TPro  6.9  /  Alb  4.5  /  TBili  0.5  /  DBili      /  AST  23  /  ALT  14  /  AlkPhos  56  07-08    Creatinine Trend: 1.17 <--, 1.19 <--                        12.1   10.86 )-----------( 277      ( 2020 11:04 )             34.0     Urine Studies:  Urinalysis Basic - ( 2020 16:34 )    Color: Colorless / Appearance: Clear / S.009 / pH:   Gluc:  / Ketone: Trace  / Bili: Negative / Urobili: Negative   Blood:  / Protein: Negative / Nitrite: Negative   Leuk Esterase: Moderate / RBC: 0 /hpf / WBC 6 /HPF   Sq Epi:  / Non Sq Epi: 0 /hpf / Bacteria: Negative      Sodium, Random Urine: 39 mmol/L ( @ 16:34)  Creatinine, Random Urine: 42 mg/dL ( @ 16:34)  Osmolality, Random Urine: 258 mos/kg ( @ 16:34)

## 2020-07-08 NOTE — H&P ADULT - HISTORY OF PRESENT ILLNESS
87 M w/ PMH of HTN, CAD s/p 4 stents last placed in June 2020 and CABG in 1980s, DM presents to the ED w/ epigastric discomfort started yesterday while at rest, a/w belching,  and mildly relieved w belching, no fevers, no chills this AM woke up w/ cp a 6/10 and had improvement to a 5/10 while in the ED didn't take home BP meds, no lightheadedness, no dizziness. Found to have low sodium. S/B MICU 87 M w/ PMH of HTN, CAD s/p 4 stents last placed in June 2020 and CABG in 1980s, DM presents to the ED w/ epigastric discomfort started yesterday while at rest, a/w belching,  and mildly relieved w belching, no fevers, no chills this AM woke up w/ cp a 6/10 and had improvement to a 5/10 while in the ED didn't take home BP meds, no lightheadedness, no dizziness. Found to have low sodium sunil elevated troponins.  S/B MICU/Cardio/Nephro.

## 2020-07-08 NOTE — CONSULT NOTE ADULT - SUBJECTIVE AND OBJECTIVE BOX
[facility]  Clifford Dumont PA-C  [address] [place] [id number]  [phone]      Chief Complaint:  Patient is a 87y old  Male who presents with a chief complaint of     HPI:    Allergies:  No Known Allergies      Medications:  lactated ringers. 500 milliLiter(s) IV Continuous <Continuous>  ondansetron Injectable 4 milliGRAM(s) IV Push once      PMHX/PSHX:  HLD (hyperlipidemia)  CAD (coronary artery disease)  DM (diabetes mellitus)  HTN (hypertension)  Status post ORIF of fracture of ankle  S/P CABG (coronary artery bypass graft)      Family history:  No pertinent family history in first degree relatives      Social History:     ROS:     General:  No wt loss, fevers, chills, night sweats, fatigue,   Eyes:  Good vision, no reported pain  ENT:  No sore throat, pain, runny nose, dysphagia  CV:  No pain, palpitations, hypo/hypertension  Resp:  No dyspnea, cough, tachypnea, wheezing  GI:  No pain, No nausea, No vomiting, No diarrhea, No constipation, No weight loss, No fever, No pruritis, No rectal bleeding, No tarry stools, No dysphagia,  :  No pain, bleeding, incontinence, nocturia  Muscle:  No pain, weakness  Neuro:  No weakness, tingling, memory problems  Psych:  No fatigue, insomnia, mood problems, depression  Endocrine:  No polyuria, polydipsia, cold/heat intolerance  Heme:  No petechiae, ecchymosis, easy bruisability  Skin:  No rash, tattoos, scars, edema      PHYSICAL EXAM:   Vital Signs:  Vital Signs Last 24 Hrs  T(C): 36.8 (2020 11:03), Max: 36.8 (2020 11:03)  T(F): 98.2 (2020 11:03), Max: 98.2 (2020 11:03)  HR: 82 (2020 11:03) (65 - 82)  BP: 176/88 (2020 11:03) (176/88 - 179/81)  BP(mean): --  RR: 20 (2020 11:03) (17 - 20)  SpO2: 97% (2020 11:03) (97% - 98%)  Daily Height in cm: 165.1 (2020 10:20)    Daily     GENERAL:  Appears stated age, well-groomed, well-nourished, no distress  HEENT:  NC/AT,  conjunctivae clear and pink, no thyromegaly, nodules, adenopathy, no JVD, sclera -anicteric  CHEST:  Full & symmetric excursion, no increased effort, breath sounds clear  HEART:  Regular rhythm, S1, S2, no murmur/rub/S3/S4, no abdominal bruit, no edema  ABDOMEN:  Soft, non-tender, non-distended, normoactive bowel sounds,  no masses ,no hepato-splenomegaly, no signs of chronic liver disease  EXTEREMITIES:  no cyanosis,clubbing or edema  SKIN:  No rash/erythema/ecchymoses/petechiae/wounds/abscess/warm/dry  NEURO:  Alert, oriented, no asterixis, no tremor, no encephalopathy    LABS:                        12.1   10.86 )-----------( 277      ( 2020 11:04 )             34.0     07-08    118<LL>  |  82<L>  |  19  ----------------------------<  188<H>  4.3   |  23  |  1.19    Ca    9.8      2020 11:04  Mg     1.6     07-08    TPro  6.9  /  Alb  4.5  /  TBili  0.5  /  DBili  x   /  AST  23  /  ALT  14  /  AlkPhos  56  07-08    LIVER FUNCTIONS - ( 2020 11:04 )  Alb: 4.5 g/dL / Pro: 6.9 g/dL / ALK PHOS: 56 U/L / ALT: 14 U/L / AST: 23 U/L / GGT: x           PT/INR - ( 2020 11:04 )   PT: 11.0 sec;   INR: 0.95 ratio         PTT - ( 2020 11:04 )  PTT:30.5 sec  Urinalysis Basic - ( 2020 14:07 )    Color: Colorless / Appearance: Clear / S.010 / pH: x  Gluc: x / Ketone: Trace  / Bili: Negative / Urobili: Negative   Blood: x / Protein: Negative / Nitrite: Negative   Leuk Esterase: Moderate / RBC: 1 /hpf / WBC 12 /HPF   Sq Epi: x / Non Sq Epi: 0 /hpf / Bacteria: Negative      Amylase Serum--      Lipase serum48       Ammonia--      Imaging: [facility]  Clifford Dumont PA-C  [address] [place] [address]  [phone]      Chief Complaint:  Patient is a 87y old  Male who presents with a chief complaint of     HPI: 87 M w/ PMH of HTN, CAD s/p 4 stents last placed in 2020 and CABG in , DM presents to the ED w/ epigastric discomfort started yesterday while at rest, a/w belching,  and mildly relieved w belching, no fevers, no chills this AM woke up w/ cp a 6/10 and had improvement to a 5/10 while in the ED didn't take home BP meds, no lightheadedness, no dizziness.    Allergies:  No Known Allergies      Medications:  lactated ringers. 500 milliLiter(s) IV Continuous <Continuous>  ondansetron Injectable 4 milliGRAM(s) IV Push once      PMHX/PSHX:  HLD (hyperlipidemia)  CAD (coronary artery disease)  DM (diabetes mellitus)  HTN (hypertension)  Status post ORIF of fracture of ankle  S/P CABG (coronary artery bypass graft)      Family history:  No pertinent family history in first degree relatives      Social History:     ROS:     General:  No wt loss, fevers, chills, night sweats, fatigue,   Eyes:  Good vision, no reported pain  ENT:  No sore throat, pain, runny nose, dysphagia  CV:  No pain, palpitations, hypo/hypertension  Resp:  No dyspnea, cough, tachypnea, wheezing  GI:  No pain, No nausea, No vomiting, No diarrhea, No constipation, No weight loss, No fever, No pruritis, No rectal bleeding, No tarry stools, No dysphagia,  :  No pain, bleeding, incontinence, nocturia  Muscle:  No pain, weakness  Neuro:  No weakness, tingling, memory problems  Psych:  No fatigue, insomnia, mood problems, depression  Endocrine:  No polyuria, polydipsia, cold/heat intolerance  Heme:  No petechiae, ecchymosis, easy bruisability  Skin:  No rash, tattoos, scars, edema      PHYSICAL EXAM:   Vital Signs:  Vital Signs Last 24 Hrs  T(C): 36.8 (2020 11:03), Max: 36.8 (2020 11:03)  T(F): 98.2 (2020 11:03), Max: 98.2 (2020 11:03)  HR: 82 (2020 11:03) (65 - 82)  BP: 176/88 (2020 11:03) (176/88 - 179/81)  BP(mean): --  RR: 20 (:) (17 - 20)  SpO2: 97% (:) (97% - 98%)  Daily Height in cm: 165.1 (2020 10:20)    Daily     GENERAL:  Appears stated age, well-groomed, well-nourished, no distress  HEENT:  NC/AT,  conjunctivae clear and pink, no thyromegaly, nodules, adenopathy, no JVD, sclera -anicteric  CHEST:  Full & symmetric excursion, no increased effort, breath sounds clear  HEART:  Regular rhythm, S1, S2, no murmur/rub/S3/S4, no abdominal bruit, no edema  ABDOMEN:  Soft, non-tender, non-distended, normoactive bowel sounds,  no masses ,no hepato-splenomegaly, no signs of chronic liver disease  EXTEREMITIES:  no cyanosis,clubbing or edema  SKIN:  No rash/erythema/ecchymoses/petechiae/wounds/abscess/warm/dry  NEURO:  Alert, oriented, no asterixis, no tremor, no encephalopathy    LABS:                        12.1   10.86 )-----------( 277      ( 2020 11:04 )             34.0     07-08    118<LL>  |  82<L>  |  19  ----------------------------<  188<H>  4.3   |  23  |  1.19    Ca    9.8      2020 11:04  Mg     1.6     07-08    TPro  6.9  /  Alb  4.5  /  TBili  0.5  /  DBili  x   /  AST  23  /  ALT  14  /  AlkPhos  56  07-08    LIVER FUNCTIONS - ( 2020 11:04 )  Alb: 4.5 g/dL / Pro: 6.9 g/dL / ALK PHOS: 56 U/L / ALT: 14 U/L / AST: 23 U/L / GGT: x           PT/INR - ( 2020 11:04 )   PT: 11.0 sec;   INR: 0.95 ratio         PTT - ( 2020 11:04 )  PTT:30.5 sec  Urinalysis Basic - ( 2020 14:07 )    Color: Colorless / Appearance: Clear / S.010 / pH: x  Gluc: x / Ketone: Trace  / Bili: Negative / Urobili: Negative   Blood: x / Protein: Negative / Nitrite: Negative   Leuk Esterase: Moderate / RBC: 1 /hpf / WBC 12 /HPF   Sq Epi: x / Non Sq Epi: 0 /hpf / Bacteria: Negative      Amylase Serum--      Lipase serum48       Ammonia--      Imaging:

## 2020-07-08 NOTE — CONSULT NOTE ADULT - PROBLEM SELECTOR RECOMMENDATION 9
Likely multifactorial  Low urine gravity  Urine osmolarity close to isostenuria  Ashely elevated  patient euvolemic in appearance  H/O Thiazide diuretics +  No CNS problems, no findings on CXR  Fluid restriction of 800cc Qd and start NaCl tabs 1gr Po TID  BMP in AM and daily
recent percutaneous coronary intervention  now with elevated troponin  cardiology eval  no objection for asa

## 2020-07-08 NOTE — ED ADULT TRIAGE NOTE - CHIEF COMPLAINT QUOTE
has two stents place 2 weeks ago, started having epigastric pain yesterday  pain worse after eating has two stents place 2 weeks ago, started having epigastric pain yesterday  pain worse after eating    [phone] daughter

## 2020-07-08 NOTE — ED ADULT NURSE NOTE - OBJECTIVE STATEMENT
87 year old male presents to the ED complaining of intermittent epigastric chest pain that began yesterday, worsened during the night, becoming more constant in nature. PMH of CAD s/p 4 Stents (last one June 2020), HTN, GERD. Patient endorses taking Pepcid and Protonix for what he felt was reflux this morning prior to coming to ED without relief of pain/symptoms, CABG  1980. Patient has EKG on arrival. Pt. on CM - NSR in 80s. Patient denies dizziness, weakness, fever/chills, nausea, vomiting, diarrhea.

## 2020-07-08 NOTE — CONSULT NOTE ADULT - ATTENDING COMMENTS
Thank you for allowing me to participate in the care of your patient    For any question, call:  Cell # [phone]  Pager # [phone]  Callback # [phone]

## 2020-07-08 NOTE — CONSULT NOTE ADULT - PROBLEM SELECTOR RECOMMENDATION 3
likely 2/2 hyponateremia  renal eval pending  monitor sodium  regular diet  proton pump inhibitor daily

## 2020-07-08 NOTE — CONSULT NOTE ADULT - ATTENDING COMMENTS
I have examined pt and agree with above exam and plan. Pt with hyponatremia but A & O x 3. Non focal exam. Await Urines lytes. Hyponatremia ay be due to HCTZ use . Pt also c/o epigastric pain. ? SIADH.   In light of recent RCA sten placed would consider cardiology consult for  epigastric pain.    Pt is not a MICU  candidate.

## 2020-07-08 NOTE — ED ADULT NURSE NOTE - CHPI ED NUR SYMPTOMS NEG
no back pain/no syncope/no chills/no nausea/no congestion/no dizziness/no shortness of breath/no diaphoresis/no fever/no vomiting

## 2020-07-08 NOTE — ED PROVIDER NOTE - OBJECTIVE STATEMENT
Tigist Mantilla MD 87 M w/ PMH of HTN, CAD s/p 4 stents last placed in June 2020 and CABG in 1980s, DM presents to the ED w/ epigastric discomfort started yesterday while at rest, a/w belching,  and mildly relieved w belching, no fevers, no chills this AM woke up w/ cp a 6/10 and had improvement to a 5/10 while in the ED didn't take home BP meds, no lightheadedness, no dizziness.

## 2020-07-08 NOTE — ED PROVIDER NOTE - PHYSICAL EXAMINATION
I have reviewed the triage vital signs.  Const: Well-nourished, Well-developed, appearing stated age  Head: atraumatic, normocephalic  Eyes: no conjunctival injection and no scleral icterus  ENMT: Atraumatic external nose and ears, Moist mucus membranes  Neck: Symmetric, trachea midline,  CVS: +S1/S2, dorsalis pedis/radial pulse 2+ bilaterally  RESP: Unlabored respiratory effort, Clear to auscultation bilaterally  GI: Nontender/Nondistended, soft abdomen  MSK: Extremities w/o deformity or ttp, no lower extremity edema  Neuro: GCS=15, motor in all 4 extremities equal, Sensation grossly intact   Psych: Awake, Alert, & Orientedx3;  Appropriate mood and affect, cooperative

## 2020-07-08 NOTE — PROVIDER CONTACT NOTE (CRITICAL VALUE NOTIFICATION) - ASSESSMENT
patient ao4, denies epigastric pain at this time. vss. denies cp, palpitations, sob. initial sodium level 118.

## 2020-07-08 NOTE — ED PROVIDER NOTE - CLINICAL SUMMARY MEDICAL DECISION MAKING FREE TEXT BOX
Tigist Mantilla MD 87 M w/ hx of cad reports compliance w/ medications presents to the ED w/ epigastric discomfort, x1 days, w/ worsening symptoms this AM at 10 AM, called his daughter who recommended the pt present to the ED for further evaluation, ekg is abnormal, he reports a 5/10 pain, w/ twi in III and avF, concern for ACS, vs relfus, plan for labs and reassessment.

## 2020-07-08 NOTE — CONSULT NOTE ADULT - ASSESSMENT
87 M w/ PMH of HTN, CAD s/p 4 stents last placed in June 2020 and CABG in 1980s, DM presents to the ED w/ epigastric discomfort started yesterday while at rest, a/w belching,  and mildly relieved w belching, no fevers, no chills this AM woke up w/ cp a 6/10 and had improvement to a 5/10 while in the ED
EKG SR q waves with TWI inferiorly     Echo < from: Transthoracic Echocardiogram (12.02.19 @ 08:07) >  1. Mitral annular calcification, otherwise normal mitral  valve. Mild mitral regurgitation.  2. Calcified trileaflet aortic valve with normal opening.  Mild aortic regurgitation.  3. Overall preserved left ventricular systolic function.  The basal inferior wall is hypokinetic.  4. Mild diastolic dysfunction (Stage I).  5. The right ventricle is not well visualized; grossly  right ventricular enlargement with normal right ventricular  systolic function.  6. Normal pericardium with no pericardial effusion.  *** Compared with echocardiogram of 8/6/2017, no  significant changes noted.    < end of copied text >    Assessment and Plan     1) CP: more epigastric  Likely GI related, recent PCI will get Echo , c/w asa and Plavix     2) Hyponatremia: t/t per primary team , euvolemic on exam     3) DVT PPX lovenox
Patient is a 87M with PMH of HTN, CAD s/p 4 stents, DM, presenting with epigastric discomfort and frequent belching. MICU consulted for hyponatremia to 118.     - Asymptomatic euvolemic hyponatremia  - History of thiazide diuretics, possibly new since admission for PCI in 6/2020  - Urine sodium 39, osmolality 258  - Appreciate nephrology recs  - Recommend cardiology consultation for epigastric pain and elevated troponins  - No indication for MICU level of care at this time  - Please reconsult as necessary

## 2020-07-08 NOTE — CONSULT NOTE ADULT - SUBJECTIVE AND OBJECTIVE BOX
Anant Cook MD  Interventional Cardiology / Endovascular Specialist  [place] Office : [address] [place] [address] [place] [address]  Tel:   [place] Office : [address] [facility] [place] [address]  Tel: [phone]  Cell : [phone]      HISTORY OF PRESENTING ILLNESS:     87 M w/ PMH of HTN, CAD s/p 4 stents last placed in June 2020 and CABG in 1980s, DM presents to the ED w/ epigastric discomfort started yesterday while at rest, a/w belching,  and mildly relieved w belching, no fevers, no chills this AM woke up w/ cp a 6/10 and had improvement to a 5/10 while in the ED didn't take home BP meds, no lightheadedness, no dizziness. Found to have low sodium. S/B MICU    PAST MEDICAL & SURGICAL HISTORY:  HLD (hyperlipidemia)  CAD (coronary artery disease)  DM (diabetes mellitus)  HTN (hypertension)  Status post ORIF of fracture of ankle: Left, Oct 2019  S/P CABG (coronary artery bypass graft)      SOCIAL HISTORY: Substance Use (street drugs): ( x ) never used  (  ) other:    FAMILY HISTORY:  No pertinent family history in first degree relatives      REVIEW OF SYSTEMS:  CONSTITUTIONAL: No fever, weight loss, or fatigue  EYES: No eye pain, visual disturbances, or discharge  ENMT:  No difficulty hearing, tinnitus, vertigo; No sinus or throat pain  BREASTS: No pain, masses, or nipple discharge  GASTROINTESTINAL: No abdominal or epigastric pain. No nausea, vomiting, or hematemesis; No diarrhea or constipation. No melena or hematochezia.  GENITOURINARY: No dysuria, frequency, hematuria, or incontinence  NEUROLOGICAL: No headaches, memory loss, loss of strength, numbness, or tremors  ENDOCRINE: No heat or cold intolerance; No hair loss  MUSCULOSKELETAL: No joint pain or swelling; No muscle, back, or extremity pain  PSYCHIATRIC: No depression, anxiety, mood swings, or difficulty sleeping  HEME/LYMPH: No easy bruising, or bleeding gums  All others negative    MEDICATIONS:  aspirin  chewable 81 milliGRAM(s) Oral daily  clopidogrel Tablet 75 milliGRAM(s) Oral daily  enoxaparin Injectable 40 milliGRAM(s) SubCutaneous daily  hydrochlorothiazide 12.5 milliGRAM(s) Oral daily  isosorbide   mononitrate ER Tablet (IMDUR) 30 milliGRAM(s) Oral daily  lisinopril 20 milliGRAM(s) Oral daily  metoprolol tartrate 25 milliGRAM(s) Oral two times a day          pantoprazole    Tablet 40 milliGRAM(s) Oral before breakfast    dextrose 40% Gel 15 Gram(s) Oral once PRN  dextrose 50% Injectable 12.5 Gram(s) IV Push once  dextrose 50% Injectable 25 Gram(s) IV Push once  dextrose 50% Injectable 25 Gram(s) IV Push once  glucagon  Injectable 1 milliGRAM(s) IntraMuscular once PRN  insulin lispro (HumaLOG) corrective regimen sliding scale   SubCutaneous three times a day before meals    artificial  tears Solution 1 Drop(s) Both EYES two times a day  dextrose 5%. 1000 milliLiter(s) IV Continuous <Continuous>  ferrous    sulfate 325 milliGRAM(s) Oral daily  lactated ringers. 500 milliLiter(s) IV Continuous <Continuous>  sodium chloride 1 Gram(s) Oral three times a day      FAMILY HISTORY:  No pertinent family history in first degree relatives        Allergies    No Known Allergies    Intolerances    	      PHYSICAL EXAM:  T(C): 36.9 (07-08-20 @ 21:12), Max: 36.9 (07-08-20 @ 21:12)  HR: 64 (07-08-20 @ 21:12) (62 - 82)  BP: 126/65 (07-08-20 @ 21:12) (126/65 - 179/81)  RR: 18 (07-08-20 @ 21:12) (17 - 20)  SpO2: 95% (07-08-20 @ 21:12) (95% - 99%)  Wt(kg): --  I&O's Summary      GENERAL: NAD,  EYES: EOMI, PERRLA, conjunctiva and sclera clear  ENMT: No tonsillar erythema, exudates, or enlargement; Moist mucous membranes, Good dentition, No lesions  Cardiovascular: Normal S1 S2, No JVD, systolic murmur +   Respiratory: Lungs clear to auscultation	  Gastrointestinal:  Soft, Non-tender, + BS	  Extremities: No edema    LABS:	 	    CARDIAC MARKERS:                                  12.1   10.86 )-----------( 277      ( 08 Jul 2020 11:04 )             34.0     07-08    119<LL>  |  84<L>  |  17  ----------------------------<  144<H>  4.2   |  20<L>  |  1.17    Ca    9.5      08 Jul 2020 17:46  Mg     1.6     07-08    TPro  6.9  /  Alb  4.5  /  TBili  0.5  /  DBili  x   /  AST  23  /  ALT  14  /  AlkPhos  56  07-08    proBNP: Serum Pro-Brain Natriuretic Peptide: 1290 pg/mL (07-08 @ 11:04)    Lipid Profile:   HgA1c:   TSH: Thyroid Stimulating Hormone, Serum: 2.10 uIU/mL (07-08 @ 20:13)      Consultant(s) Notes Reviewed:  [x ] YES  [ ] NO    Care Discussed with Consultants/Other Providers [ x] YES  [ ] NO    Imaging Personally Reviewed independently:  [x] YES  [ ] NO    All labs, radiologic studies, vitals, orders and medications list reviewed. Patient is seen and examined at bedside. Case discussed with medical team.    ASSESSMENT/PLAN:

## 2020-07-08 NOTE — ED PROVIDER NOTE - NS ED ROS FT
Constitutional: no fevers no chills.   CV: no chest pain, no palpitations   Respiratory: no shortness of breath, no cough   GI + epigastric pain, + nausea no vomiting   Neuro: no headache, no weakness, no numbness  all other ROS is negative except as documented as HPI.

## 2020-07-08 NOTE — H&P ADULT - ASSESSMENT
The patient is a 87y Male complaining of epigastric pain.     Severe hyponatremia:  Nephro eval appreciated.  Serial BMP  MICU eval appreciated.  fluid rest/ Nacl tablet    CAD/ Recent RCA stent:  PCI was done at [facility] [place].  Elevated troponin likely from recent intervention  ASA/Plavix    Nausea/Vomiting:  GI eval noted.  PPI The patient is a 87y Male complaining of epigastric pain.     Severe hyponatremia:  Nephro eval appreciated.  Serial BMP  MICU eval appreciated.  fluid rest/ Nacl tablet    CAD/ Recent RCA stent:  [place] [facility] [place]  Elevated troponin likely from recent intervention  ASA/Plavix  Cardio eval appreciated    Nausea/Vomiting:  GI eval noted.  PPI

## 2020-07-08 NOTE — ED PROVIDER NOTE - PROGRESS NOTE DETAILS
Tigist Mantilla MD spoke w pts daughter Ania and pt has been having these symptoms for 3 days, w/ epigastric pain, decreased PO intake, no vomiting,

## 2020-07-09 DIAGNOSIS — R10.9 UNSPECIFIED ABDOMINAL PAIN: ICD-10-CM

## 2020-07-09 DIAGNOSIS — R79.89 OTHER SPECIFIED ABNORMAL FINDINGS OF BLOOD CHEMISTRY: ICD-10-CM

## 2020-07-09 LAB
A1C WITH ESTIMATED AVERAGE GLUCOSE RESULT: 7.4 % — HIGH (ref 4–5.6)
ANION GAP SERPL CALC-SCNC: 12 MMOL/L — SIGNIFICANT CHANGE UP (ref 5–17)
ANION GAP SERPL CALC-SCNC: 12 MMOL/L — SIGNIFICANT CHANGE UP (ref 5–17)
ANION GAP SERPL CALC-SCNC: 13 MMOL/L — SIGNIFICANT CHANGE UP (ref 5–17)
BASOPHILS # BLD AUTO: 0.05 K/UL — SIGNIFICANT CHANGE UP (ref 0–0.2)
BASOPHILS NFR BLD AUTO: 0.7 % — SIGNIFICANT CHANGE UP (ref 0–2)
BUN SERPL-MCNC: 17 MG/DL — SIGNIFICANT CHANGE UP (ref 7–23)
BUN SERPL-MCNC: 17 MG/DL — SIGNIFICANT CHANGE UP (ref 7–23)
BUN SERPL-MCNC: 18 MG/DL — SIGNIFICANT CHANGE UP (ref 7–23)
CALCIUM SERPL-MCNC: 9.3 MG/DL — SIGNIFICANT CHANGE UP (ref 8.4–10.5)
CALCIUM SERPL-MCNC: 9.4 MG/DL — SIGNIFICANT CHANGE UP (ref 8.4–10.5)
CALCIUM SERPL-MCNC: 9.4 MG/DL — SIGNIFICANT CHANGE UP (ref 8.4–10.5)
CHLORIDE SERPL-SCNC: 90 MMOL/L — LOW (ref 96–108)
CHLORIDE SERPL-SCNC: 90 MMOL/L — LOW (ref 96–108)
CHLORIDE SERPL-SCNC: 91 MMOL/L — LOW (ref 96–108)
CK MB BLD-MCNC: 7.1 % — HIGH (ref 0–3.5)
CK MB CFR SERPL CALC: 4.7 NG/ML — SIGNIFICANT CHANGE UP (ref 0–6.7)
CK SERPL-CCNC: 66 U/L — SIGNIFICANT CHANGE UP (ref 30–200)
CO2 SERPL-SCNC: 22 MMOL/L — SIGNIFICANT CHANGE UP (ref 22–31)
CO2 SERPL-SCNC: 22 MMOL/L — SIGNIFICANT CHANGE UP (ref 22–31)
CO2 SERPL-SCNC: 23 MMOL/L — SIGNIFICANT CHANGE UP (ref 22–31)
CREAT SERPL-MCNC: 1.1 MG/DL — SIGNIFICANT CHANGE UP (ref 0.5–1.3)
CREAT SERPL-MCNC: 1.12 MG/DL — SIGNIFICANT CHANGE UP (ref 0.5–1.3)
CREAT SERPL-MCNC: 1.35 MG/DL — HIGH (ref 0.5–1.3)
CULTURE RESULTS: SIGNIFICANT CHANGE UP
EOSINOPHIL # BLD AUTO: 0.24 K/UL — SIGNIFICANT CHANGE UP (ref 0–0.5)
EOSINOPHIL NFR BLD AUTO: 3.2 % — SIGNIFICANT CHANGE UP (ref 0–6)
ESTIMATED AVERAGE GLUCOSE: 166 MG/DL — HIGH (ref 68–114)
GLUCOSE BLDC GLUCOMTR-MCNC: 114 MG/DL — HIGH (ref 70–99)
GLUCOSE BLDC GLUCOMTR-MCNC: 173 MG/DL — HIGH (ref 70–99)
GLUCOSE BLDC GLUCOMTR-MCNC: 215 MG/DL — HIGH (ref 70–99)
GLUCOSE BLDC GLUCOMTR-MCNC: 256 MG/DL — HIGH (ref 70–99)
GLUCOSE SERPL-MCNC: 281 MG/DL — HIGH (ref 70–99)
GLUCOSE SERPL-MCNC: 78 MG/DL — SIGNIFICANT CHANGE UP (ref 70–99)
GLUCOSE SERPL-MCNC: 91 MG/DL — SIGNIFICANT CHANGE UP (ref 70–99)
HCT VFR BLD CALC: 34.3 % — LOW (ref 39–50)
HGB BLD-MCNC: 12.2 G/DL — LOW (ref 13–17)
IMM GRANULOCYTES NFR BLD AUTO: 0.7 % — SIGNIFICANT CHANGE UP (ref 0–1.5)
LYMPHOCYTES # BLD AUTO: 2.1 K/UL — SIGNIFICANT CHANGE UP (ref 1–3.3)
LYMPHOCYTES # BLD AUTO: 27.6 % — SIGNIFICANT CHANGE UP (ref 13–44)
MAGNESIUM SERPL-MCNC: 2 MG/DL — SIGNIFICANT CHANGE UP (ref 1.6–2.6)
MCHC RBC-ENTMCNC: 29.7 PG — SIGNIFICANT CHANGE UP (ref 27–34)
MCHC RBC-ENTMCNC: 35.6 GM/DL — SIGNIFICANT CHANGE UP (ref 32–36)
MCV RBC AUTO: 83.5 FL — SIGNIFICANT CHANGE UP (ref 80–100)
MONOCYTES # BLD AUTO: 1.17 K/UL — HIGH (ref 0–0.9)
MONOCYTES NFR BLD AUTO: 15.4 % — HIGH (ref 2–14)
NEUTROPHILS # BLD AUTO: 4 K/UL — SIGNIFICANT CHANGE UP (ref 1.8–7.4)
NEUTROPHILS NFR BLD AUTO: 52.4 % — SIGNIFICANT CHANGE UP (ref 43–77)
NRBC # BLD: 0 /100 WBCS — SIGNIFICANT CHANGE UP (ref 0–0)
PHOSPHATE SERPL-MCNC: 4 MG/DL — SIGNIFICANT CHANGE UP (ref 2.5–4.5)
PLATELET # BLD AUTO: 269 K/UL — SIGNIFICANT CHANGE UP (ref 150–400)
POTASSIUM SERPL-MCNC: 3.9 MMOL/L — SIGNIFICANT CHANGE UP (ref 3.5–5.3)
POTASSIUM SERPL-MCNC: 4.1 MMOL/L — SIGNIFICANT CHANGE UP (ref 3.5–5.3)
POTASSIUM SERPL-MCNC: 4.5 MMOL/L — SIGNIFICANT CHANGE UP (ref 3.5–5.3)
POTASSIUM SERPL-SCNC: 3.9 MMOL/L — SIGNIFICANT CHANGE UP (ref 3.5–5.3)
POTASSIUM SERPL-SCNC: 4.1 MMOL/L — SIGNIFICANT CHANGE UP (ref 3.5–5.3)
POTASSIUM SERPL-SCNC: 4.5 MMOL/L — SIGNIFICANT CHANGE UP (ref 3.5–5.3)
RBC # BLD: 4.11 M/UL — LOW (ref 4.2–5.8)
RBC # FLD: 14.1 % — SIGNIFICANT CHANGE UP (ref 10.3–14.5)
SARS-COV-2 IGG SERPL QL IA: NEGATIVE — SIGNIFICANT CHANGE UP
SARS-COV-2 IGM SERPL IA-ACNC: <0.1 INDEX — SIGNIFICANT CHANGE UP
SARS-COV-2 RNA SPEC QL NAA+PROBE: SIGNIFICANT CHANGE UP
SODIUM SERPL-SCNC: 124 MMOL/L — LOW (ref 135–145)
SODIUM SERPL-SCNC: 125 MMOL/L — LOW (ref 135–145)
SODIUM SERPL-SCNC: 126 MMOL/L — LOW (ref 135–145)
SPECIMEN SOURCE: SIGNIFICANT CHANGE UP
TROPONIN T, HIGH SENSITIVITY RESULT: 67 NG/L — HIGH (ref 0–51)
UUN UR-MCNC: 316 MG/DL — SIGNIFICANT CHANGE UP
WBC # BLD: 7.61 K/UL — SIGNIFICANT CHANGE UP (ref 3.8–10.5)
WBC # FLD AUTO: 7.61 K/UL — SIGNIFICANT CHANGE UP (ref 3.8–10.5)

## 2020-07-09 PROCEDURE — 93010 ELECTROCARDIOGRAM REPORT: CPT

## 2020-07-09 PROCEDURE — 93306 TTE W/DOPPLER COMPLETE: CPT | Mod: 26

## 2020-07-09 RX ORDER — SODIUM CHLORIDE 9 MG/ML
1 INJECTION INTRAMUSCULAR; INTRAVENOUS; SUBCUTANEOUS EVERY 12 HOURS
Refills: 0 | Status: DISCONTINUED | OUTPATIENT
Start: 2020-07-09 | End: 2020-07-10

## 2020-07-09 RX ORDER — ONDANSETRON 8 MG/1
4 TABLET, FILM COATED ORAL ONCE
Refills: 0 | Status: DISCONTINUED | OUTPATIENT
Start: 2020-07-09 | End: 2020-07-10

## 2020-07-09 RX ORDER — SODIUM CHLORIDE 9 MG/ML
1000 INJECTION INTRAMUSCULAR; INTRAVENOUS; SUBCUTANEOUS
Refills: 0 | Status: DISCONTINUED | OUTPATIENT
Start: 2020-07-09 | End: 2020-07-10

## 2020-07-09 RX ADMIN — CLOPIDOGREL BISULFATE 75 MILLIGRAM(S): 75 TABLET, FILM COATED ORAL at 12:58

## 2020-07-09 RX ADMIN — ENOXAPARIN SODIUM 40 MILLIGRAM(S): 100 INJECTION SUBCUTANEOUS at 12:58

## 2020-07-09 RX ADMIN — ISOSORBIDE MONONITRATE 30 MILLIGRAM(S): 60 TABLET, EXTENDED RELEASE ORAL at 12:58

## 2020-07-09 RX ADMIN — Medication 325 MILLIGRAM(S): at 12:58

## 2020-07-09 RX ADMIN — SODIUM CHLORIDE 1 GRAM(S): 9 INJECTION INTRAMUSCULAR; INTRAVENOUS; SUBCUTANEOUS at 05:23

## 2020-07-09 RX ADMIN — Medication 81 MILLIGRAM(S): at 12:58

## 2020-07-09 RX ADMIN — PANTOPRAZOLE SODIUM 40 MILLIGRAM(S): 20 TABLET, DELAYED RELEASE ORAL at 05:23

## 2020-07-09 RX ADMIN — SODIUM CHLORIDE 1 GRAM(S): 9 INJECTION INTRAMUSCULAR; INTRAVENOUS; SUBCUTANEOUS at 17:36

## 2020-07-09 RX ADMIN — Medication 12.5 MILLIGRAM(S): at 05:23

## 2020-07-09 RX ADMIN — Medication 2: at 17:32

## 2020-07-09 RX ADMIN — LISINOPRIL 20 MILLIGRAM(S): 2.5 TABLET ORAL at 05:23

## 2020-07-09 RX ADMIN — Medication 1 DROP(S): at 17:33

## 2020-07-09 RX ADMIN — SODIUM CHLORIDE 100 MILLILITER(S): 9 INJECTION INTRAMUSCULAR; INTRAVENOUS; SUBCUTANEOUS at 19:20

## 2020-07-09 RX ADMIN — Medication 1 DROP(S): at 09:27

## 2020-07-09 RX ADMIN — Medication 3: at 12:58

## 2020-07-09 NOTE — PROVIDER CONTACT NOTE (OTHER) - ACTION/TREATMENT ORDERED:
RADHA Decker and RADHA Rhoades RADHA Decker and RADHA Rhoades, EKG ordered, and R2 pad at bedside. will closely monitor the patient.

## 2020-07-09 NOTE — PROGRESS NOTE ADULT - ASSESSMENT
The patient is a 87y Male complaining of epigastric pain.     Severe hyponatremia:  Nephro eval appreciated.  Serial BMP- Na improving  fluid rest/ Nacl tablet    CAD/ Recent RCA stent:  PCI was done at [facility] [place].  Elevated troponin likely from recent intervention  ASA/Plavix  Cardio f/up appreciated  ECHO reviewed.    Nausea/Vomiting:  GI f/up noted.  PPI

## 2020-07-09 NOTE — PROGRESS NOTE ADULT - ASSESSMENT
87 M w/ PMH of HTN, CAD s/p 4 stents last placed in June 2020 and CABG in 1980s, DM presents to the ED w/ epigastric discomfort started yesterday while at rest, a/w belching,  and mildly relieved w belching, no fevers, no chills this AM woke up w/ cp a 6/10 and had improvement to a 5/10 while in the ED

## 2020-07-09 NOTE — CHART NOTE - NSCHARTNOTEFT_GEN_A_CORE
Episodic Note    Notified by RN that patient with an episode of bradycardia. On telemetry monitor, from approximately 1:20 AM to 1:45 AM on 7/9/2020, patient's heart rate was in the 40's. Per vitals at this time, patient's most current HR is 55 bpm.  Prior to this event on admission, patient was in NSR with a HR in 60-80 bpm. Patient asymptomatic at this time. Patient seen and evaluated at bedside in NAD. Patient reports that he has had trouble sleeping for last few days, with no report of new symptoms at this time. Patient denies any CP, SOB, palpitations, chills, nausea, or vomiting.     Vital Signs Last 24 Hrs  T(C): 36.9 (08 Jul 2020 21:12), Max: 36.9 (08 Jul 2020 21:12)  T(F): 98.4 (08 Jul 2020 21:12), Max: 98.4 (08 Jul 2020 21:12)  HR: 55 (09 Jul 2020 01:35) (55 - 82)  BP: 123/72 (09 Jul 2020 01:35) (123/72 - 179/81)  RR: 18 (09 Jul 2020 01:35) (17 - 20)  SpO2: 98% (09 Jul 2020 01:35) (95% - 99%)    LABORATORY:   Basic Metabolic Panel - STAT (07.08.20 @ 17:46)    Sodium, Serum: 119: TEST REPEATED. mmol/L    Potassium, Serum: 4.2 mmol/L    Chloride, Serum: 84 mmol/L    Carbon Dioxide, Serum: 20 mmol/L    Anion Gap, Serum: 15 mmol/L    Blood Urea Nitrogen, Serum: 17 mg/dL    Creatinine, Serum: 1.17 mg/dL    Glucose, Serum: 144 mg/dL    Calcium, Total Serum: 9.5 mg/dL    eGFR if Non : 56:     Troponin T, High Sensitivity (07.09.20 @ 01:33)    Troponin T, High Sensitivity Result: 67    Troponin T, High Sensitivity (07.08.20 @ 17:46)    Troponin T, High Sensitivity Result: 70    Troponin T, High Sensitivity (07.08.20 @ 13:11)    Troponin T, High Sensitivity Result: 79    Troponin T, High Sensitivity (07.08.20 @ 11:04)    Troponin T, High Sensitivity Result: 88    CKMB Mass Assay (07.08.20 @ 17:46)    CKMB Units: 4.5 ng/mL    TESTING:   < from: 12 Lead ECG (07.08.20 @ 10:39) >    Diagnosis Line SINUS RHYTHM (HR 65 bpm) WITH 1ST DEGREE A-V BLOCK  POSSIBLE LEFT ATRIAL ENLARGEMENT  RIGHT BUNDLE BRANCH BLOCK  INFERIOR INFARCT , AGE UNDETERMINED  ABNORMAL ECG    < end of copied text >    PHYSICAL EXAM:  GENERAL: NAD, alert.   Cardiovascular: Normal S1 S2.  Respiratory: Lungs clear to auscultation.	  Gastrointestinal:  Soft, non-distended.  Extremities: No edema.    ASSESSMENT/PLAN:  HPI: 87 M w/ PMH of HTN, CAD s/p 4 stents last placed in June 2020 and CABG in 1980s, DM presents to the ED w/ epigastric discomfort started yesterday while at rest, a/w belching,  and mildly relieved w belching, no fevers, no chills this AM woke up w/ cp a 6/10 and had improvement to a 5/10 while in the ED didn't take home BP meds, no lightheadedness, no dizziness. Found to have low sodium and elevated Troponins. Patient now presents with asymptomatic bradycardia.     IMPRESSION: Bradycardia  > EKG ordered and relatively unchanged from EKG completed on 7/8/2020. The only change on the repeat EKG on 7/9/2020 illustrates sinus bradycardia with HR 50 bpm. 1st degree AV block, Right Bundle Branch Block, and Inferior Infarct, age underdetermined, on EKG on 7/9/2020, consistent with the prior EKG findings on 7/8/2020.  > Trending Troponins, show a downtrended in values. CKMB WNL. Reference above for Troponin levels.   > R2 pacing pads placed at patient's bedside as safety measure.   > As per patient being currently asymptomatic, continue to monitor the patient on telemetry monitoring.   > Re-evaluate Lopressor scheduled dose with HR when the medication is scheduled in the AM.  > F/u in AM with primary care team and cardiology, regarding Lopressor dosing.    Verona Red PA-C  Department of Medicine   #[id number] Episodic Note    Notified by RN that patient with an episode of bradycardia. On telemetry monitor, from approximately 1:20 AM to 1:45 AM on 7/9/2020, patient's heart rate was in the 40's. Per vitals at this time, patient's most current HR is 55 bpm.  Prior to this event on admission, patient was in NSR with a HR in 60-80 bpm. Patient asymptomatic at this time. Patient seen and evaluated at bedside in NAD. Patient reports that he has had trouble sleeping for last few days, with no report of new symptoms at this time. Patient denies any CP, SOB, palpitations, chills, nausea, or vomiting.     Vital Signs Last 24 Hrs  T(C): 36.9 (08 Jul 2020 21:12), Max: 36.9 (08 Jul 2020 21:12)  T(F): 98.4 (08 Jul 2020 21:12), Max: 98.4 (08 Jul 2020 21:12)  HR: 55 (09 Jul 2020 01:35) (55 - 82)  BP: 123/72 (09 Jul 2020 01:35) (123/72 - 179/81)  RR: 18 (09 Jul 2020 01:35) (17 - 20)  SpO2: 98% (09 Jul 2020 01:35) (95% - 99%)    LABORATORY:   Basic Metabolic Panel - STAT (07.08.20 @ 17:46)    Sodium, Serum: 119: TEST REPEATED. mmol/L    Potassium, Serum: 4.2 mmol/L    Chloride, Serum: 84 mmol/L    Carbon Dioxide, Serum: 20 mmol/L    Anion Gap, Serum: 15 mmol/L    Blood Urea Nitrogen, Serum: 17 mg/dL    Creatinine, Serum: 1.17 mg/dL    Glucose, Serum: 144 mg/dL    Calcium, Total Serum: 9.5 mg/dL    eGFR if Non : 56:     Troponin T, High Sensitivity (07.09.20 @ 01:33)    Troponin T, High Sensitivity Result: 67    Troponin T, High Sensitivity (07.08.20 @ 17:46)    Troponin T, High Sensitivity Result: 70    Troponin T, High Sensitivity (07.08.20 @ 13:11)    Troponin T, High Sensitivity Result: 79    Troponin T, High Sensitivity (07.08.20 @ 11:04)    Troponin T, High Sensitivity Result: 88    CKMB Mass Assay (07.08.20 @ 17:46)    CKMB Units: 4.5 ng/mL    TESTING:   < from: 12 Lead ECG (07.08.20 @ 10:39) >    Diagnosis Line SINUS RHYTHM (HR 65 bpm) WITH 1ST DEGREE A-V BLOCK  POSSIBLE LEFT ATRIAL ENLARGEMENT  RIGHT BUNDLE BRANCH BLOCK  INFERIOR INFARCT , AGE UNDETERMINED  ABNORMAL ECG    < end of copied text >    PHYSICAL EXAM:  GENERAL: NAD, alert.   Cardiovascular: Normal S1 S2.  Respiratory: Lungs clear to auscultation.	  Gastrointestinal:  Soft, non-distended.  Extremities: No edema.    ASSESSMENT/PLAN:  HPI: 87 M w/ PMH of HTN, CAD s/p 4 stents last placed in June 2020 and CABG in 1980s, DM presents to the ED w/ epigastric discomfort started yesterday while at rest, a/w belching,  and mildly relieved w belching, no fevers, no chills this AM woke up w/ cp a 6/10 and had improvement to a 5/10 while in the ED didn't take home BP meds, no lightheadedness, no dizziness. Found to have low sodium and elevated Troponins. Patient now presents with asymptomatic bradycardia.     IMPRESSION: Bradycardia  > EKG ordered and relatively unchanged from EKG completed on 7/8/2020. The only change on the repeat EKG on 7/9/2020 illustrates sinus bradycardia with HR 50 bpm. 1st degree AV block, Right Bundle Branch Block, and Inferior Infarct, age underdetermined, on EKG on 7/9/2020, consistent with the prior EKG findings on 7/8/2020.  > Trending Troponins, show a downtrended in values. CKMB WNL. Reference above for Troponin levels.   > R2 pacing pads placed at patient's bedside as safety measure.   > As per patient being currently asymptomatic, continue to monitor the patient on telemetry monitoring.   > Re-evaluate Lopressor scheduled dose with HR when the medication is scheduled in the AM.  > F/u in AM with primary care team and cardiology, regarding Lopressor dosing.    Case and plan discussed with preceptor, RADHA Dillon, who agrees with above and also present during patient evaluation.     Verona Red PA-C  Department of Medicine   #[id number] Episodic Note    Notified by RN that patient with an episode of bradycardia. On telemetry monitor, from approximately 1:20 AM to 1:45 AM on 7/9/2020, patient's heart rate was in the 40's. Per vitals at this time, patient's most current HR is 55 bpm.  Prior to this event on admission, patient was in NSR with a HR in 60-80 bpm. Patient asymptomatic at this time. Patient seen and evaluated at bedside in NAD. Patient reports that he has had trouble sleeping for last few days, with no report of new symptoms at this time. Patient denies any CP, SOB, palpitations, chills, nausea, or vomiting.     Vital Signs Last 24 Hrs  T(C): 36.9 (08 Jul 2020 21:12), Max: 36.9 (08 Jul 2020 21:12)  T(F): 98.4 (08 Jul 2020 21:12), Max: 98.4 (08 Jul 2020 21:12)  HR: 55 (09 Jul 2020 01:35) (55 - 82)  BP: 123/72 (09 Jul 2020 01:35) (123/72 - 179/81)  RR: 18 (09 Jul 2020 01:35) (17 - 20)  SpO2: 98% (09 Jul 2020 01:35) (95% - 99%)    LABORATORY:   Basic Metabolic Panel - STAT (07.08.20 @ 17:46)    Sodium, Serum: 119: TEST REPEATED. mmol/L    Potassium, Serum: 4.2 mmol/L    Chloride, Serum: 84 mmol/L    Carbon Dioxide, Serum: 20 mmol/L    Anion Gap, Serum: 15 mmol/L    Blood Urea Nitrogen, Serum: 17 mg/dL    Creatinine, Serum: 1.17 mg/dL    Glucose, Serum: 144 mg/dL    Calcium, Total Serum: 9.5 mg/dL    eGFR if Non : 56:     Troponin T, High Sensitivity (07.09.20 @ 01:33)    Troponin T, High Sensitivity Result: 67    Troponin T, High Sensitivity (07.08.20 @ 17:46)    Troponin T, High Sensitivity Result: 70    Troponin T, High Sensitivity (07.08.20 @ 13:11)    Troponin T, High Sensitivity Result: 79    Troponin T, High Sensitivity (07.08.20 @ 11:04)    Troponin T, High Sensitivity Result: 88    CKMB Mass Assay (07.08.20 @ 17:46)    CKMB Units: 4.5 ng/mL    TESTING:   < from: 12 Lead ECG (07.08.20 @ 10:39) >    Diagnosis Line SINUS RHYTHM (HR 65 bpm) WITH 1ST DEGREE A-V BLOCK  POSSIBLE LEFT ATRIAL ENLARGEMENT  RIGHT BUNDLE BRANCH BLOCK  INFERIOR INFARCT , AGE UNDETERMINED  ABNORMAL ECG    < end of copied text >    PHYSICAL EXAM:  GENERAL: NAD, alert.   Cardiovascular: Normal S1 S2.  Respiratory: Lungs clear to auscultation.	  Gastrointestinal:  Soft, non-distended.  Extremities: No edema.    ASSESSMENT/PLAN:  HPI: 87 M w/ PMH of HTN, CAD s/p 4 stents last placed in June 2020 and CABG in 1980s, DM presents to the ED w/ epigastric discomfort started yesterday while at rest, a/w belching,  and mildly relieved w belching, no fevers, no chills this AM woke up w/ cp a 6/10 and had improvement to a 5/10 while in the ED didn't take home BP meds, no lightheadedness, no dizziness. Found to have low sodium and elevated Troponins. Patient now presents with asymptomatic bradycardia.     IMPRESSION: Bradycardia  > EKG ordered and relatively unchanged from EKG completed on 7/8/2020. The only change on the repeat EKG on 7/9/2020 illustrates sinus bradycardia with HR 50 bpm. 1st degree AV block, Right Bundle Branch Block, and Inferior Infarct, age underdetermined, on EKG on 7/9/2020, consistent with the prior EKG findings on 7/8/2020.  > Trending Troponins, show a downtrended in values. CKMB WNL. Reference above for Troponin levels.   > R2 pacing pads placed at patient's bedside as safety measure.   > As per patient being currently asymptomatic, continue to monitor the patient on telemetry monitoring.   > Re-evaluate Lopressor scheduled dose with HR when the medication is scheduled in the AM.  > F/u in AM with primary care team and cardiology, regarding Lopressor dosing.    RN notified of above management. Case and plan discussed with preceptor, RADHA Dillon, who agrees with above and also present during patient evaluation.     Verona Red PA-C  Department of Medicine   #[id number]

## 2020-07-09 NOTE — CHART NOTE - NSCHARTNOTEFT_GEN_A_CORE
Pt seen and examined for c/o Dizziness , denied any SOB , Chest pain , N,/V , HA or transient weakness       Vital Signs Last 24 Hrs  T(C): 36.4 (09 Jul 2020 12:47), Max: 36.9 (08 Jul 2020 21:12)  T(F): 97.6 (09 Jul 2020 12:47), Max: 98.4 (08 Jul 2020 21:12)  HR: 66 (09 Jul 2020 12:47) (49 - 66)  BP: 154/75 (09 Jul 2020 12:47) (109/68 - 154/75)  BP(mean): --  RR: 18 (09 Jul 2020 12:47) (18 - 18)  SpO2: 96% (09 Jul 2020 12:47) (95% - 98%)    Physical Exam:  General: WN/WD NAD  Neurology: A&Ox3, nonfocal, MULLINS x 4  Head:  Normocephalic, atraumatic  Respiratory: CTA B/L  CV: RRR, S1S2, no murmur  Abdominal: Soft, NT, ND no palpable mass  MSK: No edema, + peripheral pulses, FROM all 4 extremity  Labs:                          12.2   7.61  )-----------( 269      ( 09 Jul 2020 06:49 )             34.3     07-09    124<L>  |  90<L>  |  18  ----------------------------<  281<H>  4.5   |  22  |  1.35<H>    Ca    9.4      09 Jul 2020 13:56  Phos  4.0     07-09  Mg     2.0     07-09    TPro  6.9  /  Alb  4.5  /  TBili  0.5  /  DBili  x   /  AST  23  /  ALT  14  /  AlkPhos  56  07-08    CARDIAC MARKERS ( 09 Jul 2020 01:33 )  x     / x     / 66 U/L / x     / 4.7 ng/mL  CARDIAC MARKERS ( 08 Jul 2020 17:46 )  x     / x     / 66 U/L / x     / 4.5 ng/mL          Radiology:    HPI:  87 M w/ PMH of HTN, CAD s/p 4 stents last placed in June 2020 and CABG in 1980s, DM presents to the ED w/ epigastric discomfort  found to have hyponatremia now c/o Dizziness    Likley  from  Dehydration  Hold Hctz   Encourage Po intake   Negative orthostatic BP   Hold Lisinopril and lopressor   f/u cr in AM  and will consider resuming Lisinopril and Lopressor          Shinamol Arash NP-C   #[id number] Pt seen and examined for c/o Dizziness , denied any SOB , Chest pain , N,/V , HA or transient weakness       Vital Signs Last 24 Hrs  T(C): 36.4 (09 Jul 2020 12:47), Max: 36.9 (08 Jul 2020 21:12)  T(F): 97.6 (09 Jul 2020 12:47), Max: 98.4 (08 Jul 2020 21:12)  HR: 66 (09 Jul 2020 12:47) (49 - 66)  BP: 154/75 (09 Jul 2020 12:47) (109/68 - 154/75)  BP(mean): --  RR: 18 (09 Jul 2020 12:47) (18 - 18)  SpO2: 96% (09 Jul 2020 12:47) (95% - 98%)    Physical Exam:  General: WN/WD NAD  Neurology: A&Ox3, nonfocal, MULLINS x 4  Head:  Normocephalic, atraumatic  Respiratory: CTA B/L  CV: RRR, S1S2, no murmur  Abdominal: Soft, NT, ND no palpable mass  MSK: No edema, + peripheral pulses, FROM all 4 extremity  Labs:                          12.2   7.61  )-----------( 269      ( 09 Jul 2020 06:49 )             34.3     07-09    124<L>  |  90<L>  |  18  ----------------------------<  281<H>  4.5   |  22  |  1.35<H>    Ca    9.4      09 Jul 2020 13:56  Phos  4.0     07-09  Mg     2.0     07-09    TPro  6.9  /  Alb  4.5  /  TBili  0.5  /  DBili  x   /  AST  23  /  ALT  14  /  AlkPhos  56  07-08    CARDIAC MARKERS ( 09 Jul 2020 01:33 )  x     / x     / 66 U/L / x     / 4.7 ng/mL  CARDIAC MARKERS ( 08 Jul 2020 17:46 )  x     / x     / 66 U/L / x     / 4.5 ng/mL          Radiology:    HPI:  87 M w/ PMH of HTN, CAD s/p 4 stents last placed in June 2020 and CABG in 1980s, DM presents to the ED w/ epigastric discomfort  found to have hyponatremia now c/o Dizziness    Likley  from  Dehydration  Hold Hctz   Encourage Po intake   Negative orthostatic BP   Hold Lisinopril and lopressor   f/u cr in AM  and will consider resuming Lisinopril and Lopressor  Will follow up DR Charlene Antoine NP-C   #[id number]

## 2020-07-09 NOTE — PROGRESS NOTE ADULT - SUBJECTIVE AND OBJECTIVE BOX
Anant Cook MD  Interventional Cardiology / Endovascular Specialist  [place] Office : [phone] [place] [address] [place] [address]  Tel:   [place] Office : [address] [facility] [place] [address]  Tel: [phone]  Cell : [phone]    HISTORY OF PRESENTING ILLNESS:    86 yo M with PMHx CAD w CABG, last cath in Aug 2017, DM2, HLD, HTN, cards consulted for chest pain , better today  	  MEDICATIONS:  aspirin  chewable 81 milliGRAM(s) Oral daily  clopidogrel Tablet 75 milliGRAM(s) Oral daily  enoxaparin Injectable 40 milliGRAM(s) SubCutaneous daily  isosorbide   mononitrate ER Tablet (IMDUR) 30 milliGRAM(s) Oral daily        ondansetron Injectable 4 milliGRAM(s) IV Push once    pantoprazole    Tablet 40 milliGRAM(s) Oral before breakfast    dextrose 40% Gel 15 Gram(s) Oral once PRN  dextrose 50% Injectable 12.5 Gram(s) IV Push once  dextrose 50% Injectable 25 Gram(s) IV Push once  dextrose 50% Injectable 25 Gram(s) IV Push once  glucagon  Injectable 1 milliGRAM(s) IntraMuscular once PRN  insulin lispro (HumaLOG) corrective regimen sliding scale   SubCutaneous three times a day before meals    artificial  tears Solution 1 Drop(s) Both EYES two times a day  dextrose 5%. 1000 milliLiter(s) IV Continuous <Continuous>  ferrous    sulfate 325 milliGRAM(s) Oral daily  lactated ringers. 500 milliLiter(s) IV Continuous <Continuous>  sodium chloride 1 Gram(s) Oral every 12 hours  sodium chloride 0.9%. 1000 milliLiter(s) IV Continuous <Continuous>      PAST MEDICAL/SURGICAL HISTORY  PAST MEDICAL & SURGICAL HISTORY:  HLD (hyperlipidemia)  CAD (coronary artery disease)  DM (diabetes mellitus)  HTN (hypertension)  Status post ORIF of fracture of ankle: Left, Oct 2019  S/P CABG (coronary artery bypass graft)      SOCIAL HISTORY: Substance Use (street drugs): ( x ) never used  (  ) other:    FAMILY HISTORY:  No pertinent family history in first degree relatives      REVIEW OF SYSTEMS:  CONSTITUTIONAL: No fever, weight loss, or fatigue  EYES: No eye pain, visual disturbances, or discharge  ENMT:  No difficulty hearing, tinnitus, vertigo; No sinus or throat pain  BREASTS: No pain, masses, or nipple discharge  GASTROINTESTINAL: No abdominal or epigastric pain. No nausea, vomiting, or hematemesis; No diarrhea or constipation. No melena or hematochezia.  GENITOURINARY: No dysuria, frequency, hematuria, or incontinence  NEUROLOGICAL: No headaches, memory loss, loss of strength, numbness, or tremors  ENDOCRINE: No heat or cold intolerance; No hair loss  MUSCULOSKELETAL: No joint pain or swelling; No muscle, back, or extremity pain  PSYCHIATRIC: No depression, anxiety, mood swings, or difficulty sleeping  HEME/LYMPH: No easy bruising, or bleeding gums  All others negative    PHYSICAL EXAM:  T(C): 36.5 (07-09-20 @ 20:00), Max: 36.7 (07-09-20 @ 04:28)  HR: 54 (07-09-20 @ 20:00) (49 - 66)  BP: 119/67 (07-09-20 @ 20:00) (109/68 - 154/75)  RR: 18 (07-09-20 @ 20:00) (18 - 18)  SpO2: 96% (07-09-20 @ 20:00) (96% - 98%)  Wt(kg): --  I&O's Summary    08 Jul 2020 07:01  -  09 Jul 2020 07:00  --------------------------------------------------------  IN: 0 mL / OUT: 1100 mL / NET: -1100 mL    09 Jul 2020 07:01  -  09 Jul 2020 22:37  --------------------------------------------------------  IN: 240 mL / OUT: 770 mL / NET: -530 mL            GENERAL: NAD   EYES: EOMI, PERRLA, conjunctiva and sclera clear  ENMT: No tonsillar erythema, exudates, or enlargement; Moist mucous membranes, Good dentition, No lesions  Cardiovascular: Normal S1 S2, No JVD, No murmurs, No edema  Respiratory: mild b/l rhonchi  Gastrointestinal:  Soft, Non-tender, + BS	  Extremities: Normal range of motion, No clubbing, cyanosis or edema  LYMPH: No lymphadenopathy noted                                  12.2   7.61  )-----------( 269      ( 09 Jul 2020 06:49 )             34.3     07-09    124<L>  |  90<L>  |  18  ----------------------------<  281<H>  4.5   |  22  |  1.35<H>    Ca    9.4      09 Jul 2020 13:56  Phos  4.0     07-09  Mg     2.0     07-09    TPro  6.9  /  Alb  4.5  /  TBili  0.5  /  DBili  x   /  AST  23  /  ALT  14  /  AlkPhos  56  07-08    proBNP:   Lipid Profile:   HgA1c:   TSH:     Consultant(s) Notes Reviewed:  [x ] YES  [ ] NO    Care Discussed with Consultants/Other Providers [ x] YES  [ ] NO    Imaging Personally Reviewed independently:  [x] YES  [ ] NO    All labs, radiologic studies, vitals, orders and medications list reviewed. Patient is seen and examined at bedside. Case discussed with medical team.

## 2020-07-09 NOTE — PROGRESS NOTE ADULT - SUBJECTIVE AND OBJECTIVE BOX
Patient is a 87y old  Male who presents with a chief complaint of The patient is a 87y Male complaining of epigastric pain (2020 09:47)      SUBJECTIVE / OVERNIGHT EVENTS:    Events noted.  CONSTITUTIONAL: No fever,  or fatigue  RESPIRATORY: No cough, wheezing,  No shortness of breath  CARDIOVASCULAR: No chest pain, palpitations  GASTROINTESTINAL: No abdominal or epigastric pain.   NEUROLOGICAL: No headaches,     MEDICATIONS  (STANDING):  artificial  tears Solution 1 Drop(s) Both EYES two times a day  aspirin  chewable 81 milliGRAM(s) Oral daily  clopidogrel Tablet 75 milliGRAM(s) Oral daily  dextrose 5%. 1000 milliLiter(s) (50 mL/Hr) IV Continuous <Continuous>  dextrose 50% Injectable 12.5 Gram(s) IV Push once  dextrose 50% Injectable 25 Gram(s) IV Push once  dextrose 50% Injectable 25 Gram(s) IV Push once  enoxaparin Injectable 40 milliGRAM(s) SubCutaneous daily  ferrous    sulfate 325 milliGRAM(s) Oral daily  insulin lispro (HumaLOG) corrective regimen sliding scale   SubCutaneous three times a day before meals  isosorbide   mononitrate ER Tablet (IMDUR) 30 milliGRAM(s) Oral daily  lactated ringers. 500 milliLiter(s) (250 mL/Hr) IV Continuous <Continuous>  ondansetron Injectable 4 milliGRAM(s) IV Push once  pantoprazole    Tablet 40 milliGRAM(s) Oral before breakfast  sodium chloride 1 Gram(s) Oral every 12 hours  sodium chloride 0.9%. 1000 milliLiter(s) (100 mL/Hr) IV Continuous <Continuous>    MEDICATIONS  (PRN):  dextrose 40% Gel 15 Gram(s) Oral once PRN Blood Glucose LESS THAN 70 milliGRAM(s)/deciliter  glucagon  Injectable 1 milliGRAM(s) IntraMuscular once PRN Glucose LESS THAN 70 milligrams/deciliter        CAPILLARY BLOOD GLUCOSE      POCT Blood Glucose.: 173 mg/dL (2020 21:15)  POCT Blood Glucose.: 215 mg/dL (2020 17:03)  POCT Blood Glucose.: 256 mg/dL (2020 12:52)  POCT Blood Glucose.: 114 mg/dL (2020 08:46)    I&O's Summary    2020 07:01  -  2020 07:00  --------------------------------------------------------  IN: 0 mL / OUT: 1100 mL / NET: -1100 mL    2020 07:01  -  2020 22:40  --------------------------------------------------------  IN: 240 mL / OUT: 770 mL / NET: -530 mL        PHYSICAL EXAM:  GENERAL: NAD  NECK: Supple, No JVD  CHEST/LUNG: Clear to auscultation bilaterally; No wheezing.  HEART: Regular rate and rhythm; No murmurs, rubs, or gallops  ABDOMEN: Soft, Nontender, Nondistended; Bowel sounds present  EXTREMITIES:   No edema  NEUROLOGY: AAO X 3      LABS:                        12.2   7.61  )-----------( 269      ( 2020 06:49 )             34.3     07-09    124<L>  |  90<L>  |  18  ----------------------------<  281<H>  4.5   |  22  |  1.35<H>    Ca    9.4      2020 13:56  Phos  4.0     07-09  Mg     2.0     07-09    TPro  6.9  /  Alb  4.5  /  TBili  0.5  /  DBili  x   /  AST  23  /  ALT  14  /  AlkPhos  56  07-08    PT/INR - ( 2020 11:04 )   PT: 11.0 sec;   INR: 0.95 ratio         PTT - ( 2020 11:04 )  PTT:30.5 sec  CARDIAC MARKERS ( 2020 01:33 )  x     / x     / 66 U/L / x     / 4.7 ng/mL  CARDIAC MARKERS ( 2020 17:46 )  x     / x     / 66 U/L / x     / 4.5 ng/mL      Urinalysis Basic - ( 2020 16:34 )    Color: Colorless / Appearance: Clear / S.009 / pH: x  Gluc: x / Ketone: Trace  / Bili: Negative / Urobili: Negative   Blood: x / Protein: Negative / Nitrite: Negative   Leuk Esterase: Moderate / RBC: 0 /hpf / WBC 6 /HPF   Sq Epi: x / Non Sq Epi: 0 /hpf / Bacteria: Negative      CAPILLARY BLOOD GLUCOSE      POCT Blood Glucose.: 173 mg/dL (2020 21:15)  POCT Blood Glucose.: 215 mg/dL (2020 17:03)  POCT Blood Glucose.: 256 mg/dL (2020 12:52)  POCT Blood Glucose.: 114 mg/dL (2020 08:46)     @ 17:23  Culture-urine --  Culture results   >=3 organisms. Probable collection contamination.  method type --  Organism --  Organism Identification --  Specimen source .Urine Clean Catch (Midstream)            @ 17:23  Culture blood --  Culture results   >=3 organisms. Probable collection contamination.  Gram stain --  Gram stain blood --  Method type --  Organism --  Organism identification --  Specimen source .Urine Clean Catch (Midstream)      RADIOLOGY & ADDITIONAL TESTS:    Imaging Personally Reviewed:    Consultant(s) Notes Reviewed:      Care Discussed with Consultants/Other Providers:    William Chang MD, CMD, FACP    [address] [place] [address]  Office Tel: [phone]  Cell: [phone]

## 2020-07-09 NOTE — PROVIDER CONTACT NOTE (OTHER) - ASSESSMENT
Pt was NS on tele upon admittance HR 60-80. Around 0130, HR briefly dropped to 42 for 2 seconds, stayed at 44 on tele and at 0135 HR was 55 on Cardiac monitor device. /72. Pt was awake, asymptomatic.  Pt denies cp/SOB/dizziness.

## 2020-07-09 NOTE — PROVIDER CONTACT NOTE (OTHER) - ASSESSMENT
Pt AOX4, Pt denies CP/SOB. Pt c/o discomfort in stomach and threw up after ingesting Sodium chloride tab. He threw up about 50ml.

## 2020-07-09 NOTE — PROGRESS NOTE ADULT - SUBJECTIVE AND OBJECTIVE BOX
[facility] - LULA Guo / LULA Chakraborty / CHANTAL Garcia/ LULA Aggarwal/ LULA Mcclendon/ ALEM Small / KESHIA Pang / CAITY Benavidez  ---------------------------------------------------------------------------------------------------------------  seen and examined today for hyponatremia  Interval : sodium much improved  VITALS:  T(F): 98.1 (07-09-20 @ 04:28), Max: 98.4 (07-08-20 @ 21:12)  HR: 49 (07-09-20 @ 04:28)  BP: 109/68 (07-09-20 @ 04:28)  RR: 18 (07-09-20 @ 04:28)  SpO2: 96% (07-09-20 @ 04:28)  Wt(kg): --    07-08 @ 07:01  -  07-09 @ 07:00  --------------------------------------------------------  IN: 0 mL / OUT: 1100 mL / NET: -1100 mL    07-09 @ 07:01  -  07-09 @ 09:47  --------------------------------------------------------  IN: 240 mL / OUT: 550 mL / NET: -310 mL      Physical Exam :-  Constitutional: NAD  Neck: Supple.  Respiratory: Bilateral equal breath sounds,  Cardiovascular: S1, S2 normal,  Gastrointestinal: Bowel Sounds present, soft, non tender.  Extremities: No edema  Neurological: Alert and Oriented x 3, no focal deficits  Psychiatric: Normal mood, normal affect  Data:-  Allergies :   No Known Allergies    Hospital Medications:   MEDICATIONS  (STANDING):  artificial  tears Solution 1 Drop(s) Both EYES two times a day  aspirin  chewable 81 milliGRAM(s) Oral daily  clopidogrel Tablet 75 milliGRAM(s) Oral daily  dextrose 5%. 1000 milliLiter(s) (50 mL/Hr) IV Continuous <Continuous>  dextrose 50% Injectable 12.5 Gram(s) IV Push once  dextrose 50% Injectable 25 Gram(s) IV Push once  dextrose 50% Injectable 25 Gram(s) IV Push once  enoxaparin Injectable 40 milliGRAM(s) SubCutaneous daily  ferrous    sulfate 325 milliGRAM(s) Oral daily  hydrochlorothiazide 12.5 milliGRAM(s) Oral daily  insulin lispro (HumaLOG) corrective regimen sliding scale   SubCutaneous three times a day before meals  isosorbide   mononitrate ER Tablet (IMDUR) 30 milliGRAM(s) Oral daily  lactated ringers. 500 milliLiter(s) (250 mL/Hr) IV Continuous <Continuous>  lisinopril 20 milliGRAM(s) Oral daily  metoprolol tartrate 25 milliGRAM(s) Oral two times a day  pantoprazole    Tablet 40 milliGRAM(s) Oral before breakfast    07-09    126<L>  |  91<L>  |  17  ----------------------------<  78  4.1   |  22  |  1.12    Ca    9.3      09 Jul 2020 06:52  Phos  4.0     07-09  Mg     2.0     07-09    TPro  6.9  /  Alb  4.5  /  TBili  0.5  /  DBili      /  AST  23  /  ALT  14  /  AlkPhos  56  07-08    Creatinine Trend: 1.12 <--, 1.10 <--, 1.17 <--, 1.19 <--                        12.2   7.61  )-----------( 269      ( 09 Jul 2020 06:49 )             34.3

## 2020-07-09 NOTE — PHYSICAL THERAPY INITIAL EVALUATION ADULT - ADDITIONAL COMMENTS
PTA pt was independent with functional mobility and ADLs using a straight cane at times. Pt lives with wife and family in a  with steps to negotiate ~1 flight at a time.

## 2020-07-09 NOTE — PROGRESS NOTE ADULT - ASSESSMENT
EKG SR q waves with TWI inferiorly     Echo : < from: Transthoracic Echocardiogram (07.09.20 @ 10:50) >  1. Mild mitral annular calcification. Mitral annular  dilatation with tethered leaflets with normal leaflet  opening. Moderate mitral regurgitation.  2. Calcified trileaflet aortic valve with normal opening.  Mild aortic regurgitation.  3. Overall preserved left ventricular systolic function  with mild segmental wall motion abnormalities. The basal  inferior and basal inferoseptal segments are hypokinetic.  The mid inferolateral segment is mildly hypokinetic.  4. Moderate diastolic dysfunction (Stage II).  5. Normal right ventricular size and function.  *** Compared with echocardiogram of 12/2/2019, results are  similar on today's study.    < end of copied text >      < end of copied text >    Assessment and Plan     1) CP: more epigastric  Likely GI related, recent PCI  c/w asa and Plavix , echo unchanged     2) Hyponatremia: t/t per primary team , euvolemic on exam     3) FARIDA: hold lisinopril, and HCTZ,  cc.hr x5 hrs       4) DVT PPX lovenox

## 2020-07-10 ENCOUNTER — TRANSCRIPTION ENCOUNTER (OUTPATIENT)
Age: 85
End: 2020-07-10

## 2020-07-10 VITALS
SYSTOLIC BLOOD PRESSURE: 125 MMHG | RESPIRATION RATE: 18 BRPM | TEMPERATURE: 98 F | HEART RATE: 59 BPM | OXYGEN SATURATION: 97 % | DIASTOLIC BLOOD PRESSURE: 64 MMHG

## 2020-07-10 DIAGNOSIS — Z71.89 OTHER SPECIFIED COUNSELING: ICD-10-CM

## 2020-07-10 LAB
ANION GAP SERPL CALC-SCNC: 10 MMOL/L — SIGNIFICANT CHANGE UP (ref 5–17)
BUN SERPL-MCNC: 20 MG/DL — SIGNIFICANT CHANGE UP (ref 7–23)
CALCIUM SERPL-MCNC: 9.1 MG/DL — SIGNIFICANT CHANGE UP (ref 8.4–10.5)
CHLORIDE SERPL-SCNC: 96 MMOL/L — SIGNIFICANT CHANGE UP (ref 96–108)
CO2 SERPL-SCNC: 24 MMOL/L — SIGNIFICANT CHANGE UP (ref 22–31)
CREAT SERPL-MCNC: 1.33 MG/DL — HIGH (ref 0.5–1.3)
GLUCOSE BLDC GLUCOMTR-MCNC: 183 MG/DL — HIGH (ref 70–99)
GLUCOSE BLDC GLUCOMTR-MCNC: 190 MG/DL — HIGH (ref 70–99)
GLUCOSE BLDC GLUCOMTR-MCNC: 227 MG/DL — HIGH (ref 70–99)
GLUCOSE SERPL-MCNC: 130 MG/DL — HIGH (ref 70–99)
POTASSIUM SERPL-MCNC: 4.1 MMOL/L — SIGNIFICANT CHANGE UP (ref 3.5–5.3)
POTASSIUM SERPL-SCNC: 4.1 MMOL/L — SIGNIFICANT CHANGE UP (ref 3.5–5.3)
SODIUM SERPL-SCNC: 130 MMOL/L — LOW (ref 135–145)

## 2020-07-10 PROCEDURE — 82553 CREATINE MB FRACTION: CPT

## 2020-07-10 PROCEDURE — 82533 TOTAL CORTISOL: CPT

## 2020-07-10 PROCEDURE — 83605 ASSAY OF LACTIC ACID: CPT

## 2020-07-10 PROCEDURE — 85014 HEMATOCRIT: CPT

## 2020-07-10 PROCEDURE — 80048 BASIC METABOLIC PNL TOTAL CA: CPT

## 2020-07-10 PROCEDURE — 86769 SARS-COV-2 COVID-19 ANTIBODY: CPT

## 2020-07-10 PROCEDURE — 82570 ASSAY OF URINE CREATININE: CPT

## 2020-07-10 PROCEDURE — U0003: CPT

## 2020-07-10 PROCEDURE — 93306 TTE W/DOPPLER COMPLETE: CPT

## 2020-07-10 PROCEDURE — 85730 THROMBOPLASTIN TIME PARTIAL: CPT

## 2020-07-10 PROCEDURE — 84300 ASSAY OF URINE SODIUM: CPT

## 2020-07-10 PROCEDURE — 81001 URINALYSIS AUTO W/SCOPE: CPT

## 2020-07-10 PROCEDURE — 84100 ASSAY OF PHOSPHORUS: CPT

## 2020-07-10 PROCEDURE — 83036 HEMOGLOBIN GLYCOSYLATED A1C: CPT

## 2020-07-10 PROCEDURE — 84295 ASSAY OF SERUM SODIUM: CPT

## 2020-07-10 PROCEDURE — 93005 ELECTROCARDIOGRAM TRACING: CPT

## 2020-07-10 PROCEDURE — 87086 URINE CULTURE/COLONY COUNT: CPT

## 2020-07-10 PROCEDURE — 99285 EMERGENCY DEPT VISIT HI MDM: CPT | Mod: 25

## 2020-07-10 PROCEDURE — 80053 COMPREHEN METABOLIC PANEL: CPT

## 2020-07-10 PROCEDURE — 84540 ASSAY OF URINE/UREA-N: CPT

## 2020-07-10 PROCEDURE — 71045 X-RAY EXAM CHEST 1 VIEW: CPT

## 2020-07-10 PROCEDURE — 82435 ASSAY OF BLOOD CHLORIDE: CPT

## 2020-07-10 PROCEDURE — 82550 ASSAY OF CK (CPK): CPT

## 2020-07-10 PROCEDURE — 97161 PT EVAL LOW COMPLEX 20 MIN: CPT

## 2020-07-10 PROCEDURE — 84443 ASSAY THYROID STIM HORMONE: CPT

## 2020-07-10 PROCEDURE — 82330 ASSAY OF CALCIUM: CPT

## 2020-07-10 PROCEDURE — 84484 ASSAY OF TROPONIN QUANT: CPT

## 2020-07-10 PROCEDURE — 83690 ASSAY OF LIPASE: CPT

## 2020-07-10 PROCEDURE — 82803 BLOOD GASES ANY COMBINATION: CPT

## 2020-07-10 PROCEDURE — 82947 ASSAY GLUCOSE BLOOD QUANT: CPT

## 2020-07-10 PROCEDURE — 84132 ASSAY OF SERUM POTASSIUM: CPT

## 2020-07-10 PROCEDURE — 85610 PROTHROMBIN TIME: CPT

## 2020-07-10 PROCEDURE — 83935 ASSAY OF URINE OSMOLALITY: CPT

## 2020-07-10 PROCEDURE — 85027 COMPLETE CBC AUTOMATED: CPT

## 2020-07-10 PROCEDURE — 82962 GLUCOSE BLOOD TEST: CPT

## 2020-07-10 PROCEDURE — 83880 ASSAY OF NATRIURETIC PEPTIDE: CPT

## 2020-07-10 PROCEDURE — 83735 ASSAY OF MAGNESIUM: CPT

## 2020-07-10 RX ORDER — SODIUM CHLORIDE 9 MG/ML
1 INJECTION INTRAMUSCULAR; INTRAVENOUS; SUBCUTANEOUS
Qty: 28 | Refills: 0
Start: 2020-07-10 | End: 2020-07-23

## 2020-07-10 RX ORDER — ISOSORBIDE MONONITRATE 60 MG/1
1 TABLET, EXTENDED RELEASE ORAL
Qty: 0 | Refills: 0 | DISCHARGE

## 2020-07-10 RX ORDER — ISOSORBIDE MONONITRATE 60 MG/1
1 TABLET, EXTENDED RELEASE ORAL
Qty: 0 | Refills: 0 | DISCHARGE
Start: 2020-07-10

## 2020-07-10 RX ORDER — LISINOPRIL/HYDROCHLOROTHIAZIDE 10-12.5 MG
1 TABLET ORAL
Qty: 0 | Refills: 0 | DISCHARGE

## 2020-07-10 RX ADMIN — PANTOPRAZOLE SODIUM 40 MILLIGRAM(S): 20 TABLET, DELAYED RELEASE ORAL at 05:40

## 2020-07-10 RX ADMIN — Medication 1 DROP(S): at 17:18

## 2020-07-10 RX ADMIN — Medication 1 DROP(S): at 08:55

## 2020-07-10 RX ADMIN — Medication 2: at 12:56

## 2020-07-10 RX ADMIN — CLOPIDOGREL BISULFATE 75 MILLIGRAM(S): 75 TABLET, FILM COATED ORAL at 12:36

## 2020-07-10 RX ADMIN — ISOSORBIDE MONONITRATE 30 MILLIGRAM(S): 60 TABLET, EXTENDED RELEASE ORAL at 12:37

## 2020-07-10 RX ADMIN — Medication 325 MILLIGRAM(S): at 12:37

## 2020-07-10 RX ADMIN — Medication 1: at 08:55

## 2020-07-10 RX ADMIN — SODIUM CHLORIDE 1 GRAM(S): 9 INJECTION INTRAMUSCULAR; INTRAVENOUS; SUBCUTANEOUS at 05:40

## 2020-07-10 RX ADMIN — Medication 81 MILLIGRAM(S): at 12:36

## 2020-07-10 RX ADMIN — SODIUM CHLORIDE 1 GRAM(S): 9 INJECTION INTRAMUSCULAR; INTRAVENOUS; SUBCUTANEOUS at 17:18

## 2020-07-10 NOTE — DISCHARGE NOTE PROVIDER - NSDCCAREPROVSEEN_GEN_ALL_CORE_FT
[facility] Medicine, Advance PracticeTeam [facility] Medicine, Advance PracticeTeam  William Chang

## 2020-07-10 NOTE — PROGRESS NOTE ADULT - SUBJECTIVE AND OBJECTIVE BOX
[facility] - LULA Guo / LULA Chakraborty / CHANTAL Garcia/ LULA Aggarwal/ LULA Mcclendon/ ALEM Small / KESHIA Pang / CAITY Benavidez  ---------------------------------------------------------------------------------------------------------------  seen and examined today for hyponatremia  Interval : sodium much improved  VITALS:  T(F): 98.1 (07-10-20 @ 04:46), Max: 98.1 (07-10-20 @ 04:46)  HR: 51 (07-10-20 @ 04:46)  BP: 126/67 (07-10-20 @ 04:46)  RR: 18 (07-10-20 @ 04:46)  SpO2: 97% (07-10-20 @ 04:46)  Wt(kg): --    07-09 @ 07:01  -  07-10 @ 07:00  --------------------------------------------------------  IN: 740 mL / OUT: 1640 mL / NET: -900 mL      Physical Exam :-  Constitutional: NAD  Neck: Supple.  Respiratory: Bilateral equal breath sounds,  Cardiovascular: S1, S2 normal,  Gastrointestinal: Bowel Sounds present, soft, non tender.  Extremities: No edema  Neurological: Alert and Oriented x 3, no focal deficits  Psychiatric: Normal mood, normal affect  Data:-  Allergies :   No Known Allergies    Hospital Medications:   MEDICATIONS  (STANDING):  artificial  tears Solution 1 Drop(s) Both EYES two times a day  aspirin  chewable 81 milliGRAM(s) Oral daily  clopidogrel Tablet 75 milliGRAM(s) Oral daily  dextrose 5%. 1000 milliLiter(s) (50 mL/Hr) IV Continuous <Continuous>  dextrose 50% Injectable 12.5 Gram(s) IV Push once  dextrose 50% Injectable 25 Gram(s) IV Push once  dextrose 50% Injectable 25 Gram(s) IV Push once  enoxaparin Injectable 40 milliGRAM(s) SubCutaneous daily  ferrous    sulfate 325 milliGRAM(s) Oral daily  insulin lispro (HumaLOG) corrective regimen sliding scale   SubCutaneous three times a day before meals  isosorbide   mononitrate ER Tablet (IMDUR) 30 milliGRAM(s) Oral daily  ondansetron Injectable 4 milliGRAM(s) IV Push once  pantoprazole    Tablet 40 milliGRAM(s) Oral before breakfast  sodium chloride 1 Gram(s) Oral every 12 hours  sodium chloride 0.9%. 1000 milliLiter(s) (100 mL/Hr) IV Continuous <Continuous>    07-10    130<L>  |  96  |  20  ----------------------------<  130<H>  4.1   |  24  |  1.33<H>    Ca    9.1      10 Jul 2020 07:29  Phos  4.0     07-09  Mg     2.0     07-09    TPro  6.9  /  Alb  4.5  /  TBili  0.5  /  DBili      /  AST  23  /  ALT  14  /  AlkPhos  56  07-08    Creatinine Trend: 1.33 <--, 1.35 <--, 1.12 <--, 1.10 <--, 1.17 <--, 1.19 <--                        12.2   7.61  )-----------( 269      ( 09 Jul 2020 06:49 )             34.3

## 2020-07-10 NOTE — DISCHARGE NOTE PROVIDER - PROVIDER TOKENS
PROVIDER:[TOKEN:[[id number]]],PROVIDER:[TOKEN:[[id number]]],PROVIDER:[TOKEN:[[id number]]]

## 2020-07-10 NOTE — PROGRESS NOTE ADULT - SUBJECTIVE AND OBJECTIVE BOX
INTERVAL HPI/OVERNIGHT EVENTS:    patient seen and examined  +small, clear emesis this morning with salt tab  able to tolerate salt tabs with meals  denies n/v abd pain  Na improving    MEDICATIONS  (STANDING):  artificial  tears Solution 1 Drop(s) Both EYES two times a day  aspirin  chewable 81 milliGRAM(s) Oral daily  clopidogrel Tablet 75 milliGRAM(s) Oral daily  dextrose 5%. 1000 milliLiter(s) (50 mL/Hr) IV Continuous <Continuous>  dextrose 50% Injectable 12.5 Gram(s) IV Push once  dextrose 50% Injectable 25 Gram(s) IV Push once  dextrose 50% Injectable 25 Gram(s) IV Push once  enoxaparin Injectable 40 milliGRAM(s) SubCutaneous daily  ferrous    sulfate 325 milliGRAM(s) Oral daily  insulin lispro (HumaLOG) corrective regimen sliding scale   SubCutaneous three times a day before meals  isosorbide   mononitrate ER Tablet (IMDUR) 30 milliGRAM(s) Oral daily  ondansetron Injectable 4 milliGRAM(s) IV Push once  pantoprazole    Tablet 40 milliGRAM(s) Oral before breakfast  sodium chloride 1 Gram(s) Oral every 12 hours  sodium chloride 0.9%. 1000 milliLiter(s) (100 mL/Hr) IV Continuous <Continuous>    MEDICATIONS  (PRN):  dextrose 40% Gel 15 Gram(s) Oral once PRN Blood Glucose LESS THAN 70 milliGRAM(s)/deciliter  glucagon  Injectable 1 milliGRAM(s) IntraMuscular once PRN Glucose LESS THAN 70 milligrams/deciliter      Allergies    No Known Allergies    Intolerances        Review of Systems:    General:  No wt loss, fevers, chills, night sweats,fatigue,   Eyes:  Good vision, no reported pain  ENT:  No sore throat, pain, runny nose, dysphagia  CV:  No pain, palpitatioins, hypo/hypertension  Resp:  No dyspnea, cough, tachypnea, wheezing  GI:  No pain, No nausea, +vomiting, No diarrhea, No constipatiion, No weight loss, No fever, No pruritis, No rectal bleeding, No tarry stools, No dysphagia,  :  No pain, bleeding, incontinence, nocturia  Muscle:  No pain, weakness  Neuro:  No weakness, tingling, memory problems  Psych:  No fatigue, insomnia, mood problems, depression  Endocrine:  No polyuria, polydypsia, cold/heat intolerance  Heme:  No petechiae, ecchymosis, easy bruisability  Skin:  No rash, tattoos, scars, edema      Vital Signs Last 24 Hrs  T(C): 36.7 (10 Jul 2020 04:46), Max: 36.7 (10 Jul 2020 04:46)  T(F): 98.1 (10 Jul 2020 04:46), Max: 98.1 (10 Jul 2020 04:46)  HR: 51 (10 Jul 2020 04:46) (51 - 66)  BP: 126/67 (10 Jul 2020 04:46) (119/67 - 154/75)  BP(mean): --  RR: 18 (10 Jul 2020 04:46) (18 - 18)  SpO2: 97% (10 Jul 2020 04:46) (96% - 97%)    PHYSICAL EXAM:    Constitutional: NAD, well-developed  HEENT: EOMI, throat clear  Neck: No LAD, supple  Respiratory: CTA and P  Cardiovascular: S1 and S2, RRR, no M  Gastrointestinal: BS+, soft, NT/ND, neg HSM,  Extremities: No peripheral edema, neg clubing, cyanosis  Vascular: 2+ peripheral pulses  Neurological: A/O x 3, no focal deficits  Psychiatric: Normal mood, normal affect  Skin: No rashes      LABS:                        12.2   7.61  )-----------( 269      ( 2020 06:49 )             34.3     07-10    130<L>  |  96  |  20  ----------------------------<  130<H>  4.1   |  24  |  1.33<H>    Ca    9.1      10 Jul 2020 07:29  Phos  4.0     07-09  Mg     2.0     07-09        Urinalysis Basic - ( 2020 16:34 )    Color: Colorless / Appearance: Clear / S.009 / pH: x  Gluc: x / Ketone: Trace  / Bili: Negative / Urobili: Negative   Blood: x / Protein: Negative / Nitrite: Negative   Leuk Esterase: Moderate / RBC: 0 /hpf / WBC 6 /HPF   Sq Epi: x / Non Sq Epi: 0 /hpf / Bacteria: Negative        RADIOLOGY & ADDITIONAL TESTS:

## 2020-07-10 NOTE — PROGRESS NOTE ADULT - ASSESSMENT
EKG SR q waves with TWI inferiorly     Echo : < from: Transthoracic Echocardiogram (07.09.20 @ 10:50) >  1. Mild mitral annular calcification. Mitral annular  dilatation with tethered leaflets with normal leaflet  opening. Moderate mitral regurgitation.  2. Calcified trileaflet aortic valve with normal opening.  Mild aortic regurgitation.  3. Overall preserved left ventricular systolic function  with mild segmental wall motion abnormalities. The basal  inferior and basal inferoseptal segments are hypokinetic.  The mid inferolateral segment is mildly hypokinetic.  4. Moderate diastolic dysfunction (Stage II).  5. Normal right ventricular size and function.  *** Compared with echocardiogram of 12/2/2019, results are  similar on today's study.    < end of copied text >      < end of copied text >    Assessment and Plan     1) CP: more epigastric  Likely GI related, recent PCI  c/w asa and Plavix , echo unchanged     2) Hyponatremia: t/t per primary team , euvolemic on exam     3) FARIDA: hold lisinopril, and HCTZ,      4) DVT PPX lovenox

## 2020-07-10 NOTE — DISCHARGE NOTE PROVIDER - NSDCMRMEDTOKEN_GEN_ALL_CORE_FT
aspirin 81 mg oral tablet, chewable: 1 tab(s) orally once a day  atorvastatin 40 mg oral tablet: 1 tab(s) orally once a day  Citracal + D:   clopidogrel 75 mg oral tablet: 1 tab(s) orally once a day  ferrous sulfate 325 mg (65 mg elemental iron) oral tablet: 1 tab(s) orally once a day  glimepiride 4 mg oral tablet: 1 tab(s) orally once a day  isosorbide mononitrate 30 mg oral tablet, extended release: 1 tab(s) orally once a day  metFORMIN 500 mg oral tablet: 1 tab(s) orally 2 times a day  Omega-3 oral capsule:   pantoprazole 20 mg oral delayed release tablet: 1 tab(s) orally once a day  Refresh ophthalmic solution: 1 drop(s) to each affected eye 2 times a day  Tradjenta 5 mg oral tablet: 1 tab(s) orally once a day  Vitamin C 1000 mg oral tablet: 1 tab(s) orally once a day aspirin 81 mg oral tablet, chewable: 1 tab(s) orally once a day  atorvastatin 40 mg oral tablet: 1 tab(s) orally once a day  Citracal + D:   clopidogrel 75 mg oral tablet: 1 tab(s) orally once a day  ferrous sulfate 325 mg (65 mg elemental iron) oral tablet: 1 tab(s) orally once a day  glimepiride 4 mg oral tablet: 1 tab(s) orally once a day  isosorbide mononitrate 30 mg oral tablet, extended release: 1 tab(s) orally once a day  metFORMIN 500 mg oral tablet: 1 tab(s) orally 2 times a day  Omega-3 oral capsule:   pantoprazole 20 mg oral delayed release tablet: 1 tab(s) orally once a day  Refresh ophthalmic solution: 1 drop(s) to each affected eye 2 times a day  sodium chloride 1 g oral tablet: 1 tab(s) orally 2 times a day   Tradjenta 5 mg oral tablet: 1 tab(s) orally once a day  Vitamin C 1000 mg oral tablet: 1 tab(s) orally once a day

## 2020-07-10 NOTE — DISCHARGE NOTE PROVIDER - HOSPITAL COURSE
87 M w/ PMH of HTN, CAD s/p 4 stents last placed in June 2020 and CABG in 1980s, DM presents to the ED w/ epigastric discomfort started yesterday while at rest, a/w belching,  and mildly relieved w belching, no fevers, no chills this AM woke up w/ cp a 6/10 and had improvement to a 5/10 while in the ED    Trop trended down     ECHO no wall motion abnormalities     Na now 130 , will be discharged home

## 2020-07-10 NOTE — DISCHARGE NOTE PROVIDER - NSDCCPCAREPLAN_GEN_ALL_CORE_FT
PRINCIPAL DISCHARGE DIAGNOSIS  Diagnosis: Atypical chest pain  Assessment and Plan of Treatment: Coronary artery disease is a condition where the arteries the supply the heart muscle get clogges with fatty deposits & puts you at risk for a heart attack  Call your doctor if you have any new pain, pressure, or discomfort in the center of your chest, pain, tingling or discomfort in arms, back, neck, jaw, or stomach, shortness of breath, nausea, vomiting, burping or heartburn, sweating, cold and clammy skin, racing or abnormal heartbeat for more than 10 minutes or if they keep coming & going.  Call 911 and do not tr to get to hospital by care  You can help yourself with lefestyle changes (quitting smoking if you smoke), eat lots of fruits & vegetables & low fat dairy products, not a lot of meat & fatty foods, walk or some form of physical activity most days of the week, lose weight if you are overweight  Take your cardiac medication as prescribed to lower cholesterol, to lower blood pressure, aspirin to prevent blood clots, and diabetes control  Make sure to keep appointments with doctor for cardiac follow up care        SECONDARY DISCHARGE DIAGNOSES  Diagnosis: Hyponatremia  Assessment and Plan of Treatment: Normalized c/w sodium chloride  1 tab BID for 1 week and then daily for  1wwek   please follow up with Nephrologist in 1 week    Diagnosis: Elevated troponin  Assessment and Plan of Treatment:

## 2020-07-10 NOTE — DISCHARGE NOTE NURSING/CASE MANAGEMENT/SOCIAL WORK - PATIENT PORTAL LINK FT
You can access the FollowMyHealth Patient Portal offered by [facility] by registering at the following website: [email or web address]. By joining [facility]’s FollowMyHealth portal, you will also be able to view your health information using other applications (apps) compatible with our system.

## 2020-07-10 NOTE — PROGRESS NOTE ADULT - ATTENDING COMMENTS
For any question, call:  Cell # [phone]  Pager # [phone]  Callback # [phone]    Office # for Apt is [phone]

## 2020-07-10 NOTE — PROGRESS NOTE ADULT - PROBLEM SELECTOR PLAN 1
recent percutaneous coronary intervention  troponin downtrending   cardiology eval noted   no objection for asa.
Sodium much improved  patient able to tolerate salt tabs with food  can DC home with salt tabs 1gr BID with meals for 1 week, then 1gr PO QD with meals and fluid restriction 1.2L QD
Sodium much improved  patient did not tolerate salt tabs, vomited twice  holding for today  can relax fluid restriction to 1L QD for now and follow trend  repeat BMP @ 2pm and daily

## 2020-07-10 NOTE — DISCHARGE NOTE PROVIDER - CARE PROVIDER_API CALL
Yash Benavidez)  Internal Medicine; Nephrology  [address] [place] [address]  Phone: [phone]  Fax: [phone]  Follow Up Time:     Anant Cook)  Cardiovascular Disease; Internal Medicine  [address] [place] [address]  Phone: [phone]  Fax: [phone]  Follow Up Time:     Rambo Ny  GASTROENTEROLOGY  [address] [place] [address]  Phone: [phone]  Fax: [phone]  Follow Up Time:

## 2020-07-10 NOTE — DISCHARGE NOTE PROVIDER - NSDCFUADDAPPT_GEN_ALL_CORE_FT
please follow up with PCP with [facility]  please follow up with DR Benavidez  to check sodium levels

## 2020-07-10 NOTE — PROGRESS NOTE ADULT - REASON FOR ADMISSION
The patient is a 87y Male complaining of epigastric pain

## 2020-07-21 ENCOUNTER — APPOINTMENT (OUTPATIENT)
Dept: UROLOGY | Facility: CLINIC | Age: 85
End: 2020-07-21
Payer: MEDICARE

## 2020-07-21 VITALS — TEMPERATURE: 98.1 F | DIASTOLIC BLOOD PRESSURE: 81 MMHG | HEART RATE: 78 BPM | SYSTOLIC BLOOD PRESSURE: 166 MMHG

## 2020-07-21 DIAGNOSIS — Z87.442 PERSONAL HISTORY OF URINARY CALCULI: ICD-10-CM

## 2020-07-21 DIAGNOSIS — N48.1 BALANITIS: ICD-10-CM

## 2020-07-21 DIAGNOSIS — Z80.9 FAMILY HISTORY OF MALIGNANT NEOPLASM, UNSPECIFIED: ICD-10-CM

## 2020-07-21 DIAGNOSIS — Z86.39 PERSONAL HISTORY OF OTHER ENDOCRINE, NUTRITIONAL AND METABOLIC DISEASE: ICD-10-CM

## 2020-07-21 PROCEDURE — 99204 OFFICE O/P NEW MOD 45 MIN: CPT

## 2020-07-21 RX ORDER — TAMSULOSIN HYDROCHLORIDE 0.4 MG/1
0.4 CAPSULE ORAL
Qty: 30 | Refills: 3 | Status: ACTIVE | COMMUNITY
Start: 2020-03-30 | End: 1900-01-01

## 2020-07-21 RX ORDER — CLOTRIMAZOLE AND BETAMETHASONE DIPROPIONATE 10; .5 MG/G; MG/G
1-0.05 CREAM TOPICAL 3 TIMES DAILY
Qty: 1 | Refills: 0 | Status: ACTIVE | COMMUNITY
Start: 2020-07-21 | End: 1900-01-01

## 2020-07-21 RX ORDER — FINASTERIDE 5 MG/1
5 TABLET, FILM COATED ORAL DAILY
Qty: 30 | Refills: 1 | Status: ACTIVE | COMMUNITY
Start: 2020-07-21 | End: 1900-01-01

## 2020-07-21 NOTE — ASSESSMENT
[FreeTextEntry1] : Very pleasant 87-year-old gentleman who presents for evaluation of BPH, balanitis\par -We discussed potential etiologies of gross hematuria.  We discussed a work-up for gross hematuria, however at this time he would like to forego aggressive diagnostic measures and would like to forego a cystoscopy and imaging as he believes that this was due to traumatic Ramirez catheter placement\par -We discussed potential etiologies of hematuria\par -Continue tamsulosin–refill sent to the pharmacy\par -Start finasteride given persistent mild symptoms of BPH\par -Start clotrimazole–betamethasone cream for balanitis\par -Follow-up in 1 month

## 2020-07-21 NOTE — HISTORY OF PRESENT ILLNESS
[FreeTextEntry1] : Very pleasant 87-year-old gentleman who presents for evaluation of BPH with urinary obstruction.  He reports that he recently went to the emergency department complaining of difficulty urinating.  A Ramirez catheter was placed at that time, he was noted to have gross hematuria.  He reports that he was admitted to the hospital for a total of 7 days because of this.  He reports that hematuria has since resolved.  He has never had hematuria in the past.  He denies dysuria.  No flank pain or suprapubic pain.  He believes that the hematuria was due to Ramirez catheter insertion.  He was started on tamsulosin in the past and reports a significant improvement in his urinary symptoms on the medication.  He also reports irritation of the glans penis.  He has not tried anything for this.  No other complaints. [Urinary Retention] : no urinary retention [Urinary Urgency] : no urinary urgency [Urinary Frequency] : no urinary frequency [Nocturia] : nocturia [Weak Stream] : weak stream [Dysuria] : no dysuria [Abdominal Pain] : no abdominal pain [Bladder Spasm] : no bladder spasm [Hematuria - Gross] : no gross hematuria [Flank Pain] : no flank pain [Fatigue] : no fatigue [Fever] : no fever [Nausea] : no nausea

## 2020-07-21 NOTE — REVIEW OF SYSTEMS
[Blood in urine that you can see] : blood visible in urine [History of kidney stones] : history of kidney stones [Negative] : Heme/Lymph

## 2020-08-21 ENCOUNTER — APPOINTMENT (OUTPATIENT)
Dept: UROLOGY | Facility: CLINIC | Age: 85
End: 2020-08-21
Payer: MEDICARE

## 2020-08-21 PROCEDURE — 99214 OFFICE O/P EST MOD 30 MIN: CPT

## 2020-08-21 RX ORDER — SILODOSIN 8 MG/1
8 CAPSULE ORAL DAILY
Qty: 30 | Refills: 1 | Status: ACTIVE | COMMUNITY
Start: 2020-08-21 | End: 1900-01-01

## 2020-08-21 NOTE — HISTORY OF PRESENT ILLNESS
[FreeTextEntry1] : Very pleasant 87-year-old gentleman who presents for follow-up of BPH with urinary obstruction. He was seen in ED for urinary retention and magallanes catheter was placed. He then had extended hospital stay due to hematuria.  He is very concerned about what occurred when the Magallanes catheter was placed in the hospital.  \par \par He was started on flomax and finasteride one month ago and has not noticed any improvement in his symptoms. He reports new onset mild urge incontinence. He states he is having side effects from the proscar including dizziness and would like to stop. He reports nocturia x2-3, urgency, weaker stream, straining, sensations of incomplete emptying. Denies dysuria, frequency, hematuria, suprapubic pain. He also complained of irritation of the glans penis and was given clotrimazole-betamethasone, which he states alleviated his symptoms. He uses the ointment occasionally now as the irritation is nearly gone. [Urinary Retention] : no urinary retention [Urinary Urgency] : no urinary urgency [Urinary Frequency] : no urinary frequency [Nocturia] : nocturia [Weak Stream] : weak stream [Dysuria] : no dysuria [Abdominal Pain] : no abdominal pain [Bladder Spasm] : no bladder spasm [Hematuria - Gross] : no gross hematuria [Flank Pain] : no flank pain [Fever] : no fever [Fatigue] : no fatigue [Nausea] : no nausea

## 2020-08-21 NOTE — PHYSICAL EXAM
[General Appearance - Well Developed] : well developed [General Appearance - Well Nourished] : well nourished [Normal Appearance] : normal appearance [Well Groomed] : well groomed [General Appearance - In No Acute Distress] : no acute distress [Abdomen Soft] : soft [Abdomen Tenderness] : non-tender [Costovertebral Angle Tenderness] : no ~M costovertebral angle tenderness [Urinary Bladder Findings] : the bladder was normal on palpation [] : no respiratory distress [Edema] : no peripheral edema [Respiration, Rhythm And Depth] : normal respiratory rhythm and effort [Oriented To Time, Place, And Person] : oriented to person, place, and time [Exaggerated Use Of Accessory Muscles For Inspiration] : no accessory muscle use [Affect] : the affect was normal [Mood] : the mood was normal [Not Anxious] : not anxious [Normal Station and Gait] : the gait and station were normal for the patient's age [No Focal Deficits] : no focal deficits [No Palpable Adenopathy] : no palpable adenopathy

## 2020-08-21 NOTE — ASSESSMENT
[FreeTextEntry1] : Very pleasant 87 year old man here with BPH and LUTs. He reports minimal improvement with tamsulosin and finasteride as well as dizziness due to finasteride. Balanitis improved with topical therapy.\par -stop finasteride and tamsulosin\par -trial of Rapaflo, sent to pharmacy\par -I discussed the risks, benefits, alternatives, and possible side effects of alpha blocker therapy with the patient, including but not limited to dizziness, lightheadedness, headache, blurred vision, retrograde ejaculation, and priapism with the patient. I also discussed that the patient must inform his ophthalmologist that he has taken an alpha blocker prior to undergoing cataract or glaucoma surgery.\par -discussed utility of cystoscopy and urodynamics, however patient prefers to defer any procedures at this time\par -We also discussed surgical options for BPH, however he would like to forego this at this time

## 2020-08-25 ENCOUNTER — RX CHANGE (OUTPATIENT)
Age: 85
End: 2020-08-25

## 2020-09-23 NOTE — PROGRESS NOTE ADULT - PROBLEM SELECTOR PLAN 8
Refill request for latanoprost received from [facility].  Last appointment 11/9/19.  OK to fill as requested.  Whit Solis RN   - patient on amlodipine, HCTZ-lisinopril at home  - c/w amlodipine for now

## 2020-09-25 ENCOUNTER — APPOINTMENT (OUTPATIENT)
Dept: UROLOGY | Facility: CLINIC | Age: 85
End: 2020-09-25

## 2020-10-01 ENCOUNTER — RX RENEWAL (OUTPATIENT)
Age: 85
End: 2020-10-01

## 2020-10-07 NOTE — PHYSICAL THERAPY INITIAL EVALUATION ADULT - ASSISTIVE DEVICE FOR STAIR TRANSFER, REHAB EVAL
Left voicemail on grandson's phone to discuss recent lab results. Will await callback.    Brianna Anguiano NP   left rail up/right rail down

## 2020-10-19 NOTE — PHYSICAL THERAPY INITIAL EVALUATION ADULT - BALANCE DISTURBANCE, SYSTEM IMPAIRMENT CONTRIBUTE, REHAB EVAL
musculoskeletal Bilobed Transposition Flap Text: The defect edges were debeveled with a #15 scalpel blade.  Given the location of the defect and the proximity to free margins a bilobed transposition flap was deemed most appropriate.  Using a sterile surgical marker, an appropriate bilobe flap drawn around the defect.    The area thus outlined was incised deep to adipose tissue with a #15 scalpel blade.  The skin margins were undermined to an appropriate distance in all directions utilizing iris scissors.

## 2021-01-05 RX ORDER — PANTOPRAZOLE 20 MG/1
20 TABLET, DELAYED RELEASE ORAL TWICE DAILY
Qty: 180 | Refills: 3 | Status: ACTIVE | COMMUNITY
Start: 2018-05-10 | End: 1900-01-01

## 2021-01-22 ENCOUNTER — APPOINTMENT (OUTPATIENT)
Dept: UROLOGY | Facility: CLINIC | Age: 86
End: 2021-01-22

## 2021-03-24 NOTE — PRE-OP CHECKLIST - LATEX ALLERGY
Xanax      Last Written Prescription Date:  02/11/2021  Last Fill Quantity: 30,   # refills: 0  Last Office Visit: 08/07/2020  Future Office visit:   None  Routing refill request to provider for review/approval because:  Drug not on the [facility], [facility] or [facility] refill protocol or controlled substance    Eri Broderick RN  
no

## 2021-04-07 NOTE — ED ADULT NURSE NOTE - NS ED NURSE REPORT GIVEN TO FT
Handoff to receiving nurse was performed and questions were answered. Vital signs noted. Patient stable.  Comments:  
ANDREW Dodd Holding

## 2021-07-21 NOTE — ED ADULT NURSE NOTE - NSFALLRSKINDICATORS_ED_ALL_ED
no Mohs Rapid Report Verbiage: The area of clinically evident tumor was marked with skin marking ink and appropriately hatched.  The initial incision was made following the Mohs approach through the skin.  The specimen was taken to the lab, divided into the necessary number of pieces, chromacoded and processed according to the Mohs protocol.  This was repeated in successive stages until a tumor free defect was achieved.

## 2022-04-11 NOTE — REVIEW OF SYSTEMS
Detail Level: Simple Instructions: This plan will send the code FBSE to the PM system.  DO NOT or CHANGE the price. Price (Do Not Change): 0.00 [As Noted in HPI] : as noted in HPI [Negative] : Heme/Lymph

## 2022-08-19 NOTE — PHYSICAL THERAPY INITIAL EVALUATION ADULT - LIVES WITH, PROFILE
Patient is scheduled for stent removal on 8/24/22 in our [facility]  Reports blood in urine intermittent  Increases with movement  Notices that after working all day then when she rests she blood with urination  Cramping is reported on the left side intermittent before urinating  Then goes away  Patient is not currently taking any medications for pain  Was discharge from hospital with  Scripts for oxycodone and levofloxacin   Has not needed to take oxycodone, also has avoided due to side effects with constipation  Patient is taking stool softener to have regular bowel movement  No fevers, chills or nausea or vomiting  Patient states she has been trying to get through over the last weeks , is at work can not hold was advised that wait times were 45 callers  She could not hold that long because she was at work  Advised patient to hydrate with water, monitor her symptoms  Reviewed what to expect with stent, stent colic and intermittent blood in urine  Reviewed comfort measures with patient , Hydration, heating pad , otc   Patient verbalized understanding and agreement   Confirmed her appt for 8/24/22    Pharmacy confirmed as :   Umang Ball spouse/children/shayna

## 2022-08-25 NOTE — PROGRESS NOTE ADULT - SUBJECTIVE AND OBJECTIVE BOX
[facility] INPATIENT ENCOUNTER  ORTHOPEDIC SURGERY DAILY PROGRESS NOTE  LOWER EXTREMITIES    ADMISSION DATE:  8/20/2022  DATE:  8/25/2022  CURRENT HOSPITAL DAY:  Hospital Day: 6  SURGEON: Surgeon(s) and Role:     * Dick Campos MD - Primary    ATTENDING PHYSICIAN:  Sabrina Valderrama MD  CODE STATUS:  Selective Treatment/DNR    PROCEDURE PERFORMED:  Procedure(s) (LRB):  AMPUTATION, BELOW RIGHT KNEE (Right)  POSTOPERATIVE DAY:  2 Day Post-Op    INTERVAL HISTORY:    Over all feeling better.    VAC is functioning Little output from the drain   Accepted to inpatient rehabilitation     MEDICATIONS:    The medication list was reviewed today.     OBJECTIVE:    Vital Signs  Vital Last Value 24 Hour Range   Temperature 98.1 °F (36.7 °C) Temp  Min: 97.9 °F (36.6 °C)  Max: 98.5 °F (36.9 °C)   Pulse 72 Pulse  Min: 65  Max: 76   Respiratory 18 Resp  Min: 16  Max: 18   Blood Pressure 118/62 BP  Min: 110/70  Max: 160/75   Pulse Oximetry 96 % SpO2  Min: 91 %  Max: 96 %     PHYSICAL EXAM:    Constitutional:  Awake and alert.  No acute distress.   Musculoskeletal:  Right surgical site wrapped with ACE has incisional VAC PRIMO drain in situ   LABORATORY DATA:  Recent Labs   Lab 08/25/22  0334 08/24/22  0712 08/23/22  0335   SODIUM 139 142 140   POTASSIUM 3.9 4.5 4.4   CHLORIDE 109 110 109   CO2 28 25 26   BUN 33* 30* 34*   CREATININE 2.29* 2.28* 2.38*   GLUCOSE 143* 198* 231*     Recent Labs   Lab 08/25/22  0334 08/24/22  0712 08/23/22 2012 08/23/22  1528   WBC 13.4* 15.7*  --  9.4   HGB 7.6* 7.8* 7.3* 8.3*   HCT 25.2* 25.6* 24.6* 27.4*    410  --  436     Recent Labs   Lab 08/20/22  1314   INR 1.2        ASSESSMENT:   Right foot infection [L08.9] s/p Right below the knee amputation  Acute surgical  blood loss anemia      PLAN:  Will keep incisional VAC over wound for min 7 days. No RRD fitting until VAC off   No weight bearing on the right  May wbat with street shoe and toe bumper left foot   Continue wound care for left foot    Natasha carroll antibiotic therapy.  Out patient Amputee clinic has been consulted for follow up for prosthetic   Monitor progress in therapy    Discussed with patient      Noreen Shi PA-C  [facility]  Pager: [phone]  Zone phone M-F from 0800am -4:00 pm [phone]       Anant Cook MD  Interventional Cardiology / Endovascular Specialist  [place] Office : [address] [place] [address] [place] [address]  Tel:   [place] Office : [address] [facility] [place] [address]  Tel: [phone]  Cell : [phone]    HISTORY OF PRESENTING ILLNESS:    84 yo M with PMHx CAD w CABG, last cath in Aug 2017, DM2, HLD, HTN, cards consulted for chest pain , better today  	  MEDICATIONS:  aspirin  chewable 81 milliGRAM(s) Oral daily  clopidogrel Tablet 75 milliGRAM(s) Oral daily  enoxaparin Injectable 40 milliGRAM(s) SubCutaneous daily  isosorbide   mononitrate ER Tablet (IMDUR) 30 milliGRAM(s) Oral daily        ondansetron Injectable 4 milliGRAM(s) IV Push once    pantoprazole    Tablet 40 milliGRAM(s) Oral before breakfast    dextrose 40% Gel 15 Gram(s) Oral once PRN  dextrose 50% Injectable 12.5 Gram(s) IV Push once  dextrose 50% Injectable 25 Gram(s) IV Push once  dextrose 50% Injectable 25 Gram(s) IV Push once  glucagon  Injectable 1 milliGRAM(s) IntraMuscular once PRN  insulin lispro (HumaLOG) corrective regimen sliding scale   SubCutaneous three times a day before meals    artificial  tears Solution 1 Drop(s) Both EYES two times a day  dextrose 5%. 1000 milliLiter(s) IV Continuous <Continuous>  ferrous    sulfate 325 milliGRAM(s) Oral daily  sodium chloride 1 Gram(s) Oral every 12 hours  sodium chloride 0.9%. 1000 milliLiter(s) IV Continuous <Continuous>      PAST MEDICAL/SURGICAL HISTORY  PAST MEDICAL & SURGICAL HISTORY:  HLD (hyperlipidemia)  CAD (coronary artery disease)  DM (diabetes mellitus)  HTN (hypertension)  Status post ORIF of fracture of ankle: Left, Oct 2019  S/P CABG (coronary artery bypass graft)      SOCIAL HISTORY: Substance Use (street drugs): ( x ) never used  (  ) other:    FAMILY HISTORY:  No pertinent family history in first degree relatives      REVIEW OF SYSTEMS:  CONSTITUTIONAL: No fever, weight loss, or fatigue  EYES: No eye pain, visual disturbances, or discharge  ENMT:  No difficulty hearing, tinnitus, vertigo; No sinus or throat pain  BREASTS: No pain, masses, or nipple discharge  GASTROINTESTINAL: No abdominal or epigastric pain. No nausea, vomiting, or hematemesis; No diarrhea or constipation. No melena or hematochezia.  GENITOURINARY: No dysuria, frequency, hematuria, or incontinence  NEUROLOGICAL: No headaches, memory loss, loss of strength, numbness, or tremors  ENDOCRINE: No heat or cold intolerance; No hair loss  MUSCULOSKELETAL: No joint pain or swelling; No muscle, back, or extremity pain  PSYCHIATRIC: No depression, anxiety, mood swings, or difficulty sleeping  HEME/LYMPH: No easy bruising, or bleeding gums  All others negative    PHYSICAL EXAM:  T(C): 36.8 (07-10-20 @ 13:53), Max: 36.8 (07-10-20 @ 13:53)  HR: 59 (07-10-20 @ 13:53) (51 - 59)  BP: 125/64 (07-10-20 @ 13:53) (125/64 - 166/76)  RR: 18 (07-10-20 @ 13:53) (18 - 18)  SpO2: 97% (07-10-20 @ 13:53) (97% - 97%)  Wt(kg): --  I&O's Summary    09 Jul 2020 07:01  -  10 Jul 2020 07:00  --------------------------------------------------------  IN: 740 mL / OUT: 1640 mL / NET: -900 mL    10 Jul 2020 07:01  -  10 Jul 2020 20:56  --------------------------------------------------------  IN: 360 mL / OUT: 350 mL / NET: 10 mL          GENERAL: NAD, well-groomed, well-developed  EYES: EOMI, PERRLA, conjunctiva and sclera clear  ENMT: No tonsillar erythema, exudates, or enlargement; Moist mucous membranes, Good dentition, No lesions  Cardiovascular: Normal S1 S2, No JVD, No murmurs, No edema  Respiratory: Lungs clear to auscultation	  Gastrointestinal:  Soft, Non-tender, + BS	  Extremities: Normal range of motion, No clubbing, cyanosis or edema  LYMPH: No lymphadenopathy noted  NERVOUS SYSTEM:  Alert & Oriented X3, Good concentration; Motor Strength 5/5 B/L upper and lower extremities; DTRs 2+ intact and symmetric                                    12.2   7.61  )-----------( 269      ( 09 Jul 2020 06:49 )             34.3     07-10    130<L>  |  96  |  20  ----------------------------<  130<H>  4.1   |  24  |  1.33<H>    Ca    9.1      10 Jul 2020 07:29  Phos  4.0     07-09  Mg     2.0     07-09      proBNP:   Lipid Profile:   HgA1c:   TSH:     Consultant(s) Notes Reviewed:  [x ] YES  [ ] NO    Care Discussed with Consultants/Other Providers [ x] YES  [ ] NO    Imaging Personally Reviewed independently:  [x] YES  [ ] NO    All labs, radiologic studies, vitals, orders and medications list reviewed. Patient is seen and examined at bedside. Case discussed with medical team.

## 2023-02-05 NOTE — PROGRESS NOTE ADULT - PROBLEM SELECTOR PLAN 5
Patient arrived via ambulance into ER with complaint of fall. Per pt he fell on the ground face down because he fall asleep on chair and was on the floor for 4 to 5 hrs. Pt denied any fever, chills and N/V/D. Pt denied hitting his head and AxO 4 upon arrival.  
--continue statin
--ISS, holding home meds  HbA1C 7.  Resume home medications.
--continue statin
--continue statin

## 2023-02-06 NOTE — PROVIDER CONTACT NOTE (OTHER) - REASON
pt had 8 beats WCT, asymptomatic Elidel Counseling: Patient may experience a mild burning sensation during topical application. Elidel is not approved in children less than 2 years of age. There have been case reports of hematologic and skin malignancies in patients using topical calcineurin inhibitors although causality is questionable.

## 2023-03-06 NOTE — ED PROVIDER NOTE - RATE
65 Closure 3 Information: This tab is for additional flaps and grafts above and beyond our usual structured repairs.  Please note if you enter information here it will not currently bill and you will need to add the billing information manually.

## 2023-05-15 NOTE — PHYSICAL THERAPY INITIAL EVALUATION ADULT - PRECAUTIONS/LIMITATIONS, REHAB EVAL
CONT:  CT neck soft tissue 11/27: Findings suggesting ongoing dental disease. Slight soft tissue fullness of the aryepiglottic folds with poor delineation of the piriform sinus, infectious/inflammatory etiologies are not excluded, motion limits assessment of the false and true cords, 2.6 cm right posterolateral proximal tracheal diverticulum. CXR : Bibasilar linear opacities that likely represent atelectasis./fall precautions/surgical precautions Doxepin Counseling:  Patient advised that the medication is sedating and not to drive a car after taking this medication. Patient informed of potential adverse effects including but not limited to dry mouth, urinary retention, and blurry vision.  The patient verbalized understanding of the proper use and possible adverse effects of doxepin.  All of the patient's questions and concerns were addressed.

## 2023-07-29 NOTE — PHYSICAL THERAPY INITIAL EVALUATION ADULT - DISCHARGE PLANNER MADE AWARE
[facility] LABOR & DELIVERY   METROPARTNERS TRIAGE    NAME:[place] Perez  : 1993   MRN: [id number]   GESTATIONAL AGE: 23w2d    ADMISSION DATE: 2023     PCP:  Chele Claros     CHIEF COMPLAINT:  Elevated Blood pressures     HPI: This is a 30yo  female at 23w2d who presents with elevated blood pressures.  She reports she increased her Nifedipine dose from 30mg every day to 60mg every day at the direction of her primary care physician.  She had previously seen Dr. Claros for her previous pregnancies and plans to follow with [facility].  She states her blood pressure at home has been 160s/100s.  She has only been taking the 60mg dose today.  All of her previous pregnancies have been complicated by hypertension and pre-eclampsia.    DIAGNOSIS COMPLICATING PREGNANCY  Previous CS x 4  H/O triplet delivery  CHTN  Superimposed preeclampsia  H/O Premature deliveries  Grand multip  Late transfer of care from [facility] to [facility]    OBSTETRICAL HISTORY:     OB History    Para Term  AB Living   9 4 2 2 4 6   SAB IAB Ectopic Multiple Live Births   4 0 0 1 6      # Outcome Date GA Lbr Carlos/2nd Weight Sex Delivery Anes PTL Lv   9 Current            8  22 36w2d  3.37 kg (7 lb 6.9 oz) F CS-LTranv Spinal N STALIN      Name: PAUL PEREZ-CHEYENNE      Apgar1: 7  Apgar5: 8   7 Term 20 37w3d  3.47 kg (7 lb 10.4 oz) M CS-LTranv EPI, Nitrous N STALIN      Complications: Failure to Progress in First Stage, Failure to Progress in Second Stage      Name: ANAMALE-CHEYENNE      Apgar1: 9  Apgar5: 9   6 SAB 10/2019     SAB      5A  19 33w1d  2.11 kg (4 lb 10.4 oz) F CS-LTranv Spinal  STALIN      Name: ANAFEMALE-A [place]      Apgar1: 6  Apgar5: 8   5B  19 33w1d  2.39 kg (5 lb 4.3 oz) F CS-LTranv Spinal  STALIN      Name: ANAFEMALE-B [place]      Apgar1: 5  Apgar5: 6   5C  19 33w1d  2.34 kg (5 lb 2.5 oz) M  CS-LTranv Spinal  STALIN      Name: ANA,MALE-C [place]      Apgar1: 6  Apgar5: 7   4 SAB 2017     SAB      3 Term 17 37w2d  2.78 kg (6 lb 2.1 oz) F CS-LTranv Spinal, EPI N STALIN      Name: ANAFEMALE-CHEYENNE      Apgar1: 8  Apgar5: 9   2 SAB            1 SAB                  PAST MEDICAL HISTORY:  Past Medical History:   Diagnosis Date    Anxiety     Anxiety disorder     Depression     Hard to intubate     Hypertension     history of chronic and gestational hypertension    PCOS (polycystic ovarian syndrome)     Prediabetes     Severe dysmenorrhea         PAST SURGICAL HISTORY:  Past Surgical History:   Procedure Laterality Date    C/SECTION, LOW TRANSVERSE      fetal intolerance after long induction    CERCLAGE CERVICAL N/A 2018    Procedure: BALDERAS CERCLAGE;  Surgeon: Chele Claros MD;  [place] [facility] [place];  Service:      SECTION N/A 2017    Procedure:  SECTION;  Surgeon: Chele Claros MD;  Location: [facility] OR;  Service:      SECTION N/A 2019    Procedure:  SECTION, REPEAT;  Surgeon: Chele Claros MD;  Location: [facility] OR;  Service: Obstetrics     SECTION N/A 2020    Procedure:  SECTION;  Surgeon: Chele Claros MD;  Location: [facility] OR;  Service: Obstetrics     SECTION N/A 2022    Procedure: REPEAT  SECTION;  Surgeon: Chele Claros MD;  Location[facility]    LAPAROSCOPY DIAGNOSTIC (GENERAL) N/A 2016    Procedure: LAPAROSCOPY WITH TUBAL DYE STUDY LAPAROTOMY RIGHT OVARIAN BIOPSY,LEFT OVARIAN BIOPSY FULGERATION OF ENDOMETRIOSOS;  Surgeon: Chele Claros MD;  [place] [facility] [place];  Service:     NOSE SURGERY      OTHER SURGICAL HISTORY      wisdom teeth extractions        SOCIAL HISTORY:   reports that she has never smoked. She has never used smokeless tobacco. She reports that she does  not drink alcohol and does not use drugs.     MEDICATIONS:  No current facility-administered medications on file prior to encounter.  aspirin (ASA) 81 MG chewable tablet, Take 81 mg by mouth daily  citalopram (CELEXA) 10 MG tablet, Take 20 mg by mouth daily  oxyCODONE (ROXICODONE) 5 MG tablet, Take 1 tablet (5 mg) by mouth every 4 hours as needed for moderate to severe pain  PRENATAL VIT CALC,IRON,FOLIC (PRENATAL VITAMIN ORAL), [PRENATAL VIT CALC,IRON,FOLIC (PRENATAL VITAMIN ORAL)] Take 1 tablet by mouth daily.  Nifedipine 60mg ER QD         ALLERGIES:  No Known Allergies     REVIEW OF SYSTEMS   Negative except what is stated in the HPI    PHYSICAL EXAM:  BP (!) 143/84   LMP 2021   GEN: NAD; Alert and oriented, appropriate affect.  HEENT  negative  Heart       Lungs      Abdomen   Abdomen soft, non-tender. BS normal. No masses, organomegaly  Extremities  Normal, Warm, No cyanosis, no clubbing, No edema, and nontender  Vaginal exam: deferred  Membranes: intact    Fetal heart Rate Tracing: appropriate for gestational age  Contractions: acontractile          Pertinent Studies:   All laboratory data reviewed  Serum Glucose range:   Recent Labs   Lab 23   *       CBC:   Recent Labs   Lab 23   WBC 10.4   HGB 12.3   HCT 35.2   MCV 87        Chemistry:   Recent Labs   Lab 23   *   POTASSIUM 3.4   CHLORIDE 102   CO2 22   BUN 7.0   CR 0.47*   GFRESTIMATED >90   ROSY 9.6   PROTTOTAL 7.1   ALBUMIN 3.6   AST 25   ALT 17   ALKPHOS 187*   BILITOTAL 0.3       IMPRESSION:  29 year old,  female  Pregnancy complications include:   Elevated Blood pressures    - on Nifedipine 60mg ER   - normal PIH labs      RECOMMENDATIONS:  - Will add Labetalol 200mg BID as she reports labetalol has worked for her in the past.  - Follow up in clinic early next week  - precautions reviewed  - discharge to home    Thank you for allowing us to participate in the care of this patient.   Please contact us with any questions/concerns.      Lucia Burns, DO on 7/28/2023 at 8:10 PM       yes

## 2023-08-24 NOTE — DISCHARGE NOTE NURSING/CASE MANAGEMENT/SOCIAL WORK - PATIENT PORTAL LINK FT
You can access the FollowMyHealth Patient Portal offered by [facility] by registering at the following website: [email or web address]. By joining [facility]’s FollowMyHealth portal, you will also be able to view your health information using other applications (apps) compatible with our system.
Negative

## 2023-10-27 NOTE — PHYSICAL EXAM
[General Appearance - Well Developed] : well developed [Normal Appearance] : normal appearance [Well Groomed] : well groomed [General Appearance - Well Nourished] : well nourished [Edema] : no peripheral edema [General Appearance - In No Acute Distress] : no acute distress [Exaggerated Use Of Accessory Muscles For Inspiration] : no accessory muscle use [Respiration, Rhythm And Depth] : normal respiratory rhythm and effort [Abdomen Tenderness] : non-tender [Abdomen Soft] : soft [Costovertebral Angle Tenderness] : no ~M costovertebral angle tenderness [Urethral Meatus] : meatus normal [Scrotum] : the scrotum was normal [Testes Mass (___cm)] : there were no testicular masses [Urinary Bladder Findings] : the bladder was normal on palpation [No Prostate Nodules] : no prostate nodules [FreeTextEntry1] : Mild balanitis [] : no rash [Normal Station and Gait] : the gait and station were normal for the patient's age [No Focal Deficits] : no focal deficits [Oriented To Time, Place, And Person] : oriented to person, place, and time [Affect] : the affect was normal [Mood] : the mood was normal [Not Anxious] : not anxious [No Palpable Adenopathy] : no palpable adenopathy clothing

## 2023-11-09 ENCOUNTER — INPATIENT (INPATIENT)
Facility: HOSPITAL | Age: 88
LOS: 3 days | Discharge: HOME | DRG: 660 | End: 2023-11-13
Attending: SURGERY | Admitting: SURGERY
Payer: MEDICARE

## 2023-11-09 ENCOUNTER — TRANSCRIPTION ENCOUNTER (OUTPATIENT)
Age: 88
End: 2023-11-09

## 2023-11-09 VITALS
SYSTOLIC BLOOD PRESSURE: 190 MMHG | TEMPERATURE: 98 F | HEIGHT: 64 IN | HEART RATE: 77 BPM | RESPIRATION RATE: 16 BRPM | OXYGEN SATURATION: 98 % | WEIGHT: 154.1 LBS | DIASTOLIC BLOOD PRESSURE: 93 MMHG

## 2023-11-09 DIAGNOSIS — Z98.890 OTHER SPECIFIED POSTPROCEDURAL STATES: Chronic | ICD-10-CM

## 2023-11-09 DIAGNOSIS — Z95.1 PRESENCE OF AORTOCORONARY BYPASS GRAFT: Chronic | ICD-10-CM

## 2023-11-09 LAB
ALBUMIN SERPL ELPH-MCNC: 4.3 G/DL — SIGNIFICANT CHANGE UP (ref 3.3–5)
ALBUMIN SERPL ELPH-MCNC: 4.3 G/DL — SIGNIFICANT CHANGE UP (ref 3.3–5)
ALP SERPL-CCNC: 56 U/L — SIGNIFICANT CHANGE UP (ref 40–120)
ALP SERPL-CCNC: 56 U/L — SIGNIFICANT CHANGE UP (ref 40–120)
ALT FLD-CCNC: 26 U/L — SIGNIFICANT CHANGE UP (ref 10–45)
ALT FLD-CCNC: 26 U/L — SIGNIFICANT CHANGE UP (ref 10–45)
ANION GAP SERPL CALC-SCNC: 14 MMOL/L — SIGNIFICANT CHANGE UP (ref 5–17)
APPEARANCE UR: CLEAR — SIGNIFICANT CHANGE UP
APPEARANCE UR: CLEAR — SIGNIFICANT CHANGE UP
AST SERPL-CCNC: 28 U/L — SIGNIFICANT CHANGE UP (ref 10–40)
AST SERPL-CCNC: 28 U/L — SIGNIFICANT CHANGE UP (ref 10–40)
BACTERIA # UR AUTO: NEGATIVE /HPF — SIGNIFICANT CHANGE UP
BACTERIA # UR AUTO: NEGATIVE /HPF — SIGNIFICANT CHANGE UP
BASE EXCESS BLDV CALC-SCNC: -0.2 MMOL/L — SIGNIFICANT CHANGE UP (ref -2–3)
BASE EXCESS BLDV CALC-SCNC: -0.2 MMOL/L — SIGNIFICANT CHANGE UP (ref -2–3)
BASE EXCESS BLDV CALC-SCNC: 2.2 MMOL/L — SIGNIFICANT CHANGE UP (ref -2–3)
BASE EXCESS BLDV CALC-SCNC: 2.2 MMOL/L — SIGNIFICANT CHANGE UP (ref -2–3)
BASOPHILS # BLD AUTO: 0.15 K/UL — SIGNIFICANT CHANGE UP (ref 0–0.2)
BASOPHILS # BLD AUTO: 0.15 K/UL — SIGNIFICANT CHANGE UP (ref 0–0.2)
BASOPHILS NFR BLD AUTO: 0.9 % — SIGNIFICANT CHANGE UP (ref 0–2)
BASOPHILS NFR BLD AUTO: 0.9 % — SIGNIFICANT CHANGE UP (ref 0–2)
BILIRUB SERPL-MCNC: 1.2 MG/DL — SIGNIFICANT CHANGE UP (ref 0.2–1.2)
BILIRUB SERPL-MCNC: 1.2 MG/DL — SIGNIFICANT CHANGE UP (ref 0.2–1.2)
BILIRUB UR-MCNC: NEGATIVE — SIGNIFICANT CHANGE UP
BILIRUB UR-MCNC: NEGATIVE — SIGNIFICANT CHANGE UP
BUN SERPL-MCNC: 22 MG/DL — SIGNIFICANT CHANGE UP (ref 7–23)
BUN SERPL-MCNC: 22 MG/DL — SIGNIFICANT CHANGE UP (ref 7–23)
BUN SERPL-MCNC: 26 MG/DL — HIGH (ref 7–23)
BUN SERPL-MCNC: 26 MG/DL — HIGH (ref 7–23)
CA-I SERPL-SCNC: 1.22 MMOL/L — SIGNIFICANT CHANGE UP (ref 1.15–1.33)
CA-I SERPL-SCNC: 1.22 MMOL/L — SIGNIFICANT CHANGE UP (ref 1.15–1.33)
CA-I SERPL-SCNC: 1.25 MMOL/L — SIGNIFICANT CHANGE UP (ref 1.15–1.33)
CA-I SERPL-SCNC: 1.25 MMOL/L — SIGNIFICANT CHANGE UP (ref 1.15–1.33)
CALCIUM SERPL-MCNC: 9.7 MG/DL — SIGNIFICANT CHANGE UP (ref 8.4–10.5)
CALCIUM SERPL-MCNC: 9.7 MG/DL — SIGNIFICANT CHANGE UP (ref 8.4–10.5)
CALCIUM SERPL-MCNC: 9.9 MG/DL — SIGNIFICANT CHANGE UP (ref 8.4–10.5)
CALCIUM SERPL-MCNC: 9.9 MG/DL — SIGNIFICANT CHANGE UP (ref 8.4–10.5)
CAST: 0 /LPF — SIGNIFICANT CHANGE UP (ref 0–4)
CAST: 0 /LPF — SIGNIFICANT CHANGE UP (ref 0–4)
CHLORIDE BLDV-SCNC: 97 MMOL/L — SIGNIFICANT CHANGE UP (ref 96–108)
CHLORIDE BLDV-SCNC: 97 MMOL/L — SIGNIFICANT CHANGE UP (ref 96–108)
CHLORIDE BLDV-SCNC: 99 MMOL/L — SIGNIFICANT CHANGE UP (ref 96–108)
CHLORIDE BLDV-SCNC: 99 MMOL/L — SIGNIFICANT CHANGE UP (ref 96–108)
CHLORIDE SERPL-SCNC: 98 MMOL/L — SIGNIFICANT CHANGE UP (ref 96–108)
CO2 BLDV-SCNC: 27 MMOL/L — HIGH (ref 22–26)
CO2 BLDV-SCNC: 27 MMOL/L — HIGH (ref 22–26)
CO2 BLDV-SCNC: 30 MMOL/L — HIGH (ref 22–26)
CO2 BLDV-SCNC: 30 MMOL/L — HIGH (ref 22–26)
CO2 SERPL-SCNC: 21 MMOL/L — LOW (ref 22–31)
CO2 SERPL-SCNC: 21 MMOL/L — LOW (ref 22–31)
CO2 SERPL-SCNC: 24 MMOL/L — SIGNIFICANT CHANGE UP (ref 22–31)
CO2 SERPL-SCNC: 24 MMOL/L — SIGNIFICANT CHANGE UP (ref 22–31)
COLOR SPEC: YELLOW — SIGNIFICANT CHANGE UP
COLOR SPEC: YELLOW — SIGNIFICANT CHANGE UP
CREAT SERPL-MCNC: 1.99 MG/DL — HIGH (ref 0.5–1.3)
CREAT SERPL-MCNC: 1.99 MG/DL — HIGH (ref 0.5–1.3)
CREAT SERPL-MCNC: 2.08 MG/DL — HIGH (ref 0.5–1.3)
CREAT SERPL-MCNC: 2.08 MG/DL — HIGH (ref 0.5–1.3)
DIFF PNL FLD: ABNORMAL
DIFF PNL FLD: ABNORMAL
EGFR: 30 ML/MIN/1.73M2 — LOW
EGFR: 30 ML/MIN/1.73M2 — LOW
EGFR: 31 ML/MIN/1.73M2 — LOW
EGFR: 31 ML/MIN/1.73M2 — LOW
ELLIPTOCYTES BLD QL SMEAR: SLIGHT — SIGNIFICANT CHANGE UP
ELLIPTOCYTES BLD QL SMEAR: SLIGHT — SIGNIFICANT CHANGE UP
EOSINOPHIL # BLD AUTO: 0 K/UL — SIGNIFICANT CHANGE UP (ref 0–0.5)
EOSINOPHIL # BLD AUTO: 0 K/UL — SIGNIFICANT CHANGE UP (ref 0–0.5)
EOSINOPHIL NFR BLD AUTO: 0 % — SIGNIFICANT CHANGE UP (ref 0–6)
EOSINOPHIL NFR BLD AUTO: 0 % — SIGNIFICANT CHANGE UP (ref 0–6)
GAS PNL BLDV: 131 MMOL/L — LOW (ref 136–145)
GAS PNL BLDV: 131 MMOL/L — LOW (ref 136–145)
GAS PNL BLDV: 133 MMOL/L — LOW (ref 136–145)
GAS PNL BLDV: 133 MMOL/L — LOW (ref 136–145)
GAS PNL BLDV: SIGNIFICANT CHANGE UP
GLUCOSE BLDV-MCNC: 179 MG/DL — HIGH (ref 70–99)
GLUCOSE BLDV-MCNC: 179 MG/DL — HIGH (ref 70–99)
GLUCOSE BLDV-MCNC: 325 MG/DL — HIGH (ref 70–99)
GLUCOSE BLDV-MCNC: 325 MG/DL — HIGH (ref 70–99)
GLUCOSE SERPL-MCNC: 133 MG/DL — HIGH (ref 70–99)
GLUCOSE SERPL-MCNC: 133 MG/DL — HIGH (ref 70–99)
GLUCOSE SERPL-MCNC: 297 MG/DL — HIGH (ref 70–99)
GLUCOSE SERPL-MCNC: 297 MG/DL — HIGH (ref 70–99)
GLUCOSE UR QL: 100 MG/DL
GLUCOSE UR QL: 100 MG/DL
HCO3 BLDV-SCNC: 26 MMOL/L — SIGNIFICANT CHANGE UP (ref 22–29)
HCO3 BLDV-SCNC: 26 MMOL/L — SIGNIFICANT CHANGE UP (ref 22–29)
HCO3 BLDV-SCNC: 28 MMOL/L — SIGNIFICANT CHANGE UP (ref 22–29)
HCO3 BLDV-SCNC: 28 MMOL/L — SIGNIFICANT CHANGE UP (ref 22–29)
HCT VFR BLD CALC: 40.6 % — SIGNIFICANT CHANGE UP (ref 39–50)
HCT VFR BLD CALC: 40.6 % — SIGNIFICANT CHANGE UP (ref 39–50)
HCT VFR BLDA CALC: 38 % — LOW (ref 39–51)
HCT VFR BLDA CALC: 38 % — LOW (ref 39–51)
HCT VFR BLDA CALC: 41 % — SIGNIFICANT CHANGE UP (ref 39–51)
HCT VFR BLDA CALC: 41 % — SIGNIFICANT CHANGE UP (ref 39–51)
HGB BLD CALC-MCNC: 12.8 G/DL — SIGNIFICANT CHANGE UP (ref 12.6–17.4)
HGB BLD CALC-MCNC: 12.8 G/DL — SIGNIFICANT CHANGE UP (ref 12.6–17.4)
HGB BLD CALC-MCNC: 13.5 G/DL — SIGNIFICANT CHANGE UP (ref 12.6–17.4)
HGB BLD CALC-MCNC: 13.5 G/DL — SIGNIFICANT CHANGE UP (ref 12.6–17.4)
HGB BLD-MCNC: 13.6 G/DL — SIGNIFICANT CHANGE UP (ref 13–17)
HGB BLD-MCNC: 13.6 G/DL — SIGNIFICANT CHANGE UP (ref 13–17)
KETONES UR-MCNC: NEGATIVE MG/DL — SIGNIFICANT CHANGE UP
KETONES UR-MCNC: NEGATIVE MG/DL — SIGNIFICANT CHANGE UP
LACTATE BLDV-MCNC: 1.6 MMOL/L — SIGNIFICANT CHANGE UP (ref 0.5–2)
LACTATE BLDV-MCNC: 1.6 MMOL/L — SIGNIFICANT CHANGE UP (ref 0.5–2)
LACTATE BLDV-MCNC: 2.3 MMOL/L — HIGH (ref 0.5–2)
LACTATE BLDV-MCNC: 2.3 MMOL/L — HIGH (ref 0.5–2)
LEUKOCYTE ESTERASE UR-ACNC: ABNORMAL
LEUKOCYTE ESTERASE UR-ACNC: ABNORMAL
LIDOCAIN IGE QN: 33 U/L — SIGNIFICANT CHANGE UP (ref 7–60)
LIDOCAIN IGE QN: 33 U/L — SIGNIFICANT CHANGE UP (ref 7–60)
LYMPHOCYTES # BLD AUTO: 12.2 % — LOW (ref 13–44)
LYMPHOCYTES # BLD AUTO: 12.2 % — LOW (ref 13–44)
LYMPHOCYTES # BLD AUTO: 2.02 K/UL — SIGNIFICANT CHANGE UP (ref 1–3.3)
LYMPHOCYTES # BLD AUTO: 2.02 K/UL — SIGNIFICANT CHANGE UP (ref 1–3.3)
MANUAL SMEAR VERIFICATION: SIGNIFICANT CHANGE UP
MANUAL SMEAR VERIFICATION: SIGNIFICANT CHANGE UP
MCHC RBC-ENTMCNC: 31.4 PG — SIGNIFICANT CHANGE UP (ref 27–34)
MCHC RBC-ENTMCNC: 31.4 PG — SIGNIFICANT CHANGE UP (ref 27–34)
MCHC RBC-ENTMCNC: 33.5 GM/DL — SIGNIFICANT CHANGE UP (ref 32–36)
MCHC RBC-ENTMCNC: 33.5 GM/DL — SIGNIFICANT CHANGE UP (ref 32–36)
MCV RBC AUTO: 93.8 FL — SIGNIFICANT CHANGE UP (ref 80–100)
MCV RBC AUTO: 93.8 FL — SIGNIFICANT CHANGE UP (ref 80–100)
MONOCYTES # BLD AUTO: 1.73 K/UL — HIGH (ref 0–0.9)
MONOCYTES # BLD AUTO: 1.73 K/UL — HIGH (ref 0–0.9)
MONOCYTES NFR BLD AUTO: 10.4 % — SIGNIFICANT CHANGE UP (ref 2–14)
MONOCYTES NFR BLD AUTO: 10.4 % — SIGNIFICANT CHANGE UP (ref 2–14)
NEUTROPHILS # BLD AUTO: 12.69 K/UL — HIGH (ref 1.8–7.4)
NEUTROPHILS # BLD AUTO: 12.69 K/UL — HIGH (ref 1.8–7.4)
NEUTROPHILS NFR BLD AUTO: 76.5 % — SIGNIFICANT CHANGE UP (ref 43–77)
NEUTROPHILS NFR BLD AUTO: 76.5 % — SIGNIFICANT CHANGE UP (ref 43–77)
NITRITE UR-MCNC: NEGATIVE — SIGNIFICANT CHANGE UP
NITRITE UR-MCNC: NEGATIVE — SIGNIFICANT CHANGE UP
OVALOCYTES BLD QL SMEAR: SLIGHT — SIGNIFICANT CHANGE UP
OVALOCYTES BLD QL SMEAR: SLIGHT — SIGNIFICANT CHANGE UP
PCO2 BLDV: 47 MMHG — SIGNIFICANT CHANGE UP (ref 42–55)
PCO2 BLDV: 47 MMHG — SIGNIFICANT CHANGE UP (ref 42–55)
PCO2 BLDV: 49 MMHG — SIGNIFICANT CHANGE UP (ref 42–55)
PCO2 BLDV: 49 MMHG — SIGNIFICANT CHANGE UP (ref 42–55)
PH BLDV: 7.35 — SIGNIFICANT CHANGE UP (ref 7.32–7.43)
PH BLDV: 7.35 — SIGNIFICANT CHANGE UP (ref 7.32–7.43)
PH BLDV: 7.37 — SIGNIFICANT CHANGE UP (ref 7.32–7.43)
PH BLDV: 7.37 — SIGNIFICANT CHANGE UP (ref 7.32–7.43)
PH UR: 7 — SIGNIFICANT CHANGE UP (ref 5–8)
PH UR: 7 — SIGNIFICANT CHANGE UP (ref 5–8)
PLAT MORPH BLD: NORMAL — SIGNIFICANT CHANGE UP
PLAT MORPH BLD: NORMAL — SIGNIFICANT CHANGE UP
PLATELET # BLD AUTO: 236 K/UL — SIGNIFICANT CHANGE UP (ref 150–400)
PLATELET # BLD AUTO: 236 K/UL — SIGNIFICANT CHANGE UP (ref 150–400)
PO2 BLDV: 17 MMHG — LOW (ref 25–45)
PO2 BLDV: 17 MMHG — LOW (ref 25–45)
PO2 BLDV: 20 MMHG — LOW (ref 25–45)
PO2 BLDV: 20 MMHG — LOW (ref 25–45)
POIKILOCYTOSIS BLD QL AUTO: SLIGHT — SIGNIFICANT CHANGE UP
POIKILOCYTOSIS BLD QL AUTO: SLIGHT — SIGNIFICANT CHANGE UP
POTASSIUM BLDV-SCNC: 4.1 MMOL/L — SIGNIFICANT CHANGE UP (ref 3.5–5.1)
POTASSIUM BLDV-SCNC: 4.1 MMOL/L — SIGNIFICANT CHANGE UP (ref 3.5–5.1)
POTASSIUM BLDV-SCNC: 4.6 MMOL/L — SIGNIFICANT CHANGE UP (ref 3.5–5.1)
POTASSIUM BLDV-SCNC: 4.6 MMOL/L — SIGNIFICANT CHANGE UP (ref 3.5–5.1)
POTASSIUM SERPL-MCNC: 4.1 MMOL/L — SIGNIFICANT CHANGE UP (ref 3.5–5.3)
POTASSIUM SERPL-MCNC: 4.1 MMOL/L — SIGNIFICANT CHANGE UP (ref 3.5–5.3)
POTASSIUM SERPL-MCNC: 4.4 MMOL/L — SIGNIFICANT CHANGE UP (ref 3.5–5.3)
POTASSIUM SERPL-MCNC: 4.4 MMOL/L — SIGNIFICANT CHANGE UP (ref 3.5–5.3)
POTASSIUM SERPL-SCNC: 4.1 MMOL/L — SIGNIFICANT CHANGE UP (ref 3.5–5.3)
POTASSIUM SERPL-SCNC: 4.1 MMOL/L — SIGNIFICANT CHANGE UP (ref 3.5–5.3)
POTASSIUM SERPL-SCNC: 4.4 MMOL/L — SIGNIFICANT CHANGE UP (ref 3.5–5.3)
POTASSIUM SERPL-SCNC: 4.4 MMOL/L — SIGNIFICANT CHANGE UP (ref 3.5–5.3)
PROT SERPL-MCNC: 7 G/DL — SIGNIFICANT CHANGE UP (ref 6–8.3)
PROT SERPL-MCNC: 7 G/DL — SIGNIFICANT CHANGE UP (ref 6–8.3)
PROT UR-MCNC: SIGNIFICANT CHANGE UP MG/DL
PROT UR-MCNC: SIGNIFICANT CHANGE UP MG/DL
RBC # BLD: 4.33 M/UL — SIGNIFICANT CHANGE UP (ref 4.2–5.8)
RBC # BLD: 4.33 M/UL — SIGNIFICANT CHANGE UP (ref 4.2–5.8)
RBC # FLD: 13.4 % — SIGNIFICANT CHANGE UP (ref 10.3–14.5)
RBC # FLD: 13.4 % — SIGNIFICANT CHANGE UP (ref 10.3–14.5)
RBC BLD AUTO: ABNORMAL
RBC BLD AUTO: ABNORMAL
RBC CASTS # UR COMP ASSIST: 2 /HPF — SIGNIFICANT CHANGE UP (ref 0–4)
RBC CASTS # UR COMP ASSIST: 2 /HPF — SIGNIFICANT CHANGE UP (ref 0–4)
REVIEW: SIGNIFICANT CHANGE UP
REVIEW: SIGNIFICANT CHANGE UP
SAO2 % BLDV: 19.3 % — LOW (ref 67–88)
SAO2 % BLDV: 19.3 % — LOW (ref 67–88)
SAO2 % BLDV: 22.8 % — LOW (ref 67–88)
SAO2 % BLDV: 22.8 % — LOW (ref 67–88)
SODIUM SERPL-SCNC: 133 MMOL/L — LOW (ref 135–145)
SODIUM SERPL-SCNC: 133 MMOL/L — LOW (ref 135–145)
SODIUM SERPL-SCNC: 136 MMOL/L — SIGNIFICANT CHANGE UP (ref 135–145)
SODIUM SERPL-SCNC: 136 MMOL/L — SIGNIFICANT CHANGE UP (ref 135–145)
SP GR SPEC: 1.01 — SIGNIFICANT CHANGE UP (ref 1–1.03)
SP GR SPEC: 1.01 — SIGNIFICANT CHANGE UP (ref 1–1.03)
SQUAMOUS # UR AUTO: 0 /HPF — SIGNIFICANT CHANGE UP (ref 0–5)
SQUAMOUS # UR AUTO: 0 /HPF — SIGNIFICANT CHANGE UP (ref 0–5)
UROBILINOGEN FLD QL: 0.2 MG/DL — SIGNIFICANT CHANGE UP (ref 0.2–1)
UROBILINOGEN FLD QL: 0.2 MG/DL — SIGNIFICANT CHANGE UP (ref 0.2–1)
WBC # BLD: 16.59 K/UL — HIGH (ref 3.8–10.5)
WBC # BLD: 16.59 K/UL — HIGH (ref 3.8–10.5)
WBC # FLD AUTO: 16.59 K/UL — HIGH (ref 3.8–10.5)
WBC # FLD AUTO: 16.59 K/UL — HIGH (ref 3.8–10.5)
WBC UR QL: 8 /HPF — HIGH (ref 0–5)
WBC UR QL: 8 /HPF — HIGH (ref 0–5)

## 2023-11-09 PROCEDURE — 99285 EMERGENCY DEPT VISIT HI MDM: CPT | Mod: FS,GC

## 2023-11-09 PROCEDURE — 74177 CT ABD & PELVIS W/CONTRAST: CPT | Mod: 26,MA

## 2023-11-09 RX ORDER — SODIUM CHLORIDE 9 MG/ML
1000 INJECTION, SOLUTION INTRAVENOUS ONCE
Refills: 0 | Status: COMPLETED | OUTPATIENT
Start: 2023-11-09 | End: 2023-11-09

## 2023-11-09 RX ORDER — TAMSULOSIN HYDROCHLORIDE 0.4 MG/1
0.4 CAPSULE ORAL ONCE
Refills: 0 | Status: COMPLETED | OUTPATIENT
Start: 2023-11-09 | End: 2023-11-09

## 2023-11-09 RX ORDER — PIPERACILLIN AND TAZOBACTAM 4; .5 G/20ML; G/20ML
3.38 INJECTION, POWDER, LYOPHILIZED, FOR SOLUTION INTRAVENOUS ONCE
Refills: 0 | Status: COMPLETED | OUTPATIENT
Start: 2023-11-09 | End: 2023-11-09

## 2023-11-09 RX ADMIN — SODIUM CHLORIDE 1000 MILLILITER(S): 9 INJECTION, SOLUTION INTRAVENOUS at 19:00

## 2023-11-09 RX ADMIN — PIPERACILLIN AND TAZOBACTAM 200 GRAM(S): 4; .5 INJECTION, POWDER, LYOPHILIZED, FOR SOLUTION INTRAVENOUS at 19:24

## 2023-11-09 RX ADMIN — SODIUM CHLORIDE 1000 MILLILITER(S): 9 INJECTION, SOLUTION INTRAVENOUS at 17:27

## 2023-11-09 RX ADMIN — PIPERACILLIN AND TAZOBACTAM 3.38 GRAM(S): 4; .5 INJECTION, POWDER, LYOPHILIZED, FOR SOLUTION INTRAVENOUS at 20:00

## 2023-11-09 NOTE — ED PROVIDER NOTE - CLINICAL SUMMARY MEDICAL DECISION MAKING FREE TEXT BOX
89M with h/o HTN, HLD c/b CAD (s/p ASA x4 in past), T2DM (A1c 7.4%), CKD, AFIb (On Apixaban) who p/w RLQ abd / suprapubic pain. DDx Appendecitis, nephrolithiasis, cystitis/UTI, colitis/diverticulitis. Low suspicion for malrotation, volvulus, SBO. Pt well-appearing.  - PRN Pain control, pt says he's doing okay for now  - Labs, IVF  - CT A/P with IVC (GFR > 30 & recent evidence suggests ROSA far less likely (if not non-existent) with modern venous contrast; pt & family made aware of risks of FARIDA/CKD with venous contrast regardless, amenable to contrast-enhanced CT)  - ABx d/t leukocytosis on QPA labs

## 2023-11-09 NOTE — ED ADULT NURSE NOTE - OBJECTIVE STATEMENT
89 y/o male, came to the ED with complaints of abdominal pains starting last night. Lower abdominal pains, radiating to his right side and right lower back. Nausea, vomiting x 1. Denies fevers, chills, diarrhea, dysuria, CP, SOB.

## 2023-11-09 NOTE — CONSULT NOTE ADULT - ASSESSMENT
89M with h/o HTN, HLD c/b CAD (s/p ASA x4 in past), T2DM (A1c 7.4%), CKD, AFIb (On Apixaban) here with abdominal pain found to have 5mm R UVJ stone with mild hydro and FARIDA. Afebrile. WBC 16.6. Cr 1.99->2.08. UA with 8WBC.     Recs:  - Recommend medical expulsion therapy with adequate hydration  - trend Cr with AM BMP after hydration  - PO analgesia prn  - Flomax 0.4mg qhs  - strain urine for calculi  - f/u urine culture  - NPO p MN in case worsening Cr or other indication for place ureteral stent  - if patient experiences fever/chills, inability to tolerate PO, acute change in pain not controlled with PO medications or signs of obstructing UTI, alert urology    89M with h/o HTN, HLD c/b CAD (s/p ASA x4 in past), T2DM (A1c 7.4%), CKD, AFIb (On Apixaban) here with abdominal pain found to have 5mm R UVJ stone with mild hydro and FARIDA. Afebrile. WBC 16.6. Cr 1.99->2.08. UA with 8WBC.     Recs:  - Recommend medical expulsion therapy with adequate hydration  - trend Cr with AM BMP after hydration  - PO analgesia prn  - Flomax 0.4mg qhs  - strain urine for calculi  - f/u urine culture  - NPO p MN in case worsening Cr or other indication for place ureteral stent  - please check post void residual by bladder scan after pt's next void  - if patient experiences fever/chills, inability to tolerate PO, acute change in pain not controlled with PO medications or signs of obstructing UTI, alert urology    89M with h/o HTN, HLD c/b CAD (s/p ASA x4 in past), T2DM (A1c 7.4%), CKD, AFIb (On Apixaban) here with abdominal pain found to have 5mm R UVJ stone with mild hydro and FARIDA. Afebrile. WBC 16.6. Cr 1.99->2.08. UA with 8WBC.     Recs:  - Recommend medical expulsion therapy with adequate hydration  - trend Cr with AM BMP after hydration  - PO analgesia prn  - Flomax 0.4mg qhs  - strain urine for calculi  - f/u urine culture  - NPO p MN in case worsening Cr or other indication for place ureteral stent  - please check post void residual by bladder scan after pt's next void  - if patient experiences fever/chills, inability to tolerate PO, acute change in pain not controlled with PO medications or signs of obstructing UTI, alert urology  - Discussed with Dr. Melendrez

## 2023-11-09 NOTE — ED PROVIDER NOTE - PHYSICAL EXAMINATION
How Severe Is Your Skin Lesion?: moderate Has Your Skin Lesion Been Treated?: not been treated Is This A New Presentation, Or A Follow-Up?: Growth Which Family Member (Optional)?: Mother GEN: Awake, AOx3, NAD.  HEENT: NCAT  ---EYES: no scleral icterus, EOMI, PERRLA  CARDIO: RRR.   RESP: Normal respiratory effort  ABD: Soft, mild TTP in suprapubic & RLQ region; no TTP at McBurney's point; no rebound or guarding; otherwise NTND  : No CVAT.   MSK: No obvious deformity or ROM deficit. 2+ pulses x4. No edema.  SKIN: Warm, dry. No rashes. Nail beds without cyanosis or clubbing.  NEURO: Moves all four extremities spontaneously  PSYCH: Appropriate mood & affect.

## 2023-11-09 NOTE — ED PROVIDER NOTE - RAPID ASSESSMENT
89 y/o male with PMHx of DM, CAD with 4 stents presents to the ED complaining of abdominal pain x last night. States that he has pain to the lower abdomen which radiates to the right side and his right lower back. He has never had pain like this before. Last night had an episode of vomiting. He has no fevers/chills. Had some medicine for pain last night but does not recall the name. Unsure if he has elevated creatine and can have IV contrast.     Patient was rapidly assessed via a telemedicine and/or role of Quick Triage RADHA Altamirano. A limited history, physical exam and assessment was performed. The patient will be seen and further evaluated in the main emergency department. The remainder of care and evaluation will be conducted by the primary emergency medicine team. Receiving team will follow up on labs, imaging and serially reassess patient as indicated. All further decisions regarding patient care, evaluation and disposition are at the discretion of the receiving primary emergency department team.

## 2023-11-09 NOTE — ED PROVIDER NOTE - ATTENDING CONTRIBUTION TO CARE
attending Pollack: 89yM h/o HTN, HLD c/b CAD (s/p ASA x4 in past), T2DM (A1c 7.4%), CKD, AFIb (On Apixaban) p/w abdominal pain x 2 days assoc with nausea. Exam as above. Will obtain labs, UA/Ucx, CT A/P, symptomatic treatment, reassess

## 2023-11-09 NOTE — ED PROVIDER NOTE - OBJECTIVE STATEMENT
Mr. Walter is a 89M with h/o HTN, HLD c/b CAD (s/p ASA x4 in past), T2DM (A1c 7.4%), CKD, AFIb (On Apixaban) who presents with abdominal pain. He says that he's had this for 2 days and now it's associated with nausea x2. Still passing flatus & had normal BM this morning. He hasn't had any measured or subjective fevers & no hematemesis. He denies CP/SOB.

## 2023-11-09 NOTE — ED ADULT NURSE NOTE - NS ED NURSE DISCH DISPOSITION
Admitted I will SWITCH the dose or number of times a day I take the medications listed below when I get home from the hospital:  None

## 2023-11-09 NOTE — ED PROVIDER NOTE - PROGRESS NOTE DETAILS
attending Aliza: spoke with patient's daughter Sara over the phone. Pt has known Afib on Eliquis. Pt's daughter is aware of his creatinine, lengthy discussion regarding risks/benefits of CT scan with contrast. Pt and daughter are comfortable with contrast administration. Will hydrate and repeat creatinine after IVF attending Aliza: urology at bedside. Torrey Kramer MD: Given that patient's creatinine is elevated and we do not have definitive proof that this is an FARIDA vs CKD, patient will be observed until the morning time for a repeat Cr measurement. Patient amenable to staying. Torrey Kramer MD: Repeat creatinine is lateral, [facility] aware of results, they will see patient and provide recommendations afterwards. pete- contacted outpt provider- pt's most recent Cr on 11/1/23 was 1.68.

## 2023-11-10 ENCOUNTER — TRANSCRIPTION ENCOUNTER (OUTPATIENT)
Age: 88
End: 2023-11-10

## 2023-11-10 DIAGNOSIS — N13.2 HYDRONEPHROSIS WITH RENAL AND URETERAL CALCULOUS OBSTRUCTION: ICD-10-CM

## 2023-11-10 LAB
ANION GAP SERPL CALC-SCNC: 12 MMOL/L — SIGNIFICANT CHANGE UP (ref 5–17)
ANION GAP SERPL CALC-SCNC: 12 MMOL/L — SIGNIFICANT CHANGE UP (ref 5–17)
ANION GAP SERPL CALC-SCNC: 15 MMOL/L — SIGNIFICANT CHANGE UP (ref 5–17)
ANION GAP SERPL CALC-SCNC: 15 MMOL/L — SIGNIFICANT CHANGE UP (ref 5–17)
BASOPHILS # BLD AUTO: 0.03 K/UL — SIGNIFICANT CHANGE UP (ref 0–0.2)
BASOPHILS # BLD AUTO: 0.03 K/UL — SIGNIFICANT CHANGE UP (ref 0–0.2)
BASOPHILS NFR BLD AUTO: 0.2 % — SIGNIFICANT CHANGE UP (ref 0–2)
BASOPHILS NFR BLD AUTO: 0.2 % — SIGNIFICANT CHANGE UP (ref 0–2)
BUN SERPL-MCNC: 21 MG/DL — SIGNIFICANT CHANGE UP (ref 7–23)
BUN SERPL-MCNC: 21 MG/DL — SIGNIFICANT CHANGE UP (ref 7–23)
BUN SERPL-MCNC: 22 MG/DL — SIGNIFICANT CHANGE UP (ref 7–23)
BUN SERPL-MCNC: 22 MG/DL — SIGNIFICANT CHANGE UP (ref 7–23)
CALCIUM SERPL-MCNC: 8.9 MG/DL — SIGNIFICANT CHANGE UP (ref 8.4–10.5)
CALCIUM SERPL-MCNC: 8.9 MG/DL — SIGNIFICANT CHANGE UP (ref 8.4–10.5)
CALCIUM SERPL-MCNC: 9.2 MG/DL — SIGNIFICANT CHANGE UP (ref 8.4–10.5)
CALCIUM SERPL-MCNC: 9.2 MG/DL — SIGNIFICANT CHANGE UP (ref 8.4–10.5)
CHLORIDE SERPL-SCNC: 100 MMOL/L — SIGNIFICANT CHANGE UP (ref 96–108)
CHLORIDE SERPL-SCNC: 100 MMOL/L — SIGNIFICANT CHANGE UP (ref 96–108)
CHLORIDE SERPL-SCNC: 102 MMOL/L — SIGNIFICANT CHANGE UP (ref 96–108)
CHLORIDE SERPL-SCNC: 102 MMOL/L — SIGNIFICANT CHANGE UP (ref 96–108)
CO2 SERPL-SCNC: 22 MMOL/L — SIGNIFICANT CHANGE UP (ref 22–31)
CO2 SERPL-SCNC: 22 MMOL/L — SIGNIFICANT CHANGE UP (ref 22–31)
CO2 SERPL-SCNC: 24 MMOL/L — SIGNIFICANT CHANGE UP (ref 22–31)
CO2 SERPL-SCNC: 24 MMOL/L — SIGNIFICANT CHANGE UP (ref 22–31)
CREAT SERPL-MCNC: 2.06 MG/DL — HIGH (ref 0.5–1.3)
CREAT SERPL-MCNC: 2.06 MG/DL — HIGH (ref 0.5–1.3)
CREAT SERPL-MCNC: 2.07 MG/DL — HIGH (ref 0.5–1.3)
CREAT SERPL-MCNC: 2.07 MG/DL — HIGH (ref 0.5–1.3)
EGFR: 30 ML/MIN/1.73M2 — LOW
EOSINOPHIL # BLD AUTO: 0.04 K/UL — SIGNIFICANT CHANGE UP (ref 0–0.5)
EOSINOPHIL # BLD AUTO: 0.04 K/UL — SIGNIFICANT CHANGE UP (ref 0–0.5)
EOSINOPHIL NFR BLD AUTO: 0.3 % — SIGNIFICANT CHANGE UP (ref 0–6)
EOSINOPHIL NFR BLD AUTO: 0.3 % — SIGNIFICANT CHANGE UP (ref 0–6)
GLUCOSE BLDC GLUCOMTR-MCNC: 386 MG/DL — HIGH (ref 70–99)
GLUCOSE BLDC GLUCOMTR-MCNC: 386 MG/DL — HIGH (ref 70–99)
GLUCOSE BLDC GLUCOMTR-MCNC: 404 MG/DL — HIGH (ref 70–99)
GLUCOSE BLDC GLUCOMTR-MCNC: 404 MG/DL — HIGH (ref 70–99)
GLUCOSE BLDC GLUCOMTR-MCNC: 415 MG/DL — HIGH (ref 70–99)
GLUCOSE BLDC GLUCOMTR-MCNC: 415 MG/DL — HIGH (ref 70–99)
GLUCOSE BLDC GLUCOMTR-MCNC: 71 MG/DL — SIGNIFICANT CHANGE UP (ref 70–99)
GLUCOSE BLDC GLUCOMTR-MCNC: 71 MG/DL — SIGNIFICANT CHANGE UP (ref 70–99)
GLUCOSE BLDC GLUCOMTR-MCNC: 83 MG/DL — SIGNIFICANT CHANGE UP (ref 70–99)
GLUCOSE BLDC GLUCOMTR-MCNC: 83 MG/DL — SIGNIFICANT CHANGE UP (ref 70–99)
GLUCOSE SERPL-MCNC: 152 MG/DL — HIGH (ref 70–99)
GLUCOSE SERPL-MCNC: 152 MG/DL — HIGH (ref 70–99)
GLUCOSE SERPL-MCNC: 87 MG/DL — SIGNIFICANT CHANGE UP (ref 70–99)
GLUCOSE SERPL-MCNC: 87 MG/DL — SIGNIFICANT CHANGE UP (ref 70–99)
HCT VFR BLD CALC: 31.4 % — LOW (ref 39–50)
HCT VFR BLD CALC: 31.4 % — LOW (ref 39–50)
HGB BLD-MCNC: 10.6 G/DL — LOW (ref 13–17)
HGB BLD-MCNC: 10.6 G/DL — LOW (ref 13–17)
IMM GRANULOCYTES NFR BLD AUTO: 0.5 % — SIGNIFICANT CHANGE UP (ref 0–0.9)
IMM GRANULOCYTES NFR BLD AUTO: 0.5 % — SIGNIFICANT CHANGE UP (ref 0–0.9)
LYMPHOCYTES # BLD AUTO: 1.19 K/UL — SIGNIFICANT CHANGE UP (ref 1–3.3)
LYMPHOCYTES # BLD AUTO: 1.19 K/UL — SIGNIFICANT CHANGE UP (ref 1–3.3)
LYMPHOCYTES # BLD AUTO: 9.3 % — LOW (ref 13–44)
LYMPHOCYTES # BLD AUTO: 9.3 % — LOW (ref 13–44)
MCHC RBC-ENTMCNC: 31.5 PG — SIGNIFICANT CHANGE UP (ref 27–34)
MCHC RBC-ENTMCNC: 31.5 PG — SIGNIFICANT CHANGE UP (ref 27–34)
MCHC RBC-ENTMCNC: 33.8 GM/DL — SIGNIFICANT CHANGE UP (ref 32–36)
MCHC RBC-ENTMCNC: 33.8 GM/DL — SIGNIFICANT CHANGE UP (ref 32–36)
MCV RBC AUTO: 93.2 FL — SIGNIFICANT CHANGE UP (ref 80–100)
MCV RBC AUTO: 93.2 FL — SIGNIFICANT CHANGE UP (ref 80–100)
MONOCYTES # BLD AUTO: 1.4 K/UL — HIGH (ref 0–0.9)
MONOCYTES # BLD AUTO: 1.4 K/UL — HIGH (ref 0–0.9)
MONOCYTES NFR BLD AUTO: 10.9 % — SIGNIFICANT CHANGE UP (ref 2–14)
MONOCYTES NFR BLD AUTO: 10.9 % — SIGNIFICANT CHANGE UP (ref 2–14)
NEUTROPHILS # BLD AUTO: 10.1 K/UL — HIGH (ref 1.8–7.4)
NEUTROPHILS # BLD AUTO: 10.1 K/UL — HIGH (ref 1.8–7.4)
NEUTROPHILS NFR BLD AUTO: 78.8 % — HIGH (ref 43–77)
NEUTROPHILS NFR BLD AUTO: 78.8 % — HIGH (ref 43–77)
NRBC # BLD: 0 /100 WBCS — SIGNIFICANT CHANGE UP (ref 0–0)
NRBC # BLD: 0 /100 WBCS — SIGNIFICANT CHANGE UP (ref 0–0)
PLATELET # BLD AUTO: 186 K/UL — SIGNIFICANT CHANGE UP (ref 150–400)
PLATELET # BLD AUTO: 186 K/UL — SIGNIFICANT CHANGE UP (ref 150–400)
POTASSIUM SERPL-MCNC: 3.6 MMOL/L — SIGNIFICANT CHANGE UP (ref 3.5–5.3)
POTASSIUM SERPL-MCNC: 3.6 MMOL/L — SIGNIFICANT CHANGE UP (ref 3.5–5.3)
POTASSIUM SERPL-MCNC: 4.1 MMOL/L — SIGNIFICANT CHANGE UP (ref 3.5–5.3)
POTASSIUM SERPL-MCNC: 4.1 MMOL/L — SIGNIFICANT CHANGE UP (ref 3.5–5.3)
POTASSIUM SERPL-SCNC: 3.6 MMOL/L — SIGNIFICANT CHANGE UP (ref 3.5–5.3)
POTASSIUM SERPL-SCNC: 3.6 MMOL/L — SIGNIFICANT CHANGE UP (ref 3.5–5.3)
POTASSIUM SERPL-SCNC: 4.1 MMOL/L — SIGNIFICANT CHANGE UP (ref 3.5–5.3)
POTASSIUM SERPL-SCNC: 4.1 MMOL/L — SIGNIFICANT CHANGE UP (ref 3.5–5.3)
RBC # BLD: 3.37 M/UL — LOW (ref 4.2–5.8)
RBC # BLD: 3.37 M/UL — LOW (ref 4.2–5.8)
RBC # FLD: 13.5 % — SIGNIFICANT CHANGE UP (ref 10.3–14.5)
RBC # FLD: 13.5 % — SIGNIFICANT CHANGE UP (ref 10.3–14.5)
SODIUM SERPL-SCNC: 137 MMOL/L — SIGNIFICANT CHANGE UP (ref 135–145)
SODIUM SERPL-SCNC: 137 MMOL/L — SIGNIFICANT CHANGE UP (ref 135–145)
SODIUM SERPL-SCNC: 138 MMOL/L — SIGNIFICANT CHANGE UP (ref 135–145)
SODIUM SERPL-SCNC: 138 MMOL/L — SIGNIFICANT CHANGE UP (ref 135–145)
WBC # BLD: 12.82 K/UL — HIGH (ref 3.8–10.5)
WBC # BLD: 12.82 K/UL — HIGH (ref 3.8–10.5)
WBC # FLD AUTO: 12.82 K/UL — HIGH (ref 3.8–10.5)
WBC # FLD AUTO: 12.82 K/UL — HIGH (ref 3.8–10.5)

## 2023-11-10 DEVICE — URETERAL STENT INLAY OPTIMA 8FR 26CM: Type: IMPLANTABLE DEVICE | Site: RIGHT | Status: FUNCTIONAL

## 2023-11-10 DEVICE — GUIDEWIRE SENSOR DUAL-FLEX NITINOL STRAIGHT .035" X 150CM: Type: IMPLANTABLE DEVICE | Site: RIGHT | Status: FUNCTIONAL

## 2023-11-10 DEVICE — URETERAL CATH OPEN END 5FR 70CM: Type: IMPLANTABLE DEVICE | Site: RIGHT | Status: FUNCTIONAL

## 2023-11-10 RX ORDER — CLOPIDOGREL BISULFATE 75 MG/1
75 TABLET, FILM COATED ORAL DAILY
Refills: 0 | Status: DISCONTINUED | OUTPATIENT
Start: 2023-11-10 | End: 2023-11-13

## 2023-11-10 RX ORDER — DEXTROSE 50 % IN WATER 50 %
25 SYRINGE (ML) INTRAVENOUS ONCE
Refills: 0 | Status: DISCONTINUED | OUTPATIENT
Start: 2023-11-10 | End: 2023-11-13

## 2023-11-10 RX ORDER — METOPROLOL TARTRATE 50 MG
25 TABLET ORAL
Refills: 0 | Status: DISCONTINUED | OUTPATIENT
Start: 2023-11-10 | End: 2023-11-10

## 2023-11-10 RX ORDER — CLOPIDOGREL BISULFATE 75 MG/1
75 TABLET, FILM COATED ORAL DAILY
Refills: 0 | Status: CANCELLED | OUTPATIENT
Start: 2023-11-11 | End: 2023-11-10

## 2023-11-10 RX ORDER — TAMSULOSIN HYDROCHLORIDE 0.4 MG/1
0.4 CAPSULE ORAL AT BEDTIME
Refills: 0 | Status: DISCONTINUED | OUTPATIENT
Start: 2023-11-10 | End: 2023-11-13

## 2023-11-10 RX ORDER — INSULIN LISPRO 100/ML
VIAL (ML) SUBCUTANEOUS
Refills: 0 | Status: DISCONTINUED | OUTPATIENT
Start: 2023-11-10 | End: 2023-11-11

## 2023-11-10 RX ORDER — OMEGA-3 ACID ETHYL ESTERS 1 G
4 CAPSULE ORAL DAILY
Refills: 0 | Status: DISCONTINUED | OUTPATIENT
Start: 2023-11-10 | End: 2023-11-13

## 2023-11-10 RX ORDER — ATORVASTATIN CALCIUM 80 MG/1
40 TABLET, FILM COATED ORAL AT BEDTIME
Refills: 0 | Status: DISCONTINUED | OUTPATIENT
Start: 2023-11-10 | End: 2023-11-13

## 2023-11-10 RX ORDER — OXYCODONE HYDROCHLORIDE 5 MG/1
5 TABLET ORAL EVERY 4 HOURS
Refills: 0 | Status: DISCONTINUED | OUTPATIENT
Start: 2023-11-10 | End: 2023-11-13

## 2023-11-10 RX ORDER — SODIUM CHLORIDE 9 MG/ML
1000 INJECTION, SOLUTION INTRAVENOUS
Refills: 0 | Status: DISCONTINUED | OUTPATIENT
Start: 2023-11-10 | End: 2023-11-10

## 2023-11-10 RX ORDER — LOSARTAN POTASSIUM 100 MG/1
50 TABLET, FILM COATED ORAL DAILY
Refills: 0 | Status: DISCONTINUED | OUTPATIENT
Start: 2023-11-10 | End: 2023-11-13

## 2023-11-10 RX ORDER — SODIUM CHLORIDE 9 MG/ML
1000 INJECTION, SOLUTION INTRAVENOUS
Refills: 0 | Status: DISCONTINUED | OUTPATIENT
Start: 2023-11-10 | End: 2023-11-13

## 2023-11-10 RX ORDER — ASPIRIN/CALCIUM CARB/MAGNESIUM 324 MG
81 TABLET ORAL DAILY
Refills: 0 | Status: DISCONTINUED | OUTPATIENT
Start: 2023-11-10 | End: 2023-11-13

## 2023-11-10 RX ORDER — APIXABAN 2.5 MG/1
2.5 TABLET, FILM COATED ORAL EVERY 12 HOURS
Refills: 0 | Status: DISCONTINUED | OUTPATIENT
Start: 2023-11-10 | End: 2023-11-10

## 2023-11-10 RX ORDER — SODIUM CHLORIDE 9 MG/ML
1000 INJECTION INTRAMUSCULAR; INTRAVENOUS; SUBCUTANEOUS
Refills: 0 | Status: DISCONTINUED | OUTPATIENT
Start: 2023-11-10 | End: 2023-11-11

## 2023-11-10 RX ORDER — METOPROLOL TARTRATE 50 MG
25 TABLET ORAL
Refills: 0 | Status: DISCONTINUED | OUTPATIENT
Start: 2023-11-10 | End: 2023-11-12

## 2023-11-10 RX ORDER — SODIUM CHLORIDE 9 MG/ML
1000 INJECTION, SOLUTION INTRAVENOUS ONCE
Refills: 0 | Status: COMPLETED | OUTPATIENT
Start: 2023-11-10 | End: 2023-11-10

## 2023-11-10 RX ORDER — ASCORBIC ACID 60 MG
500 TABLET,CHEWABLE ORAL DAILY
Refills: 0 | Status: DISCONTINUED | OUTPATIENT
Start: 2023-11-10 | End: 2023-11-10

## 2023-11-10 RX ORDER — OMEGA-3 ACID ETHYL ESTERS 1 G
4 CAPSULE ORAL DAILY
Refills: 0 | Status: DISCONTINUED | OUTPATIENT
Start: 2023-11-10 | End: 2023-11-10

## 2023-11-10 RX ORDER — ACETAMINOPHEN 500 MG
975 TABLET ORAL ONCE
Refills: 0 | Status: COMPLETED | OUTPATIENT
Start: 2023-11-10 | End: 2023-11-10

## 2023-11-10 RX ORDER — ASPIRIN/CALCIUM CARB/MAGNESIUM 324 MG
81 TABLET ORAL DAILY
Refills: 0 | Status: DISCONTINUED | OUTPATIENT
Start: 2023-11-10 | End: 2023-11-10

## 2023-11-10 RX ORDER — INFLUENZA VIRUS VACCINE 15; 15; 15; 15 UG/.5ML; UG/.5ML; UG/.5ML; UG/.5ML
0.7 SUSPENSION INTRAMUSCULAR ONCE
Refills: 0 | Status: COMPLETED | OUTPATIENT
Start: 2023-11-10 | End: 2023-11-10

## 2023-11-10 RX ORDER — ASCORBIC ACID 60 MG
500 TABLET,CHEWABLE ORAL DAILY
Refills: 0 | Status: DISCONTINUED | OUTPATIENT
Start: 2023-11-10 | End: 2023-11-13

## 2023-11-10 RX ORDER — FERROUS SULFATE 325(65) MG
1 TABLET ORAL
Qty: 0 | Refills: 0 | DISCHARGE

## 2023-11-10 RX ORDER — INSULIN LISPRO 100/ML
VIAL (ML) SUBCUTANEOUS AT BEDTIME
Refills: 0 | Status: DISCONTINUED | OUTPATIENT
Start: 2023-11-10 | End: 2023-11-11

## 2023-11-10 RX ORDER — DEXTROSE 50 % IN WATER 50 %
15 SYRINGE (ML) INTRAVENOUS ONCE
Refills: 0 | Status: DISCONTINUED | OUTPATIENT
Start: 2023-11-10 | End: 2023-11-13

## 2023-11-10 RX ORDER — TAMSULOSIN HYDROCHLORIDE 0.4 MG/1
0.4 CAPSULE ORAL AT BEDTIME
Refills: 0 | Status: DISCONTINUED | OUTPATIENT
Start: 2023-11-10 | End: 2023-11-10

## 2023-11-10 RX ORDER — ACETAMINOPHEN 500 MG
1000 TABLET ORAL EVERY 6 HOURS
Refills: 0 | Status: DISCONTINUED | OUTPATIENT
Start: 2023-11-10 | End: 2023-11-13

## 2023-11-10 RX ORDER — SENNA PLUS 8.6 MG/1
2 TABLET ORAL AT BEDTIME
Refills: 0 | Status: DISCONTINUED | OUTPATIENT
Start: 2023-11-10 | End: 2023-11-13

## 2023-11-10 RX ORDER — PANTOPRAZOLE SODIUM 20 MG/1
1 TABLET, DELAYED RELEASE ORAL
Qty: 0 | Refills: 0 | DISCHARGE

## 2023-11-10 RX ORDER — LOSARTAN POTASSIUM 100 MG/1
50 TABLET, FILM COATED ORAL DAILY
Refills: 0 | Status: DISCONTINUED | OUTPATIENT
Start: 2023-11-10 | End: 2023-11-10

## 2023-11-10 RX ORDER — OXYCODONE HYDROCHLORIDE 5 MG/1
10 TABLET ORAL EVERY 6 HOURS
Refills: 0 | Status: DISCONTINUED | OUTPATIENT
Start: 2023-11-10 | End: 2023-11-13

## 2023-11-10 RX ORDER — DEXTROSE 50 % IN WATER 50 %
12.5 SYRINGE (ML) INTRAVENOUS ONCE
Refills: 0 | Status: DISCONTINUED | OUTPATIENT
Start: 2023-11-10 | End: 2023-11-13

## 2023-11-10 RX ORDER — GLUCAGON INJECTION, SOLUTION 0.5 MG/.1ML
1 INJECTION, SOLUTION SUBCUTANEOUS ONCE
Refills: 0 | Status: DISCONTINUED | OUTPATIENT
Start: 2023-11-10 | End: 2023-11-13

## 2023-11-10 RX ORDER — APIXABAN 2.5 MG/1
2.5 TABLET, FILM COATED ORAL EVERY 12 HOURS
Refills: 0 | Status: DISCONTINUED | OUTPATIENT
Start: 2023-11-10 | End: 2023-11-13

## 2023-11-10 RX ORDER — ATORVASTATIN CALCIUM 80 MG/1
40 TABLET, FILM COATED ORAL AT BEDTIME
Refills: 0 | Status: DISCONTINUED | OUTPATIENT
Start: 2023-11-10 | End: 2023-11-10

## 2023-11-10 RX ADMIN — SODIUM CHLORIDE 1000 MILLILITER(S): 9 INJECTION, SOLUTION INTRAVENOUS at 04:02

## 2023-11-10 RX ADMIN — APIXABAN 2.5 MILLIGRAM(S): 2.5 TABLET, FILM COATED ORAL at 17:48

## 2023-11-10 RX ADMIN — TAMSULOSIN HYDROCHLORIDE 0.4 MILLIGRAM(S): 0.4 CAPSULE ORAL at 21:53

## 2023-11-10 RX ADMIN — Medication 25 MILLIGRAM(S): at 17:48

## 2023-11-10 RX ADMIN — SODIUM CHLORIDE 100 MILLILITER(S): 9 INJECTION INTRAMUSCULAR; INTRAVENOUS; SUBCUTANEOUS at 15:24

## 2023-11-10 RX ADMIN — CLOPIDOGREL BISULFATE 75 MILLIGRAM(S): 75 TABLET, FILM COATED ORAL at 17:48

## 2023-11-10 RX ADMIN — Medication 975 MILLIGRAM(S): at 04:05

## 2023-11-10 RX ADMIN — Medication 3: at 21:54

## 2023-11-10 RX ADMIN — SENNA PLUS 2 TABLET(S): 8.6 TABLET ORAL at 21:53

## 2023-11-10 RX ADMIN — Medication 6: at 17:47

## 2023-11-10 RX ADMIN — TAMSULOSIN HYDROCHLORIDE 0.4 MILLIGRAM(S): 0.4 CAPSULE ORAL at 02:06

## 2023-11-10 RX ADMIN — ATORVASTATIN CALCIUM 40 MILLIGRAM(S): 80 TABLET, FILM COATED ORAL at 21:53

## 2023-11-10 RX ADMIN — PIPERACILLIN AND TAZOBACTAM 25 GRAM(S): 4; .5 INJECTION, POWDER, LYOPHILIZED, FOR SOLUTION INTRAVENOUS at 02:05

## 2023-11-10 RX ADMIN — Medication 975 MILLIGRAM(S): at 06:31

## 2023-11-10 RX ADMIN — LOSARTAN POTASSIUM 50 MILLIGRAM(S): 100 TABLET, FILM COATED ORAL at 17:48

## 2023-11-10 NOTE — ED ADULT NURSE REASSESSMENT NOTE - NS ED NURSE REASSESS COMMENT FT1
MD Robin Cortez contacted for sugar of 71 with knowing patient is NPO for procedure. RN awaiting orders for possible dextrose. Pt currently has no diabetes order sets.

## 2023-11-10 NOTE — H&P ADULT - ASSESSMENT
89M with h/o HTN, HLD c/b CAD (s/p ASA x4 in past), T2DM (A1c 7.4%), CKD, AFIb (On Apixaban) here with abdominal pain found to have 5mm R UVJ stone with mild hydro and FARIDA. Afebrile. WBC 16.6. Cr 1.99->2.08. UA with 8WBC.     Recs:  - NPO for right ureteral stent this AM  - PO analgesia prn  - Flomax 0.4mg qhs  - strain urine for calculi  - f/u urine culture  - if patient experiences fever/chills, inability to tolerate PO, acute change in pain not controlled with PO medications or signs of obstructing UTI, alert urology  - Discussed with Dr. Melendrez

## 2023-11-10 NOTE — H&P ADULT - HISTORY OF PRESENT ILLNESS
Mr. 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 Pt reports he has been having some lower abdominal discomfort for the past two days but yesterday had severe R flank pain associated with nausea and 1 episode of emesis. Also feels like he has some lower abdominal distension, more so than his baseline. Has been voiding well and feels as though he is able to empty. Denies fever/chills, L flank pain, dysuria, hematuria, or other acute complaints. Follows with Dr. Andrade for BPH. Has had a stone in the past which he was able to pass with medical expulsive therapy. Reports his endocrinology advised him to see a nephrologist, but pt and family unsure why.

## 2023-11-10 NOTE — PATIENT PROFILE ADULT - FALL HARM RISK - RISK INTERVENTIONS
Assistance OOB with selected safe patient handling equipment/Assistance with ambulation/Communicate Fall Risk and Risk Factors to all staff, patient, and family/Monitor gait and stability/Reinforce activity limits and safety measures with patient and family/Sit up slowly, dangle for a short time, stand at bedside before walking/Use of alarms - bed, chair and/or voice tab/Visual Cue: Yellow wristband/Bed in lowest position, wheels locked, appropriate side rails in place/Call bell, personal items and telephone in reach/Instruct patient to call for assistance before getting out of bed or chair/Non-slip footwear when patient is out of bed/[place] to call system/Physically safe environment - no spills, clutter or unnecessary equipment/Purposeful Proactive Rounding/Room/bathroom lighting operational, light cord in reach

## 2023-11-10 NOTE — H&P ADULT - NSHPLABSRESULTS_GEN_ALL_CORE
10.6   12.82 )-----------( 186               31.4    138  |  102  |  22  ----------------------------<  87  3.6   |  24  |  2.06    Ca    8.9    TPro  7.0  /  Alb  4.3  /  TBili  1.2  /  DBili  x   /  AST  28  /  ALT  26  /  AlkPhos  56    Urinalysis Basic:    Color: x / Appearance: x / SG: x / pH: x  Gluc: 87 mg/dL / Ketone: x  / Bili: x / Urobili: x   Blood: x / Protein: x / Nitrite: x   Leuk Esterase: x / RBC: x / WBC x   Sq Epi: x / Non Sq Epi: x / Bacteria: x      Urine culture: pending results Minocycline Counseling: Patient advised regarding possible photosensitivity and discoloration of the teeth, skin, lips, tongue and gums.  Patient instructed to avoid sunlight, if possible.  When exposed to sunlight, patients should wear protective clothing, sunglasses, and sunscreen.  The patient was instructed to call the office immediately if the following severe adverse effects occur:  hearing changes, easy bruising/bleeding, severe headache, or vision changes.  The patient verbalized understanding of the proper use and possible adverse effects of minocycline.  All of the patient's questions and concerns were addressed.

## 2023-11-10 NOTE — ASU DISCHARGE PLAN (ADULT/PEDIATRIC) - CARE PROVIDER_API CALL
Joya Watkins  Urology  [address] [place] [id number]  Phone: [phone]  Fax: [phone]  Follow Up Time: 1 week

## 2023-11-10 NOTE — PROGRESS NOTE ADULT - SUBJECTIVE AND OBJECTIVE BOX
Post op Check    Pt seen and examined without complaints. Pain is controlled. Denies SOB/CP/N/V.     Vital Signs Last 24 Hrs  T(C): 36.7 (10 Nov 2023 15:07), Max: 37.2 (10 Nov 2023 02:37)  T(F): 98 (10 Nov 2023 15:07), Max: 99 (10 Nov 2023 02:37)  HR: 78 (10 Nov 2023 15:07) (65 - 88)  BP: 145/88 (10 Nov 2023 15:07) (125/63 - 169/100)  BP(mean): 88 (10 Nov 2023 14:15) (87 - 123)  RR: 18 (10 Nov 2023 15:07) (15 - 18)  SpO2: 97% (10 Nov 2023 15:07) (94% - 100%)    Parameters below as of 10 Nov 2023 15:07  Patient On (Oxygen Delivery Method): room air    I&O's Summary    10 Nov 2023 07:01  -  10 Nov 2023 16:00  --------------------------------------------------------  IN: 200 mL / OUT: 100 mL / NET: 100 mL    Physical Exam  Gen: NAD, A&Ox3  Pulm: No respiratory distress, no subcostal retractions  CV: RRR, no JVD  Abd: Soft, NT, ND  Back: no CVAT  : voiding                           10.6   12.82 )-----------( 186      ( 10 Nov 2023 04:59 )             31.4       11-10    137  |  100  |  21  ----------------------------<  152<H>  4.1   |  22  |  2.07<H>    Ca    9.2      10 Nov 2023 12:45    TPro  7.0  /  Alb  4.3  /  TBili  1.2  /  DBili  x   /  AST  28  /  ALT  26  /  AlkPhos  56  11-09

## 2023-11-10 NOTE — ASU DISCHARGE PLAN (ADULT/PEDIATRIC) - NS MD DC FALL RISK RISK
For information on Fall & Injury Prevention, visit: [email or web address] OR  [email or web address] OR  https://www.cdc.gov/steadi/patient.html

## 2023-11-10 NOTE — ED ADULT NURSE REASSESSMENT NOTE - NS ED NURSE REASSESS COMMENT FT1
maintenance fluids ordered with D5 as patient is being transported to the OR for procedure. OR RN Wily made aware of sugar and pending fluids to be given. Belongings and clothes taken off patient and given to daughter.

## 2023-11-11 LAB
-  STAPHYLOCOCCUS EPIDERMIDIS, METHICILLIN RESISTANT: SIGNIFICANT CHANGE UP
-  STAPHYLOCOCCUS EPIDERMIDIS, METHICILLIN RESISTANT: SIGNIFICANT CHANGE UP
A1C WITH ESTIMATED AVERAGE GLUCOSE RESULT: 8.6 % — HIGH (ref 4–5.6)
A1C WITH ESTIMATED AVERAGE GLUCOSE RESULT: 8.6 % — HIGH (ref 4–5.6)
ANION GAP SERPL CALC-SCNC: 12 MMOL/L — SIGNIFICANT CHANGE UP (ref 5–17)
ANION GAP SERPL CALC-SCNC: 12 MMOL/L — SIGNIFICANT CHANGE UP (ref 5–17)
BUN SERPL-MCNC: 24 MG/DL — HIGH (ref 7–23)
BUN SERPL-MCNC: 24 MG/DL — HIGH (ref 7–23)
CALCIUM SERPL-MCNC: 8.6 MG/DL — SIGNIFICANT CHANGE UP (ref 8.4–10.5)
CALCIUM SERPL-MCNC: 8.6 MG/DL — SIGNIFICANT CHANGE UP (ref 8.4–10.5)
CHLORIDE SERPL-SCNC: 104 MMOL/L — SIGNIFICANT CHANGE UP (ref 96–108)
CHLORIDE SERPL-SCNC: 104 MMOL/L — SIGNIFICANT CHANGE UP (ref 96–108)
CO2 SERPL-SCNC: 21 MMOL/L — LOW (ref 22–31)
CO2 SERPL-SCNC: 21 MMOL/L — LOW (ref 22–31)
CREAT SERPL-MCNC: 2.01 MG/DL — HIGH (ref 0.5–1.3)
CREAT SERPL-MCNC: 2.01 MG/DL — HIGH (ref 0.5–1.3)
CULTURE RESULTS: SIGNIFICANT CHANGE UP
CULTURE RESULTS: SIGNIFICANT CHANGE UP
EGFR: 31 ML/MIN/1.73M2 — LOW
EGFR: 31 ML/MIN/1.73M2 — LOW
ESTIMATED AVERAGE GLUCOSE: 200 MG/DL — HIGH (ref 68–114)
ESTIMATED AVERAGE GLUCOSE: 200 MG/DL — HIGH (ref 68–114)
GLUCOSE BLDC GLUCOMTR-MCNC: 146 MG/DL — HIGH (ref 70–99)
GLUCOSE BLDC GLUCOMTR-MCNC: 146 MG/DL — HIGH (ref 70–99)
GLUCOSE BLDC GLUCOMTR-MCNC: 176 MG/DL — HIGH (ref 70–99)
GLUCOSE BLDC GLUCOMTR-MCNC: 176 MG/DL — HIGH (ref 70–99)
GLUCOSE BLDC GLUCOMTR-MCNC: 264 MG/DL — HIGH (ref 70–99)
GLUCOSE BLDC GLUCOMTR-MCNC: 264 MG/DL — HIGH (ref 70–99)
GLUCOSE BLDC GLUCOMTR-MCNC: 308 MG/DL — HIGH (ref 70–99)
GLUCOSE BLDC GLUCOMTR-MCNC: 308 MG/DL — HIGH (ref 70–99)
GLUCOSE SERPL-MCNC: 211 MG/DL — HIGH (ref 70–99)
GLUCOSE SERPL-MCNC: 211 MG/DL — HIGH (ref 70–99)
GRAM STN FLD: ABNORMAL
HCT VFR BLD CALC: 33 % — LOW (ref 39–50)
HCT VFR BLD CALC: 33 % — LOW (ref 39–50)
HGB BLD-MCNC: 11.2 G/DL — LOW (ref 13–17)
HGB BLD-MCNC: 11.2 G/DL — LOW (ref 13–17)
MCHC RBC-ENTMCNC: 31.8 PG — SIGNIFICANT CHANGE UP (ref 27–34)
MCHC RBC-ENTMCNC: 31.8 PG — SIGNIFICANT CHANGE UP (ref 27–34)
MCHC RBC-ENTMCNC: 33.9 GM/DL — SIGNIFICANT CHANGE UP (ref 32–36)
MCHC RBC-ENTMCNC: 33.9 GM/DL — SIGNIFICANT CHANGE UP (ref 32–36)
MCV RBC AUTO: 93.8 FL — SIGNIFICANT CHANGE UP (ref 80–100)
MCV RBC AUTO: 93.8 FL — SIGNIFICANT CHANGE UP (ref 80–100)
METHOD TYPE: SIGNIFICANT CHANGE UP
METHOD TYPE: SIGNIFICANT CHANGE UP
NRBC # BLD: 0 /100 WBCS — SIGNIFICANT CHANGE UP (ref 0–0)
NRBC # BLD: 0 /100 WBCS — SIGNIFICANT CHANGE UP (ref 0–0)
PLATELET # BLD AUTO: 184 K/UL — SIGNIFICANT CHANGE UP (ref 150–400)
PLATELET # BLD AUTO: 184 K/UL — SIGNIFICANT CHANGE UP (ref 150–400)
POTASSIUM SERPL-MCNC: 4.5 MMOL/L — SIGNIFICANT CHANGE UP (ref 3.5–5.3)
POTASSIUM SERPL-MCNC: 4.5 MMOL/L — SIGNIFICANT CHANGE UP (ref 3.5–5.3)
POTASSIUM SERPL-SCNC: 4.5 MMOL/L — SIGNIFICANT CHANGE UP (ref 3.5–5.3)
POTASSIUM SERPL-SCNC: 4.5 MMOL/L — SIGNIFICANT CHANGE UP (ref 3.5–5.3)
RBC # BLD: 3.52 M/UL — LOW (ref 4.2–5.8)
RBC # BLD: 3.52 M/UL — LOW (ref 4.2–5.8)
RBC # FLD: 13.7 % — SIGNIFICANT CHANGE UP (ref 10.3–14.5)
RBC # FLD: 13.7 % — SIGNIFICANT CHANGE UP (ref 10.3–14.5)
SODIUM SERPL-SCNC: 137 MMOL/L — SIGNIFICANT CHANGE UP (ref 135–145)
SODIUM SERPL-SCNC: 137 MMOL/L — SIGNIFICANT CHANGE UP (ref 135–145)
SPECIMEN SOURCE: SIGNIFICANT CHANGE UP
SPECIMEN SOURCE: SIGNIFICANT CHANGE UP
WBC # BLD: 13.11 K/UL — HIGH (ref 3.8–10.5)
WBC # BLD: 13.11 K/UL — HIGH (ref 3.8–10.5)
WBC # FLD AUTO: 13.11 K/UL — HIGH (ref 3.8–10.5)
WBC # FLD AUTO: 13.11 K/UL — HIGH (ref 3.8–10.5)

## 2023-11-11 PROCEDURE — 99232 SBSQ HOSP IP/OBS MODERATE 35: CPT

## 2023-11-11 RX ORDER — INSULIN LISPRO 100/ML
VIAL (ML) SUBCUTANEOUS
Refills: 0 | Status: DISCONTINUED | OUTPATIENT
Start: 2023-11-11 | End: 2023-11-13

## 2023-11-11 RX ORDER — INSULIN LISPRO 100/ML
VIAL (ML) SUBCUTANEOUS AT BEDTIME
Refills: 0 | Status: DISCONTINUED | OUTPATIENT
Start: 2023-11-11 | End: 2023-11-13

## 2023-11-11 RX ADMIN — Medication 25 MILLIGRAM(S): at 18:15

## 2023-11-11 RX ADMIN — Medication 4 GRAM(S): at 12:55

## 2023-11-11 RX ADMIN — ATORVASTATIN CALCIUM 40 MILLIGRAM(S): 80 TABLET, FILM COATED ORAL at 21:56

## 2023-11-11 RX ADMIN — Medication 25 MILLIGRAM(S): at 08:58

## 2023-11-11 RX ADMIN — Medication 6: at 18:11

## 2023-11-11 RX ADMIN — LOSARTAN POTASSIUM 50 MILLIGRAM(S): 100 TABLET, FILM COATED ORAL at 06:00

## 2023-11-11 RX ADMIN — SODIUM CHLORIDE 100 MILLILITER(S): 9 INJECTION INTRAMUSCULAR; INTRAVENOUS; SUBCUTANEOUS at 06:00

## 2023-11-11 RX ADMIN — Medication 1 TABLET(S): at 12:26

## 2023-11-11 RX ADMIN — Medication 500 MILLIGRAM(S): at 12:26

## 2023-11-11 RX ADMIN — Medication 1: at 08:57

## 2023-11-11 RX ADMIN — TAMSULOSIN HYDROCHLORIDE 0.4 MILLIGRAM(S): 0.4 CAPSULE ORAL at 21:57

## 2023-11-11 RX ADMIN — APIXABAN 2.5 MILLIGRAM(S): 2.5 TABLET, FILM COATED ORAL at 06:00

## 2023-11-11 RX ADMIN — APIXABAN 2.5 MILLIGRAM(S): 2.5 TABLET, FILM COATED ORAL at 18:13

## 2023-11-11 RX ADMIN — CLOPIDOGREL BISULFATE 75 MILLIGRAM(S): 75 TABLET, FILM COATED ORAL at 12:28

## 2023-11-11 RX ADMIN — SENNA PLUS 2 TABLET(S): 8.6 TABLET ORAL at 21:56

## 2023-11-11 RX ADMIN — Medication 4: at 12:58

## 2023-11-11 NOTE — PROGRESS NOTE ADULT - SUBJECTIVE AND OBJECTIVE BOX
Subjective  Unable to void overnight, initial attempt traumatic with subsequent successful placement, still with hematuria however, appears to be clearing and now light translucent red.  Blood Cx's from ED positive for MRSE, repeats sent this AM. Urine culture normal  hailey.  Patient offers no complaints. Tolerating diet. Pain controlled. No issues with magallanes.    Objective    Vital signs  T(F): , Max: 98.7 (11-10-23 @ 16:07)  HR: 72 (11-11-23 @ 05:18)  BP: 129/61 (11-11-23 @ 05:18)  SpO2: 97% (11-11-23 @ 05:18)  Wt(kg): --    Output     OUT:    Indwelling Catheter - Urethral (mL): 1750 mL    Voided (mL): 100 mL  Total OUT: 1850 mL    Total NET: -1850 mL          Gen: NAD  Abd: soft, nontender, nondistended  : magallanes secured in place, draining clear translucent light red    Labs      11-11 @ 07:04    WBC --    / Hct --    / SCr 2.01     11-11 @ 07:03    WBC 13.11 / Hct 33.0  / SCr --           Culture - Urine (collected 11-09-23 @ 19:17)  Source: Clean Catch Clean Catch (Midstream)  Final Report (11-11-23 @ 09:52):    <10,000 CFU/mL Normal Urogenital Hailey    Culture - Blood (collected 11-09-23 @ 18:50)  Source: .Blood Blood-Peripheral  Gram Stain (11-11-23 @ 00:25):    Growth in aerobic bottle: Gram Positive Cocci in Clusters  Preliminary Report (11-11-23 @ 00:25):    Growth in aerobic bottle: Gram Positive Cocci in Clusters    Direct identification is available within approximately 3-5    hours either by Blood Panel Multiplexed PCR or Direct    MALDI-TOF. Details: [email or web address]  Organism: Blood Culture PCR (11-11-23 @ 01:39)  Organism: Blood Culture PCR (11-11-23 @ 01:39)      Method Type: PCR      -  Staphylococcus epidermidis, Methicillin resistant: Detec    Culture - Blood (collected 11-09-23 @ 17:10)  Source: .Blood Blood-Peripheral  Gram Stain (11-11-23 @ 03:28):    Growth in aerobic bottle: Gram Positive Cocci in Clusters  Preliminary Report (11-11-23 @ 03:28):    Growth in aerobic bottle: Gram Positive Cocci in Clusters

## 2023-11-11 NOTE — CONSULT NOTE ADULT - ASSESSMENT
89y Male s/p right ureteral stent placement    89M with h/o HTN, HLD c/b CAD (s/p ASA x4 in past), T2DM (A1c 7.4%), CKD, AFIb (On Apixaban) here with abdominal pain found to have 5mm R UVJ stone with mild hydro and FARIDA. Afebrile. WBC 16.6. Cr 1.99->2.08. UA with 8WB    CAD s/p stent Aspirin   Afib rate controlled   Eliquis     DM Uncontrolled   Follow up A1C     Increase sliding scale for now     CKD Stable

## 2023-11-11 NOTE — CONSULT NOTE ADULT - SUBJECTIVE AND OBJECTIVE BOX
HPI:  Mr. 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 Pt reports he has been having some lower abdominal discomfort for the past two days but yesterday had severe R flank pain associated with nausea and 1 episode of emesis. Also feels like he has some lower abdominal distension, more so than his baseline. Has been voiding well and feels as though he is able to empty. Denies fever/chills, L flank pain, dysuria, hematuria, or other acute complaints. Follows with Dr. Andrade for BPH. Has had a stone in the past which he was able to pass with medical expulsive therapy. Reports his endocrinology advised him to see a nephrologist, but pt and family unsure why.  (10 Nov 2023 08:46)      PAST MEDICAL & SURGICAL HISTORY:  HTN (hypertension)      DM (diabetes mellitus)      CAD (coronary artery disease)      HLD (hyperlipidemia)      S/P CABG (coronary artery bypass graft)      Status post ORIF of fracture of ankle  Left, Oct 2019          Review of Systems:   CONSTITUTIONAL: No fever, weight loss, or fatigue  EYES: No eye pain, visual disturbances, or discharge  ENMT:  No difficulty hearing, tinnitus, vertigo; No sinus or throat pain  NECK: No pain or stiffness  BREASTS: No pain, masses, or nipple discharge  RESPIRATORY: No cough, wheezing, chills or hemoptysis; No shortness of breath  CARDIOVASCULAR: No chest pain, palpitations, dizziness, or leg swelling  GASTROINTESTINAL: No abdominal or epigastric pain. No nausea, vomiting, or hematemesis; No diarrhea or constipation. No melena or hematochezia.  GENITOURINARY: No dysuria, frequency, hematuria, or incontinence  NEUROLOGICAL: No headaches, memory loss, loss of strength, numbness, or tremors  SKIN: No itching, burning, rashes, or lesions   LYMPH NODES: No enlarged glands  ENDOCRINE: No heat or cold intolerance; No hair loss  MUSCULOSKELETAL: No joint pain or swelling; No muscle, back, or extremity pain  PSYCHIATRIC: No depression, anxiety, mood swings, or difficulty sleeping  HEME/LYMPH: No easy bruising, or bleeding gums  ALLERY AND IMMUNOLOGIC: No hives or eczema    Allergies    No Known Allergies    Intolerances        Social History:     FAMILY HISTORY:      MEDICATIONS  (STANDING):  acetaminophen     Tablet .. 1000 milliGRAM(s) Oral every 6 hours  apixaban 2.5 milliGRAM(s) Oral every 12 hours  ascorbic acid 500 milliGRAM(s) Oral daily  aspirin  chewable 81 milliGRAM(s) Oral daily  atorvastatin 40 milliGRAM(s) Oral at bedtime  calcium carbonate 1250 mG  + Vitamin D (OsCal 500 + D) 1 Tablet(s) Oral daily  clopidogrel Tablet 75 milliGRAM(s) Oral daily  dextrose 5%. 1000 milliLiter(s) (50 mL/Hr) IV Continuous <Continuous>  dextrose 5%. 1000 milliLiter(s) (100 mL/Hr) IV Continuous <Continuous>  dextrose 50% Injectable 25 Gram(s) IV Push once  dextrose 50% Injectable 25 Gram(s) IV Push once  dextrose 50% Injectable 12.5 Gram(s) IV Push once  glucagon  Injectable 1 milliGRAM(s) IntraMuscular once  influenza  Vaccine (HIGH DOSE) 0.7 milliLiter(s) IntraMuscular once  insulin lispro (ADMELOG) corrective regimen sliding scale   SubCutaneous at bedtime  insulin lispro (ADMELOG) corrective regimen sliding scale   SubCutaneous three times a day before meals  losartan 50 milliGRAM(s) Oral daily  metoprolol tartrate 25 milliGRAM(s) Oral two times a day  omega-3-Acid Ethyl Esters 4 Gram(s) Oral daily  senna 2 Tablet(s) Oral at bedtime  tamsulosin 0.4 milliGRAM(s) Oral at bedtime    MEDICATIONS  (PRN):  dextrose Oral Gel 15 Gram(s) Oral once PRN Blood Glucose LESS THAN 70 milliGRAM(s)/deciliter  oxyCODONE    IR 10 milliGRAM(s) Oral every 6 hours PRN Severe Pain (7 - 10)  oxyCODONE    IR 5 milliGRAM(s) Oral every 4 hours PRN Moderate Pain (4 - 6)        CAPILLARY BLOOD GLUCOSE      POCT Blood Glucose.: 146 mg/dL (11 Nov 2023 21:53)  POCT Blood Glucose.: 264 mg/dL (11 Nov 2023 17:24)  POCT Blood Glucose.: 308 mg/dL (11 Nov 2023 12:54)  POCT Blood Glucose.: 176 mg/dL (11 Nov 2023 08:53)    I&O's Summary    10 Nov 2023 07:01 - 11 Nov 2023 07:00  --------------------------------------------------------  IN: 940 mL / OUT: 1850 mL / NET: -910 mL    11 Nov 2023 07:01  -  11 Nov 2023 23:58  --------------------------------------------------------  IN: 1230 mL / OUT: 1200 mL / NET: 30 mL        PHYSICAL EXAM:  GENERAL: NAD, well-developed  HEAD:  Atraumatic, Normocephalic  EYES: EOMI, PERRLA, conjunctiva and sclera clear  NECK: Supple, No JVD  CHEST/LUNG: Clear to auscultation bilaterally; No wheeze  HEART: Regular rate and rhythm; No murmurs, rubs, or gallops  ABDOMEN: Soft, Nontender, Nondistended; Bowel sounds present  EXTREMITIES:  2+ Peripheral Pulses, No clubbing, cyanosis, or edema  PSYCH: AAOx3  NEUROLOGY: non-focal  SKIN: No rashes or lesions    LABS:                        11.2   13.11 )-----------( 184      ( 11 Nov 2023 07:03 )             33.0     11-11    137  |  104  |  24<H>  ----------------------------<  211<H>  4.5   |  21<L>  |  2.01<H>    Ca    8.6      11 Nov 2023 07:04            Urinalysis Basic - ( 11 Nov 2023 07:04 )    Color: x / Appearance: x / SG: x / pH: x  Gluc: 211 mg/dL / Ketone: x  / Bili: x / Urobili: x   Blood: x / Protein: x / Nitrite: x   Leuk Esterase: x / RBC: x / WBC x   Sq Epi: x / Non Sq Epi: x / Bacteria: x        RADIOLOGY & ADDITIONAL TESTS:    Imaging Personally Reviewed:    Consultant(s) Notes Reviewed:      Care Discussed with Consultants/Other Providers:  
HPI: Mr. 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 Pt reports he has been having some lower abdominal discomfort for the past two days but yesterday had severe R flank pain associated with nausea and 1 episode of emesis. Also feels like he has some lower abdominal distension, more so than his baseline. Has been voiding well and feels as though he is able to empty. Denies fever/chills, L flank pain, dysuria, hematuria, or other acute complaints. Follows with Dr. Andrade for BPH. Has had a stone in the past which he was able to pass with medical expulsive therapy. Reports his endocrinology advised him to see a nephrologist, but pt and family unsure why.     PAST MEDICAL & SURGICAL HISTORY:  HTN (hypertension)      DM (diabetes mellitus)      CAD (coronary artery disease)      HLD (hyperlipidemia)      S/P CABG (coronary artery bypass graft)      Status post ORIF of fracture of ankle  Left, Oct 2019          MEDICATIONS  (STANDING):  piperacillin/tazobactam IVPB.. 3.375 Gram(s) IV Intermittent once  tamsulosin 0.4 milliGRAM(s) Oral once    MEDICATIONS  (PRN):      FAMILY HISTORY:  No pertinent family history in first degree relatives        Allergies    No Known Allergies    Intolerances        SOCIAL HISTORY:    REVIEW OF SYSTEMS: Otherwise negative as stated in HPI    Physical Exam  Vital signs  T(C): 36.9 (11-09-23 @ 20:14), Max: 36.9 (11-09-23 @ 20:14)  HR: 76 (11-09-23 @ 20:14)  BP: 169/100 (11-09-23 @ 20:14)  SpO2: 96% (11-09-23 @ 20:14)    Output    Gen: NAD  Pulm: No respiratory distress  Abd: soft, nt  : no CVAT      LABS:                          13.6   16.59 )-----------( 236      ( 09 Nov 2023 15:25 )             40.6       11-09    136  |  98  |  22  ----------------------------<  133<H>  4.1   |  24  |  2.08<H>    Ca    9.7      09 Nov 2023 22:30    TPro  7.0  /  Alb  4.3  /  TBili  1.2  /  DBili  x   /  AST  28  /  ALT  26  /  AlkPhos  56  11-09      Urinalysis Basic - ( 09 Nov 2023 22:30 )    Color: x / Appearance: x / SG: x / pH: x  Gluc: 133 mg/dL / Ketone: x  / Bili: x / Urobili: x   Blood: x / Protein: x / Nitrite: x   Leuk Esterase: x / RBC: x / WBC x   Sq Epi: x / Non Sq Epi: x / Bacteria: x    Urine Cx: pending    RADIOLOGY:  < from: CT Abdomen and Pelvis w/ IV Cont (11.09.23 @ 21:31) >  ACC: [id number] EXAM:  CT ABDOMEN AND PELVIS IC   ORDERED BY: ZOYA RAMÍREZ     PROCEDURE DATE:  11/09/2023          INTERPRETATION:  CLINICAL INFORMATION: Abdominal pain    COMPARISON: CT abdomen/pelvis 3/23/2018    CONTRAST/COMPLICATIONS:  IV Contrast: Omnipaque 350  90 cc administered   10 cc discarded  Oral Contrast: NONE  Complications: None reported at time of study completion    PROCEDURE:  CT of the Abdomen and Pelvis was performed.  Sagittal and coronal reformats were performed.    FINDINGS:  LOWER CHEST: Multivessel coronary artery calcifications. Sternotomy.    LIVER: Within normal limits.  BILE DUCTS: Normal caliber.  GALLBLADDER: Within normal limits.  SPLEEN: Within normal limits.  PANCREAS: Within normal limits.  ADRENALS: Within normal limits.  KIDNEYS/URETERS: 5 mm obstructive stone at the right ureterovesicular   junction resulting in mild right hydroureteronephrosis with mild   periureteral and perinephric stranding/fluid. Mildly delayed right   nephrogram. Left renal subcentimeter low-density lesions, too small to   catheterize.    BLADDER: Within normal limits.  REPRODUCTIVE ORGANS: Prostate is enlarged.    BOWEL: No bowel obstruction. Appendix is normal.  PERITONEUM: No ascites.  VESSELS: Atherosclerotic changes.  RETROPERITONEUM/LYMPH NODES: No lymphadenopathy.  ABDOMINAL WALL: Within normal limits.  BONES: Degenerative changes.    IMPRESSION:  5 mm obstructive stone at the right ureterovesicular junction resulting   in mild right hydroureteronephrosis with mild periureteral and   perinephric stranding, recommend correlation with urinalysis to assess   for superimposed infection.        --- End of Report ---    < end of copied text >

## 2023-11-12 ENCOUNTER — TRANSCRIPTION ENCOUNTER (OUTPATIENT)
Age: 88
End: 2023-11-12

## 2023-11-12 LAB
-  AMPICILLIN/SULBACTAM: SIGNIFICANT CHANGE UP
-  AMPICILLIN/SULBACTAM: SIGNIFICANT CHANGE UP
-  CEFAZOLIN: SIGNIFICANT CHANGE UP
-  CEFAZOLIN: SIGNIFICANT CHANGE UP
-  CLINDAMYCIN: SIGNIFICANT CHANGE UP
-  CLINDAMYCIN: SIGNIFICANT CHANGE UP
-  ERYTHROMYCIN: SIGNIFICANT CHANGE UP
-  ERYTHROMYCIN: SIGNIFICANT CHANGE UP
-  GENTAMICIN: SIGNIFICANT CHANGE UP
-  GENTAMICIN: SIGNIFICANT CHANGE UP
-  OXACILLIN: SIGNIFICANT CHANGE UP
-  OXACILLIN: SIGNIFICANT CHANGE UP
-  RIFAMPIN: SIGNIFICANT CHANGE UP
-  RIFAMPIN: SIGNIFICANT CHANGE UP
-  TETRACYCLINE: SIGNIFICANT CHANGE UP
-  TETRACYCLINE: SIGNIFICANT CHANGE UP
-  TRIMETHOPRIM/SULFAMETHOXAZOLE: SIGNIFICANT CHANGE UP
-  TRIMETHOPRIM/SULFAMETHOXAZOLE: SIGNIFICANT CHANGE UP
-  VANCOMYCIN: SIGNIFICANT CHANGE UP
-  VANCOMYCIN: SIGNIFICANT CHANGE UP
A1C WITH ESTIMATED AVERAGE GLUCOSE RESULT: 8.8 % — HIGH (ref 4–5.6)
A1C WITH ESTIMATED AVERAGE GLUCOSE RESULT: 8.8 % — HIGH (ref 4–5.6)
ANION GAP SERPL CALC-SCNC: 13 MMOL/L — SIGNIFICANT CHANGE UP (ref 5–17)
ANION GAP SERPL CALC-SCNC: 13 MMOL/L — SIGNIFICANT CHANGE UP (ref 5–17)
BUN SERPL-MCNC: 24 MG/DL — HIGH (ref 7–23)
BUN SERPL-MCNC: 24 MG/DL — HIGH (ref 7–23)
CALCIUM SERPL-MCNC: 8.6 MG/DL — SIGNIFICANT CHANGE UP (ref 8.4–10.5)
CALCIUM SERPL-MCNC: 8.6 MG/DL — SIGNIFICANT CHANGE UP (ref 8.4–10.5)
CHLORIDE SERPL-SCNC: 107 MMOL/L — SIGNIFICANT CHANGE UP (ref 96–108)
CHLORIDE SERPL-SCNC: 107 MMOL/L — SIGNIFICANT CHANGE UP (ref 96–108)
CO2 SERPL-SCNC: 20 MMOL/L — LOW (ref 22–31)
CO2 SERPL-SCNC: 20 MMOL/L — LOW (ref 22–31)
CREAT SERPL-MCNC: 1.95 MG/DL — HIGH (ref 0.5–1.3)
CREAT SERPL-MCNC: 1.95 MG/DL — HIGH (ref 0.5–1.3)
CULTURE RESULTS: ABNORMAL
EGFR: 32 ML/MIN/1.73M2 — LOW
EGFR: 32 ML/MIN/1.73M2 — LOW
ESTIMATED AVERAGE GLUCOSE: 206 MG/DL — HIGH (ref 68–114)
ESTIMATED AVERAGE GLUCOSE: 206 MG/DL — HIGH (ref 68–114)
GLUCOSE BLDC GLUCOMTR-MCNC: 184 MG/DL — HIGH (ref 70–99)
GLUCOSE BLDC GLUCOMTR-MCNC: 184 MG/DL — HIGH (ref 70–99)
GLUCOSE BLDC GLUCOMTR-MCNC: 219 MG/DL — HIGH (ref 70–99)
GLUCOSE BLDC GLUCOMTR-MCNC: 219 MG/DL — HIGH (ref 70–99)
GLUCOSE BLDC GLUCOMTR-MCNC: 224 MG/DL — HIGH (ref 70–99)
GLUCOSE BLDC GLUCOMTR-MCNC: 224 MG/DL — HIGH (ref 70–99)
GLUCOSE BLDC GLUCOMTR-MCNC: 71 MG/DL — SIGNIFICANT CHANGE UP (ref 70–99)
GLUCOSE BLDC GLUCOMTR-MCNC: 71 MG/DL — SIGNIFICANT CHANGE UP (ref 70–99)
GLUCOSE SERPL-MCNC: 72 MG/DL — SIGNIFICANT CHANGE UP (ref 70–99)
GLUCOSE SERPL-MCNC: 72 MG/DL — SIGNIFICANT CHANGE UP (ref 70–99)
GRAM STN FLD: ABNORMAL
GRAM STN FLD: ABNORMAL
HCT VFR BLD CALC: 34.2 % — LOW (ref 39–50)
HCT VFR BLD CALC: 34.2 % — LOW (ref 39–50)
HGB BLD-MCNC: 11.2 G/DL — LOW (ref 13–17)
HGB BLD-MCNC: 11.2 G/DL — LOW (ref 13–17)
MCHC RBC-ENTMCNC: 31 PG — SIGNIFICANT CHANGE UP (ref 27–34)
MCHC RBC-ENTMCNC: 31 PG — SIGNIFICANT CHANGE UP (ref 27–34)
MCHC RBC-ENTMCNC: 32.7 GM/DL — SIGNIFICANT CHANGE UP (ref 32–36)
MCHC RBC-ENTMCNC: 32.7 GM/DL — SIGNIFICANT CHANGE UP (ref 32–36)
MCV RBC AUTO: 94.7 FL — SIGNIFICANT CHANGE UP (ref 80–100)
MCV RBC AUTO: 94.7 FL — SIGNIFICANT CHANGE UP (ref 80–100)
METHOD TYPE: SIGNIFICANT CHANGE UP
METHOD TYPE: SIGNIFICANT CHANGE UP
NRBC # BLD: 0 /100 WBCS — SIGNIFICANT CHANGE UP (ref 0–0)
NRBC # BLD: 0 /100 WBCS — SIGNIFICANT CHANGE UP (ref 0–0)
ORGANISM # SPEC MICROSCOPIC CNT: ABNORMAL
PLATELET # BLD AUTO: 209 K/UL — SIGNIFICANT CHANGE UP (ref 150–400)
PLATELET # BLD AUTO: 209 K/UL — SIGNIFICANT CHANGE UP (ref 150–400)
POTASSIUM SERPL-MCNC: 4.1 MMOL/L — SIGNIFICANT CHANGE UP (ref 3.5–5.3)
POTASSIUM SERPL-MCNC: 4.1 MMOL/L — SIGNIFICANT CHANGE UP (ref 3.5–5.3)
POTASSIUM SERPL-SCNC: 4.1 MMOL/L — SIGNIFICANT CHANGE UP (ref 3.5–5.3)
POTASSIUM SERPL-SCNC: 4.1 MMOL/L — SIGNIFICANT CHANGE UP (ref 3.5–5.3)
RBC # BLD: 3.61 M/UL — LOW (ref 4.2–5.8)
RBC # BLD: 3.61 M/UL — LOW (ref 4.2–5.8)
RBC # FLD: 13.9 % — SIGNIFICANT CHANGE UP (ref 10.3–14.5)
RBC # FLD: 13.9 % — SIGNIFICANT CHANGE UP (ref 10.3–14.5)
SODIUM SERPL-SCNC: 140 MMOL/L — SIGNIFICANT CHANGE UP (ref 135–145)
SODIUM SERPL-SCNC: 140 MMOL/L — SIGNIFICANT CHANGE UP (ref 135–145)
SPECIMEN SOURCE: SIGNIFICANT CHANGE UP
WBC # BLD: 12.01 K/UL — HIGH (ref 3.8–10.5)
WBC # BLD: 12.01 K/UL — HIGH (ref 3.8–10.5)
WBC # FLD AUTO: 12.01 K/UL — HIGH (ref 3.8–10.5)
WBC # FLD AUTO: 12.01 K/UL — HIGH (ref 3.8–10.5)

## 2023-11-12 RX ORDER — METOPROLOL TARTRATE 50 MG
25 TABLET ORAL
Refills: 0 | Status: DISCONTINUED | OUTPATIENT
Start: 2023-11-12 | End: 2023-11-13

## 2023-11-12 RX ADMIN — Medication 25 MILLIGRAM(S): at 17:46

## 2023-11-12 RX ADMIN — ATORVASTATIN CALCIUM 40 MILLIGRAM(S): 80 TABLET, FILM COATED ORAL at 23:11

## 2023-11-12 RX ADMIN — APIXABAN 2.5 MILLIGRAM(S): 2.5 TABLET, FILM COATED ORAL at 17:47

## 2023-11-12 RX ADMIN — LOSARTAN POTASSIUM 50 MILLIGRAM(S): 100 TABLET, FILM COATED ORAL at 06:32

## 2023-11-12 RX ADMIN — CLOPIDOGREL BISULFATE 75 MILLIGRAM(S): 75 TABLET, FILM COATED ORAL at 12:39

## 2023-11-12 RX ADMIN — Medication 500 MILLIGRAM(S): at 12:40

## 2023-11-12 RX ADMIN — Medication 25 MILLIGRAM(S): at 10:20

## 2023-11-12 RX ADMIN — Medication 4: at 17:48

## 2023-11-12 RX ADMIN — TAMSULOSIN HYDROCHLORIDE 0.4 MILLIGRAM(S): 0.4 CAPSULE ORAL at 23:11

## 2023-11-12 RX ADMIN — Medication 4: at 12:40

## 2023-11-12 RX ADMIN — Medication 1 TABLET(S): at 12:42

## 2023-11-12 RX ADMIN — APIXABAN 2.5 MILLIGRAM(S): 2.5 TABLET, FILM COATED ORAL at 06:35

## 2023-11-12 RX ADMIN — Medication 4 GRAM(S): at 12:33

## 2023-11-12 NOTE — DISCHARGE NOTE PROVIDER - NSDCFUADDINST_GEN_ALL_CORE_FT
PAIN CONTROL: You may take 650 mg of Tylenol every 4-6 hours. Do not exceed more than 4000mg or 4 grams of Tylenol daily. You may take Tylenol and Ibuprofen as needed for pain. If you were prescribed narcotic pain medication, take as directed. You should also take senna to prevent constipation.    STENT: You may have intermittent pink tinged urine and slight flank pain when you urinate.  This is normal and due to the stent in your ureter. It is not uncommon to have some burning when you urinate.  Some patients do not feel any discomfort from the stent, while others may feel the sensation of needing to urinate frequently, burning on urination, or even back pain that worsens with urination. These symptoms usually improve gradually, however it may be necessary to take pain medication until the discomfort subsides.  If you are unable to urinate or your urine becomes bright red or with clots, please call the office. Please make sure you drink plenty of fluids.    ANTIBIOTICS: Please complete the course of antibiotics sent your pharmacy as instructed.    BATHING: Please do not submerge wound underwater. You may shower and/or sponge bathe.    ACTIVITY: No heavy lifting or straining. Otherwise, you may return to your usual level of physical activity. If you are taking narcotic pain medications (such as oxycodone), do NOT drive a car, operate machinery or make important decisions.    DIET: Return to your usual diet    NOTIFY YOUR SURGEON IF: You have any bleeding that does not stop, any fevers (over 100.4F) or chills, persistent nausea/vomiting, persistent diarrhea, your pain is not controlled on your discharge pain medications, heavy bleeding in the urine, inability to urinate, or other worrisome symptoms arise.    FOLLOW UP:  1. Please call your surgeon to make a follow up appointment within one week of discharge  2: Please follow up with your primary care physician within 1-2 weeks regarding your hospitalization.

## 2023-11-12 NOTE — DISCHARGE NOTE PROVIDER - NSDCACTIVITY_GEN_ALL_CORE
No restrictions/Return to Work/School allowed/Stairs allowed/Walking - Indoors allowed/No heavy lifting/straining/Walking - Outdoors allowed/Follow Instructions Provided by your Surgical Team

## 2023-11-12 NOTE — PROGRESS NOTE ADULT - SUBJECTIVE AND OBJECTIVE BOX
Subjective: yesterday BCx from ED resulted with MSRE, repeat BCx sent. No overnight events.    Objective    Vital signs  T(F): , Max: 98.4 (11-12-23 @ 05:15)  HR: 58 (11-12-23 @ 05:15)  BP: 125/72 (11-12-23 @ 05:15)  SpO2: 93% (11-12-23 @ 05:15)  Wt(kg): --    Output     OUT:    Indwelling Catheter - Urethral (mL): 1750 mL    Voided (mL): 100 mL  Total OUT: 1850 mL    Total NET: -1850 mL      OUT:    Indwelling Catheter - Urethral (mL): 2350 mL  Total OUT: 2350 mL    Total NET: -2350 mL        Gen  Abd      Labs      AM labs pending    Urine Cx: Culture - Urine (11.09.23 @ 19:17)    Specimen Source: Clean Catch Clean Catch (Midstream)   Culture Results:   <10,000 CFU/mL Normal Urogenital Galilea      Blood Cx: Culture - Blood (11.09.23 @ 18:50)    -  Staphylococcus epidermidis, Methicillin resistant: Detec   Gram Stain:   Growth in aerobic bottle: Gram Positive Cocci in Clusters  Growth in anaerobic bottle: Gram Positive Cocci in Clusters   Specimen Source: .Blood Blood-Peripheral   Organism: Blood Culture PCR   Culture Results:   Growth in aerobic bottle: Staphylococcus epidermidis  Direct identification is available within approximately 3-5  hours either by Blood Panel Multiplexed PCR or Direct  MALDI-TOF. Details: [email or web address]  Growth in anaerobic bottle: Gram Positive Cocci in Clusters   Organism Identification: Blood Culture PCR   Method Type: PCR    Culture - Blood (11.09.23 @ 17:10)    Gram Stain:   Growth in aerobic bottle: Gram Positive Cocci in Clusters   Specimen Source: .Blood Blood-Peripheral   Culture Results:   Growth in aerobic bottle: Staphylococcus epidermidis Susceptibility to  follow.   Subjective: yesterday BCx from ED resulted with MSRE, repeat BCx sent. No overnight events. feels well. wants to go home.    Objective    Vital signs  T(F): , Max: 98.4 (11-12-23 @ 05:15)  HR: 58 (11-12-23 @ 05:15)  BP: 125/72 (11-12-23 @ 05:15)  SpO2: 93% (11-12-23 @ 05:15)  Wt(kg): --    Output     OUT:    Indwelling Catheter - Urethral (mL): 1750 mL    Voided (mL): 100 mL  Total OUT: 1850 mL    Total NET: -1850 mL      OUT:    Indwelling Catheter - Urethral (mL): 2350 mL  Total OUT: 2350 mL    Total NET: -2350 mL        Gen: NAD  Resp: no resp distress  Abd: soft, nontender  : magallanes in place with clear urine    Labs             11.2   12.01 )-----------( 209      ( 12 Nov 2023 07:09 )             34.2     11-12    140  |  107  |  24<H>  ----------------------------<  72  4.1   |  20<L>  |  1.95<H>    Ca    8.6      12 Nov 2023 07:09      Urine Cx: Culture - Urine (11.09.23 @ 19:17)    Specimen Source: Clean Catch Clean Catch (Midstream)   Culture Results:   <10,000 CFU/mL Normal Urogenital Galilea      Blood Cx: Culture - Blood (11.09.23 @ 18:50)    -  Staphylococcus epidermidis, Methicillin resistant: Detec   Gram Stain:   Growth in aerobic bottle: Gram Positive Cocci in Clusters  Growth in anaerobic bottle: Gram Positive Cocci in Clusters   Specimen Source: .Blood Blood-Peripheral   Organism: Blood Culture PCR   Culture Results:   Growth in aerobic bottle: Staphylococcus epidermidis  Direct identification is available within approximately 3-5  hours either by Blood Panel Multiplexed PCR or Direct  MALDI-TOF. Details: [email or web address]  Growth in anaerobic bottle: Gram Positive Cocci in Clusters   Organism Identification: Blood Culture PCR   Method Type: PCR    Culture - Blood (11.09.23 @ 17:10)    Gram Stain:   Growth in aerobic bottle: Gram Positive Cocci in Clusters   Specimen Source: .Blood Blood-Peripheral   Culture Results:   Growth in aerobic bottle: Staphylococcus epidermidis Susceptibility to  follow.

## 2023-11-12 NOTE — PROVIDER CONTACT NOTE (CRITICAL VALUE NOTIFICATION) - SITUATION
Blood culture positive for Aerobic gram + Cocci and clusters
Blood culture collected on 11/9 amended report: growth in aerobic bottle staphaccous epidermitis, growth in anaerobic bottle gram positive cocci in clusters

## 2023-11-12 NOTE — DISCHARGE NOTE PROVIDER - NSDCMRMEDTOKEN_GEN_ALL_CORE_FT
aspirin 81 mg oral tablet, chewable: 1 tab(s) orally once a day  atorvastatin 40 mg oral tablet: 1 tab(s) orally once a day  Citracal + D:   clopidogrel 75 mg oral tablet: 1 tab(s) orally once a day  Eliquis 5 mg oral tablet: 1 tab(s) orally once a day  Flomax 0.4 mg oral capsule: 1 cap(s) orally once a day (at bedtime)  glimepiride 4 mg oral tablet: 1 tab(s) orally once a day  losartan 50 mg oral tablet: 1 tab(s) orally once a day  metFORMIN 500 mg oral tablet: 1 tab(s) orally 2 times a day  metoprolol tartrate 25 mg oral tablet: 1 tab(s) orally 2 times a day  Omega-3 oral capsule:   Tradjenta 5 mg oral tablet: 1 tab(s) orally once a day  Vitamin C 1000 mg oral tablet: 1 tab(s) orally once a day

## 2023-11-12 NOTE — PROGRESS NOTE ADULT - SUBJECTIVE AND OBJECTIVE BOX
HPI:  Mr. 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 Pt reports he has been having some lower abdominal discomfort for the past two days but yesterday had severe R flank pain associated with nausea and 1 episode of emesis. Also feels like he has some lower abdominal distension, more so than his baseline. Has been voiding well and feels as though he is able to empty. Denies fever/chills, L flank pain, dysuria, hematuria, or other acute complaints. Follows with Dr. Andrade for BPH. Has had a stone in the past which he was able to pass with medical expulsive therapy. Reports his endocrinology advised him to see a nephrologist, but pt and family unsure why.  (10 Nov 2023 08:46)      PAST MEDICAL & SURGICAL HISTORY:  HTN (hypertension)      DM (diabetes mellitus)      CAD (coronary artery disease)      HLD (hyperlipidemia)      S/P CABG (coronary artery bypass graft)      Status post ORIF of fracture of ankle  Left, Oct 2019          Review of Systems:   CONSTITUTIONAL: No fever, weight loss, or fatigue  EYES: No eye pain, visual disturbances, or discharge  ENMT:  No difficulty hearing, tinnitus, vertigo; No sinus or throat pain  NECK: No pain or stiffness  BREASTS: No pain, masses, or nipple discharge  RESPIRATORY: No cough, wheezing, chills or hemoptysis; No shortness of breath  CARDIOVASCULAR: No chest pain, palpitations, dizziness, or leg swelling  GASTROINTESTINAL: No abdominal or epigastric pain. No nausea, vomiting, or hematemesis; No diarrhea or constipation. No melena or hematochezia.  GENITOURINARY: No dysuria, frequency, hematuria, or incontinence  NEUROLOGICAL: No headaches, memory loss, loss of strength, numbness, or tremors  SKIN: No itching, burning, rashes, or lesions   LYMPH NODES: No enlarged glands  ENDOCRINE: No heat or cold intolerance; No hair loss  MUSCULOSKELETAL: No joint pain or swelling; No muscle, back, or extremity pain  PSYCHIATRIC: No depression, anxiety, mood swings, or difficulty sleeping  HEME/LYMPH: No easy bruising, or bleeding gums  ALLERY AND IMMUNOLOGIC: No hives or eczema    Allergies    No Known Allergies    Intolerances        Social History:     FAMILY HISTORY:      T(C): 36.7 (11-12-23 @ 21:31), Max: 36.7 (11-12-23 @ 21:31)  HR: 71 (11-12-23 @ 21:31) (65 - 72)  BP: 156/79 (11-12-23 @ 21:31) (152/81 - 156/79)  RR: 18 (11-12-23 @ 21:31) (18 - 18)  SpO2: 98% (11-12-23 @ 21:31) (98% - 98%)      MEDICATIONS  (STANDING):  acetaminophen     Tablet .. 1000 milliGRAM(s) Oral every 6 hours  apixaban 2.5 milliGRAM(s) Oral every 12 hours  ascorbic acid 500 milliGRAM(s) Oral daily  aspirin  chewable 81 milliGRAM(s) Oral daily  atorvastatin 40 milliGRAM(s) Oral at bedtime  calcium carbonate 1250 mG  + Vitamin D (OsCal 500 + D) 1 Tablet(s) Oral daily  clopidogrel Tablet 75 milliGRAM(s) Oral daily  dextrose 5%. 1000 milliLiter(s) (100 mL/Hr) IV Continuous <Continuous>  dextrose 5%. 1000 milliLiter(s) (50 mL/Hr) IV Continuous <Continuous>  dextrose 50% Injectable 25 Gram(s) IV Push once  dextrose 50% Injectable 25 Gram(s) IV Push once  dextrose 50% Injectable 12.5 Gram(s) IV Push once  glucagon  Injectable 1 milliGRAM(s) IntraMuscular once  influenza  Vaccine (HIGH DOSE) 0.7 milliLiter(s) IntraMuscular once  insulin lispro (ADMELOG) corrective regimen sliding scale   SubCutaneous at bedtime  insulin lispro (ADMELOG) corrective regimen sliding scale   SubCutaneous three times a day before meals  losartan 50 milliGRAM(s) Oral daily  metoprolol tartrate 25 milliGRAM(s) Oral two times a day  omega-3-Acid Ethyl Esters 4 Gram(s) Oral daily  senna 2 Tablet(s) Oral at bedtime  tamsulosin 0.4 milliGRAM(s) Oral at bedtime    MEDICATIONS  (PRN):  dextrose Oral Gel 15 Gram(s) Oral once PRN Blood Glucose LESS THAN 70 milliGRAM(s)/deciliter  oxyCODONE    IR 5 milliGRAM(s) Oral every 4 hours PRN Moderate Pain (4 - 6)  oxyCODONE    IR 10 milliGRAM(s) Oral every 6 hours PRN Severe Pain (7 - 10)  PHYSICAL EXAM:  GENERAL: NAD, well-developed  HEAD:  Atraumatic, Normocephalic  EYES: EOMI, PERRLA, conjunctiva and sclera clear  NECK: Supple, No JVD  CHEST/LUNG: Clear to auscultation bilaterally; No wheeze  HEART: Regular rate and rhythm; No murmurs, rubs, or gallops  ABDOMEN: Soft, Nontender, Nondistended; Bowel sounds present  EXTREMITIES:  2+ Peripheral Pulses, No clubbing, cyanosis, or edema  PSYCH: AAOx3  NEUROLOGY: non-focal  SKIN: No rashes or lesions                            11.2   12.01 )-----------( 209      ( 12 Nov 2023 07:09 )             34.2               140|107|24<72  4.1|20|1.95  8.6,--,--  11-12 @ 07:09          Urinalysis Basic - ( 11 Nov 2023 07:04 )    Color: x / Appearance: x / SG: x / pH: x  Gluc: 211 mg/dL / Ketone: x  / Bili: x / Urobili: x   Blood: x / Protein: x / Nitrite: x   Leuk Esterase: x / RBC: x / WBC x   Sq Epi: x / Non Sq Epi: x / Bacteria: x        RADIOLOGY & ADDITIONAL TESTS:    Imaging Personally Reviewed:    Consultant(s) Notes Reviewed:      Care Discussed with Consultants/Other Providers:

## 2023-11-12 NOTE — DISCHARGE NOTE PROVIDER - HOSPITAL COURSE
89M with h/o HTN, HLD c/b CAD (s/p ASA x4 in past), T2DM (A1c 7.4%), CKD, AFib (On Apixaban) here with abdominal pain found to have 5mm R UVJ stone with mild hydro and FARIDA admitted and s/p R ureteral stent 11/10. Post op complicated by retention s/p magallanes placement. Blood cultures from ER came back MSRE, likely contaminated, repeats sent and ______. cleared by Dr. Melendrez for outpatient follow up with catheter. Pt did well post op. At the time of discharge, the patient was hemodynamically stable, was tolerating PO diet, was voiding urine and passing stool, was ambulating, and was comfortable with adequate pain control. Pt/family given discharge plan/instructions and agrees with plan. 89M with h/o HTN, HLD c/b CAD (s/p ASA x4 in past), T2DM (A1c 7.4%), CKD, AFib (On Apixaban) here with abdominal pain found to have 5mm R UVJ stone with mild hydro and FARIDA admitted and s/p R ureteral stent 11/10. Post op complicated by retention s/p magallanes placement. Blood cultures from ER came back MSRE, likely contaminated, repeats sent and no growth at 24 hours. cleared by Dr. Melendrez for outpatient follow up with catheter. Pt did well post op. At the time of discharge, the patient was hemodynamically stable, was tolerating PO diet, was voiding urine and passing stool, was ambulating, and was comfortable with adequate pain control. Pt/family given discharge plan/instructions and agrees with plan. Will continue to follow blood culture results until finalized. 89M with h/o HTN, HLD c/b CAD (s/p ASA x4 in past), T2DM (A1c 7.4%), CKD, AFib (On Apixaban) here with abdominal pain found to have 5mm R UVJ stone with mild hydro and FARIDA admitted and s/p R ureteral stent 11/10. Post op complicated by retention s/p magallanes placement. Blood cultures from ER came back MSRE, likely contaminated, repeats sent and no growth at 24 hours. cleared by Dr. Melendrez for outpatient follow up with catheter. Pt did well post op. At the time of discharge, the patient was hemodynamically stable, was tolerating PO diet, was voiding urine and passing stool, was ambulating, and was comfortable with adequate pain control. Cr also downtrending at time of discharge. Pt/family given discharge plan/instructions and agrees with plan. Will continue to follow blood culture results until finalized. no abx needed. home with magallanes.

## 2023-11-12 NOTE — DISCHARGE NOTE PROVIDER - CARE PROVIDER_API CALL
Joya Watkins  Urology  [address] [place] [id number]  Phone: [phone]  Fax: [phone]  Follow Up Time: 1 week

## 2023-11-12 NOTE — PROVIDER CONTACT NOTE (CRITICAL VALUE NOTIFICATION) - TEST AND RESULT REPORTED:
B/C from 11/9 growth in aerobic and anaerobic bottle Gram positive cocci in pairs/ Staphylococcus Epidermitis
Blood culture from 11/9 , positive for aerobic gram + cocci and clusters
Blood culture collected on 11/9 amended report: growth in aerobic bottle staphaccous epidermitis, growth in anaerobic bottle gram positive cocci in clusters

## 2023-11-12 NOTE — DISCHARGE NOTE PROVIDER - NSDCCPCAREPLAN_GEN_ALL_CORE_FT
PRINCIPAL DISCHARGE DIAGNOSIS  Diagnosis: Hydronephrosis with obstructing calculus  Assessment and Plan of Treatment: you had a right ureteral stent placed to help with drainage of your right kidney. you went into urinary retention post operatively and had a magallanes catheter placed to drain your bladder. follow up with Dr. Watkins within a week for removal of catheter and to discuss definitive stone management. Call the office if you have fever greater than 101, difficulty urinating, pain not relieved with pain medication, nausea/vomiting or other worrisome symptoms.

## 2023-11-13 ENCOUNTER — TRANSCRIPTION ENCOUNTER (OUTPATIENT)
Age: 88
End: 2023-11-13

## 2023-11-13 VITALS
SYSTOLIC BLOOD PRESSURE: 140 MMHG | DIASTOLIC BLOOD PRESSURE: 72 MMHG | OXYGEN SATURATION: 97 % | HEART RATE: 68 BPM | RESPIRATION RATE: 18 BRPM

## 2023-11-13 LAB
ANION GAP SERPL CALC-SCNC: 10 MMOL/L — SIGNIFICANT CHANGE UP (ref 5–17)
ANION GAP SERPL CALC-SCNC: 10 MMOL/L — SIGNIFICANT CHANGE UP (ref 5–17)
BUN SERPL-MCNC: 20 MG/DL — SIGNIFICANT CHANGE UP (ref 7–23)
BUN SERPL-MCNC: 20 MG/DL — SIGNIFICANT CHANGE UP (ref 7–23)
CALCIUM SERPL-MCNC: 9.1 MG/DL — SIGNIFICANT CHANGE UP (ref 8.4–10.5)
CALCIUM SERPL-MCNC: 9.1 MG/DL — SIGNIFICANT CHANGE UP (ref 8.4–10.5)
CHLORIDE SERPL-SCNC: 104 MMOL/L — SIGNIFICANT CHANGE UP (ref 96–108)
CHLORIDE SERPL-SCNC: 104 MMOL/L — SIGNIFICANT CHANGE UP (ref 96–108)
CO2 SERPL-SCNC: 24 MMOL/L — SIGNIFICANT CHANGE UP (ref 22–31)
CO2 SERPL-SCNC: 24 MMOL/L — SIGNIFICANT CHANGE UP (ref 22–31)
CREAT SERPL-MCNC: 1.67 MG/DL — HIGH (ref 0.5–1.3)
CREAT SERPL-MCNC: 1.67 MG/DL — HIGH (ref 0.5–1.3)
CULTURE RESULTS: ABNORMAL
CULTURE RESULTS: ABNORMAL
EGFR: 39 ML/MIN/1.73M2 — LOW
EGFR: 39 ML/MIN/1.73M2 — LOW
GLUCOSE BLDC GLUCOMTR-MCNC: 162 MG/DL — HIGH (ref 70–99)
GLUCOSE BLDC GLUCOMTR-MCNC: 162 MG/DL — HIGH (ref 70–99)
GLUCOSE BLDC GLUCOMTR-MCNC: 289 MG/DL — HIGH (ref 70–99)
GLUCOSE BLDC GLUCOMTR-MCNC: 289 MG/DL — HIGH (ref 70–99)
GLUCOSE SERPL-MCNC: 179 MG/DL — HIGH (ref 70–99)
GLUCOSE SERPL-MCNC: 179 MG/DL — HIGH (ref 70–99)
HCT VFR BLD CALC: 34.4 % — LOW (ref 39–50)
HCT VFR BLD CALC: 34.4 % — LOW (ref 39–50)
HGB BLD-MCNC: 11.5 G/DL — LOW (ref 13–17)
HGB BLD-MCNC: 11.5 G/DL — LOW (ref 13–17)
MCHC RBC-ENTMCNC: 31.3 PG — SIGNIFICANT CHANGE UP (ref 27–34)
MCHC RBC-ENTMCNC: 31.3 PG — SIGNIFICANT CHANGE UP (ref 27–34)
MCHC RBC-ENTMCNC: 33.4 GM/DL — SIGNIFICANT CHANGE UP (ref 32–36)
MCHC RBC-ENTMCNC: 33.4 GM/DL — SIGNIFICANT CHANGE UP (ref 32–36)
MCV RBC AUTO: 93.5 FL — SIGNIFICANT CHANGE UP (ref 80–100)
MCV RBC AUTO: 93.5 FL — SIGNIFICANT CHANGE UP (ref 80–100)
NRBC # BLD: 0 /100 WBCS — SIGNIFICANT CHANGE UP (ref 0–0)
NRBC # BLD: 0 /100 WBCS — SIGNIFICANT CHANGE UP (ref 0–0)
PLATELET # BLD AUTO: 220 K/UL — SIGNIFICANT CHANGE UP (ref 150–400)
PLATELET # BLD AUTO: 220 K/UL — SIGNIFICANT CHANGE UP (ref 150–400)
POTASSIUM SERPL-MCNC: 4.6 MMOL/L — SIGNIFICANT CHANGE UP (ref 3.5–5.3)
POTASSIUM SERPL-MCNC: 4.6 MMOL/L — SIGNIFICANT CHANGE UP (ref 3.5–5.3)
POTASSIUM SERPL-SCNC: 4.6 MMOL/L — SIGNIFICANT CHANGE UP (ref 3.5–5.3)
POTASSIUM SERPL-SCNC: 4.6 MMOL/L — SIGNIFICANT CHANGE UP (ref 3.5–5.3)
RBC # BLD: 3.68 M/UL — LOW (ref 4.2–5.8)
RBC # BLD: 3.68 M/UL — LOW (ref 4.2–5.8)
RBC # FLD: 13.6 % — SIGNIFICANT CHANGE UP (ref 10.3–14.5)
RBC # FLD: 13.6 % — SIGNIFICANT CHANGE UP (ref 10.3–14.5)
SODIUM SERPL-SCNC: 138 MMOL/L — SIGNIFICANT CHANGE UP (ref 135–145)
SODIUM SERPL-SCNC: 138 MMOL/L — SIGNIFICANT CHANGE UP (ref 135–145)
WBC # BLD: 8.21 K/UL — SIGNIFICANT CHANGE UP (ref 3.8–10.5)
WBC # BLD: 8.21 K/UL — SIGNIFICANT CHANGE UP (ref 3.8–10.5)
WBC # FLD AUTO: 8.21 K/UL — SIGNIFICANT CHANGE UP (ref 3.8–10.5)
WBC # FLD AUTO: 8.21 K/UL — SIGNIFICANT CHANGE UP (ref 3.8–10.5)

## 2023-11-13 RX ADMIN — Medication 2: at 08:59

## 2023-11-13 RX ADMIN — APIXABAN 2.5 MILLIGRAM(S): 2.5 TABLET, FILM COATED ORAL at 05:30

## 2023-11-13 RX ADMIN — LOSARTAN POTASSIUM 50 MILLIGRAM(S): 100 TABLET, FILM COATED ORAL at 05:29

## 2023-11-13 RX ADMIN — Medication 6: at 12:37

## 2023-11-13 RX ADMIN — Medication 4 GRAM(S): at 12:40

## 2023-11-13 RX ADMIN — Medication 1 TABLET(S): at 12:40

## 2023-11-13 RX ADMIN — Medication 25 MILLIGRAM(S): at 05:29

## 2023-11-13 RX ADMIN — Medication 500 MILLIGRAM(S): at 12:40

## 2023-11-13 NOTE — PROGRESS NOTE ADULT - SUBJECTIVE AND OBJECTIVE BOX
UROLOGY PA PROGRESS NOTE:     Subjective:  no acute events overnight, pt feeling well without complaints.     Objective:  Vital signs  T(F): , Max: 98 (11-12-23 @ 21:31)  HR: 68 (11-13-23 @ 05:32)  BP: 162/84 (11-13-23 @ 05:32)  SpO2: 93% (11-13-23 @ 05:32)  Wt(kg): --    Output     11-12 @ 07:01  -  11-13 @ 07:00  --------------------------------------------------------  IN: 800 mL / OUT: 3150 mL / NET: -2350 mL    magallanes 1200cc     Physical Exam:  Gen: NAD  Abd: soft, NT/ND  : +magallanes draining clear urine     Labs:  11-13  8.21  / 34.4  /x      11-13  x     / x     /1.67                           11.5   8.21  )-----------( 220      ( 13 Nov 2023 07:32 )             34.4     11-13    138  |  104  |  20  ----------------------------<  179<H>  4.6   |  24  |  1.67<H>    Ca    9.1      13 Nov 2023 07:31        Urinalysis Basic - ( 13 Nov 2023 07:31 )    Color: x / Appearance: x / SG: x / pH: x  Gluc: 179 mg/dL / Ketone: x  / Bili: x / Urobili: x   Blood: x / Protein: x / Nitrite: x   Leuk Esterase: x / RBC: x / WBC x   Sq Epi: x / Non Sq Epi: x / Bacteria: x        Urine Cx:    Culture - Blood (collected 11 Nov 2023 10:20)  Source: .Blood Blood-Venous  Preliminary Report (12 Nov 2023 17:02):    No growth at 24 hours    Culture - Blood (collected 11 Nov 2023 10:10)  Source: .Blood Blood-Peripheral  Preliminary Report (12 Nov 2023 17:02):    No growth at 24 hours

## 2023-11-13 NOTE — DISCHARGE NOTE NURSING/CASE MANAGEMENT/SOCIAL WORK - PATIENT PORTAL LINK FT
You can access the FollowMyHealth Patient Portal offered by [facility] by registering at the following website: [email or web address]. By joining [employer]’s FollowMyHealth portal, you will also be able to view your health information using other applications (apps) compatible with our system.

## 2023-11-13 NOTE — PROGRESS NOTE ADULT - REASON FOR ADMISSION
FARIDA, 5mm right ureteral calculus

## 2023-11-13 NOTE — PROGRESS NOTE ADULT - ASSESSMENT
89y Male s/p right ureteral stent placement    89M with h/o HTN, HLD c/b CAD (s/p ASA x4 in past), T2DM (A1c 7.4%), CKD, AFIb (On Apixaban) here with abdominal pain found to have 5mm R UVJ stone with mild hydro and FARIDA. Afebrile. WBC 16.6. Cr 1.99->2.08. UA with 8WB    CAD s/p stent Aspirin   Afib rate controlled   Eliquis     DM Uncontrolled   Follow up A1C 8.8     Increase sliding scale for now     CKD Stable 
A/P: 89y Male s/p right ureteral stent placement    Elevated FS overnight, will discuss with medicine if not improved today  DVT prophylaxis/OOB  Incentive spirometry  Strict I&O's  Analgesia and antiemetics as needed  Diet-consistent carb  AM labs reviewed, Cr 2.01 from 2.07  Blood Cx MRSE, FU repeats this AM  Urine Cx normal  hailey  Continue magallanes    
A/P: 89y Male s/p right ureteral stent placement   DVT prophylaxis/OOB  Incentive spirometry  Strict I&O's  Analgesia and antiemetics as needed  Diet-consistent carb  AM labs  
A/P: 89y Male s/p right ureteral stent placement, Cr now improving   - am labs reviewed, Cr downtrending, now 1.67 from 1.95  - Blood Cx MRSE, repeat Blood Cx NGTD  - Urine Cx normal  hailey  - no abx   - Continue magallanes for retention  - d/c today with magallanes, no additional abx 
A/P: 89y Male s/p right ureteral stent placement    appreciate medicine recommendations, ISS increased and will f/u A1C  DVT prophylaxis/OOB  Incentive spirometry  Strict I&O's  Analgesia and antiemetics as needed  Diet-consistent carb  AM labs reviewed, Cr 2.01 from 2.07  Blood Cx MRSE, repeat Blood Cx pending  Urine Cx normal  hailey  Continue magallanes for retention  Dispo planning pending blood cultures

## 2023-11-15 ENCOUNTER — APPOINTMENT (OUTPATIENT)
Dept: UROLOGY | Facility: CLINIC | Age: 88
End: 2023-11-15
Payer: MEDICARE

## 2023-11-15 ENCOUNTER — OUTPATIENT (OUTPATIENT)
Dept: OUTPATIENT SERVICES | Facility: HOSPITAL | Age: 88
LOS: 1 days | End: 2023-11-15
Payer: MEDICARE

## 2023-11-15 VITALS — HEART RATE: 75 BPM | DIASTOLIC BLOOD PRESSURE: 93 MMHG | SYSTOLIC BLOOD PRESSURE: 178 MMHG

## 2023-11-15 DIAGNOSIS — Z95.1 PRESENCE OF AORTOCORONARY BYPASS GRAFT: Chronic | ICD-10-CM

## 2023-11-15 DIAGNOSIS — N40.0 BENIGN PROSTATIC HYPERPLASIA WITHOUT LOWER URINARY TRACT SYMPMS: ICD-10-CM

## 2023-11-15 DIAGNOSIS — Z98.890 OTHER SPECIFIED POSTPROCEDURAL STATES: Chronic | ICD-10-CM

## 2023-11-15 DIAGNOSIS — R35.0 FREQUENCY OF MICTURITION: ICD-10-CM

## 2023-11-15 PROCEDURE — 99214 OFFICE O/P EST MOD 30 MIN: CPT | Mod: 25

## 2023-11-15 PROCEDURE — 51700 IRRIGATION OF BLADDER: CPT

## 2023-11-16 PROBLEM — N40.0 BPH (BENIGN PROSTATIC HYPERPLASIA): Noted: 2020-03-30

## 2023-11-16 LAB
CULTURE RESULTS: SIGNIFICANT CHANGE UP
SPECIMEN SOURCE: SIGNIFICANT CHANGE UP

## 2023-11-17 DIAGNOSIS — N40.1 BENIGN PROSTATIC HYPERPLASIA WITH LOWER URINARY TRACT SYMPTOMS: ICD-10-CM

## 2023-11-17 DIAGNOSIS — N20.1 CALCULUS OF URETER: ICD-10-CM

## 2023-11-17 DIAGNOSIS — N40.0 BENIGN PROSTATIC HYPERPLASIA WITHOUT LOWER URINARY TRACT SYMPTOMS: ICD-10-CM

## 2023-11-17 DIAGNOSIS — R33.9 RETENTION OF URINE, UNSPECIFIED: ICD-10-CM

## 2023-11-17 NOTE — H&P ADULT - MUSCULOSKELETAL
Problem: Pain Acute  Goal: Acceptable Pain Control and Functional Ability  11/17/2023 1606 by Aracelis Guerra RN  Outcome: Adequate for Care Transition  11/17/2023 1314 by Aracelis Guerra RN  Outcome: Ongoing, Progressing  11/17/2023 1120 by Aracelis Guerra RN  Outcome: Ongoing, Progressing      No joint pain, swelling or deformity; no limitation of movement negative

## 2023-11-22 ENCOUNTER — OUTPATIENT (OUTPATIENT)
Dept: OUTPATIENT SERVICES | Facility: HOSPITAL | Age: 88
LOS: 1 days | End: 2023-11-22
Payer: MEDICARE

## 2023-11-22 VITALS
HEART RATE: 83 BPM | OXYGEN SATURATION: 96 % | TEMPERATURE: 98 F | HEIGHT: 65 IN | DIASTOLIC BLOOD PRESSURE: 82 MMHG | WEIGHT: 151.9 LBS | RESPIRATION RATE: 16 BRPM | SYSTOLIC BLOOD PRESSURE: 146 MMHG

## 2023-11-22 DIAGNOSIS — N20.0 CALCULUS OF KIDNEY: ICD-10-CM

## 2023-11-22 DIAGNOSIS — I48.91 UNSPECIFIED ATRIAL FIBRILLATION: ICD-10-CM

## 2023-11-22 DIAGNOSIS — E11.9 TYPE 2 DIABETES MELLITUS WITHOUT COMPLICATIONS: ICD-10-CM

## 2023-11-22 DIAGNOSIS — I25.10 ATHEROSCLEROTIC HEART DISEASE OF NATIVE CORONARY ARTERY WITHOUT ANGINA PECTORIS: ICD-10-CM

## 2023-11-22 DIAGNOSIS — Z01.818 ENCOUNTER FOR OTHER PREPROCEDURAL EXAMINATION: ICD-10-CM

## 2023-11-22 DIAGNOSIS — N20.1 CALCULUS OF URETER: ICD-10-CM

## 2023-11-22 DIAGNOSIS — Z95.1 PRESENCE OF AORTOCORONARY BYPASS GRAFT: Chronic | ICD-10-CM

## 2023-11-22 DIAGNOSIS — Z98.890 OTHER SPECIFIED POSTPROCEDURAL STATES: Chronic | ICD-10-CM

## 2023-11-22 DIAGNOSIS — Z98.61 CORONARY ANGIOPLASTY STATUS: Chronic | ICD-10-CM

## 2023-11-22 LAB
ANION GAP SERPL CALC-SCNC: 16 MMOL/L — SIGNIFICANT CHANGE UP (ref 5–17)
ANION GAP SERPL CALC-SCNC: 16 MMOL/L — SIGNIFICANT CHANGE UP (ref 5–17)
BUN SERPL-MCNC: 26 MG/DL — HIGH (ref 7–23)
BUN SERPL-MCNC: 26 MG/DL — HIGH (ref 7–23)
CALCIUM SERPL-MCNC: 10.2 MG/DL — SIGNIFICANT CHANGE UP (ref 8.4–10.5)
CALCIUM SERPL-MCNC: 10.2 MG/DL — SIGNIFICANT CHANGE UP (ref 8.4–10.5)
CHLORIDE SERPL-SCNC: 98 MMOL/L — SIGNIFICANT CHANGE UP (ref 96–108)
CHLORIDE SERPL-SCNC: 98 MMOL/L — SIGNIFICANT CHANGE UP (ref 96–108)
CO2 SERPL-SCNC: 21 MMOL/L — LOW (ref 22–31)
CO2 SERPL-SCNC: 21 MMOL/L — LOW (ref 22–31)
CREAT SERPL-MCNC: 1.76 MG/DL — HIGH (ref 0.5–1.3)
CREAT SERPL-MCNC: 1.76 MG/DL — HIGH (ref 0.5–1.3)
EGFR: 36 ML/MIN/1.73M2 — LOW
EGFR: 36 ML/MIN/1.73M2 — LOW
GLUCOSE SERPL-MCNC: 193 MG/DL — HIGH (ref 70–99)
GLUCOSE SERPL-MCNC: 193 MG/DL — HIGH (ref 70–99)
HCT VFR BLD CALC: 34.4 % — LOW (ref 39–50)
HCT VFR BLD CALC: 34.4 % — LOW (ref 39–50)
HGB BLD-MCNC: 11.6 G/DL — LOW (ref 13–17)
HGB BLD-MCNC: 11.6 G/DL — LOW (ref 13–17)
MCHC RBC-ENTMCNC: 30.9 PG — SIGNIFICANT CHANGE UP (ref 27–34)
MCHC RBC-ENTMCNC: 30.9 PG — SIGNIFICANT CHANGE UP (ref 27–34)
MCHC RBC-ENTMCNC: 33.7 GM/DL — SIGNIFICANT CHANGE UP (ref 32–36)
MCHC RBC-ENTMCNC: 33.7 GM/DL — SIGNIFICANT CHANGE UP (ref 32–36)
MCV RBC AUTO: 91.5 FL — SIGNIFICANT CHANGE UP (ref 80–100)
MCV RBC AUTO: 91.5 FL — SIGNIFICANT CHANGE UP (ref 80–100)
NRBC # BLD: 0 /100 WBCS — SIGNIFICANT CHANGE UP (ref 0–0)
NRBC # BLD: 0 /100 WBCS — SIGNIFICANT CHANGE UP (ref 0–0)
PLATELET # BLD AUTO: 325 K/UL — SIGNIFICANT CHANGE UP (ref 150–400)
PLATELET # BLD AUTO: 325 K/UL — SIGNIFICANT CHANGE UP (ref 150–400)
POTASSIUM SERPL-MCNC: 4.5 MMOL/L — SIGNIFICANT CHANGE UP (ref 3.5–5.3)
POTASSIUM SERPL-MCNC: 4.5 MMOL/L — SIGNIFICANT CHANGE UP (ref 3.5–5.3)
POTASSIUM SERPL-SCNC: 4.5 MMOL/L — SIGNIFICANT CHANGE UP (ref 3.5–5.3)
POTASSIUM SERPL-SCNC: 4.5 MMOL/L — SIGNIFICANT CHANGE UP (ref 3.5–5.3)
RBC # BLD: 3.76 M/UL — LOW (ref 4.2–5.8)
RBC # BLD: 3.76 M/UL — LOW (ref 4.2–5.8)
RBC # FLD: 13.2 % — SIGNIFICANT CHANGE UP (ref 10.3–14.5)
RBC # FLD: 13.2 % — SIGNIFICANT CHANGE UP (ref 10.3–14.5)
SODIUM SERPL-SCNC: 135 MMOL/L — SIGNIFICANT CHANGE UP (ref 135–145)
SODIUM SERPL-SCNC: 135 MMOL/L — SIGNIFICANT CHANGE UP (ref 135–145)
WBC # BLD: 12.3 K/UL — HIGH (ref 3.8–10.5)
WBC # BLD: 12.3 K/UL — HIGH (ref 3.8–10.5)
WBC # FLD AUTO: 12.3 K/UL — HIGH (ref 3.8–10.5)
WBC # FLD AUTO: 12.3 K/UL — HIGH (ref 3.8–10.5)

## 2023-11-22 PROCEDURE — 83036 HEMOGLOBIN GLYCOSYLATED A1C: CPT

## 2023-11-22 PROCEDURE — 85027 COMPLETE CBC AUTOMATED: CPT

## 2023-11-22 PROCEDURE — 80048 BASIC METABOLIC PNL TOTAL CA: CPT

## 2023-11-22 PROCEDURE — G0463: CPT

## 2023-11-22 NOTE — H&P PST ADULT - PROBLEM SELECTOR PLAN 2
Instructed to continue meds .   Pt "states his cardiologist advised to stop Clopidogrel, off 5 days preop( last dose 11/22 today),

## 2023-11-22 NOTE — H&P PST ADULT - PROBLEM SELECTOR PLAN 3
Will follow up with labs/ fingerstick. Preop HgA1c ordered Instructed to hold metformin ( last dose on11/27 & no Glimepiride or Tradjenta on DOS. STAT FS  on the day of surgery ordered.

## 2023-11-22 NOTE — H&P PST ADULT - HISTORY OF PRESENT ILLNESS
89 year old RHD male  with right UVJ calculi, scheduled for Right ureteroscopy with laser lithgotripsy on 11/28/2023.    PMH of  HTN, HLD ,  CAD, CABG x2 ( as per patient > 30 years ago), Coronary angioplasty/ PCI /ASA  ? x 5 on Clopidogrel  "3x/week"(2398-5553), T2DM , neuropathy, CKD, AFib (On Apixaban) , Pt is s/p Hospitalised from 11/10/23 -11/13 with abdominal pain / RENAL COLIC found to have 5mm R UVJ stone with mild hydro and FARIDA and s/p R ureteral stent  placed on 11/10/23. Blood cultures from ER came back MSRE, likely contaminated, repeats sent and no growth at 24 hours. Currently he c/o slight blood in urine. Denies fever/chills, dysuria or other acute complaints.  89 year old RHD male  with right UVJ calculi, scheduled for Right ureteroscopy with laser lithotripsy on 11/28/2023.    PMH of  HTN, HLD ,  CAD- s/p CABG x2 ("> 30 years ago"), Coronary angioplasty/ PCIs /ASA  ? x 5 on Clopidogrel  "3x/week"([id number]), T2DM , neuropathy, CKD, AFib (On Apixaban) , Pt is s/p Hospitalised from 11/10/23 -11/13 /23 with abdominal pain / renal colic,  found to have 5mm R UVJ stone with mild hydro and FARIDA and s/p R ureteral stent  placed on 11/10/23. Blood cultures from ER came back MSRE, likely contaminated, repeats sent and no growth at 24 hours. Currently he c/o slight blood in urine. Denies fever/chills, dysuria or other acute complaints.

## 2023-11-22 NOTE — H&P PST ADULT - NEGATIVE NEUROLOGICAL SYMPTOMS
no weakness/no syncope/no tremors/no vertigo/no loss of sensation/no difficulty walking/no headache/no hemiparesis/no confusion/no facial palsy

## 2023-11-22 NOTE — H&P PST ADULT - PROBLEM SELECTOR PLAN 4
Instructed to continue meds &  take with sips of water in AM the day of surgery .  cardiologist advised to be off Eliquis 3 days preop , last dose 11/24

## 2023-11-22 NOTE — H&P PST ADULT - OTHER CARE PROVIDERS
Cardio Dr Luis Eduardo Edgar [phone] preop eval 11/22, Endo Dr Conchis Feliciano  Cardio Dr Luis Eduardo [place] [phone] preop eval 11/2023, Endo Dr Conchis Feliciano

## 2023-11-22 NOTE — H&P PST ADULT - NSICDXPROCEDURE_GEN_ALL_CORE_FT
PROCEDURES:  Right ureteroscopy with laser lithotripsy 22-Nov-2023 17:53:11 Right ureteroscopy with laser lithgotripsy on 11/28/2023. Claude Perez

## 2023-11-22 NOTE — H&P PST ADULT - NSICDXPASTSURGICALHX_GEN_ALL_CORE_FT
PAST SURGICAL HISTORY:  H/O coronary angioplasty     S/P CABG (coronary artery bypass graft)     Status post ORIF of fracture of ankle Left, Oct 2019

## 2023-11-22 NOTE — H&P PST ADULT - NSICDXPASTMEDICALHX_GEN_ALL_CORE_FT
PAST MEDICAL HISTORY:  Atrial dilatation, left     Atrial fibrillation     CAD (coronary artery disease)     DM (diabetes mellitus)     Hematuria     HLD (hyperlipidemia)     [place] (hard of hearing)     HTN (hypertension)     Mitral valvular insufficiency     Moderate aortic regurgitation     T2DM (type 2 diabetes mellitus)

## 2023-11-22 NOTE — H&P PST ADULT - PROBLEM SELECTOR PLAN 1
Right ureteroscopy with laser lithotripsy on 11/28/23  UC reports in sunrise.  cbc, bmp, aic sent.  Pt need preop medical & cardiac eval

## 2023-11-23 LAB
A1C WITH ESTIMATED AVERAGE GLUCOSE RESULT: 8.7 % — HIGH (ref 4–5.6)
A1C WITH ESTIMATED AVERAGE GLUCOSE RESULT: 8.7 % — HIGH (ref 4–5.6)
ESTIMATED AVERAGE GLUCOSE: 203 MG/DL — HIGH (ref 68–114)
ESTIMATED AVERAGE GLUCOSE: 203 MG/DL — HIGH (ref 68–114)

## 2023-11-27 ENCOUNTER — NON-APPOINTMENT (OUTPATIENT)
Age: 88
End: 2023-11-27

## 2023-11-28 ENCOUNTER — APPOINTMENT (OUTPATIENT)
Dept: UROLOGY | Facility: HOSPITAL | Age: 88
End: 2023-11-28

## 2023-12-13 PROCEDURE — 83036 HEMOGLOBIN GLYCOSYLATED A1C: CPT

## 2023-12-13 PROCEDURE — C2625: CPT

## 2023-12-13 PROCEDURE — 82803 BLOOD GASES ANY COMBINATION: CPT

## 2023-12-13 PROCEDURE — 85018 HEMOGLOBIN: CPT

## 2023-12-13 PROCEDURE — 87077 CULTURE AEROBIC IDENTIFY: CPT

## 2023-12-13 PROCEDURE — 84132 ASSAY OF SERUM POTASSIUM: CPT

## 2023-12-13 PROCEDURE — 96375 TX/PRO/DX INJ NEW DRUG ADDON: CPT

## 2023-12-13 PROCEDURE — 36415 COLL VENOUS BLD VENIPUNCTURE: CPT

## 2023-12-13 PROCEDURE — 80048 BASIC METABOLIC PNL TOTAL CA: CPT

## 2023-12-13 PROCEDURE — 82330 ASSAY OF CALCIUM: CPT

## 2023-12-13 PROCEDURE — 74177 CT ABD & PELVIS W/CONTRAST: CPT | Mod: MA

## 2023-12-13 PROCEDURE — 83690 ASSAY OF LIPASE: CPT

## 2023-12-13 PROCEDURE — 85025 COMPLETE CBC W/AUTO DIFF WBC: CPT

## 2023-12-13 PROCEDURE — 82962 GLUCOSE BLOOD TEST: CPT

## 2023-12-13 PROCEDURE — 96365 THER/PROPH/DIAG IV INF INIT: CPT

## 2023-12-13 PROCEDURE — 80053 COMPREHEN METABOLIC PANEL: CPT

## 2023-12-13 PROCEDURE — 82947 ASSAY GLUCOSE BLOOD QUANT: CPT

## 2023-12-13 PROCEDURE — 82435 ASSAY OF BLOOD CHLORIDE: CPT

## 2023-12-13 PROCEDURE — C1758: CPT

## 2023-12-13 PROCEDURE — 87040 BLOOD CULTURE FOR BACTERIA: CPT

## 2023-12-13 PROCEDURE — 85027 COMPLETE CBC AUTOMATED: CPT

## 2023-12-13 PROCEDURE — 87150 DNA/RNA AMPLIFIED PROBE: CPT

## 2023-12-13 PROCEDURE — 85014 HEMATOCRIT: CPT

## 2023-12-13 PROCEDURE — 87086 URINE CULTURE/COLONY COUNT: CPT

## 2023-12-13 PROCEDURE — 83605 ASSAY OF LACTIC ACID: CPT

## 2023-12-13 PROCEDURE — 81001 URINALYSIS AUTO W/SCOPE: CPT

## 2023-12-13 PROCEDURE — 87186 SC STD MICRODIL/AGAR DIL: CPT

## 2023-12-13 PROCEDURE — 76000 FLUOROSCOPY <1 HR PHYS/QHP: CPT

## 2023-12-13 PROCEDURE — 84295 ASSAY OF SERUM SODIUM: CPT

## 2023-12-13 PROCEDURE — C1769: CPT

## 2023-12-13 PROCEDURE — 99285 EMERGENCY DEPT VISIT HI MDM: CPT | Mod: 25

## 2024-01-03 PROBLEM — E11.9 TYPE 2 DIABETES MELLITUS WITHOUT COMPLICATIONS: Chronic | Status: ACTIVE | Noted: 2023-11-22

## 2024-01-03 PROBLEM — I34.0 NONRHEUMATIC MITRAL (VALVE) INSUFFICIENCY: Chronic | Status: ACTIVE | Noted: 2023-11-22

## 2024-01-03 PROBLEM — I48.91 UNSPECIFIED ATRIAL FIBRILLATION: Chronic | Status: ACTIVE | Noted: 2023-11-22

## 2024-01-03 PROBLEM — H91.90 UNSPECIFIED HEARING LOSS, UNSPECIFIED EAR: Chronic | Status: ACTIVE | Noted: 2023-11-22

## 2024-01-03 PROBLEM — N18.9 CHRONIC KIDNEY DISEASE, UNSPECIFIED: Chronic | Status: ACTIVE | Noted: 2023-11-22

## 2024-01-03 PROBLEM — R31.9 HEMATURIA, UNSPECIFIED: Chronic | Status: ACTIVE | Noted: 2023-11-22

## 2024-01-03 PROBLEM — I35.1 NONRHEUMATIC AORTIC (VALVE) INSUFFICIENCY: Chronic | Status: ACTIVE | Noted: 2023-11-22

## 2024-01-03 PROBLEM — I51.7 CARDIOMEGALY: Chronic | Status: ACTIVE | Noted: 2023-11-22

## 2024-01-05 ENCOUNTER — OUTPATIENT (OUTPATIENT)
Dept: OUTPATIENT SERVICES | Facility: HOSPITAL | Age: 89
LOS: 1 days | End: 2024-01-05
Payer: MEDICARE

## 2024-01-05 VITALS
OXYGEN SATURATION: 99 % | SYSTOLIC BLOOD PRESSURE: 153 MMHG | RESPIRATION RATE: 20 BRPM | DIASTOLIC BLOOD PRESSURE: 78 MMHG | WEIGHT: 143.08 LBS | HEIGHT: 64 IN | TEMPERATURE: 97 F | HEART RATE: 74 BPM

## 2024-01-05 DIAGNOSIS — N20.0 CALCULUS OF KIDNEY: ICD-10-CM

## 2024-01-05 DIAGNOSIS — Z98.41 CATARACT EXTRACTION STATUS, RIGHT EYE: Chronic | ICD-10-CM

## 2024-01-05 DIAGNOSIS — Z98.890 OTHER SPECIFIED POSTPROCEDURAL STATES: Chronic | ICD-10-CM

## 2024-01-05 DIAGNOSIS — Z95.5 PRESENCE OF CORONARY ANGIOPLASTY IMPLANT AND GRAFT: ICD-10-CM

## 2024-01-05 DIAGNOSIS — Z95.1 PRESENCE OF AORTOCORONARY BYPASS GRAFT: Chronic | ICD-10-CM

## 2024-01-05 DIAGNOSIS — I20.9 ANGINA PECTORIS, UNSPECIFIED: ICD-10-CM

## 2024-01-05 DIAGNOSIS — Z01.818 ENCOUNTER FOR OTHER PREPROCEDURAL EXAMINATION: ICD-10-CM

## 2024-01-05 DIAGNOSIS — E11.9 TYPE 2 DIABETES MELLITUS WITHOUT COMPLICATIONS: ICD-10-CM

## 2024-01-05 DIAGNOSIS — N20.1 CALCULUS OF URETER: ICD-10-CM

## 2024-01-05 DIAGNOSIS — Z98.61 CORONARY ANGIOPLASTY STATUS: Chronic | ICD-10-CM

## 2024-01-05 DIAGNOSIS — I10 ESSENTIAL (PRIMARY) HYPERTENSION: ICD-10-CM

## 2024-01-05 LAB
A1C WITH ESTIMATED AVERAGE GLUCOSE RESULT: 9.1 % — HIGH (ref 4–5.6)
A1C WITH ESTIMATED AVERAGE GLUCOSE RESULT: 9.1 % — HIGH (ref 4–5.6)
ANION GAP SERPL CALC-SCNC: 13 MMOL/L — SIGNIFICANT CHANGE UP (ref 5–17)
ANION GAP SERPL CALC-SCNC: 13 MMOL/L — SIGNIFICANT CHANGE UP (ref 5–17)
BUN SERPL-MCNC: 29 MG/DL — HIGH (ref 7–23)
BUN SERPL-MCNC: 29 MG/DL — HIGH (ref 7–23)
CALCIUM SERPL-MCNC: 10.2 MG/DL — SIGNIFICANT CHANGE UP (ref 8.4–10.5)
CALCIUM SERPL-MCNC: 10.2 MG/DL — SIGNIFICANT CHANGE UP (ref 8.4–10.5)
CHLORIDE SERPL-SCNC: 101 MMOL/L — SIGNIFICANT CHANGE UP (ref 96–108)
CHLORIDE SERPL-SCNC: 101 MMOL/L — SIGNIFICANT CHANGE UP (ref 96–108)
CO2 SERPL-SCNC: 23 MMOL/L — SIGNIFICANT CHANGE UP (ref 22–31)
CO2 SERPL-SCNC: 23 MMOL/L — SIGNIFICANT CHANGE UP (ref 22–31)
CREAT SERPL-MCNC: 1.69 MG/DL — HIGH (ref 0.5–1.3)
CREAT SERPL-MCNC: 1.69 MG/DL — HIGH (ref 0.5–1.3)
EGFR: 38 ML/MIN/1.73M2 — LOW
EGFR: 38 ML/MIN/1.73M2 — LOW
ESTIMATED AVERAGE GLUCOSE: 214 MG/DL — HIGH (ref 68–114)
ESTIMATED AVERAGE GLUCOSE: 214 MG/DL — HIGH (ref 68–114)
GLUCOSE SERPL-MCNC: 184 MG/DL — HIGH (ref 70–99)
GLUCOSE SERPL-MCNC: 184 MG/DL — HIGH (ref 70–99)
HCT VFR BLD CALC: 36.1 % — LOW (ref 39–50)
HCT VFR BLD CALC: 36.1 % — LOW (ref 39–50)
HGB BLD-MCNC: 12.3 G/DL — LOW (ref 13–17)
HGB BLD-MCNC: 12.3 G/DL — LOW (ref 13–17)
MCHC RBC-ENTMCNC: 30.6 PG — SIGNIFICANT CHANGE UP (ref 27–34)
MCHC RBC-ENTMCNC: 30.6 PG — SIGNIFICANT CHANGE UP (ref 27–34)
MCHC RBC-ENTMCNC: 34.1 GM/DL — SIGNIFICANT CHANGE UP (ref 32–36)
MCHC RBC-ENTMCNC: 34.1 GM/DL — SIGNIFICANT CHANGE UP (ref 32–36)
MCV RBC AUTO: 89.8 FL — SIGNIFICANT CHANGE UP (ref 80–100)
MCV RBC AUTO: 89.8 FL — SIGNIFICANT CHANGE UP (ref 80–100)
NRBC # BLD: 0 /100 WBCS — SIGNIFICANT CHANGE UP (ref 0–0)
NRBC # BLD: 0 /100 WBCS — SIGNIFICANT CHANGE UP (ref 0–0)
PLATELET # BLD AUTO: 276 K/UL — SIGNIFICANT CHANGE UP (ref 150–400)
PLATELET # BLD AUTO: 276 K/UL — SIGNIFICANT CHANGE UP (ref 150–400)
POTASSIUM SERPL-MCNC: 4.5 MMOL/L — SIGNIFICANT CHANGE UP (ref 3.5–5.3)
POTASSIUM SERPL-MCNC: 4.5 MMOL/L — SIGNIFICANT CHANGE UP (ref 3.5–5.3)
POTASSIUM SERPL-SCNC: 4.5 MMOL/L — SIGNIFICANT CHANGE UP (ref 3.5–5.3)
POTASSIUM SERPL-SCNC: 4.5 MMOL/L — SIGNIFICANT CHANGE UP (ref 3.5–5.3)
RBC # BLD: 4.02 M/UL — LOW (ref 4.2–5.8)
RBC # BLD: 4.02 M/UL — LOW (ref 4.2–5.8)
RBC # FLD: 13.5 % — SIGNIFICANT CHANGE UP (ref 10.3–14.5)
RBC # FLD: 13.5 % — SIGNIFICANT CHANGE UP (ref 10.3–14.5)
SODIUM SERPL-SCNC: 137 MMOL/L — SIGNIFICANT CHANGE UP (ref 135–145)
SODIUM SERPL-SCNC: 137 MMOL/L — SIGNIFICANT CHANGE UP (ref 135–145)
WBC # BLD: 12.43 K/UL — HIGH (ref 3.8–10.5)
WBC # BLD: 12.43 K/UL — HIGH (ref 3.8–10.5)
WBC # FLD AUTO: 12.43 K/UL — HIGH (ref 3.8–10.5)
WBC # FLD AUTO: 12.43 K/UL — HIGH (ref 3.8–10.5)

## 2024-01-05 RX ORDER — OMEGA-3 ACID ETHYL ESTERS 1 G
0 CAPSULE ORAL
Qty: 0 | Refills: 0 | DISCHARGE

## 2024-01-05 RX ORDER — LIDOCAINE HCL 20 MG/ML
0.2 VIAL (ML) INJECTION ONCE
Refills: 0 | Status: DISCONTINUED | OUTPATIENT
Start: 2024-01-18 | End: 2024-02-01

## 2024-01-05 RX ORDER — SODIUM CHLORIDE 9 MG/ML
3 INJECTION INTRAMUSCULAR; INTRAVENOUS; SUBCUTANEOUS EVERY 8 HOURS
Refills: 0 | Status: DISCONTINUED | OUTPATIENT
Start: 2024-01-18 | End: 2024-01-18

## 2024-01-05 NOTE — H&P PST ADULT - HISTORY OF PRESENT ILLNESS
89 year old RHD male  with PMH of  HTN, HLD ,  CAD- s/p CABG x2 ("> 30 years ago"), Coronary angioplasty/ PCIs /ASA   x 6 on Clopidogrel  ,  T2DM , neuropathy, CKD, AFib (On Apixaban) , Pt is s/p Hospitalised from 11/10/23 -11/13 /23 with abdominal pain / renal colic,  found to have 5mm R UVJ stone with mild hydro and FARIDA and s/p R ureteral stent  placed on 11/10/23.right UVJ calculi, denies any pain , fever . Now coming in for Right ureteroscopy , Laser lithotripsy , Stent insertion on 1/16/2024.     Pt initially scheduled for 11/2023- rescheduled due to cardiac clearance issues   89 year old RHD male  with PMH of  HTN, HLD ,  CAD- s/p CABG x2 ("> 30 years ago"), Coronary angioplasty/ PCIs /ASA   x 6 on Clopidogrel  ,  T2DM , neuropathy, CKD, AFib (On Apixaban) , Pt is s/p Hospitalised from 11/10/23 -11/13 /23 with abdominal pain / renal colic,  found to have 5mm R UVJ stone with mild hydro and FARIDA and s/p R ureteral stent  placed on 11/10/23.right UVJ calculi, denies any pain , fever . Now coming in for Right ureteroscopy , Laser lithotripsy , Stent insertion on 1/16/2024.     ****Pt initially scheduled for 11/2023- rescheduled due to cardiac clearance issues due to chest discomfort - on nitro patch 0.4 mg q 24 hrs . Pt got a evaluation on 12/20/2023. Pt endorses having chest discomfort since then-but did not see cardio or go to ER. Advised patient to go back to Cardio for an evaluation prior to surgery . Also advised patient & daughter to consult with cardio to start aspirin 81 mg when he is off Plavix for 5 days & Eliquis for 3 days Prior to surgery. *****

## 2024-01-05 NOTE — H&P PST ADULT - PROBLEM SELECTOR PLAN 5
Pt initially scheduled for 11/2023- rescheduled due to cardiac clearance issues due to chest discomfort - on nitro patch 0.4 mg q 24 hrs . Pt got a evaluation on 12/20/2023. Pt endorses having chest discomfort since then-but did not see cardio or go to ER. Advised patient to go back to Cardio for an evaluation prior to surgery . Also advised patient & daughter to consult with cardio to start aspirin 81 mg when he is off Plavix for 5 days & Eliquis for 3 days Prior to surgery.    Discussed case with Dr. Duran

## 2024-01-05 NOTE — H&P PST ADULT - NSICDXPROCEDURE_GEN_ALL_CORE_FT
PROCEDURES:  Right ureteroscopy with laser lithotripsy 22-Nov-2023 17:53:11 Right ureteroscopy with laser lithgotripsy on 11/28/2023. Claude Perez   PROCEDURES:  Right ureteroscopy with laser lithotripsy 22-Nov-2023 17:53:11 Right ureteroscopy with laser lithgotripsy on 11/28/2023. Claude Perze

## 2024-01-05 NOTE — H&P PST ADULT - CONSTITUTIONAL
Abdomen soft, nontender, nondistended, bowel sounds present in all 4 quadrants. normal/well-groomed/no distress

## 2024-01-05 NOTE — H&P PST ADULT - NSICDXPASTMEDICALHX_GEN_ALL_CORE_FT
PAST MEDICAL HISTORY:  Angina pectoris     Atrial dilatation, left     Atrial fibrillation     CAD (coronary artery disease)     Chronic kidney disease (CKD)     Hematuria     HLD (hyperlipidemia)     [place] (hard of hearing)     HTN (hypertension)     Kidney stones     Mild pulmonary hypertension     Mitral valvular insufficiency     Moderate aortic regurgitation     Osteoarthritis     T2DM (type 2 diabetes mellitus)      PAST MEDICAL HISTORY:  Angina pectoris     Atrial dilatation, left     Atrial fibrillation     CAD (coronary artery disease)     Chronic kidney disease (CKD)     Hematuria     HLD (hyperlipidemia)     [place] (hard of hearing)     HTN (hypertension)     Kidney stones     Mild pulmonary hypertension     Mitral valvular insufficiency     Moderate aortic regurgitation     Osteoarthritis     T2DM (type 2 diabetes mellitus)

## 2024-01-05 NOTE — H&P PST ADULT - NSICDXPASTSURGICALHX_GEN_ALL_CORE_FT
PAST SURGICAL HISTORY:  H/O coronary angioplasty     S/P bilateral cataract extraction     S/P CABG (coronary artery bypass graft)     Status post ORIF of fracture of ankle Left, Oct 2019

## 2024-01-05 NOTE — H&P PST ADULT - PROBLEM SELECTOR PLAN 4
advised patient & daughter to consult with cardio to start aspirin 81 mg when he is off Plavix for 5 days & Eliquis for 3 days Prior to surgery.

## 2024-01-05 NOTE — H&P PST ADULT - ASSESSMENT
Airway:  normal    Mallampati-       Dental: Patient denies loose teeth, UPPER & LOWER PARTIAL DENTURES    Activity :  dasi mets :     Airway:  normal    Mallampati-    3   Dental: Patient denies loose teeth, UPPER & LOWER PARTIAL DENTURES    Activity : ADL , Walk 30 mts   dasi mets : 4 dasi mets

## 2024-01-05 NOTE — H&P PST ADULT - PSYCHIATRIC
Started see surgery encounter.  Patient and left a message negative normal/normal affect/alert and oriented x3/normal behavior

## 2024-01-07 LAB
CULTURE RESULTS: ABNORMAL
CULTURE RESULTS: ABNORMAL
SPECIMEN SOURCE: SIGNIFICANT CHANGE UP
SPECIMEN SOURCE: SIGNIFICANT CHANGE UP

## 2024-01-09 RX ORDER — AMOXICILLIN AND CLAVULANATE POTASSIUM 875; 125 MG/1; MG/1
875-125 TABLET, COATED ORAL
Qty: 10 | Refills: 0 | Status: ACTIVE | COMMUNITY
Start: 2024-01-09 | End: 1900-01-01

## 2024-01-11 NOTE — PHYSICAL THERAPY INITIAL EVALUATION ADULT - ADDITIONAL COMMENTS
Patient arrives to clinic today with cough and congestion for two weeks    Patient has taken no OTC meds.    Patient would like communication of their results via:    Ritchie   Patient resides with spouse and son in house that has 5 steps in and 1 flight inside. Patient recently  hospitalized for fall in october this year, admitted to [facility] d/c 11/29. Prior to injury patient independent in adl's and ambulation. Patient resides with spouse and son in house that has 3 steps in and 1 flight inside. Pt states he is able to stay on first floor. Patient recently hospitalized for fall in october this year, admitted to [facility], d/c 11/29. Pt was about to start home PT but was readmitted to hospital. Pt states he was able to amb from bed to wheelchair using RW, able to perform ADL's independently. Pt owns RW, w/c, shower chair, and commode

## 2024-01-16 ENCOUNTER — NON-APPOINTMENT (OUTPATIENT)
Age: 89
End: 2024-01-16

## 2024-01-17 ENCOUNTER — TRANSCRIPTION ENCOUNTER (OUTPATIENT)
Age: 89
End: 2024-01-17

## 2024-01-18 ENCOUNTER — RESULT REVIEW (OUTPATIENT)
Age: 89
End: 2024-01-18

## 2024-01-18 ENCOUNTER — TRANSCRIPTION ENCOUNTER (OUTPATIENT)
Age: 89
End: 2024-01-18

## 2024-01-18 ENCOUNTER — APPOINTMENT (OUTPATIENT)
Dept: UROLOGY | Facility: HOSPITAL | Age: 89
End: 2024-01-18

## 2024-01-18 ENCOUNTER — OUTPATIENT (OUTPATIENT)
Dept: INPATIENT UNIT | Facility: HOSPITAL | Age: 89
LOS: 1 days | End: 2024-01-18
Payer: MEDICARE

## 2024-01-18 VITALS
DIASTOLIC BLOOD PRESSURE: 76 MMHG | HEART RATE: 74 BPM | SYSTOLIC BLOOD PRESSURE: 145 MMHG | HEIGHT: 64.02 IN | TEMPERATURE: 98 F | WEIGHT: 143.08 LBS | RESPIRATION RATE: 18 BRPM | OXYGEN SATURATION: 99 %

## 2024-01-18 VITALS
HEART RATE: 71 BPM | TEMPERATURE: 98 F | SYSTOLIC BLOOD PRESSURE: 134 MMHG | OXYGEN SATURATION: 98 % | DIASTOLIC BLOOD PRESSURE: 98 MMHG | RESPIRATION RATE: 18 BRPM

## 2024-01-18 DIAGNOSIS — Z98.61 CORONARY ANGIOPLASTY STATUS: Chronic | ICD-10-CM

## 2024-01-18 DIAGNOSIS — Z98.890 OTHER SPECIFIED POSTPROCEDURAL STATES: Chronic | ICD-10-CM

## 2024-01-18 DIAGNOSIS — Z95.1 PRESENCE OF AORTOCORONARY BYPASS GRAFT: Chronic | ICD-10-CM

## 2024-01-18 DIAGNOSIS — N20.1 CALCULUS OF URETER: ICD-10-CM

## 2024-01-18 DIAGNOSIS — Z98.41 CATARACT EXTRACTION STATUS, RIGHT EYE: Chronic | ICD-10-CM

## 2024-01-18 LAB
GLUCOSE BLDC GLUCOMTR-MCNC: 181 MG/DL — HIGH (ref 70–99)
GLUCOSE BLDC GLUCOMTR-MCNC: 215 MG/DL — HIGH (ref 70–99)

## 2024-01-18 PROCEDURE — G0463: CPT

## 2024-01-18 PROCEDURE — C2625: CPT

## 2024-01-18 PROCEDURE — 85027 COMPLETE CBC AUTOMATED: CPT

## 2024-01-18 PROCEDURE — C1889: CPT

## 2024-01-18 PROCEDURE — 83036 HEMOGLOBIN GLYCOSYLATED A1C: CPT

## 2024-01-18 PROCEDURE — 82365 CALCULUS SPECTROSCOPY: CPT

## 2024-01-18 PROCEDURE — 52356 CYSTO/URETERO W/LITHOTRIPSY: CPT | Mod: RT

## 2024-01-18 PROCEDURE — 87086 URINE CULTURE/COLONY COUNT: CPT

## 2024-01-18 PROCEDURE — 87077 CULTURE AEROBIC IDENTIFY: CPT

## 2024-01-18 PROCEDURE — 74420 UROGRAPHY RTRGR +-KUB: CPT | Mod: 26

## 2024-01-18 PROCEDURE — C1769: CPT

## 2024-01-18 PROCEDURE — 82962 GLUCOSE BLOOD TEST: CPT

## 2024-01-18 PROCEDURE — 88300 SURGICAL PATH GROSS: CPT

## 2024-01-18 PROCEDURE — 76000 FLUOROSCOPY <1 HR PHYS/QHP: CPT

## 2024-01-18 PROCEDURE — 80048 BASIC METABOLIC PNL TOTAL CA: CPT

## 2024-01-18 PROCEDURE — C1758: CPT

## 2024-01-18 PROCEDURE — 88300 SURGICAL PATH GROSS: CPT | Mod: 26

## 2024-01-18 DEVICE — STONE BASKET ZEROTIP NITINOL 4-WIRE 1.9FR 120CM X 12MM: Type: IMPLANTABLE DEVICE | Site: RIGHT | Status: FUNCTIONAL

## 2024-01-18 DEVICE — URETERAL CATH SOF-FLEX OPEN END 6FR .040" X 70CM: Type: IMPLANTABLE DEVICE | Site: RIGHT | Status: FUNCTIONAL

## 2024-01-18 DEVICE — LASER FIBER SOLTIVE 200 BALL TIP: Type: IMPLANTABLE DEVICE | Site: RIGHT | Status: FUNCTIONAL

## 2024-01-18 DEVICE — GUIDEWIRE SENSOR DUAL-FLEX NITINOL STRAIGHT .035" X 150CM: Type: IMPLANTABLE DEVICE | Site: RIGHT | Status: FUNCTIONAL

## 2024-01-18 DEVICE — URETERAL STENT INLAY OPTIMA 8FR 26CM: Type: IMPLANTABLE DEVICE | Site: RIGHT | Status: FUNCTIONAL

## 2024-01-18 RX ORDER — METFORMIN HYDROCHLORIDE 850 MG/1
1 TABLET ORAL
Qty: 0 | Refills: 0 | DISCHARGE

## 2024-01-18 RX ORDER — DIAZEPAM 5 MG
1 TABLET ORAL
Qty: 9 | Refills: 0
Start: 2024-01-18 | End: 2024-01-20

## 2024-01-18 RX ORDER — ONDANSETRON 8 MG/1
4 TABLET, FILM COATED ORAL ONCE
Refills: 0 | Status: DISCONTINUED | OUTPATIENT
Start: 2024-01-18 | End: 2024-01-18

## 2024-01-18 RX ORDER — ATORVASTATIN CALCIUM 80 MG/1
1 TABLET, FILM COATED ORAL
Qty: 0 | Refills: 0 | DISCHARGE

## 2024-01-18 RX ORDER — LOSARTAN POTASSIUM 100 MG/1
1 TABLET, FILM COATED ORAL
Refills: 0 | DISCHARGE

## 2024-01-18 RX ORDER — OXYBUTYNIN CHLORIDE 5 MG
1 TABLET ORAL
Qty: 15 | Refills: 0
Start: 2024-01-18 | End: 2024-01-22

## 2024-01-18 RX ORDER — CLOPIDOGREL BISULFATE 75 MG/1
1 TABLET, FILM COATED ORAL
Refills: 0 | DISCHARGE

## 2024-01-18 RX ORDER — NITROGLYCERIN 6.5 MG
1 CAPSULE, EXTENDED RELEASE ORAL
Refills: 0 | DISCHARGE

## 2024-01-18 RX ORDER — OMEGA-3 ACID ETHYL ESTERS 1 G
1 CAPSULE ORAL
Refills: 0 | DISCHARGE

## 2024-01-18 RX ORDER — SODIUM CHLORIDE 9 MG/ML
1000 INJECTION, SOLUTION INTRAVENOUS
Refills: 0 | Status: DISCONTINUED | OUTPATIENT
Start: 2024-01-18 | End: 2024-02-01

## 2024-01-18 RX ORDER — DIAZEPAM 5 MG
2 TABLET ORAL ONCE
Refills: 0 | Status: DISCONTINUED | OUTPATIENT
Start: 2024-01-18 | End: 2024-01-18

## 2024-01-18 RX ORDER — METOPROLOL TARTRATE 50 MG
1 TABLET ORAL
Refills: 0 | DISCHARGE

## 2024-01-18 RX ORDER — LINAGLIPTIN 5 MG/1
1 TABLET, FILM COATED ORAL
Qty: 0 | Refills: 0 | DISCHARGE

## 2024-01-18 RX ORDER — ACETAMINOPHEN 500 MG
1000 TABLET ORAL EVERY 6 HOURS
Refills: 0 | Status: DISCONTINUED | OUTPATIENT
Start: 2024-01-18 | End: 2024-02-01

## 2024-01-18 RX ORDER — TAMSULOSIN HYDROCHLORIDE 0.4 MG/1
1 CAPSULE ORAL
Refills: 0 | DISCHARGE

## 2024-01-18 RX ORDER — CEFAZOLIN SODIUM 1 G
2000 VIAL (EA) INJECTION ONCE
Refills: 0 | Status: COMPLETED | OUTPATIENT
Start: 2024-01-18 | End: 2024-01-18

## 2024-01-18 RX ORDER — PHENAZOPYRIDINE HCL 100 MG
1 TABLET ORAL
Qty: 9 | Refills: 0
Start: 2024-01-18 | End: 2024-01-20

## 2024-01-18 RX ORDER — APIXABAN 2.5 MG/1
1 TABLET, FILM COATED ORAL
Refills: 0 | DISCHARGE

## 2024-01-18 RX ORDER — OXYBUTYNIN CHLORIDE 5 MG
5 TABLET ORAL ONCE
Refills: 0 | Status: COMPLETED | OUTPATIENT
Start: 2024-01-18 | End: 2024-01-18

## 2024-01-18 RX ORDER — ASCORBIC ACID 60 MG
1 TABLET,CHEWABLE ORAL
Qty: 0 | Refills: 0 | DISCHARGE

## 2024-01-18 RX ORDER — PHENAZOPYRIDINE HCL 100 MG
100 TABLET ORAL ONCE
Refills: 0 | Status: COMPLETED | OUTPATIENT
Start: 2024-01-18 | End: 2024-01-18

## 2024-01-18 RX ORDER — INFLUENZA VIRUS VACCINE 15; 15; 15; 15 UG/.5ML; UG/.5ML; UG/.5ML; UG/.5ML
0.7 SUSPENSION INTRAMUSCULAR ONCE
Refills: 0 | Status: DISCONTINUED | OUTPATIENT
Start: 2024-01-18 | End: 2024-02-01

## 2024-01-18 RX ORDER — GLIMEPIRIDE 1 MG
1 TABLET ORAL
Qty: 0 | Refills: 0 | DISCHARGE

## 2024-01-18 RX ADMIN — Medication 2 MILLIGRAM(S): at 17:30

## 2024-01-18 RX ADMIN — Medication 5 MILLIGRAM(S): at 17:09

## 2024-01-18 RX ADMIN — Medication 100 MILLIGRAM(S): at 17:30

## 2024-01-18 RX ADMIN — SODIUM CHLORIDE 100 MILLILITER(S): 9 INJECTION, SOLUTION INTRAVENOUS at 16:09

## 2024-01-18 NOTE — PATIENT PROFILE ADULT - PRO INTERPRETER NEED 2
[facility]    [address]    [place] [address]    Phone:  [phone]    Fax:  [phone]                                       After Visit Summary   11/13/2018    Rupesh Muller    MRN: [id number]           After Visit Summary Signature Page     I have received my discharge instructions, and my questions have been answered. I have discussed any challenges I see with this plan with the nurse or doctor.    ..........................................................................................................................................  Patient/Patient Representative Signature      ..........................................................................................................................................  Patient Representative Print Name and Relationship to Patient    ..................................................               ................................................  Date                                   Time    ..........................................................................................................................................  Reviewed by Signature/Title    ...................................................              ..............................................  Date                                               Time          22EPIC Rev 08/18         English

## 2024-01-18 NOTE — ASU DISCHARGE PLAN (ADULT/PEDIATRIC) - NS MD DC FALL RISK RISK
For information on Fall & Injury Prevention, visit: [email or web address] OR  [email or web address] OR  https://www.cdc.gov/steadi/patient.html

## 2024-01-18 NOTE — PRE-ANESTHESIA EVALUATION ADULT - NSANTHPMHFT_GEN_ALL_CORE
cardiology eval reviewed   pt had episode of CP late december after walking up 2 flight of stairs, no CP since then walks outside 30 min without stopping, walks up stairs   stable on NTG patch per cardiology no cath indicated  occasional GERD

## 2024-01-18 NOTE — ASU DISCHARGE PLAN (ADULT/PEDIATRIC) - SIGNS AND SYMPTOMS OF INFECTION: FEVER, REDNESS, SWELLING, FOUL SMELLING DISCHARGE
Autumn Hyde called from the Division of Family Services #[phone] to see if patient has been compliant with the suboxone  Program. Please call and address.  
Statement Selected
No

## 2024-01-18 NOTE — ASU DISCHARGE PLAN (ADULT/PEDIATRIC) - ASU DC SPECIAL INSTRUCTIONSFT
Discharge Instructions: Ureteroscopy  · Catheter: The nurses will review instructions and care before you go home.     · Stent: You have an internal stent (a hollow tube that runs from the kidney to your bladder) after your procedure, which helps urine drain from the kidney to your bladder. Some patients experience urinary frequency, burning, or even back pain (especially with urination). These sensations will gradually get better. Increasing your fluid intake can also improve these symptoms. While the stent is in place, your urine may continue to be bloody. This stent is temporary and must be removed or exchanged by your urologist as an outpatient within 3 months unless otherwise specified.    o If your stent is on a string, it is secured to your leg or genitalia with an adhesive bandage. Do not pull on the string, do not remove the bandage, do not insert anything intravaginally (such as a tampon), and do not engage in sexual intercourse until after the stent is removed at your post-operative appointment.    · General: It is common to have blood in the urine after your procedure. It may be pink or even red; inform your doctor if you have a significant amount of clot in the urine or if you are unable to void at all. The urine may clear and then become bloody again especially as you are more physically active.    · Bathing: You may shower or bathe.    · Diet: You may resume your regular diet and regular medication regimen.    · Pain: You may take Tylenol (acetaminophen) 650-975mg and/or Motrin (ibuprofen) 400-600mg, available over the counter, for pain every 6 hours as needed. Do not exceed 4000 milligrams of Tylenol (acetaminophen) daily. You may alternate these medications such that you take either one every 3 hours.    · Antibiotics: Please complete your course of antibiotics (the augmentin) at home.    · Stool softeners: Do not allow yourself to become constipated as this may increase your bother from the stent and/or straining may cause bleeding. Take stool softeners (ex. Colace) or a laxative (ex. Senekot, ExLax), available over the counter, if needed.    · Activity: No heavy lifting or strenuous exercise until you are evaluated at your post-operative appointment. Otherwise, you may return to your usual level of activity.    · Anticoagulation: You may restart all of your anti-coagulation.    · Follow-up: If you did not already schedule your post-operative appointment, please call your urologist to schedule a follow-up appointment.    · Call your urologist if: You have any bleeding that does not stop, inability to void >8 hours, fever over 100.4 F, chills, persistent nausea/vomiting, or if your pain is not controlled on your discharge pain medications.

## 2024-01-18 NOTE — ASU DISCHARGE PLAN (ADULT/PEDIATRIC) - CARE PROVIDER_API CALL
Joay Watkins  Urology  [address] [place] [id number]  Phone: [phone]  Fax: [phone]  Follow Up Time: 1 week

## 2024-01-19 PROBLEM — M19.90 UNSPECIFIED OSTEOARTHRITIS, UNSPECIFIED SITE: Chronic | Status: ACTIVE | Noted: 2024-01-05

## 2024-01-19 PROBLEM — I20.9 ANGINA PECTORIS, UNSPECIFIED: Chronic | Status: ACTIVE | Noted: 2024-01-05

## 2024-01-19 PROBLEM — I27.20 PULMONARY HYPERTENSION, UNSPECIFIED: Chronic | Status: ACTIVE | Noted: 2024-01-05

## 2024-01-22 ENCOUNTER — APPOINTMENT (OUTPATIENT)
Dept: UROLOGY | Facility: CLINIC | Age: 89
End: 2024-01-22
Payer: MEDICARE

## 2024-01-22 ENCOUNTER — OUTPATIENT (OUTPATIENT)
Dept: OUTPATIENT SERVICES | Facility: HOSPITAL | Age: 89
LOS: 1 days | End: 2024-01-22
Payer: MEDICARE

## 2024-01-22 VITALS — SYSTOLIC BLOOD PRESSURE: 134 MMHG | DIASTOLIC BLOOD PRESSURE: 73 MMHG | HEART RATE: 77 BPM

## 2024-01-22 DIAGNOSIS — Z98.61 CORONARY ANGIOPLASTY STATUS: Chronic | ICD-10-CM

## 2024-01-22 DIAGNOSIS — Z98.890 OTHER SPECIFIED POSTPROCEDURAL STATES: Chronic | ICD-10-CM

## 2024-01-22 DIAGNOSIS — N20.1 CALCULUS OF URETER: ICD-10-CM

## 2024-01-22 DIAGNOSIS — R35.0 FREQUENCY OF MICTURITION: ICD-10-CM

## 2024-01-22 DIAGNOSIS — Z98.41 CATARACT EXTRACTION STATUS, RIGHT EYE: Chronic | ICD-10-CM

## 2024-01-22 DIAGNOSIS — R33.9 RETENTION OF URINE, UNSPECIFIED: ICD-10-CM

## 2024-01-22 DIAGNOSIS — Z95.1 PRESENCE OF AORTOCORONARY BYPASS GRAFT: Chronic | ICD-10-CM

## 2024-01-22 PROBLEM — N20.0 CALCULUS OF KIDNEY: Chronic | Status: ACTIVE | Noted: 2024-01-05

## 2024-01-22 PROCEDURE — 51700 IRRIGATION OF BLADDER: CPT

## 2024-01-22 PROCEDURE — 99212 OFFICE O/P EST SF 10 MIN: CPT | Mod: 25

## 2024-01-24 LAB — SURGICAL PATHOLOGY STUDY: SIGNIFICANT CHANGE UP

## 2024-01-25 ENCOUNTER — NON-APPOINTMENT (OUTPATIENT)
Age: 89
End: 2024-01-25

## 2024-01-25 LAB
CELL MATERIAL STONE EST-MCNT: SIGNIFICANT CHANGE UP
LABORATORY COMMENT REPORT: SIGNIFICANT CHANGE UP
NIDUS STONE QN: SIGNIFICANT CHANGE UP

## 2024-01-28 PROBLEM — R33.9 URINARY RETENTION: Status: ACTIVE | Noted: 2020-07-21

## 2024-01-28 PROBLEM — N20.1 RIGHT URETERAL STONE: Status: ACTIVE | Noted: 2023-11-15

## 2024-01-28 NOTE — ASSESSMENT
[FreeTextEntry1] : PROCEDURE NOTE RIGHT Ureteral stent removed intact and, in its entirety, using attached string tether. Pt tolerated well. Post stent removal pain/colic cautions advised.  For voiding trial Bladder filled to 200 cc Patient able to void Voided 300 cc PVR 0 cc  Plan Return in 3 months Renal ultrasound at or before next visit

## 2024-01-28 NOTE — PHYSICAL EXAM
[General Appearance - Well Developed] : well developed [General Appearance - Well Nourished] : well nourished [General Appearance - In No Acute Distress] : no acute distress [] : no respiratory distress [Respiration, Rhythm And Depth] : normal respiratory rhythm and effort [Abdomen Soft] : soft [Exaggerated Use Of Accessory Muscles For Inspiration] : no accessory muscle use [Abdomen Tenderness] : non-tender [Urethral Meatus] : meatus normal [Skin Color & Pigmentation] : normal skin color and pigmentation

## 2024-01-28 NOTE — HISTORY OF PRESENT ILLNESS
[FreeTextEntry1] : Patient is status post right ureteroscopy laser lithotripsy, stent change and insertion of Ramirez catheter on January 18, 2024 Stone analysis  He presented today for Ramirez catheter removal and stent removal   PROCEDURE NOTE RIGHT Ureteral stent removed intact and, in its entirety, using attached string tether. Pt tolerated well. Post stent removal pain/colic cautions advised.  For voiding trial Bladder filled to 200 cc Patient able to void Voided 300 cc PVR 0 cc

## 2024-01-29 DIAGNOSIS — N40.1 BENIGN PROSTATIC HYPERPLASIA WITH LOWER URINARY TRACT SYMPTOMS: ICD-10-CM

## 2024-01-29 DIAGNOSIS — N20.1 CALCULUS OF URETER: ICD-10-CM

## 2024-01-29 DIAGNOSIS — R33.9 RETENTION OF URINE, UNSPECIFIED: ICD-10-CM

## 2024-02-05 NOTE — PROVIDER CONTACT NOTE (OTHER) - ACTION/TREATMENT ORDERED:
Detail Level: Detailed notified PA. PA prescribed Ondansentron and MAALOX. pt report relief. will continue to monitor.

## 2024-03-04 NOTE — ED PROVIDER NOTE - NSCAREINITIATED _GEN_ER
Trino Altamirano) Libtayo Counseling- I discussed with the patient the risks of Libtayo including but not limited to nausea, vomiting, diarrhea, and bone or muscle pain.  The patient verbalized understanding of the proper use and possible adverse effects of Libtayo.  All of the patient's questions and concerns were addressed.

## 2024-04-22 ENCOUNTER — APPOINTMENT (OUTPATIENT)
Dept: UROLOGY | Facility: CLINIC | Age: 89
End: 2024-04-22
Payer: MEDICARE

## 2024-04-22 ENCOUNTER — OUTPATIENT (OUTPATIENT)
Dept: OUTPATIENT SERVICES | Facility: HOSPITAL | Age: 89
LOS: 1 days | End: 2024-04-22
Payer: MEDICARE

## 2024-04-22 VITALS
TEMPERATURE: 97.7 F | HEART RATE: 63 BPM | RESPIRATION RATE: 17 BRPM | WEIGHT: 150 LBS | DIASTOLIC BLOOD PRESSURE: 71 MMHG | SYSTOLIC BLOOD PRESSURE: 155 MMHG | BODY MASS INDEX: 29.45 KG/M2 | HEIGHT: 60 IN

## 2024-04-22 DIAGNOSIS — Z98.61 CORONARY ANGIOPLASTY STATUS: Chronic | ICD-10-CM

## 2024-04-22 DIAGNOSIS — Z98.41 CATARACT EXTRACTION STATUS, RIGHT EYE: Chronic | ICD-10-CM

## 2024-04-22 DIAGNOSIS — N20.0 CALCULUS OF KIDNEY: ICD-10-CM

## 2024-04-22 DIAGNOSIS — Z98.890 OTHER SPECIFIED POSTPROCEDURAL STATES: Chronic | ICD-10-CM

## 2024-04-22 DIAGNOSIS — Z95.1 PRESENCE OF AORTOCORONARY BYPASS GRAFT: Chronic | ICD-10-CM

## 2024-04-22 DIAGNOSIS — N40.1 BENIGN PROSTATIC HYPERPLASIA WITH LOWER URINARY TRACT SYMPMS: ICD-10-CM

## 2024-04-22 DIAGNOSIS — N13.8 BENIGN PROSTATIC HYPERPLASIA WITH LOWER URINARY TRACT SYMPMS: ICD-10-CM

## 2024-04-22 DIAGNOSIS — N40.1 BENIGN PROSTATIC HYPERPLASIA WITH LOWER URINARY TRACT SYMPTOMS: ICD-10-CM

## 2024-04-22 PROCEDURE — 99213 OFFICE O/P EST LOW 20 MIN: CPT

## 2024-04-22 PROCEDURE — 76775 US EXAM ABDO BACK WALL LIM: CPT | Mod: 26

## 2024-04-22 PROCEDURE — 76775 US EXAM ABDO BACK WALL LIM: CPT

## 2024-04-22 NOTE — PHYSICAL EXAM
[General Appearance - Well Developed] : well developed [General Appearance - Well Nourished] : well nourished [] : no respiratory distress [Abdomen Soft] : soft [Skin Color & Pigmentation] : normal skin color and pigmentation

## 2024-04-22 NOTE — ED ADULT TRIAGE NOTE - PAIN: PRESENCE, MLM
I spoke to pt regarding Dr Phelps's order for diazepam 5mg before MRI. I informed him that he will not be able to drive after taking the medication. He had no other questions and stated understanding. He will get from the pharmacy in the next few days.   complains of pain/discomfort

## 2024-05-01 NOTE — PHYSICAL THERAPY INITIAL EVALUATION ADULT - PERTINENT HX OF CURRENT PROBLEM, REHAB EVAL
presents to the ED w/ epigastric discomfort started yesterday while at rest, a/w belching,  and mildly relieved w belching, no fevers, no chills this AM woke up w/ cp a 6/10 and had improvement to a 5/10 while in the ED didn't take home BP meds, no lightheadedness, no dizziness. EKG SR q waves with TWI inferiorly. + elevated troponins. MICU consulted for hyponatremia to 118. Asymptomatic euvolemic hyponatremia. Sodium 126 on 7/9.
right upper arm
left upper arm

## 2024-05-09 NOTE — HISTORY OF PRESENT ILLNESS
[None] : None [FreeTextEntry1] : 89 yr old man accompanied by his daughter today kidney stones. Pt doing well- denies dysuria or hematuria.   hx of right URS on 1/18/24 stone-100% Calcium oxalate monohydrate.   Renal US- no stones [Dysuria] : no dysuria [Hematuria - Gross] : no gross hematuria

## 2024-05-09 NOTE — ASSESSMENT
[FreeTextEntry1] : No stones on ultrasound  Increase fluid intake to 64 ounces.   plan Renal sono ordered to be done at or before next visit Follow up in 6 months

## 2024-05-16 DIAGNOSIS — R35.0 FREQUENCY OF MICTURITION: ICD-10-CM

## 2024-07-19 NOTE — DISCHARGE NOTE ADULT - ADMISSION DATE +STARTOFVISITDATE
Patient has chronic hypothyroidism. TFTs reviewed-   Lab Results   Component Value Date    TSH 0.870 03/24/2023   . Will continue chronic levothyroxine and adjust for and clinical changes.       Statement Selected

## 2024-10-02 ENCOUNTER — NON-APPOINTMENT (OUTPATIENT)
Age: 89
End: 2024-10-02

## 2024-10-23 NOTE — PROGRESS NOTE ADULT - PROVIDER SPECIALTY LIST ADULT
Health Maintenance       DTaP/Tdap/Td Vaccine (1 - Tdap)  Never done    Colorectal Cancer Screen (View Topic Details)  Never done    Shingles Vaccine (1 of 2)  Never done    Hepatitis C Screening (Once)  Never done    Breast Cancer Screening (Every 2 Years)  Ordered on 8/23/2024    Influenza Vaccine (1)  Overdue since 9/1/2024    COVID-19 Vaccine (5 - 2024-25 season)  Overdue since 9/1/2024           Following review of the above:  Patient wishes to discuss with clinician: Colorectal Cancer Screening, Mammogram, COVID-19, Influenza, and Shingles    Note: Refer to final orders and clinician documentation.   Had colonoscopy a few years ago pt does not remember, canceled upcoming mammogram apt.        Cardiology

## 2024-11-04 ENCOUNTER — OUTPATIENT (OUTPATIENT)
Dept: OUTPATIENT SERVICES | Facility: HOSPITAL | Age: 89
LOS: 1 days | End: 2024-11-04
Payer: MEDICARE

## 2024-11-04 ENCOUNTER — APPOINTMENT (OUTPATIENT)
Dept: UROLOGY | Facility: CLINIC | Age: 89
End: 2024-11-04
Payer: MEDICARE

## 2024-11-04 ENCOUNTER — NON-APPOINTMENT (OUTPATIENT)
Age: 89
End: 2024-11-04

## 2024-11-04 VITALS
WEIGHT: 150 LBS | DIASTOLIC BLOOD PRESSURE: 63 MMHG | TEMPERATURE: 97.6 F | HEIGHT: 60 IN | HEART RATE: 75 BPM | SYSTOLIC BLOOD PRESSURE: 150 MMHG | RESPIRATION RATE: 17 BRPM | BODY MASS INDEX: 29.45 KG/M2

## 2024-11-04 DIAGNOSIS — N20.1 CALCULUS OF URETER: ICD-10-CM

## 2024-11-04 DIAGNOSIS — N20.0 CALCULUS OF KIDNEY: ICD-10-CM

## 2024-11-04 DIAGNOSIS — Z95.1 PRESENCE OF AORTOCORONARY BYPASS GRAFT: Chronic | ICD-10-CM

## 2024-11-04 DIAGNOSIS — Z98.890 OTHER SPECIFIED POSTPROCEDURAL STATES: Chronic | ICD-10-CM

## 2024-11-04 DIAGNOSIS — R35.0 FREQUENCY OF MICTURITION: ICD-10-CM

## 2024-11-04 DIAGNOSIS — Z87.898 PERSONAL HISTORY OF OTHER SPECIFIED CONDITIONS: ICD-10-CM

## 2024-11-04 DIAGNOSIS — Z98.61 CORONARY ANGIOPLASTY STATUS: Chronic | ICD-10-CM

## 2024-11-04 DIAGNOSIS — Z98.41 CATARACT EXTRACTION STATUS, RIGHT EYE: Chronic | ICD-10-CM

## 2024-11-04 DIAGNOSIS — Z87.438 PERSONAL HISTORY OF OTHER DISEASES OF MALE GENITAL ORGANS: ICD-10-CM

## 2024-11-04 DIAGNOSIS — N40.1 BENIGN PROSTATIC HYPERPLASIA WITH LOWER URINARY TRACT SYMPMS: ICD-10-CM

## 2024-11-04 DIAGNOSIS — N13.8 BENIGN PROSTATIC HYPERPLASIA WITH LOWER URINARY TRACT SYMPMS: ICD-10-CM

## 2024-11-04 PROCEDURE — 76775 US EXAM ABDO BACK WALL LIM: CPT | Mod: 26

## 2024-11-04 PROCEDURE — 99213 OFFICE O/P EST LOW 20 MIN: CPT

## 2024-11-05 PROBLEM — Z87.898 HISTORY OF URINARY RETENTION: Status: RESOLVED | Noted: 2020-07-21 | Resolved: 2024-11-05

## 2024-11-05 PROBLEM — Z87.438 HISTORY OF BALANITIS: Status: RESOLVED | Noted: 2020-07-21 | Resolved: 2024-11-05

## 2024-11-05 PROBLEM — N20.1 RIGHT URETERAL STONE: Status: RESOLVED | Noted: 2023-11-15 | Resolved: 2024-11-05

## 2024-11-06 DIAGNOSIS — N40.1 BENIGN PROSTATIC HYPERPLASIA WITH LOWER URINARY TRACT SYMPTOMS: ICD-10-CM

## 2024-11-06 DIAGNOSIS — N20.0 CALCULUS OF KIDNEY: ICD-10-CM

## 2024-11-12 NOTE — OCCUPATIONAL THERAPY INITIAL EVALUATION ADULT - REHAB POTENTIAL, OT EVAL
[Takes medication as prescribed] : takes [None] : Patient does not have any barriers to medication adherence [Yes] : Reviewed medication list for presence of high-risk medications. [Benzodiazepines] : benzodiazepines good, to achieve stated therapy goals

## 2024-11-20 PROCEDURE — 76775 US EXAM ABDO BACK WALL LIM: CPT

## 2025-04-24 NOTE — PATIENT PROFILE ADULT. - NS PRO OT REFERRAL QUES 1 YN
Detail Level: Detailed Asa Clasification: I Preoperative Diagnosis (For...Diagnosis Name): Surgery for Site Marked?: Yes Proposed Procedure: Flap Anesthesia #1 Type: 1% lidocaine with epinephrine Anesthesia #1 Volume In Cc: 2 Anesthesia #3 Volume In Cc: 0 Time 'time-Out' Performed: 12:30 Time Discharged: 12:50 Surgeon: Jackie Leary MD Dicharge Condition: Stable Patient Discharged To: Spouse Discharge Instructions (Will Render On Patient Hand-Out): 1. Supplies- You will need the following: \\n       • Water & Peroxide      \\n       • Non-Adherent Dressing or Band-Aids \\n       • Q-Tips                      \\n       • Vaseline \\n2. Wound Care\\n? CLEAN wound once DAILY after the pressure dressing is removed. \\n      • Clean wound with Q-Tips soaked in ½ water, ½ hydrogen peroxide. \\n      • Do not reuse Q-Tips. \\n      • Remove all crusted or white/yellow material that can come off easily.\\n      • After cleaning, generously APPLY VASELINE with a clean Q-Tip.\\n? Keep bandage dry \\n? COVER WOUND with a bandaid OR non-adherent dressing (cut to size & tape)\\n  o Keep WRAP in place for 24 hours.\\n  o Keep pressure DRESSING dry and intact ? 24 hours  ? 48 hours\\n  o Leave STERI-STRIPS in place \\n      o until they fall off   \\n      o _________________\\nContinue wound care  \\n      o until sutures are removed  \\n      o until healed or as your doctor directs\\n  o Apply ice packs, 20 minutes on, 20 minutes off for the next 24-48 hours while awake.\\n  o If repair includes a skin graft and you smoke, discontinue smoking for 1 month after your graft. \\n3. Personal Hygiene\\n    A. Showers or baths are allowed as long as the bandage remains dry, as directed. After 24hrs, the sutures may get wet; do NOT soak in water (i.e. baths, sinks, hot tubs or swimming pools/lakes).\\n    B. Heavy lifting and exercise are not allowed until the sutures are removed and/or the wound is healed. \\n4. Prescriptions \\n? Unless the doctor states otherwise, take 1-2 Extra Strength Tylenol every 6hrs as needed for pain. \\n? Alcohol should be avoided for two days.\\n  o Your doctor has prescribed an antibiotic for you to begin taking today as directed. \\n  o Your doctor has prescribed a pain pill for you to take as directed. \\n5. Potential Post-operative complications\\n? INFECTION: Infection seldom occurs when the wound care instructions have been carefully followed. Signs of infection include increasing redness, increased warmth, increasing pain, and white/yellow/green discharge. Contact our office if you experience one of these symptoms. \\n? BLEEDING: Bleeding can occur following surgery. To reduce the possibility of bleeding, follow these instructions:\\n          A. Limit your activities for at least 24 hours\\n          B. Keep the surgery site elevated\\n          C. If surgery was on the face, head, or neck:\\n                 I. Avoid stooping or bending\\n                II. Avoid Straining to have bowel movement\\n               III. Sleep with your head and shoulders elevated on extra pillows\\nIF BLEEDING OCCURS, apply firm constant pressure on the bandage for 20 minutes. That will usually stop minor bleeding. If area continues to bleed, call our office [phone] during business hours. Call our emergency physician on-call number [phone] during evenings, weekends, and holidays. no

## 2025-08-04 ENCOUNTER — APPOINTMENT (OUTPATIENT)
Dept: UROLOGY | Facility: CLINIC | Age: 89
End: 2025-08-04

## (undated) DEVICE — TUBING SUCTION 20FT

## (undated) DEVICE — WARMING BLANKET UPPER ADULT

## (undated) DEVICE — POSITIONER FOAM EGG CRATE ULNAR 2PCS (PINK)

## (undated) DEVICE — GLV 6.5 PROTEXIS (WHITE)

## (undated) DEVICE — BOSTON SCIENTIFC PUMPING SYSTEM SAPS SINGLE ACTION 10CC

## (undated) DEVICE — PRESSURE INFUSOR BAG 3000ML

## (undated) DEVICE — GLV 8 PROTEXIS (WHITE)

## (undated) DEVICE — SOL IRR BAG NS 0.9% 3000ML

## (undated) DEVICE — GLV 7.5 PROTEXIS (WHITE)

## (undated) DEVICE — POSITIONER FOAM HEADREST (PINK)

## (undated) DEVICE — SOL IRR POUR H2O 1500ML

## (undated) DEVICE — FOLEY HOLDER STATLOCK 2 WAY ADULT

## (undated) DEVICE — DRAPE EQUIPMENT BANDED BAG 30 X 30" (SHOWER CAP)

## (undated) DEVICE — GOWN TRIMAX LG

## (undated) DEVICE — VENODYNE/SCD SLEEVE CALF LARGE

## (undated) DEVICE — SOL IRR BAG H2O 3000ML

## (undated) DEVICE — ACMI SELF-SEALING SEAL UP TO 7FR

## (undated) DEVICE — ADAPTER CHECK FLO 9FR STERILE

## (undated) DEVICE — GLV 7 PROTEXIS (WHITE)

## (undated) DEVICE — PACK CYSTO

## (undated) DEVICE — TUBING RANGER FLUID IRRIGATION SET DISP

## (undated) DEVICE — IRR BULB PATHFINDER + 10"

## (undated) DEVICE — SYR ASEPTO